# Patient Record
Sex: FEMALE | Race: BLACK OR AFRICAN AMERICAN | NOT HISPANIC OR LATINO | Employment: OTHER | URBAN - METROPOLITAN AREA
[De-identification: names, ages, dates, MRNs, and addresses within clinical notes are randomized per-mention and may not be internally consistent; named-entity substitution may affect disease eponyms.]

---

## 2017-02-14 ENCOUNTER — LAB VISIT (OUTPATIENT)
Dept: LAB | Facility: HOSPITAL | Age: 67
End: 2017-02-14
Attending: FAMILY MEDICINE
Payer: COMMERCIAL

## 2017-02-14 DIAGNOSIS — E78.5 HLD (HYPERLIPIDEMIA): ICD-10-CM

## 2017-02-14 DIAGNOSIS — D64.9 ANEMIA: ICD-10-CM

## 2017-02-14 DIAGNOSIS — M85.80 OSTEOPENIA: ICD-10-CM

## 2017-02-14 DIAGNOSIS — I10 ESSENTIAL HYPERTENSION: ICD-10-CM

## 2017-02-14 DIAGNOSIS — Z00.00 ROUTINE GENERAL MEDICAL EXAMINATION AT A HEALTH CARE FACILITY: ICD-10-CM

## 2017-02-14 LAB
25(OH)D3+25(OH)D2 SERPL-MCNC: 42 NG/ML
ALBUMIN SERPL BCP-MCNC: 3.5 G/DL
ALP SERPL-CCNC: 74 U/L
ALT SERPL W/O P-5'-P-CCNC: 13 U/L
ANION GAP SERPL CALC-SCNC: 5 MMOL/L
AST SERPL-CCNC: 21 U/L
BASOPHILS # BLD AUTO: 0.01 K/UL
BASOPHILS NFR BLD: 0.2 %
BILIRUB SERPL-MCNC: 0.5 MG/DL
BUN SERPL-MCNC: 18 MG/DL
CALCIUM SERPL-MCNC: 9.3 MG/DL
CHLORIDE SERPL-SCNC: 106 MMOL/L
CHOLEST/HDLC SERPL: 2.1 {RATIO}
CO2 SERPL-SCNC: 30 MMOL/L
CREAT SERPL-MCNC: 0.9 MG/DL
DIFFERENTIAL METHOD: ABNORMAL
EOSINOPHIL # BLD AUTO: 0.1 K/UL
EOSINOPHIL NFR BLD: 2 %
ERYTHROCYTE [DISTWIDTH] IN BLOOD BY AUTOMATED COUNT: 15 %
EST. GFR  (AFRICAN AMERICAN): >60 ML/MIN/1.73 M^2
EST. GFR  (NON AFRICAN AMERICAN): >60 ML/MIN/1.73 M^2
GLUCOSE SERPL-MCNC: 86 MG/DL
HCT VFR BLD AUTO: 35.2 %
HDL/CHOLESTEROL RATIO: 47.3 %
HDLC SERPL-MCNC: 184 MG/DL
HDLC SERPL-MCNC: 87 MG/DL
HGB BLD-MCNC: 11.3 G/DL
LDLC SERPL CALC-MCNC: 87.4 MG/DL
LYMPHOCYTES # BLD AUTO: 2.4 K/UL
LYMPHOCYTES NFR BLD: 44.5 %
MCH RBC QN AUTO: 29.8 PG
MCHC RBC AUTO-ENTMCNC: 32.1 %
MCV RBC AUTO: 93 FL
MONOCYTES # BLD AUTO: 0.4 K/UL
MONOCYTES NFR BLD: 7.2 %
NEUTROPHILS # BLD AUTO: 2.5 K/UL
NEUTROPHILS NFR BLD: 45.9 %
NONHDLC SERPL-MCNC: 97 MG/DL
PLATELET # BLD AUTO: 212 K/UL
PMV BLD AUTO: 10 FL
POTASSIUM SERPL-SCNC: 3.9 MMOL/L
PROT SERPL-MCNC: 7.6 G/DL
RBC # BLD AUTO: 3.79 M/UL
SODIUM SERPL-SCNC: 141 MMOL/L
TRIGL SERPL-MCNC: 48 MG/DL
TSH SERPL DL<=0.005 MIU/L-ACNC: 1.5 UIU/ML
WBC # BLD AUTO: 5.42 K/UL

## 2017-02-14 PROCEDURE — 84443 ASSAY THYROID STIM HORMONE: CPT

## 2017-02-14 PROCEDURE — 85025 COMPLETE CBC W/AUTO DIFF WBC: CPT

## 2017-02-14 PROCEDURE — 80061 LIPID PANEL: CPT

## 2017-02-14 PROCEDURE — 82306 VITAMIN D 25 HYDROXY: CPT

## 2017-02-14 PROCEDURE — 80053 COMPREHEN METABOLIC PANEL: CPT

## 2017-02-14 PROCEDURE — 36415 COLL VENOUS BLD VENIPUNCTURE: CPT | Mod: PO

## 2017-02-21 ENCOUNTER — OFFICE VISIT (OUTPATIENT)
Dept: INTERNAL MEDICINE | Facility: CLINIC | Age: 67
End: 2017-02-21
Payer: COMMERCIAL

## 2017-02-21 VITALS
HEART RATE: 64 BPM | DIASTOLIC BLOOD PRESSURE: 80 MMHG | TEMPERATURE: 98 F | WEIGHT: 168.88 LBS | BODY MASS INDEX: 28.83 KG/M2 | SYSTOLIC BLOOD PRESSURE: 134 MMHG | HEIGHT: 64 IN

## 2017-02-21 DIAGNOSIS — G47.9 SLEEP DISORDER: Primary | ICD-10-CM

## 2017-02-21 DIAGNOSIS — D64.9 ANEMIA, UNSPECIFIED TYPE: ICD-10-CM

## 2017-02-21 DIAGNOSIS — Z12.31 ENCOUNTER FOR SCREENING MAMMOGRAM FOR BREAST CANCER: ICD-10-CM

## 2017-02-21 DIAGNOSIS — R35.0 URINARY FREQUENCY: ICD-10-CM

## 2017-02-21 DIAGNOSIS — E78.5 HYPERLIPIDEMIA, UNSPECIFIED HYPERLIPIDEMIA TYPE: ICD-10-CM

## 2017-02-21 DIAGNOSIS — N64.4 BREAST PAIN, LEFT: ICD-10-CM

## 2017-02-21 DIAGNOSIS — E55.9 VITAMIN D DEFICIENCY DISEASE: ICD-10-CM

## 2017-02-21 DIAGNOSIS — R06.83 SNORING: ICD-10-CM

## 2017-02-21 DIAGNOSIS — N60.02 BREAST CYST, LEFT: ICD-10-CM

## 2017-02-21 DIAGNOSIS — I10 ESSENTIAL HYPERTENSION: ICD-10-CM

## 2017-02-21 LAB
CREAT UR-MCNC: 205 MG/DL
MICROALBUMIN UR DL<=1MG/L-MCNC: 18 UG/ML
MICROALBUMIN/CREATININE RATIO: 8.8 UG/MG

## 2017-02-21 PROCEDURE — 1157F ADVNC CARE PLAN IN RCRD: CPT | Mod: S$GLB,,, | Performed by: FAMILY MEDICINE

## 2017-02-21 PROCEDURE — 1126F AMNT PAIN NOTED NONE PRSNT: CPT | Mod: S$GLB,,, | Performed by: FAMILY MEDICINE

## 2017-02-21 PROCEDURE — 1160F RVW MEDS BY RX/DR IN RCRD: CPT | Mod: S$GLB,,, | Performed by: FAMILY MEDICINE

## 2017-02-21 PROCEDURE — 1159F MED LIST DOCD IN RCRD: CPT | Mod: S$GLB,,, | Performed by: FAMILY MEDICINE

## 2017-02-21 PROCEDURE — 3075F SYST BP GE 130 - 139MM HG: CPT | Mod: S$GLB,,, | Performed by: FAMILY MEDICINE

## 2017-02-21 PROCEDURE — 82570 ASSAY OF URINE CREATININE: CPT

## 2017-02-21 PROCEDURE — 3079F DIAST BP 80-89 MM HG: CPT | Mod: S$GLB,,, | Performed by: FAMILY MEDICINE

## 2017-02-21 PROCEDURE — 99214 OFFICE O/P EST MOD 30 MIN: CPT | Mod: S$GLB,,, | Performed by: FAMILY MEDICINE

## 2017-02-21 PROCEDURE — 99999 PR PBB SHADOW E&M-EST. PATIENT-LVL IV: CPT | Mod: PBBFAC,,, | Performed by: FAMILY MEDICINE

## 2017-02-21 NOTE — PROGRESS NOTES
Subjective:      Patient ID: Xin Taylor is a 66 y.o. female.    Chief Complaint: Follow-up (6m multiple issues)    HPI Comments: Patient's coming in today for follow-up of multiple issues and labs.     1. Pt snores a good bit. wants to be screened for sleep apnea. Still tired when waking up in am. Now staying with someone to tell her that she does a lot.  Also reports that she doesn't have restorative sleep.     2. She also complains of urinary frequency mostly at night.  She does drink a good bit a water.  She reports that she does not sleep well because she seems to go to the bathroom a lot at nighttime.  She doesn't seem to have as many symptoms during the daytime.      3. Hyperlipidemia- she is now back on the Lipitor.  Labs reviewed today.  She's actually doing very well.  Also on coenzyme Q 10.      4.. Anemia- currently stable at 11.4.  Normocytic.    No blood loss.  Colonoscopy done 2015 no evidence of bleeding or polyps.    5. Blood pressures controlled with amlodipine 5. No ha. No cp.    6.  She does occasionally report some left chest pain and sometimes breast tenderness.  She had a left breast cyst noted on her ultrasound when done with her last mammogram.  At this time he just is intermittent.  It is not terrible but she just wanted to mention it.  She does not want to proceed forward with doing an ultrasound now but would like to consider doing it at her July appointment.         Lab Results   Component Value Date    WBC 5.42 02/14/2017    HGB 11.3 (L) 02/14/2017    HCT 35.2 (L) 02/14/2017     02/14/2017    CHOL 184 02/14/2017    TRIG 48 02/14/2017    HDL 87 (H) 02/14/2017    ALT 13 02/14/2017    AST 21 02/14/2017     02/14/2017    K 3.9 02/14/2017     02/14/2017    CREATININE 0.9 02/14/2017    BUN 18 02/14/2017    CO2 30 (H) 02/14/2017    TSH 1.496 02/14/2017       Review of Systems   Constitutional: Negative for activity change, appetite change, fatigue and unexpected weight  change.   HENT: Negative for trouble swallowing and voice change.    Respiratory: Negative for cough and shortness of breath.    Cardiovascular: Positive for chest pain and leg swelling. Negative for palpitations.   Genitourinary: Positive for frequency. Negative for difficulty urinating.   Neurological: Negative for light-headedness and headaches.   Psychiatric/Behavioral: Positive for sleep disturbance. The patient is not nervous/anxious.      Objective:     Physical Exam   Constitutional: She appears well-developed and well-nourished.   HENT:   Head: Normocephalic and atraumatic.   Right Ear: Tympanic membrane normal.   Left Ear: Tympanic membrane normal.   Mouth/Throat: Oropharynx is clear and moist.   Eyes: Conjunctivae and EOM are normal.   Neck: Normal range of motion. Neck supple.   Cardiovascular: Normal rate and regular rhythm.    Pulmonary/Chest: Effort normal and breath sounds normal.   Psychiatric: She has a normal mood and affect. Her behavior is normal.   Nursing note and vitals reviewed.    Assessment:     1. Sleep disorder    2. Snoring    3. Essential hypertension    4. Anemia, unspecified type    5. Hyperlipidemia, unspecified hyperlipidemia type    6. Urinary frequency    7. Breast pain, left    8. Breast cyst, left    9. Encounter for screening mammogram for breast cancer    10. Vitamin D deficiency disease      Plan:   Xin was seen today for follow-up.    Diagnoses and all orders for this visit:    Sleep disorder-new problem.  Comments:  pt would like to have sleep study due to snoring and fatigue in am. nonrestorative sleep.  Will refer to sleep disorders clinic to evaluate for sleep apnea.  Orders:  -     Ambulatory consult to Sleep Disorders  -     TSH; Future  -     Lipid panel; Future  -     Comprehensive metabolic panel; Future  -     CBC auto differential; Future  -     Microalbumin/creatinine urine ratio  -     Vitamin D; Future  -     Iron and TIBC; Future    Snoring-new finding.   She's now staying with someone who can tell her that she just more extensively.  We'll refer her to sleep study clinic.  -     Ambulatory consult to Sleep Disorders  -     TSH; Future  -     Lipid panel; Future  -     Comprehensive metabolic panel; Future  -     CBC auto differential; Future  -     Microalbumin/creatinine urine ratio  -     Vitamin D; Future  -     Iron and TIBC; Future    Essential hypertension-stable with amlodipine 5 mg.  Continue with diet and exercise.  -     Ambulatory consult to Sleep Disorders  -     TSH; Future  -     Lipid panel; Future  -     Comprehensive metabolic panel; Future  -     CBC auto differential; Future  -     Microalbumin/creatinine urine ratio  -     Vitamin D; Future  -     Iron and TIBC; Future    Anemia, unspecified type-stable.  Up-to-date on colonoscopy.  Continue with B complex as well as iron tablets.  -     TSH; Future  -     Lipid panel; Future  -     Comprehensive metabolic panel; Future  -     CBC auto differential; Future  -     Microalbumin/creatinine urine ratio  -     Vitamin D; Future  -     Iron and TIBC; Future    Hyperlipidemia, unspecified hyperlipidemia type-improved with atorvastatin.  Continue with medication.  -     TSH; Future  -     Lipid panel; Future  -     Comprehensive metabolic panel; Future  -     CBC auto differential; Future  -     Microalbumin/creatinine urine ratio  -     Vitamin D; Future  -     Iron and TIBC; Future    Urinary frequency-worse only at nighttime.  Advised patient to start keeping a diary and to limit her fluid intake after 7 PM.  Still not improving can consider medication versus referral to urologist.  -     TSH; Future  -     Lipid panel; Future  -     Comprehensive metabolic panel; Future  -     CBC auto differential; Future  -     Microalbumin/creatinine urine ratio  -     Vitamin D; Future  -     Iron and TIBC; Future    Breast pain, left  Comments:  periodically, with documented left breast cyst on exam. schedule  repeats in july. wants to hold off for now.  Advised that if symptoms worsen can perform ultrasound and diagnostic mammogram sooner.  Orders:  -     US Breast Left Limited; Future  -     Mammo Digital Diagnostic Left with CAD; Future  -     TSH; Future  -     Lipid panel; Future  -     Comprehensive metabolic panel; Future  -     CBC auto differential; Future  -     Microalbumin/creatinine urine ratio  -     Vitamin D; Future  -     Iron and TIBC; Future    Breast cyst, left-noted on last breast ultrasound.  Repeating  -     US Breast Left Limited; Future  -     Mammo Digital Diagnostic Left with CAD; Future  -     TSH; Future  -     Lipid panel; Future  -     Comprehensive metabolic panel; Future  -     CBC auto differential; Future  -     Microalbumin/creatinine urine ratio  -     Vitamin D; Future  -     Iron and TIBC; Future    Encounter for screening mammogram for breast cancer  -     Mammo Digital Screening Right with Tomosynthesis_CAD; Future  -     TSH; Future  -     Lipid panel; Future  -     Comprehensive metabolic panel; Future  -     CBC auto differential; Future  -     Microalbumin/creatinine urine ratio  -     Vitamin D; Future  -     Iron and TIBC; Future    Vitamin D deficiency disease-history of, repeating labs prior to next visit.  -     TSH; Future  -     Lipid panel; Future  -     Comprehensive metabolic panel; Future  -     CBC auto differential; Future  -     Microalbumin/creatinine urine ratio  -     Vitamin D; Future  -     Iron and TIBC; Future          Return in about 6 months (around 8/21/2017) for physical.

## 2017-02-21 NOTE — MR AVS SNAPSHOT
[facility]  [address] [place] [address]  Phone: [phone]  Fax: [phone]                  Xin Taylor   2017 11:00 AM   Office Visit    Description:  Female : 1950   Provider:  Breann Carlson MD   Department[facility]  [facility]           Reason for Visit     Follow-up           Diagnoses this Visit        Comments    Sleep disorder    -  Primary pt would like to have sleep study due to snoring and fatigue in am. nonrestorative sleep.     Snoring         Essential hypertension         Anemia, unspecified type         Hyperlipidemia, unspecified hyperlipidemia type         Urinary frequency         Breast pain, left     periodically, with documented left breast cyst on exam. schedule repeats in july. wants to hold off for now.     Breast cyst, left         Encounter for screening mammogram for breast cancer         Vitamin D deficiency disease                To Do List           Future Appointments        Provider Department Dept Phone    2017 2:00 PM Vito Muse MD [facility] [phone]    8/15/2017 7:40 AM LAB[facility]ATORY, [facility] [phone]    2017 11:00 AM Breann Carlson MD [facility] [phone]      Goals (5 Years of Data)     None      Follow-Up and Disposition     Return in about 6 months (around 2017) for physical.      [facility]     [facility] Nurse Care Line -  Assistance  Registered nurses in the [facility] provide clinical advisement, health education, appointment booking, and other advisory services.  Call for this free service at [phone].             Medications           Message regarding Medications     Verify the changes and/or additions to your medication regime listed below are the same as discussed with your clinician today.  If any of these changes or additions are incorrect, please notify your  "healthcare provider.             Verify that the below list of medications is an accurate representation of the medications you are currently taking.  If none reported, the list may be blank. If incorrect, please contact your healthcare provider. Carry this list with you in case of emergency.           Current Medications     amlodipine (NORVASC) 5 MG tablet Take 1 tablet (5 mg total) by mouth nightly.    aspirin 81 mg Tab Take 1 tablet by mouth Use as needed.    atorvastatin (LIPITOR) 10 MG tablet Take 1 tablet (10 mg total) by mouth once daily.    b complex vitamins tablet Take 1 tablet by mouth once daily.    biotin 1 mg tablet Take 1,000 mcg by mouth 3 (three) times daily.    co-enzyme Q-10 30 mg capsule Take 30 mg by mouth 3 (three) times daily.    fish oil-omega-3 fatty acids 300-1,000 mg capsule Take 2 g by mouth once daily.    melatonin 1 mg Tab     multivitamin (ONE DAILY MULTIVITAMIN) per tablet Take 1 tablet by mouth once daily.    vitamin D 1000 units Tab Take 185 mg by mouth once daily.           Clinical Reference Information           Your Vitals Were     BP Pulse Temp Height Weight BMI    134/80 64 98 °F (36.7 °C) 5' 4" (1.626 m) 76.6 kg (168 lb 14 oz) 28.99 kg/m2      Blood Pressure          Most Recent Value    BP  134/80      Allergies as of 2/21/2017     No Known Drug Allergies      Immunizations Administered on Date of Encounter - 2/21/2017     None      Orders Placed During Today's Visit      Normal Orders This Visit    Ambulatory consult to Sleep Disorders     Microalbumin/creatinine urine ratio     Future Labs/Procedures Expected by Expires    CBC auto differential  2/21/2017 4/22/2018    Comprehensive metabolic panel  2/21/2017 4/22/2018    Iron and TIBC  2/21/2017 4/22/2018    Lipid panel  2/21/2017 4/22/2018    Mammo Digital Screening Right with Tomosynthesis_CAD  2/21/2017 4/21/2018    TSH  2/21/2017 4/22/2018    Vitamin D  2/21/2017 4/22/2018    Mammo Digital Diagnostic Left with CAD  " 7/28/2017 2/21/2018    US Breast Left Limited  7/28/2017 2/21/2018      [facility] Sign-Up     Activating your [facility] account is as easy as 1-2-3!     1) Visit [email or web address], select Sign Up Now, enter this activation code and your date of birth, then select Next.  [email or web address]  Expires: 4/7/2017 10:54 AM      2) Create a username and password to use when you visit [facility] in the future and select a security question in case you lose your password and select Next.    3) Enter your e-mail address and click Sign Up!    Additional Information  If you have questions, please e-mail [email or web address] or call [phone] to talk to our [facility] staff. Remember, [facility] is NOT to be used for urgent needs. For medical emergencies, dial 911.         Language Assistance Services     ATTENTION: Language assistance services are available, free of charge. Please call [phone].      ATENCIÓN: Si habla andre, tiene a corral disposición servicios gratuitos de asistencia lingüística. Llame al [phone].     CHÚ Ý: N?u b?n nói Ti?ng Vi?t, có các d?ch v? h? tr? ngôn ng? mi?n phí dành cho b?n. G?i s? [phone].         [facility] complies with applicable Federal civil rights laws and does not discriminate on the basis of race, color, national origin, age, disability, or sex.

## 2017-03-31 ENCOUNTER — TELEPHONE (OUTPATIENT)
Dept: PULMONOLOGY | Facility: CLINIC | Age: 67
End: 2017-03-31

## 2017-03-31 NOTE — TELEPHONE ENCOUNTER
----- Message from Nallely Kaye sent at 3/31/2017 12:24 PM CDT -----  Contact: pt  Pt is calling nurse staff regarding pt appt for April 6th at 2:00 pm  appt.  Pt call back to be advised [phone] thanks

## 2017-05-18 ENCOUNTER — TELEPHONE (OUTPATIENT)
Dept: SLEEP MEDICINE | Facility: CLINIC | Age: 67
End: 2017-05-18

## 2017-05-18 DIAGNOSIS — G47.30 SLEEP DISORDER BREATHING: Primary | ICD-10-CM

## 2017-05-19 ENCOUNTER — OFFICE VISIT (OUTPATIENT)
Dept: PULMONOLOGY | Facility: CLINIC | Age: 67
End: 2017-05-19
Payer: COMMERCIAL

## 2017-05-19 ENCOUNTER — HOSPITAL ENCOUNTER (OUTPATIENT)
Dept: RADIOLOGY | Facility: HOSPITAL | Age: 67
Discharge: HOME | End: 2017-05-19
Attending: INTERNAL MEDICINE
Payer: COMMERCIAL

## 2017-05-19 VITALS
HEIGHT: 64 IN | RESPIRATION RATE: 18 BRPM | WEIGHT: 171.06 LBS | BODY MASS INDEX: 29.2 KG/M2 | HEART RATE: 89 BPM | DIASTOLIC BLOOD PRESSURE: 82 MMHG | OXYGEN SATURATION: 98 % | SYSTOLIC BLOOD PRESSURE: 110 MMHG

## 2017-05-19 DIAGNOSIS — G47.33 SLEEP APNEA, OBSTRUCTIVE: Primary | Chronic | ICD-10-CM

## 2017-05-19 DIAGNOSIS — I10 ESSENTIAL HYPERTENSION: ICD-10-CM

## 2017-05-19 DIAGNOSIS — K13.79 MASS OF ORAL CAVITY: ICD-10-CM

## 2017-05-19 DIAGNOSIS — G47.30 SLEEP DISORDER BREATHING: ICD-10-CM

## 2017-05-19 PROCEDURE — 1157F ADVNC CARE PLAN IN RCRD: CPT | Mod: 8P,S$GLB,, | Performed by: INTERNAL MEDICINE

## 2017-05-19 PROCEDURE — 99205 OFFICE O/P NEW HI 60 MIN: CPT | Mod: S$GLB,,, | Performed by: INTERNAL MEDICINE

## 2017-05-19 PROCEDURE — 3074F SYST BP LT 130 MM HG: CPT | Mod: S$GLB,,, | Performed by: INTERNAL MEDICINE

## 2017-05-19 PROCEDURE — 1159F MED LIST DOCD IN RCRD: CPT | Mod: S$GLB,,, | Performed by: INTERNAL MEDICINE

## 2017-05-19 PROCEDURE — 71020 XR CHEST PA AND LATERAL: CPT | Mod: TC,PO

## 2017-05-19 PROCEDURE — 1126F AMNT PAIN NOTED NONE PRSNT: CPT | Mod: S$GLB,,, | Performed by: INTERNAL MEDICINE

## 2017-05-19 PROCEDURE — 71020 XR CHEST PA AND LATERAL: CPT | Mod: 26,,, | Performed by: RADIOLOGY

## 2017-05-19 PROCEDURE — 1160F RVW MEDS BY RX/DR IN RCRD: CPT | Mod: S$GLB,,, | Performed by: INTERNAL MEDICINE

## 2017-05-19 PROCEDURE — 99999 PR PBB SHADOW E&M-EST. PATIENT-LVL IV: CPT | Mod: PBBFAC,,, | Performed by: INTERNAL MEDICINE

## 2017-05-19 PROCEDURE — 3079F DIAST BP 80-89 MM HG: CPT | Mod: S$GLB,,, | Performed by: INTERNAL MEDICINE

## 2017-05-19 NOTE — LETTER
May 19, 2017      Breann Carlson MD  [address]  [address]  [place] [address]           [facility]  [address]  [place] [address]  Phone: [phone]  Fax: [phone]          Patient: Xin Taylor   MR Number: [id number]   YOB: 1950   Date of Visit: 5/19/2017       Dear Dr. Breann Carlson:    Thank you for referring Xin Taylor to me for evaluation. Attached you will find relevant portions of my assessment and plan of care.    If you have questions, please do not hesitate to call me. I look forward to following Xin Taylor along with you.    Sincerely,    Vito Muse MD    Enclosure  CC:  No Recipients    If you would like to receive this communication electronically, please contact [email or web address] or [phone] to request more information on [employer] access.    For providers and/or their staff who would like to refer a patient to [facility], please contact us through our one-stop-shop provider referral line, [facility], at [phone].    If you feel you have received this communication in error or would no longer like to receive these types of communications, please e-mail [email or web address]

## 2017-05-19 NOTE — PATIENT INSTRUCTIONS
Your provider has scheduled you for a sleep study.   You should be receiving a phone call from the sleep lab shortly after your study has been approved by your insurance. Please make sure you have your current phone numbers in the [facility] system. If you do not hear from anyone in the next 10 business days, please call the sleep lab at [phone] to schedule your sleep study. The sleep studies are performed at [facility] seven nights a week.  When you are scheduling your sleep study, they will also make you a follow up appointment with your provider. This follow up appointment will be 10-14 days after your sleep study to review the results. If it is noted that you do have sleep apnea on your initial sleep study, you may receive a call back for a second night study with the CPAP before you come back to the office.     Thank You    Thank you for choosing the PULMONARY MEDICINE DEPARTMENT at [facility]. At [facility], your satisfaction is our priority. You may receive a survey asking about your experience with us. We would appreciate you taking the time to complete and return the survey. Our goal is to provide you with a level 5 or Very Good experience. We thank you for allowing us to serve you and value your feedback.    Your Nurse/Medical Assistant: ___Sid Villegas___________________________    Sincerely,  Pulmonary Department  Phone: [phone]  Fax: [phone]

## 2017-05-19 NOTE — MR AVS SNAPSHOT
[facility] - Pulmonary Services  [address] [place] [address]  Phone: [phone]  Fax: [phone]                  Xin Taylor   2017 2:40 PM   Office Visit    Description:  Female : 1950   Provider:  Vito Muse MD   Department:  [facility] - Pulmonary Services           Reason for Visit     Sleep Eval           Diagnoses this Visit        Comments    Essential hypertension    -  Primary     Mass of oral cavity         Sleep apnea, obstructive                To Do List           Future Appointments        Provider Department Dept Phone    2017 9:45 AM [facility] US2 [facility] [phone]    2017 10:30 AM [facility] MAMMO2-DX [facility] [phone]    8/15/2017 7:40 AM LABORATORY, [facility] [phone]    2017 11:00 AM [facility] [phone]      Goals (5 Years of Data)     None      Follow-Up and Disposition     Return for Follow up Sleep Clinic after test.      [facility]     [facility] Care Line -  Assistance  Unless otherwise directed by your provider, please contact [facility], our nurse care line that is available for  assistance.     Registered nurses in the [facility] provide: appointment scheduling, clinical advisement, health education, and other advisory services.  Call: [phone] (toll free)               Medications           Message regarding Medications     Verify the changes and/or additions to your medication regime listed below are the same as discussed with your clinician today.  If any of these changes or additions are incorrect, please notify your healthcare provider.        STOP taking these medications     biotin 1 mg tablet Take 1,000 mcg by mouth 3 (three) times daily.    melatonin 1 mg Tab            Verify that the below list of medications is an accurate representation of the medications you are  "currently taking.  If none reported, the list may be blank. If incorrect, please contact your healthcare provider. Carry this list with you in case of emergency.           Current Medications     amlodipine (NORVASC) 5 MG tablet Take 1 tablet (5 mg total) by mouth nightly.    aspirin 81 mg Tab Take 1 tablet by mouth Use as needed.    atorvastatin (LIPITOR) 10 MG tablet Take 1 tablet (10 mg total) by mouth once daily.    b complex vitamins tablet Take 1 tablet by mouth once daily.    co-enzyme Q-10 30 mg capsule Take 30 mg by mouth 3 (three) times daily.    fish oil-omega-3 fatty acids 300-1,000 mg capsule Take 2 g by mouth once daily.    multivitamin (ONE DAILY MULTIVITAMIN) per tablet Take 1 tablet by mouth once daily.    KEGEP-UXUIC-EOV TEA-EGCG-DIG#3 ORAL Take by mouth.    vitamin D 1000 units Tab Take 185 mg by mouth once daily.           Clinical Reference Information           Your Vitals Were     BP Pulse Resp Height Weight SpO2    110/82 (BP Location: Left arm, Patient Position: Sitting, BP Method: Manual) 89 18 5' 4" (1.626 m) 77.6 kg (171 lb 1.2 oz) 98%    BMI                29.37 kg/m2          Blood Pressure          Most Recent Value    BP  110/82      Allergies as of 5/19/2017     No Known Drug Allergies      Immunizations Administered on Date of Encounter - 5/19/2017     None      Orders Placed During Today's Visit     Future Labs/Procedures Expected by Expires    Polysomnogram (CPAP will be added if patient meets diagnostic criteria.)  As directed 5/19/2018      [facility] Sign-Up     Activating your [facility] account is as easy as 1-2-3!     1) Visit [email or web address], select Sign Up Now, enter this activation code and your date of birth, then select Next.  [email or web address]  Expires: 7/3/2017  3:41 PM      2) Create a username and password to use when you visit [facility] in the future and select a security question in case you lose your password and select Next.    3) Enter your e-mail address and " click Sign Up!    Additional Information  If you have questions, please e-mail [email or web address] or call [phone] to talk to our [facility] staff. Remember, [facility] is NOT to be used for urgent needs. For medical emergencies, dial 911.         Instructions    Your provider has scheduled you for a sleep study.   You should be receiving a phone call from the sleep lab shortly after your study has been approved by your insurance. Please make sure you have your current phone numbers in the [facility] system. If you do not hear from anyone in the next 10 business days, please call the sleep lab at [phone] to schedule your sleep study. The sleep studies are performed at [facility] seven nights a week.  When you are scheduling your sleep study, they will also make you a follow up appointment with your provider. This follow up appointment will be 10-14 days after your sleep study to review the results. If it is noted that you do have sleep apnea on your initial sleep study, you may receive a call back for a second night study with the CPAP before you come back to the office.     Thank You    Thank you for choosing the PULMONARY MEDICINE DEPARTMENT at [facility]. At [facility], your satisfaction is our priority. You may receive a survey asking about your experience with us. We would appreciate you taking the time to complete and return the survey. Our goal is to provide you with a level 5 or Very Good experience. We thank you for allowing us to serve you and value your feedback.    Your Nurse/Medical Assistant: ___Sid Villegas___________________________    Sincerely,  Pulmonary Department  Phone: [phone]  Fax: [phone]              Language Assistance Services     ATTENTION: Language assistance services are available, free of charge. Please call [phone].      ATENCIÓN: Si habla español, tiene a corral disposición servicios gratuitos de asistencia  lingüística. Brayden al [phone].     KAVON Ý: N?u b?n nói Ti?ng Vi?t, có các d?ch v? h? tr? ngôn ng? mi?n phí dành cho b?n. G?i s? [phone].         [facility] - [facility] Services complies with applicable Federal civil rights laws and does not discriminate on the basis of race, color, national origin, age, disability, or sex.
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
## 2017-05-30 DIAGNOSIS — G47.33 SLEEP APNEA, OBSTRUCTIVE: Primary | Chronic | ICD-10-CM

## 2017-06-10 RX ORDER — ATORVASTATIN CALCIUM 10 MG/1
10 TABLET, FILM COATED ORAL DAILY
Qty: 90 TABLET | Refills: 3 | Status: SHIPPED | OUTPATIENT
Start: 2017-06-10 | End: 2018-02-22 | Stop reason: SDUPTHER

## 2017-06-12 ENCOUNTER — OFFICE VISIT (OUTPATIENT)
Dept: SLEEP MEDICINE | Facility: CLINIC | Age: 67
End: 2017-06-12
Payer: COMMERCIAL

## 2017-06-12 DIAGNOSIS — G47.33 SLEEP APNEA, OBSTRUCTIVE: Chronic | ICD-10-CM

## 2017-06-12 PROCEDURE — 95806 SLEEP STUDY UNATT&RESP EFFT: CPT | Mod: S$GLB,,, | Performed by: INTERNAL MEDICINE

## 2017-06-12 PROCEDURE — 99499 UNLISTED E&M SERVICE: CPT | Mod: S$GLB,,, | Performed by: INTERNAL MEDICINE

## 2017-06-12 PROCEDURE — 99999 PR PBB SHADOW E&M-EST. PATIENT-LVL II: CPT | Mod: PBBFAC,,,

## 2017-06-12 NOTE — Clinical Note
Please inform patient's sleep study shows she has obstructive sleep apnea and I will be ordering a CPAP device for her.

## 2017-06-13 ENCOUNTER — TELEPHONE (OUTPATIENT)
Dept: PULMONOLOGY | Facility: CLINIC | Age: 67
End: 2017-06-13

## 2017-06-13 NOTE — TELEPHONE ENCOUNTER
----- Message from Vito Muse MD sent at 6/12/2017  9:50 PM CDT -----  Please inform patient's sleep study shows she has obstructive sleep apnea and I will be ordering a CPAP device for her.

## 2017-06-13 NOTE — PROGRESS NOTES
Assessment and Recommendations  Device duration 7 hours 55 minutes. Excluded respiratory signal 2 minutes excluded O2 signal 2 minutes.  Lowest oxygen saturation was 80%. Snoring recorded about 50 dB 99% of the time. Apnea-hypopnea  index/respiratory event index 13.4 events per hour. Total events 106.  Obstructive sleep apnea-hypopnea syndrome is detected.  Treatment with CPAP is indicated. Auto PAP 4-20 cm water pressure.  If patient is intolerant to CPAP then other modalities may be considered including oral appliance therapy,  mandibular advancement device, nasal Provent where appropriate. ENT appropriate procedures in the correct  candidate.  Follow-up with ordering clinician

## 2017-07-28 ENCOUNTER — TELEPHONE (OUTPATIENT)
Dept: INTERNAL MEDICINE | Facility: CLINIC | Age: 67
End: 2017-07-28

## 2017-07-28 DIAGNOSIS — Z12.31 ENCOUNTER FOR SCREENING MAMMOGRAM FOR BREAST CANCER: Primary | ICD-10-CM

## 2017-08-15 ENCOUNTER — LAB VISIT (OUTPATIENT)
Dept: LAB | Facility: HOSPITAL | Age: 67
End: 2017-08-15
Attending: FAMILY MEDICINE
Payer: COMMERCIAL

## 2017-08-15 DIAGNOSIS — R06.83 SNORING: ICD-10-CM

## 2017-08-15 DIAGNOSIS — R35.0 URINARY FREQUENCY: ICD-10-CM

## 2017-08-15 DIAGNOSIS — G47.9 SLEEP DISORDER: ICD-10-CM

## 2017-08-15 DIAGNOSIS — Z12.31 ENCOUNTER FOR SCREENING MAMMOGRAM FOR BREAST CANCER: ICD-10-CM

## 2017-08-15 DIAGNOSIS — I10 ESSENTIAL HYPERTENSION: ICD-10-CM

## 2017-08-15 DIAGNOSIS — D64.9 ANEMIA, UNSPECIFIED TYPE: ICD-10-CM

## 2017-08-15 DIAGNOSIS — E78.5 HYPERLIPIDEMIA, UNSPECIFIED HYPERLIPIDEMIA TYPE: ICD-10-CM

## 2017-08-15 DIAGNOSIS — N64.4 BREAST PAIN, LEFT: ICD-10-CM

## 2017-08-15 DIAGNOSIS — N60.02 BREAST CYST, LEFT: ICD-10-CM

## 2017-08-15 DIAGNOSIS — E55.9 VITAMIN D DEFICIENCY DISEASE: ICD-10-CM

## 2017-08-15 LAB
25(OH)D3+25(OH)D2 SERPL-MCNC: 43 NG/ML
ALBUMIN SERPL BCP-MCNC: 3.5 G/DL
ALP SERPL-CCNC: 76 U/L
ALT SERPL W/O P-5'-P-CCNC: 19 U/L
ANION GAP SERPL CALC-SCNC: 6 MMOL/L
AST SERPL-CCNC: 22 U/L
BASOPHILS # BLD AUTO: 0.01 K/UL
BASOPHILS NFR BLD: 0.2 %
BILIRUB SERPL-MCNC: 0.5 MG/DL
BUN SERPL-MCNC: 13 MG/DL
CALCIUM SERPL-MCNC: 9.4 MG/DL
CHLORIDE SERPL-SCNC: 109 MMOL/L
CHOLEST/HDLC SERPL: 2 {RATIO}
CO2 SERPL-SCNC: 28 MMOL/L
CREAT SERPL-MCNC: 0.8 MG/DL
DIFFERENTIAL METHOD: ABNORMAL
EOSINOPHIL # BLD AUTO: 0.1 K/UL
EOSINOPHIL NFR BLD: 1.9 %
ERYTHROCYTE [DISTWIDTH] IN BLOOD BY AUTOMATED COUNT: 15.4 %
EST. GFR  (AFRICAN AMERICAN): >60 ML/MIN/1.73 M^2
EST. GFR  (NON AFRICAN AMERICAN): >60 ML/MIN/1.73 M^2
GLUCOSE SERPL-MCNC: 83 MG/DL
HCT VFR BLD AUTO: 33.2 %
HDL/CHOLESTEROL RATIO: 49.7 %
HDLC SERPL-MCNC: 169 MG/DL
HDLC SERPL-MCNC: 84 MG/DL
HGB BLD-MCNC: 11.1 G/DL
IRON SERPL-MCNC: 66 UG/DL
LDLC SERPL CALC-MCNC: 76.4 MG/DL
LYMPHOCYTES # BLD AUTO: 2.2 K/UL
LYMPHOCYTES NFR BLD: 47.8 %
MCH RBC QN AUTO: 30 PG
MCHC RBC AUTO-ENTMCNC: 33.4 G/DL
MCV RBC AUTO: 90 FL
MONOCYTES # BLD AUTO: 0.4 K/UL
MONOCYTES NFR BLD: 8.2 %
NEUTROPHILS # BLD AUTO: 1.9 K/UL
NEUTROPHILS NFR BLD: 41.7 %
NONHDLC SERPL-MCNC: 85 MG/DL
PLATELET # BLD AUTO: 240 K/UL
PMV BLD AUTO: 9.9 FL
POTASSIUM SERPL-SCNC: 3.7 MMOL/L
PROT SERPL-MCNC: 7.4 G/DL
RBC # BLD AUTO: 3.7 M/UL
SATURATED IRON: 15 %
SODIUM SERPL-SCNC: 143 MMOL/L
TOTAL IRON BINDING CAPACITY: 443 UG/DL
TRANSFERRIN SERPL-MCNC: 299 MG/DL
TRIGL SERPL-MCNC: 43 MG/DL
TSH SERPL DL<=0.005 MIU/L-ACNC: 1.45 UIU/ML
WBC # BLD AUTO: 4.62 K/UL

## 2017-08-15 PROCEDURE — 36415 COLL VENOUS BLD VENIPUNCTURE: CPT | Mod: PO

## 2017-08-15 PROCEDURE — 80053 COMPREHEN METABOLIC PANEL: CPT

## 2017-08-15 PROCEDURE — 80061 LIPID PANEL: CPT

## 2017-08-15 PROCEDURE — 83540 ASSAY OF IRON: CPT

## 2017-08-15 PROCEDURE — 84443 ASSAY THYROID STIM HORMONE: CPT

## 2017-08-15 PROCEDURE — 82306 VITAMIN D 25 HYDROXY: CPT

## 2017-08-15 PROCEDURE — 85025 COMPLETE CBC W/AUTO DIFF WBC: CPT

## 2017-08-22 ENCOUNTER — OFFICE VISIT (OUTPATIENT)
Dept: INTERNAL MEDICINE | Facility: CLINIC | Age: 67
End: 2017-08-22
Payer: COMMERCIAL

## 2017-08-22 VITALS
HEART RATE: 82 BPM | WEIGHT: 178.56 LBS | SYSTOLIC BLOOD PRESSURE: 122 MMHG | DIASTOLIC BLOOD PRESSURE: 80 MMHG | HEIGHT: 64 IN | BODY MASS INDEX: 30.48 KG/M2 | TEMPERATURE: 98 F

## 2017-08-22 DIAGNOSIS — D50.8 IRON DEFICIENCY ANEMIA SECONDARY TO INADEQUATE DIETARY IRON INTAKE: ICD-10-CM

## 2017-08-22 DIAGNOSIS — E78.5 HYPERLIPIDEMIA, UNSPECIFIED HYPERLIPIDEMIA TYPE: ICD-10-CM

## 2017-08-22 DIAGNOSIS — G47.33 SLEEP APNEA, OBSTRUCTIVE: Chronic | ICD-10-CM

## 2017-08-22 DIAGNOSIS — M85.80 OSTEOPENIA, UNSPECIFIED LOCATION: ICD-10-CM

## 2017-08-22 DIAGNOSIS — Z00.00 ROUTINE GENERAL MEDICAL EXAMINATION AT A HEALTH CARE FACILITY: Primary | ICD-10-CM

## 2017-08-22 DIAGNOSIS — I10 ESSENTIAL HYPERTENSION: ICD-10-CM

## 2017-08-22 DIAGNOSIS — M17.0 PRIMARY OSTEOARTHRITIS OF BOTH KNEES: ICD-10-CM

## 2017-08-22 PROCEDURE — 99999 PR PBB SHADOW E&M-EST. PATIENT-LVL III: CPT | Mod: PBBFAC,,, | Performed by: FAMILY MEDICINE

## 2017-08-22 PROCEDURE — 99397 PER PM REEVAL EST PAT 65+ YR: CPT | Mod: S$GLB,,, | Performed by: FAMILY MEDICINE

## 2017-08-22 RX ORDER — FERROUS SULFATE 325(65) MG
325 TABLET ORAL DAILY
Qty: 100 TABLET | Refills: 3 | Status: SHIPPED | OUTPATIENT
Start: 2017-08-22 | End: 2018-08-21

## 2017-08-22 RX ORDER — GUAIFENESIN AND PHENYLEPHRINE HCL 400; 10 MG/1; MG/1
500 TABLET ORAL 2 TIMES DAILY
Qty: 100 CAPSULE | Refills: 4
Start: 2017-08-22

## 2017-08-22 NOTE — PROGRESS NOTES
Subjective:      Patient ID: iXn Taylor is a 66 y.o. female.    Chief Complaint: Annual Exam    Patient's coming in today for follow-up of multiple issues and labs and prevention exam..     1. Pt is now on a CPAP machine.  She actually finds in the last month she feels much more restorative.  Her blood pressure is much improved.  Her energy level is much improved.      2. She does occasionally have some knee pain and discomfort especially more in the right knee than the left.  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  She has difficulty getting up from the bathtub and difficulty getting up from the laying down position.  Mainly this is because she can't fully flex her knees.  She was interested in possibly using turmeric.     3. Hyperlipidemia- she is now back on the Lipitor.  Labs reviewed today.  She's actually doing very well.  Also on coenzyme Q 10.      4.. Anemia- currently stable at 11.4.  Normocytic.    No blood loss.  Colonoscopy done 2015 no evidence of bleeding or polyps.  Is willing to increase her iron because once again saturations are low.    5. Blood pressures controlled with amlodipine 5. No ha. No cp.  Improved with the use of CPAP as well.    6.  Reviewed her bone density noted to be osteopenia.  Discussed need for 1200 mg of calcium through diet and vitamin D supplementation through supplements.  Currently vitamin D levels look good.        Lab Results   Component Value Date    WBC 4.62 08/15/2017    HGB 11.1 (L) 08/15/2017    HCT 33.2 (L) 08/15/2017     08/15/2017    CHOL 169 08/15/2017    TRIG 43 08/15/2017    HDL 84 (H) 08/15/2017    ALT 19 08/15/2017    AST 22 08/15/2017     08/15/2017    K 3.7 08/15/2017     08/15/2017    CREATININE 0.8 08/15/2017    BUN 13 08/15/2017    CO2 28 08/15/2017    TSH 1.446 08/15/2017       Review of Systems   Constitutional: Negative for chills, fatigue and fever.   HENT: Negative for ear pain and trouble swallowing.     Eyes: Negative for pain and visual disturbance.   Respiratory: Negative for cough and shortness of breath.    Cardiovascular: Negative for chest pain and leg swelling.   Gastrointestinal: Negative for abdominal pain, blood in stool, nausea and vomiting.   Endocrine: Negative for cold intolerance and heat intolerance.   Genitourinary: Negative for dysuria and frequency.   Musculoskeletal: Positive for arthralgias. Negative for joint swelling, myalgias and neck pain.   Skin: Negative for color change and rash.   Neurological: Negative for dizziness and headaches.   Psychiatric/Behavioral: Negative for behavioral problems, decreased concentration, dysphoric mood and sleep disturbance.     Objective:     Physical Exam   Constitutional: She is oriented to person, place, and time. She appears well-developed and well-nourished.   HENT:   Head: Normocephalic and atraumatic.   Right Ear: External ear normal.   Left Ear: External ear normal.   Mouth/Throat: Oropharynx is clear and moist.   Eyes: EOM are normal.   Neck: Normal range of motion. Neck supple. No thyromegaly present.   Cardiovascular: Normal rate and regular rhythm.  Exam reveals no gallop and no friction rub.    No murmur heard.  Pulmonary/Chest: Effort normal. No respiratory distress. She has no wheezes. She has no rales.   Abdominal: Soft. Bowel sounds are normal. She exhibits no distension. There is no tenderness. There is no rebound.   Musculoskeletal: Normal range of motion. She exhibits no edema.        Right knee: She exhibits normal range of motion and no swelling. Tenderness found. Medial joint line tenderness noted.        Left knee: She exhibits normal range of motion and no swelling. Tenderness found. Medial joint line tenderness noted.   Lymphadenopathy:     She has no cervical adenopathy.   Neurological: She is alert and oriented to person, place, and time.   Skin: Skin is warm and dry. No rash noted.   Psychiatric: She has a normal mood and  affect. Her behavior is normal. Judgment and thought content normal.   Vitals reviewed.    Assessment:     1. Routine general medical examination at a health care facility    2. Hyperlipidemia, unspecified hyperlipidemia type    3. Essential hypertension    4. Iron deficiency anemia secondary to inadequate dietary iron intake    5. Sleep apnea, obstructive    6. Osteopenia, unspecified location    7. Primary osteoarthritis of both knees      Plan:   Xin was seen today for annual exam.    Diagnoses and all orders for this visit:    Routine general medical examination at a health care facility - labs reviewed. Discussed Health Maintenance issues.       Hyperlipidemia, unspecified hyperlipidemia type - stable, Continue with current medications and interventions. Labs reviewed.     -     Lipid panel; Future  -     Comprehensive metabolic panel; Future  -     CBC auto differential; Future  -     TSH; Future  -     Iron and TIBC; Future    Essential hypertension- stable, Continue with current medications and interventions. Labs reviewed.  -     Lipid panel; Future  -     Comprehensive metabolic panel; Future  -     CBC auto differential; Future  -     TSH; Future  -     Iron and TIBC; Future    Iron deficiency anemia secondary to inadequate dietary iron intake-add an additional iron supplement mid day.  Anemia stable at 11-  -     Lipid panel; Future  -     Comprehensive metabolic panel; Future  -     CBC auto differential; Future  -     TSH; Future  -     Iron and TIBC; Future    Sleep apnea, obstructive-Improved with CPAP using now nightly.  Finds benefit from device.  -     Lipid panel; Future  -     Comprehensive metabolic panel; Future  -     CBC auto differential; Future  -     TSH; Future  -     Iron and TIBC; Future    Osteopenia, unspecified location-Review DEXA scan obtained 1200 mg calcium through diet vitamin D3 supplement at goal levels.    Primary osteoarthritis of both knees-Okay to start daily  supplement of turmeric root extract 500 mg Cap    Other orders  -     ferrous sulfate 325 mg (65 mg iron) Tab tablet; Take 1 tablet (325 mg total) by mouth once daily.  -     turmeric root extract 500 mg Cap; Take 500 mg by mouth 2 (two) times daily.    She has a mammogram scheduled for tomorrow.            Return in about 6 months (around 2/22/2018) for chol, bp, anemia, iron.

## 2017-08-23 ENCOUNTER — HOSPITAL ENCOUNTER (OUTPATIENT)
Dept: RADIOLOGY | Facility: HOSPITAL | Age: 67
Discharge: HOME | End: 2017-08-23
Attending: FAMILY MEDICINE
Payer: COMMERCIAL

## 2017-08-23 DIAGNOSIS — Z12.31 ENCOUNTER FOR SCREENING MAMMOGRAM FOR BREAST CANCER: ICD-10-CM

## 2017-08-23 PROCEDURE — 77063 BREAST TOMOSYNTHESIS BI: CPT | Mod: 26,,, | Performed by: RADIOLOGY

## 2017-08-23 PROCEDURE — 77067 SCR MAMMO BI INCL CAD: CPT | Mod: 26,,, | Performed by: RADIOLOGY

## 2017-08-23 PROCEDURE — 77067 SCR MAMMO BI INCL CAD: CPT | Mod: TC

## 2017-09-14 ENCOUNTER — OFFICE VISIT (OUTPATIENT)
Dept: SLEEP MEDICINE | Facility: CLINIC | Age: 67
End: 2017-09-14
Payer: COMMERCIAL

## 2017-09-14 VITALS
HEIGHT: 64 IN | OXYGEN SATURATION: 98 % | SYSTOLIC BLOOD PRESSURE: 110 MMHG | HEART RATE: 97 BPM | BODY MASS INDEX: 30.14 KG/M2 | RESPIRATION RATE: 18 BRPM | WEIGHT: 176.56 LBS | DIASTOLIC BLOOD PRESSURE: 62 MMHG

## 2017-09-14 DIAGNOSIS — G47.33 OSA ON CPAP: Primary | ICD-10-CM

## 2017-09-14 PROCEDURE — 3008F BODY MASS INDEX DOCD: CPT | Mod: S$GLB,,, | Performed by: INTERNAL MEDICINE

## 2017-09-14 PROCEDURE — 3078F DIAST BP <80 MM HG: CPT | Mod: S$GLB,,, | Performed by: INTERNAL MEDICINE

## 2017-09-14 PROCEDURE — 3074F SYST BP LT 130 MM HG: CPT | Mod: S$GLB,,, | Performed by: INTERNAL MEDICINE

## 2017-09-14 PROCEDURE — 99999 PR PBB SHADOW E&M-EST. PATIENT-LVL III: CPT | Mod: PBBFAC,,, | Performed by: INTERNAL MEDICINE

## 2017-09-14 PROCEDURE — 99214 OFFICE O/P EST MOD 30 MIN: CPT | Mod: S$GLB,,, | Performed by: INTERNAL MEDICINE

## 2017-09-14 PROCEDURE — 1159F MED LIST DOCD IN RCRD: CPT | Mod: S$GLB,,, | Performed by: INTERNAL MEDICINE

## 2017-09-14 NOTE — PROGRESS NOTES
"Subjective:       Patient ID: Xin Taylor is a 66 y.o. female.    Chief Complaint: kathryn on cpap    Ms. Denise Taylor comes to see me as a follow-up  She had a sleep study.  AHI was 13.4 events per hour.  I prescribed AutoPap 4-20 cm water pressure   she is using the device and benefits from it  Mean pressure was 10.2.  Residual AHI is 7.0.    She is having some mask leak about an hour  Her usage greater than 4 hours was 93%.  I like her to have change of mask to see whether this will help of mask leak  She'll follow-up annually.  She'll let me know if there are any issues with mask  She still some issues with her maxillary teeth on the upper left which she'll address with a dentist        Review of Systems   Constitutional: Negative.    HENT: Negative.    Eyes: Negative.    Respiratory: Negative.    Genitourinary: Negative.    Endocrine: endocrine negative   Musculoskeletal: Negative.    Skin: Negative.    Gastrointestinal: Negative.    Neurological: Negative.    Psychiatric/Behavioral: Negative.        Objective:       Vitals:    09/14/17 0940   BP: 110/62   Pulse: 97   Resp: 18   SpO2: 98%   Weight: 80.1 kg (176 lb 9.4 oz)   Height: 5' 4" (1.626 m)     Physical Exam   Constitutional: She is oriented to person, place, and time. She appears well-developed and well-nourished.   HENT:   Head: Normocephalic.   Nose: Nose normal.   Neck: Normal range of motion.   Musculoskeletal: Normal range of motion.   Neurological: She is alert and oriented to person, place, and time.   Skin: Skin is warm and dry.   Psychiatric: She has a normal mood and affect.   Nursing note and vitals reviewed.    Personal Diagnostic Review  DOWNLOAD    8/14/2017 - 9/12/2017  YOB: 1950  Mask:  Compliance Summary  8/14/2017 - 9/12/2017 (30 days)  Days with Device Usage 30 days  Days without Device Usage 0 days  Percent Days with Device Usage 100.0%  Cumulative Usage 7 days 21 hrs. 4 mins. 3 secs.  Maximum Usage (1 Day) 9 hrs. 14 " mins. 45 secs.  Average Usage (All Days) 6 hrs. 18 mins. 8 secs.  Average Usage (Days Used) 6 hrs. 18 mins. 8 secs.  Minimum Usage (1 Day) 3 hrs. 55 secs.  Percent of Days with Usage >= 4 Hours 93.3%  Percent of Days with Usage < 4 Hours 6.7%  Date Range  Total Blower Time 7 days 21 hrs. 4 mins. 13 secs.  Average AHI 7.0  Auto CPAP Summary  Auto CPAP Mean Pressure 10.2 cmH2O  Auto CPAP Peak Average Pressure 14.1 cmH2O  Average Device Pressure <= 90% of Time 13.2 cmH2O  Average Time in Large Leak Per Day 1 hrs. 6 mins. 18 secs.  No flowsheet data found.     PSG  Assessment and Recommendations  Device duration 7 hours 55 minutes. Excluded respiratory signal 2 minutes excluded O2 signal 2 minutes.  Lowest oxygen saturation was 80%. Snoring recorded about 50 dB 99% of the time. Apnea-hypopnea  index/respiratory event index 13.4 events per hour. Total events 106.  Obstructive sleep apnea-hypopnea syndrome is detected.  Treatment with CPAP is indicated. Auto PAP 4-20 cm water pressure.  If patient is intolerant to CPAP then other modalities may be considered including oral appliance therapy,  mandibular advancement device, nasal Provent where appropriate. ENT appropriate procedures in the correct  candidate.  Follow-up with ordering clinician      Assessment:       Problem List Items Addressed This Visit     ASIF on CPAP - Primary    Relevant Orders    CPAP/BIPAP SUPPLIES      Other Visit Diagnoses    None.       Plan:       Patient using device and benefits     Follow with dental pratitioner    Return in about 1 year (around 9/14/2018) for Download, CPAP supplies.    This note was prepared using voice recognition system and is likely to have sound alike errors that may have been overlooked even after proof reading.  Please call me with any questions    TIME SPENT WITH PATIENT:     Time spent: 20 minutes in face to face  discussion concerning diagnosis, prognosis, review of lab and test results, benefits of treatment as  well as management of disease, counseling of patient and coordination of care between various health  care providers . Greater than half the time spent was used for coordination of care and counseling of patient.     Vito Muse    Pulmonary/Critical care/Sleepmedicine

## 2017-11-29 ENCOUNTER — IMMUNIZATION (OUTPATIENT)
Dept: INTERNAL MEDICINE | Facility: CLINIC | Age: 67
End: 2017-11-29
Payer: COMMERCIAL

## 2017-11-29 PROCEDURE — 90662 IIV NO PRSV INCREASED AG IM: CPT | Mod: S$GLB,,, | Performed by: PEDIATRICS

## 2017-11-29 PROCEDURE — 90471 IMMUNIZATION ADMIN: CPT | Mod: S$GLB,,, | Performed by: PEDIATRICS

## 2017-11-30 ENCOUNTER — TELEPHONE (OUTPATIENT)
Dept: INTERNAL MEDICINE | Facility: CLINIC | Age: 67
End: 2017-11-30

## 2017-11-30 NOTE — TELEPHONE ENCOUNTER
----- Message from Reji Streeter sent at 11/30/2017 12:34 PM CST -----  Contact: Pt  States she's calling to get an order for the pneumonia shot placed in system and wants to have it done at the [facility] location and can be reached at [phone]

## 2017-12-01 RX ORDER — AMLODIPINE BESYLATE 5 MG/1
5 TABLET ORAL NIGHTLY
Qty: 90 TABLET | Refills: 3 | Status: SHIPPED | OUTPATIENT
Start: 2017-12-01 | End: 2018-02-22 | Stop reason: SDUPTHER

## 2017-12-01 NOTE — TELEPHONE ENCOUNTER
----- Message from Isha Shields sent at 12/1/2017 11:25 AM CST -----  Contact: mick alegre  Pt needs a refill on amlodipine ,,, [facility] [address],,, please call pt when done at [phone]

## 2018-02-19 ENCOUNTER — LAB VISIT (OUTPATIENT)
Dept: LAB | Facility: HOSPITAL | Age: 68
End: 2018-02-19
Attending: FAMILY MEDICINE
Payer: COMMERCIAL

## 2018-02-19 DIAGNOSIS — E78.5 HYPERLIPIDEMIA, UNSPECIFIED HYPERLIPIDEMIA TYPE: ICD-10-CM

## 2018-02-19 DIAGNOSIS — D50.8 IRON DEFICIENCY ANEMIA SECONDARY TO INADEQUATE DIETARY IRON INTAKE: ICD-10-CM

## 2018-02-19 DIAGNOSIS — G47.33 SLEEP APNEA, OBSTRUCTIVE: Chronic | ICD-10-CM

## 2018-02-19 DIAGNOSIS — I10 ESSENTIAL HYPERTENSION: ICD-10-CM

## 2018-02-19 LAB
ALBUMIN SERPL BCP-MCNC: 3.3 G/DL
ALP SERPL-CCNC: 83 U/L
ALT SERPL W/O P-5'-P-CCNC: 14 U/L
ANION GAP SERPL CALC-SCNC: 10 MMOL/L
AST SERPL-CCNC: 20 U/L
BASOPHILS # BLD AUTO: 0.03 K/UL
BASOPHILS NFR BLD: 0.6 %
BILIRUB SERPL-MCNC: 0.5 MG/DL
BUN SERPL-MCNC: 13 MG/DL
CALCIUM SERPL-MCNC: 9.3 MG/DL
CHLORIDE SERPL-SCNC: 106 MMOL/L
CHOLEST SERPL-MCNC: 174 MG/DL
CHOLEST/HDLC SERPL: 2.1 {RATIO}
CO2 SERPL-SCNC: 27 MMOL/L
CREAT SERPL-MCNC: 0.9 MG/DL
DIFFERENTIAL METHOD: ABNORMAL
EOSINOPHIL # BLD AUTO: 0.2 K/UL
EOSINOPHIL NFR BLD: 3.2 %
ERYTHROCYTE [DISTWIDTH] IN BLOOD BY AUTOMATED COUNT: 15.1 %
EST. GFR  (AFRICAN AMERICAN): >60 ML/MIN/1.73 M^2
EST. GFR  (NON AFRICAN AMERICAN): >60 ML/MIN/1.73 M^2
GLUCOSE SERPL-MCNC: 90 MG/DL
HCT VFR BLD AUTO: 33.6 %
HDLC SERPL-MCNC: 83 MG/DL
HDLC SERPL: 47.7 %
HGB BLD-MCNC: 11.1 G/DL
IMM GRANULOCYTES # BLD AUTO: 0.01 K/UL
IMM GRANULOCYTES NFR BLD AUTO: 0.2 %
IRON SERPL-MCNC: 67 UG/DL
LDLC SERPL CALC-MCNC: 78 MG/DL
LYMPHOCYTES # BLD AUTO: 2.1 K/UL
LYMPHOCYTES NFR BLD: 38.2 %
MCH RBC QN AUTO: 30.1 PG
MCHC RBC AUTO-ENTMCNC: 33 G/DL
MCV RBC AUTO: 91 FL
MONOCYTES # BLD AUTO: 0.5 K/UL
MONOCYTES NFR BLD: 9.1 %
NEUTROPHILS # BLD AUTO: 2.6 K/UL
NEUTROPHILS NFR BLD: 48.7 %
NONHDLC SERPL-MCNC: 91 MG/DL
NRBC BLD-RTO: 0 /100 WBC
PLATELET # BLD AUTO: 215 K/UL
PMV BLD AUTO: 9.8 FL
POTASSIUM SERPL-SCNC: 3.9 MMOL/L
PROT SERPL-MCNC: 7.6 G/DL
RBC # BLD AUTO: 3.69 M/UL
SATURATED IRON: 16 %
SODIUM SERPL-SCNC: 143 MMOL/L
TOTAL IRON BINDING CAPACITY: 419 UG/DL
TRANSFERRIN SERPL-MCNC: 283 MG/DL
TRIGL SERPL-MCNC: 65 MG/DL
TSH SERPL DL<=0.005 MIU/L-ACNC: 1.39 UIU/ML
WBC # BLD AUTO: 5.39 K/UL

## 2018-02-19 PROCEDURE — 84443 ASSAY THYROID STIM HORMONE: CPT

## 2018-02-19 PROCEDURE — 80053 COMPREHEN METABOLIC PANEL: CPT

## 2018-02-19 PROCEDURE — 80061 LIPID PANEL: CPT

## 2018-02-19 PROCEDURE — 36415 COLL VENOUS BLD VENIPUNCTURE: CPT | Mod: PO

## 2018-02-19 PROCEDURE — 83540 ASSAY OF IRON: CPT

## 2018-02-19 PROCEDURE — 85025 COMPLETE CBC W/AUTO DIFF WBC: CPT

## 2018-02-22 ENCOUNTER — TELEPHONE (OUTPATIENT)
Dept: INTERNAL MEDICINE | Facility: CLINIC | Age: 68
End: 2018-02-22

## 2018-02-22 ENCOUNTER — OFFICE VISIT (OUTPATIENT)
Dept: INTERNAL MEDICINE | Facility: CLINIC | Age: 68
End: 2018-02-22
Payer: COMMERCIAL

## 2018-02-22 VITALS
BODY MASS INDEX: 29.89 KG/M2 | TEMPERATURE: 99 F | HEIGHT: 64 IN | SYSTOLIC BLOOD PRESSURE: 144 MMHG | WEIGHT: 175.06 LBS | DIASTOLIC BLOOD PRESSURE: 84 MMHG

## 2018-02-22 DIAGNOSIS — G89.29 CHRONIC PAIN OF RIGHT KNEE: ICD-10-CM

## 2018-02-22 DIAGNOSIS — G89.29 CHRONIC RIGHT SHOULDER PAIN: ICD-10-CM

## 2018-02-22 DIAGNOSIS — M79.671 RIGHT FOOT PAIN: ICD-10-CM

## 2018-02-22 DIAGNOSIS — D50.8 IRON DEFICIENCY ANEMIA SECONDARY TO INADEQUATE DIETARY IRON INTAKE: ICD-10-CM

## 2018-02-22 DIAGNOSIS — E78.5 HYPERLIPIDEMIA, UNSPECIFIED HYPERLIPIDEMIA TYPE: Primary | ICD-10-CM

## 2018-02-22 DIAGNOSIS — M25.561 CHRONIC PAIN OF RIGHT KNEE: ICD-10-CM

## 2018-02-22 DIAGNOSIS — M25.511 CHRONIC RIGHT SHOULDER PAIN: ICD-10-CM

## 2018-02-22 DIAGNOSIS — M79.89 LEG SWELLING: ICD-10-CM

## 2018-02-22 PROCEDURE — 1126F AMNT PAIN NOTED NONE PRSNT: CPT | Mod: S$GLB,,, | Performed by: FAMILY MEDICINE

## 2018-02-22 PROCEDURE — 99999 PR PBB SHADOW E&M-EST. PATIENT-LVL III: CPT | Mod: PBBFAC,,, | Performed by: FAMILY MEDICINE

## 2018-02-22 PROCEDURE — 1159F MED LIST DOCD IN RCRD: CPT | Mod: S$GLB,,, | Performed by: FAMILY MEDICINE

## 2018-02-22 PROCEDURE — 99214 OFFICE O/P EST MOD 30 MIN: CPT | Mod: S$GLB,,, | Performed by: FAMILY MEDICINE

## 2018-02-22 PROCEDURE — 3008F BODY MASS INDEX DOCD: CPT | Mod: S$GLB,,, | Performed by: FAMILY MEDICINE

## 2018-02-22 RX ORDER — ATORVASTATIN CALCIUM 10 MG/1
5 TABLET, FILM COATED ORAL DAILY
Qty: 90 TABLET | Refills: 3 | Status: SHIPPED | OUTPATIENT
Start: 2018-02-22 | End: 2019-04-05 | Stop reason: SDUPTHER

## 2018-02-22 RX ORDER — TRIAMTERENE/HYDROCHLOROTHIAZID 37.5-25 MG
1 TABLET ORAL DAILY
Qty: 90 TABLET | Refills: 3 | Status: SHIPPED | OUTPATIENT
Start: 2018-02-22 | End: 2019-03-11 | Stop reason: SDUPTHER

## 2018-02-22 RX ORDER — MELOXICAM 15 MG/1
15 TABLET ORAL DAILY
Qty: 20 TABLET | Refills: 0 | Status: SHIPPED | OUTPATIENT
Start: 2018-02-22 | End: 2018-03-22 | Stop reason: SDUPTHER

## 2018-02-22 RX ORDER — AMLODIPINE BESYLATE 2.5 MG/1
2.5 TABLET ORAL NIGHTLY
Qty: 90 TABLET | Refills: 3 | Status: SHIPPED | OUTPATIENT
Start: 2018-02-22 | End: 2019-03-04 | Stop reason: SDUPTHER

## 2018-02-22 NOTE — TELEPHONE ENCOUNTER
----- Message from Tio Palma sent at 2/22/2018  9:46 AM CST -----  Contact: [phone]   09:50 - Pt running 15 mins late- called no [id number] (pt's cell number is [phone]).

## 2018-02-22 NOTE — PROGRESS NOTES
Subjective:      Patient ID: Xin Taylor is a 67 y.o. female.    Chief Complaint: Follow-up (6 months labs, meds, joint pains, leg swelling)    Disclaimer:  This note is prepared using voice recognition software and as such is likely to have errors and has not been proof read. Please contact me for questions.     Patient's coming in today for follow-up of multiple issues and labs.     1. Pt is doing well other than the fact that she's been having more joint pains especially the right side.  Mainly on the right shoulder right knee and right foot.  She did buy several supplements wanted make sure she could take them.  She is currently on tumor ache.  She also believes some of this contributed to taking the Lipitor 10.  She finds that when she wears a knee sleeve it seems to help the right knee.  Her foot is also been bothersome.      Blood pressures actually slightly elevated today.  On amlodipine 5 mg by mouth B did discuss that this can call some additional leg swelling.  She feels like she could benefit from a diuretic.  She's willing to check adjust medicines at this time.    Cholesterol levels are much improved with Lipitor 10 but she's concerned about possible joint pains.  I did discuss with her that we could consider decreasing it down to 5 mg.  She's willing to try this as well.    She doesn't find that that he works very helpful for her arthritis.  She bought some glucosamine/chondroitin.  I believe should benefit from some meloxicam only short-term.  This may raise her blood pressure some but she be willing to try it.  Kidney functions normal    Still has anemia with low iron levels but no change.  Was taking her iron with her meal.  Willing to  out.            Lab Results   Component Value Date    WBC 5.39 02/19/2018    HGB 11.1 (L) 02/19/2018    HCT 33.6 (L) 02/19/2018     02/19/2018    CHOL 174 02/19/2018    TRIG 65 02/19/2018    HDL 83 (H) 02/19/2018    ALT 14 02/19/2018    AST 20  "02/19/2018     02/19/2018    K 3.9 02/19/2018     02/19/2018    CREATININE 0.9 02/19/2018    BUN 13 02/19/2018    CO2 27 02/19/2018    TSH 1.386 02/19/2018       Review of Systems   Constitutional: Positive for activity change. Negative for appetite change, fatigue and unexpected weight change.   Respiratory: Negative for cough and shortness of breath.    Cardiovascular: Positive for leg swelling. Negative for chest pain and palpitations.   Musculoskeletal: Positive for arthralgias, gait problem, joint swelling and myalgias.   Psychiatric/Behavioral: Negative for sleep disturbance. The patient is not nervous/anxious.      Objective:     Vitals:    02/22/18 1021   BP: (!) 144/84   Temp: 99.4 °F (37.4 °C)   TempSrc: Tympanic   Weight: 79.4 kg (175 lb 0.7 oz)   Height: 5' 4" (1.626 m)     Physical Exam   Constitutional: She appears well-developed and well-nourished.   HENT:   Head: Normocephalic and atraumatic.   Right Ear: Tympanic membrane normal.   Left Ear: Tympanic membrane normal.   Mouth/Throat: Oropharynx is clear and moist.   Eyes: Conjunctivae and EOM are normal.   Neck: Normal range of motion. Neck supple.   Cardiovascular: Normal rate and regular rhythm.    Pulmonary/Chest: Effort normal and breath sounds normal.   Musculoskeletal:        Right shoulder: She exhibits decreased range of motion, tenderness, pain and spasm.        Right knee: She exhibits decreased range of motion and swelling. Tenderness found. Medial joint line tenderness noted.        Right foot: There is tenderness and swelling.   Psychiatric: She has a normal mood and affect. Her behavior is normal.   Nursing note and vitals reviewed.    Assessment:     1. Hyperlipidemia, unspecified hyperlipidemia type    2. Iron deficiency anemia secondary to inadequate dietary iron intake    3. Chronic right shoulder pain    4. Right foot pain    5. Chronic pain of right knee    6. Leg swelling      Plan:   Xin was seen today for " follow-up.    Diagnoses and all orders for this visit:    Hyperlipidemia, unspecified hyperlipidemia type  Comments:  well controlled, but with joint aches, cut to 5mg. on the Lipitor labs reviewed    Iron deficiency anemia secondary to inadequate dietary iron intake  Comments:  move to taking iron tablets separate from diary in diet.  Stable but still not improved with the iron levels    Chronic right shoulder pain-new Will give a trial of NSAIDs and changing medicines  Comments:  trial of mobic x 1 week, and decreasing lipitor. if not improving, then xray and ortho consideration.     Right foot pain-new Will give a trial of NSAIDs and changing medicines  Comments:  trial of mobic x 1 week, and decreasing lipitor. if not improving, then xray and ortho consideration. dec amlodpine for less swelling    Chronic pain of right knee-new Will give a trial of NSAIDs and changing medicines  Comments:  trial of mobic x 1 week, and decreasing lipitor. if not improving, then xray and ortho consideration.     Leg swelling  Comments:  trial of mobic x 1 week, and decreasing lipitor. if not improving, then xray and ortho consideration. dec amlodpine for less swelling    Other orders  -     atorvastatin (LIPITOR) 10 MG tablet; Take 0.5 tablets (5 mg total) by mouth once daily.  -     triamterene-hydrochlorothiazide 37.5-25 mg (MAXZIDE-25) 37.5-25 mg per tablet; Take 1 tablet by mouth once daily. For BP and diuretic  -     amLODIPine (NORVASC) 2.5 MG tablet; Take 1 tablet (2.5 mg total) by mouth nightly.  -     meloxicam (MOBIC) 15 MG tablet; Take 1 tablet (15 mg total) by mouth once daily. Arthritis/anti-inflammatory            Follow-up in about 4 weeks (around 3/22/2018) for bp, arthritis followup.

## 2018-03-22 ENCOUNTER — TELEPHONE (OUTPATIENT)
Dept: INTERNAL MEDICINE | Facility: CLINIC | Age: 68
End: 2018-03-22

## 2018-03-22 ENCOUNTER — OFFICE VISIT (OUTPATIENT)
Dept: INTERNAL MEDICINE | Facility: CLINIC | Age: 68
End: 2018-03-22
Payer: COMMERCIAL

## 2018-03-22 VITALS
WEIGHT: 177.25 LBS | TEMPERATURE: 98 F | BODY MASS INDEX: 30.26 KG/M2 | OXYGEN SATURATION: 99 % | SYSTOLIC BLOOD PRESSURE: 138 MMHG | HEIGHT: 64 IN | HEART RATE: 70 BPM | DIASTOLIC BLOOD PRESSURE: 82 MMHG

## 2018-03-22 DIAGNOSIS — M25.50 ARTHRALGIA, UNSPECIFIED JOINT: Primary | ICD-10-CM

## 2018-03-22 DIAGNOSIS — I10 ESSENTIAL HYPERTENSION: ICD-10-CM

## 2018-03-22 DIAGNOSIS — E61.1 IRON DEFICIENCY: ICD-10-CM

## 2018-03-22 DIAGNOSIS — E78.5 HYPERLIPIDEMIA, UNSPECIFIED HYPERLIPIDEMIA TYPE: ICD-10-CM

## 2018-03-22 PROCEDURE — 3079F DIAST BP 80-89 MM HG: CPT | Mod: CPTII,S$GLB,, | Performed by: FAMILY MEDICINE

## 2018-03-22 PROCEDURE — 99214 OFFICE O/P EST MOD 30 MIN: CPT | Mod: S$GLB,,, | Performed by: FAMILY MEDICINE

## 2018-03-22 PROCEDURE — 99999 PR PBB SHADOW E&M-EST. PATIENT-LVL III: CPT | Mod: PBBFAC,,, | Performed by: FAMILY MEDICINE

## 2018-03-22 PROCEDURE — 3075F SYST BP GE 130 - 139MM HG: CPT | Mod: CPTII,S$GLB,, | Performed by: FAMILY MEDICINE

## 2018-03-22 RX ORDER — MELOXICAM 15 MG/1
15 TABLET ORAL DAILY PRN
Qty: 30 TABLET | Refills: 1 | Status: SHIPPED | OUTPATIENT
Start: 2018-03-22 | End: 2018-08-21

## 2018-03-22 NOTE — TELEPHONE ENCOUNTER
----- Message from Charley Coulter sent at 3/22/2018 12:06 PM CDT -----  Contact: pt  Pt will be a few minutes late due to traffic. 5-10 minutes.

## 2018-03-22 NOTE — PROGRESS NOTES
Subjective:      Patient ID: Xin Taylor is a 67 y.o. female.    Chief Complaint: Follow-up (4w)    Disclaimer:  This note is prepared using voice recognition software and as such is likely to have errors and has not been proof read. Please contact me for questions.     Patient's coming in today for four-week follow-up of arthralgias.  Since I last saw her we did do meloxicam for about 20 days.  She reports that all of her joint pains dramatically improved.  Especially her knee her foot her neck and her hands.  She was impressed with how much improvement she had.  She however has not been on it for the last week.  She starting to notice some the symptoms are coming back.  She was slightly concerned about seeing the possible risks associated with medicine.  Weeks since we went over the increased risk for cardiovascular events slightly in prolonged use of anti-inflammatories such as meloxicam.  She is using tumor ache but doesn't find that it's very beneficial at this time but she has another when she like to try.  We did discuss potentially that the cardiac vascular risk would be lessened if she does use meloxicam intermittently.  She like to consider doing this as well.    We also reduce the Lipitor from 10-5 mg.  She's uncertain if this is helped either but she's doing well with this dosing today.    Blood pressures are controlled today.  Still on the amlodipine.  Even in the setting of using the anti-inflammatories is doing well.    She's trying to obtain more iron on only to a supplement but also in her diet as well.            Lab Results   Component Value Date    WBC 5.39 02/19/2018    HGB 11.1 (L) 02/19/2018    HCT 33.6 (L) 02/19/2018     02/19/2018    CHOL 174 02/19/2018    TRIG 65 02/19/2018    HDL 83 (H) 02/19/2018    ALT 14 02/19/2018    AST 20 02/19/2018     02/19/2018    K 3.9 02/19/2018     02/19/2018    CREATININE 0.9 02/19/2018    BUN 13 02/19/2018    CO2 27 02/19/2018    TSH  "1.386 02/19/2018       Review of Systems   Constitutional: Positive for activity change. Negative for appetite change, fatigue and unexpected weight change.   Respiratory: Negative for cough and shortness of breath.    Cardiovascular: Negative for chest pain and palpitations.   Musculoskeletal: Negative for arthralgias, back pain, gait problem, joint swelling, myalgias, neck pain and neck stiffness.   Neurological: Negative for light-headedness and headaches.   Psychiatric/Behavioral: Negative for decreased concentration, dysphoric mood and sleep disturbance.     Objective:     Vitals:    03/22/18 1224   BP: 138/82   Pulse: 70   Temp: 98.1 °F (36.7 °C)   SpO2: 99%   Weight: 80.4 kg (177 lb 4 oz)   Height: 5' 4" (1.626 m)     Physical Exam   Constitutional: She appears well-developed and well-nourished.   Cardiovascular: Normal rate, regular rhythm and normal heart sounds.    Pulmonary/Chest: Effort normal and breath sounds normal.   Musculoskeletal:        Right knee: She exhibits normal range of motion. No tenderness found. No medial joint line and no lateral joint line tenderness noted.        Left knee: She exhibits normal range of motion. No tenderness found. No medial joint line tenderness noted.        Cervical back: She exhibits normal range of motion, no tenderness, no bony tenderness and no pain.   Psychiatric: She has a normal mood and affect. Her speech is normal.   Vitals reviewed.    Assessment:     1. Arthralgia, unspecified joint    2. Essential hypertension    3. Hyperlipidemia, unspecified hyperlipidemia type    4. Iron deficiency      Plan:   Xin was seen today for follow-up.    Diagnoses and all orders for this visit:    Arthralgia, unspecified joint  Comments:  much improved with short burst of mobic. given prn rx to use intermittently. discussed risks and benefits with cv events, and ok to use intermittently.   Orders:  -     meloxicam (MOBIC) 15 MG tablet; Take 1 tablet (15 mg total) by " mouth daily as needed for Pain. Arthritis/anti-inflammatory    Essential hypertension  Comments:  bp controlled, check bp when using the meloxicam to monitor for elevations or tinnitus.   Orders:  -     Lipid panel; Future  -     Comprehensive metabolic panel; Future  -     TSH; Future  -     T4, free; Future    Hyperlipidemia, unspecified hyperlipidemia type  Comments:  cont with lipitor at 5mg. Labs prior to next visit.   Orders:  -     Lipid panel; Future  -     Comprehensive metabolic panel; Future  -     TSH; Future  -     T4, free; Future    Iron deficiency- continue with supplementation on only good diet but also to vitamins.  -     CBC auto differential; Future  -     Iron and TIBC; Future            Follow-up in about 5 months (around 8/22/2018) for F/u WT, Labs, Meds Dr Carlson.

## 2018-03-23 ENCOUNTER — TELEPHONE (OUTPATIENT)
Dept: INTERNAL MEDICINE | Facility: CLINIC | Age: 68
End: 2018-03-23

## 2018-03-23 NOTE — TELEPHONE ENCOUNTER
Her bp was good at last visit. If still high this afternoon > 150/90 then take additional dose of the amlodipine 2.5mg tablet.

## 2018-08-14 ENCOUNTER — LAB VISIT (OUTPATIENT)
Dept: LAB | Facility: HOSPITAL | Age: 68
End: 2018-08-14
Attending: FAMILY MEDICINE
Payer: COMMERCIAL

## 2018-08-14 DIAGNOSIS — E61.1 IRON DEFICIENCY: ICD-10-CM

## 2018-08-14 DIAGNOSIS — I10 ESSENTIAL HYPERTENSION: ICD-10-CM

## 2018-08-14 DIAGNOSIS — E78.5 HYPERLIPIDEMIA, UNSPECIFIED HYPERLIPIDEMIA TYPE: ICD-10-CM

## 2018-08-14 LAB
ALBUMIN SERPL BCP-MCNC: 3.7 G/DL
ALP SERPL-CCNC: 75 U/L
ALT SERPL W/O P-5'-P-CCNC: 13 U/L
ANION GAP SERPL CALC-SCNC: 9 MMOL/L
AST SERPL-CCNC: 20 U/L
BASOPHILS # BLD AUTO: 0.02 K/UL
BASOPHILS NFR BLD: 0.4 %
BILIRUB SERPL-MCNC: 0.5 MG/DL
BUN SERPL-MCNC: 21 MG/DL
CALCIUM SERPL-MCNC: 9.7 MG/DL
CHLORIDE SERPL-SCNC: 106 MMOL/L
CHOLEST SERPL-MCNC: 176 MG/DL
CHOLEST/HDLC SERPL: 2.3 {RATIO}
CO2 SERPL-SCNC: 28 MMOL/L
CREAT SERPL-MCNC: 1.1 MG/DL
DIFFERENTIAL METHOD: ABNORMAL
EOSINOPHIL # BLD AUTO: 0.1 K/UL
EOSINOPHIL NFR BLD: 2 %
ERYTHROCYTE [DISTWIDTH] IN BLOOD BY AUTOMATED COUNT: 14.6 %
EST. GFR  (AFRICAN AMERICAN): >60 ML/MIN/1.73 M^2
EST. GFR  (NON AFRICAN AMERICAN): 52.1 ML/MIN/1.73 M^2
GLUCOSE SERPL-MCNC: 94 MG/DL
HCT VFR BLD AUTO: 35.5 %
HDLC SERPL-MCNC: 76 MG/DL
HDLC SERPL: 43.2 %
HGB BLD-MCNC: 11.6 G/DL
IMM GRANULOCYTES # BLD AUTO: 0.01 K/UL
IMM GRANULOCYTES NFR BLD AUTO: 0.2 %
IRON SERPL-MCNC: 63 UG/DL
LDLC SERPL CALC-MCNC: 88.2 MG/DL
LYMPHOCYTES # BLD AUTO: 2.2 K/UL
LYMPHOCYTES NFR BLD: 40.8 %
MCH RBC QN AUTO: 30.9 PG
MCHC RBC AUTO-ENTMCNC: 32.7 G/DL
MCV RBC AUTO: 94 FL
MONOCYTES # BLD AUTO: 0.5 K/UL
MONOCYTES NFR BLD: 9.1 %
NEUTROPHILS # BLD AUTO: 2.6 K/UL
NEUTROPHILS NFR BLD: 47.5 %
NONHDLC SERPL-MCNC: 100 MG/DL
NRBC BLD-RTO: 0 /100 WBC
PLATELET # BLD AUTO: 248 K/UL
PMV BLD AUTO: 10 FL
POTASSIUM SERPL-SCNC: 3.8 MMOL/L
PROT SERPL-MCNC: 7.6 G/DL
RBC # BLD AUTO: 3.76 M/UL
SATURATED IRON: 16 %
SODIUM SERPL-SCNC: 143 MMOL/L
T4 FREE SERPL-MCNC: 1.02 NG/DL
TOTAL IRON BINDING CAPACITY: 400 UG/DL
TRANSFERRIN SERPL-MCNC: 270 MG/DL
TRIGL SERPL-MCNC: 59 MG/DL
TSH SERPL DL<=0.005 MIU/L-ACNC: 1.28 UIU/ML
WBC # BLD AUTO: 5.37 K/UL

## 2018-08-14 PROCEDURE — 83540 ASSAY OF IRON: CPT

## 2018-08-14 PROCEDURE — 84439 ASSAY OF FREE THYROXINE: CPT

## 2018-08-14 PROCEDURE — 36415 COLL VENOUS BLD VENIPUNCTURE: CPT | Mod: PO

## 2018-08-14 PROCEDURE — 80061 LIPID PANEL: CPT

## 2018-08-14 PROCEDURE — 80053 COMPREHEN METABOLIC PANEL: CPT

## 2018-08-14 PROCEDURE — 85025 COMPLETE CBC W/AUTO DIFF WBC: CPT

## 2018-08-14 PROCEDURE — 84443 ASSAY THYROID STIM HORMONE: CPT

## 2018-08-21 ENCOUNTER — OFFICE VISIT (OUTPATIENT)
Dept: INTERNAL MEDICINE | Facility: CLINIC | Age: 68
End: 2018-08-21
Payer: COMMERCIAL

## 2018-08-21 VITALS
BODY MASS INDEX: 29.31 KG/M2 | HEIGHT: 65 IN | HEART RATE: 74 BPM | SYSTOLIC BLOOD PRESSURE: 130 MMHG | DIASTOLIC BLOOD PRESSURE: 78 MMHG | TEMPERATURE: 98 F | WEIGHT: 175.94 LBS

## 2018-08-21 DIAGNOSIS — L84 PRE-ULCERATIVE CORN OR CALLOUS: ICD-10-CM

## 2018-08-21 DIAGNOSIS — Z12.31 ENCOUNTER FOR SCREENING MAMMOGRAM FOR BREAST CANCER: ICD-10-CM

## 2018-08-21 DIAGNOSIS — G62.9 NEUROPATHY: ICD-10-CM

## 2018-08-21 DIAGNOSIS — Z00.00 ROUTINE GENERAL MEDICAL EXAMINATION AT A HEALTH CARE FACILITY: ICD-10-CM

## 2018-08-21 DIAGNOSIS — E78.5 HYPERLIPIDEMIA, UNSPECIFIED HYPERLIPIDEMIA TYPE: Primary | ICD-10-CM

## 2018-08-21 DIAGNOSIS — I10 ESSENTIAL HYPERTENSION: ICD-10-CM

## 2018-08-21 DIAGNOSIS — D50.8 IRON DEFICIENCY ANEMIA SECONDARY TO INADEQUATE DIETARY IRON INTAKE: ICD-10-CM

## 2018-08-21 PROCEDURE — 3078F DIAST BP <80 MM HG: CPT | Mod: CPTII,S$GLB,, | Performed by: FAMILY MEDICINE

## 2018-08-21 PROCEDURE — 3075F SYST BP GE 130 - 139MM HG: CPT | Mod: CPTII,S$GLB,, | Performed by: FAMILY MEDICINE

## 2018-08-21 PROCEDURE — 99999 PR PBB SHADOW E&M-EST. PATIENT-LVL III: CPT | Mod: PBBFAC,,, | Performed by: FAMILY MEDICINE

## 2018-08-21 PROCEDURE — 99397 PER PM REEVAL EST PAT 65+ YR: CPT | Mod: 25,S$GLB,, | Performed by: FAMILY MEDICINE

## 2018-08-21 PROCEDURE — 99214 OFFICE O/P EST MOD 30 MIN: CPT | Mod: 25,S$GLB,, | Performed by: FAMILY MEDICINE

## 2018-08-21 RX ORDER — FERROUS SULFATE 325(65) MG
325 TABLET ORAL DAILY
Qty: 100 TABLET | Refills: 3 | Status: SHIPPED | OUTPATIENT
Start: 2018-08-21

## 2018-08-21 NOTE — PROGRESS NOTES
Subjective:      Patient ID: Xin Taylor is a 67 y.o. female.    Chief Complaint: Follow-up (multiple issues. )    Disclaimer:  This note is prepared using voice recognition software and as such is likely to have errors and has not been proof read. Please contact me for questions.     Xin Taylor is a 67 y.o. female who presents today for multiple issues and 4 month follow-up.  Since I last saw her she has lost about 2 lb.  Blood pressure is well controlled.  She is not currently taking her aspirin.  She still uses meloxicam only every 4-5 days to help with arthritis symptoms but she does report that when she takes the aspirin it helped a lot with her arthritis as well.  She just noticed that when she took it daily she had bruised a lot more.  Does she is willing to go back on it but potentially even doing every other day dosing and giving at the [facility].    She also reports that Left big toe going numb since last visit about 5mo. Hoping not to go to the whole foot. Tingling noted. decreased sensation.  We did review the path way of the lumbar nerve that is consistent with a S1 pattern.  It has decreased discrimination.  She does not have diabetes.  She has been walking differently on her foot because of a callus as well as some breakdown with her arches.  She has been trying to apply some various topical creams, acids, and even soaking and scrubbing that it has not been coming off.  Willing to have this looked at further.    Needing mammo also.     Wt down 2 lbs. 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  Does  more so does a lot of squatting    Last visit she took the atorvastatin 10 mg and Cut the statin medication in half and numbers not affected much.  Willing to keep it at this dose.  Did really help much with joint aches or pains though.  Is needing to take the nsaid every 7-10 days to help with arthritis symptoms, but doesn't think the decreased dose changed the arthritis symptoms, but numbers look good.  "          Lab Results   Component Value Date    WBC 5.37 08/14/2018    HGB 11.6 (L) 08/14/2018    HCT 35.5 (L) 08/14/2018     08/14/2018    CHOL 176 08/14/2018    TRIG 59 08/14/2018    HDL 76 (H) 08/14/2018    ALT 13 08/14/2018    AST 20 08/14/2018     08/14/2018    K 3.8 08/14/2018     08/14/2018    CREATININE 1.1 08/14/2018    BUN 21 08/14/2018    CO2 28 08/14/2018    TSH 1.282 08/14/2018       Review of Systems   Constitutional: Positive for activity change. Negative for appetite change, fatigue and unexpected weight change.   Respiratory: Negative for cough and shortness of breath.    Cardiovascular: Negative for chest pain and palpitations.   Gastrointestinal: Negative for abdominal distention and abdominal pain.   Musculoskeletal: Positive for arthralgias, gait problem, joint swelling and myalgias. Negative for neck pain and neck stiffness.   Neurological: Positive for numbness. Negative for weakness and headaches.   Psychiatric/Behavioral: Negative for decreased concentration, dysphoric mood and sleep disturbance.     Objective:     Vitals:    08/21/18 1017   BP: 130/78   Pulse: 74   Temp: 98.2 °F (36.8 °C)   TempSrc: Tympanic   Weight: 79.8 kg (175 lb 14.8 oz)   Height: 5' 5" (1.651 m)     Physical Exam   Constitutional: She is oriented to person, place, and time. She appears well-developed and well-nourished.   HENT:   Right Ear: Tympanic membrane normal.   Left Ear: Tympanic membrane normal.   Cardiovascular:   Pulses:       Dorsalis pedis pulses are 2+ on the left side.   Musculoskeletal:        Left ankle: She exhibits decreased range of motion and swelling.        Left foot: There is decreased range of motion, tenderness, bony tenderness and deformity.   Feet:   Left Foot:   Protective Sensation: 4 sites sensed.   Skin Integrity: Positive for callus and dry skin.   Neurological: She is alert and oriented to person, place, and time. She has normal strength. A sensory deficit is " present.   Decreased sensation to pinpoint and monofilament over the S1 distribution of the left great toe   Skin:        Psychiatric: She has a normal mood and affect. Her speech is normal and behavior is normal.   Nursing note and vitals reviewed.    Assessment:     1. Hyperlipidemia, unspecified hyperlipidemia type    2. Iron deficiency anemia secondary to inadequate dietary iron intake    3. Pre-ulcerative corn or callous    4. Neuropathy    5. Essential hypertension    6. Encounter for screening mammogram for breast cancer      Plan:   Xin was seen today for follow-up.    Diagnoses and all orders for this visit:    Hyperlipidemia, unspecified hyperlipidemia type  Comments:  stable with 5mg dosing, labs reviewed.     Iron deficiency anemia secondary to inadequate dietary iron intake  Comments:  increase with citrus to improved iron absorption.   Orders:  -     CBC auto differential; Future  -     Iron and TIBC; Future    Pre-ulcerative corn or callous  Comments:  removed today with #10 blade in office with shave procedure.     Neuropathy  Comments:  of left great toe in S1 pattern. trial of changing gait / shoe cushion to see if helps if does not , could consider lumbar xrays vs xray of foot.     Essential hypertension-stable continue with medications  -     Comprehensive metabolic panel; Future      Iron deficiency anemia-advised patient to start taking it with citrus to help anemia levels are improving but iron stores still could improve.  -     ferrous sulfate 325 mg (65 mg iron) Tab tablet; Take 1 tablet (325 mg total) by mouth once daily. Take with citrus            Follow-up in about 6 months (around 2/21/2019) for chronic issues Dr Carlson.

## 2018-08-21 NOTE — PROCEDURES
"Foot Care  Date/Time: 8/21/2018 10:45 AM  Performed by: Breann Carlson MD  Authorized by: Breann Carlson MD     Time out: Immediately prior to procedure a "time out" was called to verify the correct patient, procedure, equipment, support staff and site/side marked as required.    Consent Done?:  Yes (Verbal)  Hyperkeratotic Skin Lesions?: Yes    Location(s):  Left 5th Metatarsal Head    Nail Care Type:  Debride (callous)(Left 5th Toe)  Patient tolerance:  Patient tolerated the procedure well with no immediate complications        "

## 2018-08-21 NOTE — PROGRESS NOTES
"Subjective:      Patient ID: Xin Taylor is a 67 y.o. female.    Chief Complaint: prevention exam    Disclaimer:  This note is prepared using voice recognition software and as such is likely to have errors and has not been proof read. Please contact me for questions.     Xin Taylor is a 67 y.o. female who presents today for a wellness exam.  She is needing to have her mammogram.  She is also wanting to review her lab work.  She does not like to take a lot of medications.  She does like to take supplements.  Is working on risk reduction.  Currently on lower dose of the statin.            Lab Results   Component Value Date    WBC 5.37 08/14/2018    HGB 11.6 (L) 08/14/2018    HCT 35.5 (L) 08/14/2018     08/14/2018    CHOL 176 08/14/2018    TRIG 59 08/14/2018    HDL 76 (H) 08/14/2018    ALT 13 08/14/2018    AST 20 08/14/2018     08/14/2018    K 3.8 08/14/2018     08/14/2018    CREATININE 1.1 08/14/2018    BUN 21 08/14/2018    CO2 28 08/14/2018    TSH 1.282 08/14/2018       Review of Systems  Objective:     Vitals:    08/21/18 1017   BP: 130/78   Pulse: 74   Temp: 98.2 °F (36.8 °C)   TempSrc: Tympanic   Weight: 79.8 kg (175 lb 14.8 oz)   Height: 5' 5" (1.651 m)     Physical Exam   Constitutional: She appears well-developed and well-nourished.   HENT:   Head: Normocephalic and atraumatic.   Right Ear: Tympanic membrane normal.   Left Ear: Tympanic membrane normal.   Mouth/Throat: Oropharynx is clear and moist.   Eyes: Conjunctivae and EOM are normal.   Neck: Normal range of motion. Neck supple.   Cardiovascular: Normal rate and regular rhythm.   Pulmonary/Chest: Effort normal and breath sounds normal.   Psychiatric: She has a normal mood and affect. Her behavior is normal.   Nursing note and vitals reviewed.    Assessment:                         6. Routine general medical examination at a health care facility    7. Encounter for screening mammogram for breast cancer      Plan:   Xin was seen " today for prevention exam  Diagnoses and all orders for this visit:    [facility] [place] general medical examination at a health care facility-reviewed labs today discussed health maintenance will set up her mammogram she is up-to-date on immunizations at this time.  Obtain shingles vaccine once back order has been resolved.  Obtain flu shot in October.    Encounter for screening mammogram for breast cancer  -     Mammo Digital Screening Bilat with CAD; Future    Other orders  -     ferrous sulfate 325 mg (65 mg iron) Tab tablet; Take 1 tablet (325 mg total) by mouth once daily. Take with citrus            Follow-up in about 6 months (around 2/21/2019) for chronic issues Dr Carlson.

## 2018-08-30 ENCOUNTER — TELEPHONE (OUTPATIENT)
Dept: PULMONOLOGY | Facility: CLINIC | Age: 68
End: 2018-08-30

## 2018-09-11 ENCOUNTER — HOSPITAL ENCOUNTER (OUTPATIENT)
Dept: RADIOLOGY | Facility: HOSPITAL | Age: 68
Discharge: HOME | End: 2018-09-11
Attending: FAMILY MEDICINE
Payer: COMMERCIAL

## 2018-09-11 VITALS — WEIGHT: 175 LBS | HEIGHT: 65 IN | BODY MASS INDEX: 29.16 KG/M2

## 2018-09-11 DIAGNOSIS — Z12.31 ENCOUNTER FOR SCREENING MAMMOGRAM FOR BREAST CANCER: ICD-10-CM

## 2018-09-11 PROCEDURE — 77067 SCR MAMMO BI INCL CAD: CPT | Mod: 26,,, | Performed by: RADIOLOGY

## 2018-09-11 PROCEDURE — 77063 BREAST TOMOSYNTHESIS BI: CPT | Mod: TC,PO

## 2018-09-11 PROCEDURE — 77063 BREAST TOMOSYNTHESIS BI: CPT | Mod: 26,,, | Performed by: RADIOLOGY

## 2018-09-19 NOTE — PROGRESS NOTES
"Subjective:       Patient ID: Xin Taylor is a 67 y.o. female.    Chief Complaint: Sleep Apnea    Ms. Denise Taylor comes to see me as a follow-up  She has ASIF on APAP 4-20  Residual AHI 5.4, some mask leak  Bed time: 11-12MN  Wake time : 9 am  [place] score 5  She is having some mask leak about an hour  Her usage greater than 4 hours was 100%.  She'll follow-up annually.  She'll let me know if there are any issues with mask  She still some issues with her maxillary teeth on the upper left which she'll address with a dentist        Review of Systems   Constitutional: Negative.    HENT: Negative.    Eyes: Negative.    Respiratory: Negative.    Genitourinary: Negative.    Endocrine: endocrine negative   Musculoskeletal: Negative.    Skin: Negative.    Gastrointestinal: Negative.    Neurological: Negative.    Psychiatric/Behavioral: Negative.        Objective:       Vitals:    09/20/18 0907   BP: 134/80   Pulse: 83   Resp: 15   SpO2: 98%   Weight: 80.1 kg (176 lb 9.4 oz)   Height: 5' 5" (1.651 m)     Physical Exam   Constitutional: She is oriented to person, place, and time. She appears well-developed and well-nourished.   HENT:   Head: Normocephalic.   Nose: Nose normal.   Neck: Normal range of motion.   Cardiovascular: Normal rate and regular rhythm.   Pulmonary/Chest: Normal expansion, symmetric chest wall expansion, effort normal and breath sounds normal.   Musculoskeletal: Normal range of motion.   Neurological: She is alert and oriented to person, place, and time.   Skin: Skin is warm and dry.   Psychiatric: She has a normal mood and affect.   Nursing note and vitals reviewed.    Personal Diagnostic Review  DOWNLOAD    8/18/2018 - 9/16/2018  YOB: 1950  Mask:  Compliance Summary  8/18/2018 - 9/16/2018 (30 days)  Days with Device Usage 30 days  Days without Device Usage 0 days  Percent Days with Device Usage 100.0%  Cumulative Usage 11 days 2 hrs. 9 mins. 23 secs.  Maximum Usage (1 Day) 10 hrs. " 43 mins. 44 secs.  Average Usage (All Days) 8 hrs. 52 mins. 18 secs.  Average Usage (Days Used) 8 hrs. 52 mins. 18 secs.  Minimum Usage (1 Day) 7 hrs. 27 mins. 36 secs.  Percent of Days with Usage >= 4 Hours 100.0%  Percent of Days with Usage < 4 Hours 0.0%  Date Range  Total Blower Time 11 days 2 hrs. 15 mins. 36 secs.  Average AHI 5.4  Auto-CPAP Summary  Auto-CPAP Mean Pressure 8.4 cmH2O  Auto-CPAP Peak Average Pressure 11.0 cmH2O  Average Device Pressure <= 90% of Time 11.4 cmH2O  Average Time in Large Leak Per Day 1 hrs. 9 mins. 26 secs.        Assessment:       Problem List Items Addressed This Visit     Mass of oral cavity     Has neglected to see dentist, will make appt         ASIF on CPAP - Primary    Relevant Orders    HME - OTHER    MyChart Patient Entered CPAP Usage        Plan:       Patient using device and benefits   APAP 6-14  Follow with dental pratitioner    Follow-up in about 1 year (around 9/20/2019), or APAP 6-14, for Download CPAP/ APAP/ TRIOLOG/ BIPA, CPAP supplies.    This note was prepared using voice recognition system and is likely to have sound alike errors that may have been overlooked even after proof reading.  Please call me with any questions    TIME SPENT WITH PATIENT:     Time spent: 20 minutes in face to face  discussion concerning diagnosis, prognosis, review of lab and test results, benefits of treatment as well as management of disease, counseling of patient and coordination of care between various health  care providers . Greater than half the time spent was used for coordination of care and counseling of patient.     Vito Muse    Pulmonary/Critical care/Sleepmedicine

## 2018-09-20 ENCOUNTER — OFFICE VISIT (OUTPATIENT)
Dept: SLEEP MEDICINE | Facility: CLINIC | Age: 68
End: 2018-09-20
Payer: COMMERCIAL

## 2018-09-20 VITALS
HEIGHT: 65 IN | RESPIRATION RATE: 15 BRPM | WEIGHT: 176.56 LBS | DIASTOLIC BLOOD PRESSURE: 80 MMHG | HEART RATE: 83 BPM | BODY MASS INDEX: 29.42 KG/M2 | OXYGEN SATURATION: 98 % | SYSTOLIC BLOOD PRESSURE: 134 MMHG

## 2018-09-20 DIAGNOSIS — G47.33 OSA ON CPAP: Primary | ICD-10-CM

## 2018-09-20 DIAGNOSIS — K13.79 MASS OF ORAL CAVITY: ICD-10-CM

## 2018-09-20 PROCEDURE — 1101F PT FALLS ASSESS-DOCD LE1/YR: CPT | Mod: CPTII,S$GLB,, | Performed by: INTERNAL MEDICINE

## 2018-09-20 PROCEDURE — 99999 PR PBB SHADOW E&M-EST. PATIENT-LVL III: CPT | Mod: PBBFAC,,, | Performed by: INTERNAL MEDICINE

## 2018-09-20 PROCEDURE — 3075F SYST BP GE 130 - 139MM HG: CPT | Mod: CPTII,S$GLB,, | Performed by: INTERNAL MEDICINE

## 2018-09-20 PROCEDURE — 3079F DIAST BP 80-89 MM HG: CPT | Mod: CPTII,S$GLB,, | Performed by: INTERNAL MEDICINE

## 2018-09-20 PROCEDURE — 99214 OFFICE O/P EST MOD 30 MIN: CPT | Mod: S$GLB,,, | Performed by: INTERNAL MEDICINE

## 2018-10-02 ENCOUNTER — NURSE TRIAGE (OUTPATIENT)
Dept: ADMINISTRATIVE | Facility: CLINIC | Age: 68
End: 2018-10-02

## 2018-10-02 ENCOUNTER — TELEPHONE (OUTPATIENT)
Dept: INTERNAL MEDICINE | Facility: CLINIC | Age: 68
End: 2018-10-02

## 2018-10-02 NOTE — TELEPHONE ENCOUNTER
Reason for Disposition   Colds with no complications    Protocols used: ST COMMON COLD-A-[place]

## 2018-10-02 NOTE — TELEPHONE ENCOUNTER
Off work since Thursday afternoon due to illness. She states that she had diarrhea and then had heavy congestion and cough. Saw [facility] urgent care. Given abx and instructions to follow.      She was calling today stating that her stomach issue has gotten better but she has not gotten better as far as the cold, congestion and cough goes. She called the on call service and they gave her some instructions with trying some different things to see if it would help. (see telephone call). She will try those things and if not better by Thursday call back and schedule to see Nancy Aldana NP.

## 2018-10-02 NOTE — TELEPHONE ENCOUNTER
----- Message from Regine Landers sent at 10/2/2018 11:46 AM CDT -----  Pt at [phone]//states she went to a [facility] urgent care over the weekend//she had diarrhea///// upper respiratory congestion and low grade fever//she still has a cough/nasal congestion//but she is feeling a lot better//would like to discuss with your office//please call//desmond/chaparrita

## 2018-10-11 ENCOUNTER — TELEPHONE (OUTPATIENT)
Dept: INTERNAL MEDICINE | Facility: CLINIC | Age: 68
End: 2018-10-11

## 2018-10-11 NOTE — TELEPHONE ENCOUNTER
Patient states that she is going to see urgent care at [facility] today.emma

## 2018-10-11 NOTE — TELEPHONE ENCOUNTER
----- Message from Shira Barillas sent at 10/11/2018 11:45 AM CDT -----  Contact: pt  She is calling in regards to still having symptoms of congestion and a fever after her 4 week visit, please advise [phone] (home)

## 2018-10-12 ENCOUNTER — OFFICE VISIT (OUTPATIENT)
Dept: INTERNAL MEDICINE | Facility: CLINIC | Age: 68
End: 2018-10-12
Payer: COMMERCIAL

## 2018-10-12 VITALS
BODY MASS INDEX: 28.17 KG/M2 | WEIGHT: 169.06 LBS | OXYGEN SATURATION: 97 % | HEART RATE: 84 BPM | HEIGHT: 65 IN | SYSTOLIC BLOOD PRESSURE: 120 MMHG | TEMPERATURE: 99 F | DIASTOLIC BLOOD PRESSURE: 80 MMHG

## 2018-10-12 DIAGNOSIS — J32.9 SINUSITIS, UNSPECIFIED CHRONICITY, UNSPECIFIED LOCATION: Primary | ICD-10-CM

## 2018-10-12 PROCEDURE — 3079F DIAST BP 80-89 MM HG: CPT | Mod: CPTII,S$GLB,, | Performed by: INTERNAL MEDICINE

## 2018-10-12 PROCEDURE — 99999 PR PBB SHADOW E&M-EST. PATIENT-LVL III: CPT | Mod: PBBFAC,,, | Performed by: INTERNAL MEDICINE

## 2018-10-12 PROCEDURE — 99214 OFFICE O/P EST MOD 30 MIN: CPT | Mod: S$GLB,,, | Performed by: INTERNAL MEDICINE

## 2018-10-12 PROCEDURE — 1101F PT FALLS ASSESS-DOCD LE1/YR: CPT | Mod: CPTII,S$GLB,, | Performed by: INTERNAL MEDICINE

## 2018-10-12 PROCEDURE — 3074F SYST BP LT 130 MM HG: CPT | Mod: CPTII,S$GLB,, | Performed by: INTERNAL MEDICINE

## 2018-10-12 RX ORDER — FLUTICASONE PROPIONATE 50 MCG
2 SPRAY, SUSPENSION (ML) NASAL DAILY
Qty: 16 G | Refills: 1 | Status: SHIPPED | OUTPATIENT
Start: 2018-10-12 | End: 2018-11-11

## 2018-10-12 RX ORDER — AMOXICILLIN AND CLAVULANATE POTASSIUM 875; 125 MG/1; MG/1
1 TABLET, FILM COATED ORAL DAILY
Qty: 5 TABLET | Refills: 0 | Status: SHIPPED | OUTPATIENT
Start: 2018-10-12 | End: 2018-10-17

## 2018-10-12 RX ORDER — PREDNISONE 20 MG/1
20 TABLET ORAL DAILY
Qty: 10 TABLET | Refills: 0 | Status: SHIPPED | OUTPATIENT
Start: 2018-10-12 | End: 2018-10-22

## 2018-10-12 NOTE — PATIENT INSTRUCTIONS
flonase nasal spray 2 spays each nostril for nasal congestion, post nasal drip, runny nose    Delsym as needed for cough    Allegra or zyrtec for allergies, post nasal drip, runny nose, watery eyes.    cepacol throat lozenges or chloraseptic spray for sore throat    mucinex for chest congestion.     Tylenol or ibuprofen for pain or fever.

## 2018-10-12 NOTE — PROGRESS NOTES
"Subjective:      Patient ID: Xin Taylor is a 68 y.o. female.    Chief Complaint: Fever; Nasal Congestion; and Cough    67 yo with Patient Active Problem List:     HTN (hypertension)     HLD (hyperlipidemia)     Anemia     Personal history of colonic polyps     Osteopenia     Mass of oral cavity     ASIF on CPAP    Past Medical History:  No date: Anemia  No date: Colon polyp  No date: Edema  No date: HLD (hyperlipidemia)  No date: HTN (hypertension)  No date: Palpitation  No date: Tinnitus    Here today c/o sinus problem        Sinus Problem   This is a new problem. The current episode started 1 to 4 weeks ago. The problem has been gradually worsening since onset. The maximum temperature recorded prior to her arrival was 101 - 101.9 F. Associated symptoms include chills, congestion, coughing, sinus pressure and a sore throat. Pertinent negatives include no ear pain, headaches, hoarse voice, neck pain, shortness of breath or sneezing. Treatments tried: mucinex. The treatment provided mild relief.     Review of Systems   Constitutional: Positive for chills.   HENT: Positive for congestion, sinus pressure and sore throat. Negative for ear pain, hoarse voice and sneezing.    Respiratory: Positive for cough. Negative for shortness of breath.    Musculoskeletal: Negative for neck pain.   Neurological: Negative for headaches.     Objective:   /80 (BP Location: Right arm, Patient Position: Sitting)   Pulse 84   Temp 98.6 °F (37 °C) (Tympanic)   Ht 5' 5" (1.651 m)   Wt 76.7 kg (169 lb 1.5 oz)   SpO2 97%   BMI 28.14 kg/m²     Physical Exam   Constitutional: She appears well-developed and well-nourished. No distress.   HENT:   Right Ear: Tympanic membrane normal.   Left Ear: Tympanic membrane normal.   Nose: Mucosal edema and rhinorrhea present. Right sinus exhibits maxillary sinus tenderness. Right sinus exhibits no frontal sinus tenderness. Left sinus exhibits no maxillary sinus tenderness and no frontal sinus " tenderness.   Mouth/Throat: Posterior oropharyngeal erythema present. No oropharyngeal exudate or posterior oropharyngeal edema.   Cardiovascular: Normal rate and regular rhythm.   Pulmonary/Chest: Effort normal.   Neurological: She is alert.   Skin: Skin is warm and dry.   Psychiatric: She has a normal mood and affect. Her behavior is normal.       Assessment:     1. Sinusitis, unspecified chronicity, unspecified location      Plan:   Sinusitis, unspecified chronicity, unspecified location  -     amoxicillin-clavulanate 875-125mg (AUGMENTIN) 875-125 mg per tablet; Take 1 tablet by mouth once daily. for 5 days  Dispense: 5 tablet; Refill: 0  -     fluticasone (FLONASE) 50 mcg/actuation nasal spray; 2 sprays (100 mcg total) by Each Nare route once daily. For nasal congestion or runny nose  Dispense: 16 g; Refill: 1  -     predniSONE (DELTASONE) 20 MG tablet; Take 1 tablet (20 mg total) by mouth once daily. for 10 days  Dispense: 10 tablet; Refill: 0    flonase nasal spray 2 spays each nostril for nasal congestion, post nasal drip, runny nose    Delsym as needed for cough    Allegra or zyrtec for allergies, post nasal drip, runny nose, watery eyes.    cepacol throat lozenges or chloraseptic spray for sore throat    mucinex for chest congestion.     Tylenol or ibuprofen for pain or fever.       Lab Frequency Next Occurrence   CBC auto differential Once 11/21/2018   Iron and TIBC Once 11/21/2018   Comprehensive metabolic panel Once 02/21/2019       Problem List Items Addressed This Visit     None      Visit Diagnoses     Sinusitis, unspecified chronicity, unspecified location    -  Primary    Relevant Medications    amoxicillin-clavulanate 875-125mg (AUGMENTIN) 875-125 mg per tablet    fluticasone (FLONASE) 50 mcg/actuation nasal spray    predniSONE (DELTASONE) 20 MG tablet          Follow-up if symptoms worsen or fail to improve.

## 2018-11-20 ENCOUNTER — TELEPHONE (OUTPATIENT)
Dept: INTERNAL MEDICINE | Facility: CLINIC | Age: 68
End: 2018-11-20

## 2018-11-20 NOTE — TELEPHONE ENCOUNTER
----- Message from Kia Garcia sent at 11/20/2018  3:40 PM CST -----  Contact: Pt   Pt called and stated she needs to know if when she should schedule her pneumonia, shingles, and tetanus vaccinations. She can be reached at [phone].    Thanks,  TF

## 2018-11-21 ENCOUNTER — IMMUNIZATION (OUTPATIENT)
Dept: INTERNAL MEDICINE | Facility: CLINIC | Age: 68
End: 2018-11-21
Payer: COMMERCIAL

## 2018-11-21 PROCEDURE — 90471 IMMUNIZATION ADMIN: CPT | Mod: S$GLB,,, | Performed by: FAMILY MEDICINE

## 2018-11-21 PROCEDURE — 90662 IIV NO PRSV INCREASED AG IM: CPT | Mod: S$GLB,,, | Performed by: FAMILY MEDICINE

## 2018-12-18 ENCOUNTER — TELEPHONE (OUTPATIENT)
Dept: INTERNAL MEDICINE | Facility: CLINIC | Age: 68
End: 2018-12-18

## 2018-12-18 NOTE — TELEPHONE ENCOUNTER
----- Message from Hawa Shields sent at 12/18/2018  2:35 PM CST -----  Contact: [phone]  Returning call. Please call back at [phone]. Please call before 11:00. Thanks [email or web address]

## 2019-01-21 ENCOUNTER — NURSE TRIAGE (OUTPATIENT)
Dept: ADMINISTRATIVE | Facility: CLINIC | Age: 69
End: 2019-01-21

## 2019-01-21 NOTE — TELEPHONE ENCOUNTER
Reason for Disposition   Red eye and no complications   Colds with no complications    Protocols used: ST EYE - RED WITHOUT PUS-A-OH, ST COLDS-A-OH    Ms. Taylor is experiencing redness to one eye with white crust to the eye Patient noted redness about an hour ago after waking up. She states she has been experiencing nasal congestion for about a week. Patient is concerned that she has pinkeye. She would like to know if Dr. Carlson recommends antibiotic eye drops. 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

## 2019-01-21 NOTE — TELEPHONE ENCOUNTER
"Called and explained to pt that she would need to be seen for this. That this is not something that we would treat over the phone. That we would want to see her and evaluate her for this. Offered her to come in today for 5:00 with Nancy Song NP. She declined stating that this would be too far. She asked for something at "[facility]" explained to her that it is now [place] and gave her directions. Gave her information on going in to the Urgent Care she verbalized understanding.   "

## 2019-01-22 ENCOUNTER — OFFICE VISIT (OUTPATIENT)
Dept: URGENT CARE | Facility: CLINIC | Age: 69
End: 2019-01-22
Payer: COMMERCIAL

## 2019-01-22 VITALS
BODY MASS INDEX: 28.02 KG/M2 | HEART RATE: 91 BPM | HEIGHT: 65 IN | WEIGHT: 168.19 LBS | TEMPERATURE: 99 F | SYSTOLIC BLOOD PRESSURE: 124 MMHG | DIASTOLIC BLOOD PRESSURE: 82 MMHG

## 2019-01-22 DIAGNOSIS — H10.9 BACTERIAL CONJUNCTIVITIS OF LEFT EYE: ICD-10-CM

## 2019-01-22 DIAGNOSIS — J01.00 ACUTE NON-RECURRENT MAXILLARY SINUSITIS: Primary | ICD-10-CM

## 2019-01-22 PROCEDURE — 3079F PR MOST RECENT DIASTOLIC BLOOD PRESSURE 80-89 MM HG: ICD-10-PCS | Mod: CPTII,S$GLB,, | Performed by: NURSE PRACTITIONER

## 2019-01-22 PROCEDURE — 1101F PT FALLS ASSESS-DOCD LE1/YR: CPT | Mod: CPTII,S$GLB,, | Performed by: NURSE PRACTITIONER

## 2019-01-22 PROCEDURE — 99999 PR PBB SHADOW E&M-EST. PATIENT-LVL IV: CPT | Mod: PBBFAC,,, | Performed by: NURSE PRACTITIONER

## 2019-01-22 PROCEDURE — 3079F DIAST BP 80-89 MM HG: CPT | Mod: CPTII,S$GLB,, | Performed by: NURSE PRACTITIONER

## 2019-01-22 PROCEDURE — 3074F PR MOST RECENT SYSTOLIC BLOOD PRESSURE < 130 MM HG: ICD-10-PCS | Mod: CPTII,S$GLB,, | Performed by: NURSE PRACTITIONER

## 2019-01-22 PROCEDURE — 3074F SYST BP LT 130 MM HG: CPT | Mod: CPTII,S$GLB,, | Performed by: NURSE PRACTITIONER

## 2019-01-22 PROCEDURE — 99214 OFFICE O/P EST MOD 30 MIN: CPT | Mod: S$GLB,,, | Performed by: NURSE PRACTITIONER

## 2019-01-22 PROCEDURE — 1101F PR PT FALLS ASSESS DOC 0-1 FALLS W/OUT INJ PAST YR: ICD-10-PCS | Mod: CPTII,S$GLB,, | Performed by: NURSE PRACTITIONER

## 2019-01-22 PROCEDURE — 99214 PR OFFICE/OUTPT VISIT, EST, LEVL IV, 30-39 MIN: ICD-10-PCS | Mod: S$GLB,,, | Performed by: NURSE PRACTITIONER

## 2019-01-22 PROCEDURE — 99999 PR PBB SHADOW E&M-EST. PATIENT-LVL IV: ICD-10-PCS | Mod: PBBFAC,,, | Performed by: NURSE PRACTITIONER

## 2019-01-22 RX ORDER — POLYMYXIN B SULFATE AND TRIMETHOPRIM 1; 10000 MG/ML; [USP'U]/ML
1 SOLUTION OPHTHALMIC EVERY 4 HOURS
Qty: 10 ML | Refills: 0 | Status: SHIPPED | OUTPATIENT
Start: 2019-01-22 | End: 2019-02-01

## 2019-01-22 RX ORDER — AMOXICILLIN 875 MG/1
875 TABLET, FILM COATED ORAL 2 TIMES DAILY
Qty: 20 TABLET | Refills: 0 | Status: SHIPPED | OUTPATIENT
Start: 2019-01-22 | End: 2019-02-01

## 2019-01-22 NOTE — PROGRESS NOTES
Subjective:       Patient ID: Xin Taylor is a 68 y.o. female.    Chief Complaint: Conjunctivitis (drainage, crust ) and URI (sinus congestion)    68 year old presents to Urgent Care with reports of sinus pressure and tenderness that has been present for about 2 weeks with no improvement with OTC medications.  Patient also reports left eye redness and drainage (closed shut) that has been present for about 3 days. Denies any other problems or concerns at this time.      Sinusitis   This is a new problem. The problem has been gradually worsening since onset. There has been no fever. Her pain is at a severity of 3/10. The pain is mild. Associated symptoms include congestion, sinus pressure and a sore throat. Pertinent negatives include no chills, ear pain or shortness of breath. (Eye drainage) Past treatments include nothing.     Review of Systems   Constitutional: Negative for appetite change, chills and fever.   HENT: Positive for congestion, sinus pressure, sinus pain and sore throat. Negative for ear pain and trouble swallowing.    Eyes: Positive for discharge. Negative for visual disturbance.   Respiratory: Negative for shortness of breath.    Cardiovascular: Negative for chest pain.   Gastrointestinal: Negative for abdominal pain, diarrhea, nausea and vomiting.   Endocrine: Negative for cold intolerance, polyphagia and polyuria.   Genitourinary: Negative for decreased urine volume and dysuria.   Musculoskeletal: Negative for back pain.   Skin: Negative for rash.   Allergic/Immunologic: Negative for environmental allergies and food allergies.   Neurological: Negative for dizziness, tremors, weakness and numbness.   Hematological: Does not bruise/bleed easily.   Psychiatric/Behavioral: Negative for confusion and hallucinations. The patient is not nervous/anxious and is not hyperactive.    All other systems reviewed and are negative.      Objective:     Physical Exam   Constitutional: She is oriented to person,  place, and time. She appears well-developed and well-nourished.   HENT:   Head: Normocephalic and atraumatic.   Right Ear: External ear normal.   Left Ear: External ear normal.   Nose: Mucosal edema present. Right sinus exhibits maxillary sinus tenderness. Left sinus exhibits maxillary sinus tenderness.   Mouth/Throat: Uvula is midline. Posterior oropharyngeal erythema present.   Eyes: EOM are normal. Pupils are equal, round, and reactive to light. Left eye exhibits discharge. Left conjunctiva is injected.   Neck: Normal range of motion. Neck supple.   Cardiovascular: Normal rate, regular rhythm, normal heart sounds and intact distal pulses.   No murmur heard.  Pulmonary/Chest: Effort normal and breath sounds normal. She has no wheezes.   Abdominal: Soft. Bowel sounds are normal. There is no tenderness.   Musculoskeletal: Normal range of motion.   Neurological: She is alert and oriented to person, place, and time. She has normal reflexes.   Skin: Skin is warm and dry. No rash noted.   Psychiatric: She has a normal mood and affect. Her behavior is normal. Judgment and thought content normal.   Nursing note and vitals reviewed.    Assessment:     1. Acute non-recurrent maxillary sinusitis    2. Bacterial conjunctivitis of left eye      Plan:   Xin was seen today for conjunctivitis and uri.    Diagnoses and all orders for this visit:    Acute non-recurrent maxillary sinusitis    Bacterial conjunctivitis of left eye  -     polymyxin B sulf-trimethoprim (POLYMYXIN B SUL-TRIMETHOPRIM) 10,000 unit- 1 mg/mL Drop; Place 1 drop into both eyes every 4 (four) hours. for 10 days    Other orders  -     amoxicillin (AMOXIL) 875 MG tablet; Take 1 tablet (875 mg total) by mouth 2 (two) times daily. for 10 days

## 2019-01-22 NOTE — PATIENT INSTRUCTIONS
Sinusitis (Antibiotic Treatment)    The sinuses are air-filled spaces within the bones of the face. They connect to the inside of the nose. Sinusitis is an inflammation of the tissue lining the sinus cavity. Sinus inflammation can occur during a cold. It can also be due to allergies to pollens and other particles in the air. Sinusitis can cause symptoms of sinus congestion and fullness. A sinus infection causes fever, headache and facial pain. There is often green or yellow drainage from the nose or into the back of the throat (post-nasal drip). You have been given antibiotics to treat this condition.  Home care:  · Take the full course of antibiotics as instructed. Do not stop taking them, even if you feel better.  · Drink plenty of water, hot tea, and other liquids. This may help thin mucus. It also may promote sinus drainage.  · Heat may help soothe painful areas of the face. Use a towel soaked in hot water. Or,  the shower and direct the hot spray onto your face. Using a vaporizer along with a menthol rub at night may also help.   · An expectorant containing guaifenesin may help thin the mucus and promote drainage from the sinuses.  · Over-the-counter decongestants may be used unless a similar medicine was prescribed. Nasal sprays work the fastest. Use one that contains phenylephrine or oxymetazoline. First blow the nose gently. Then use the spray. Do not use these medicines more often than directed on the label or symptoms may get worse. You may also use tablets containing pseudoephedrine. Avoid products that combine ingredients, because side effects may be increased. Read labels. You can also ask the pharmacist for help. (NOTE: Persons with high blood pressure should not use decongestants. They can raise blood pressure.)  · Over-the-counter antihistamines may help if allergies contributed to your sinusitis.    · Do not use nasal rinses or irrigation during an acute sinus infection, unless told to by  your health care provider. Rinsing may spread the infection to other sinuses.  · Use acetaminophen or ibuprofen to control pain, unless another pain medicine was prescribed. (If you have chronic liver or kidney disease or ever had a stomach ulcer, talk with your doctor before using these medicines. Aspirin should never be used in anyone under 18 years of age who is ill with a fever. It may cause severe liver damage.)  · Don't smoke. This can worsen symptoms.  Follow-up care  Follow up with your healthcare provider or our staff if you are not improving within the next week.  When to seek medical advice  Call your healthcare provider if any of these occur:  · Facial pain or headache becoming more severe  · Stiff neck  · Unusual drowsiness or confusion  · Swelling of the forehead or eyelids  · Vision problems, including blurred or double vision  · Fever of 100.4ºF (38ºC) or higher, or as directed by your healthcare provider  · Seizure  · Breathing problems  · Symptoms not resolving within 10 days  Date Last Reviewed: 4/13/2015  © 9874-3932 [employer]. [address] [place] [address], [place] [address]. All rights reserved. This information is not intended as a substitute for professional medical care. Always follow your healthcare professional's instructions.

## 2019-01-22 NOTE — LETTER
January 22, 2019      [facility]  [address]  [place] [address]  Phone: [phone]  Fax: [phone]       Patient: Xin Taylor   YOB: 1950  Date of Visit: 01/22/2019    To Whom It May Concern:    Xavi Taylor  was at [facility] on 01/22/2019. She may return to work/school on 01/23/2019 with no restrictions. If you have any questions or concerns, or if I can be of further assistance, please do not hesitate to contact me.    Sincerely,    LOTTIE Baxter

## 2019-02-12 ENCOUNTER — LAB VISIT (OUTPATIENT)
Dept: LAB | Facility: HOSPITAL | Age: 69
End: 2019-02-12
Payer: COMMERCIAL

## 2019-02-12 DIAGNOSIS — D50.8 IRON DEFICIENCY ANEMIA SECONDARY TO INADEQUATE DIETARY IRON INTAKE: ICD-10-CM

## 2019-02-12 DIAGNOSIS — I10 ESSENTIAL HYPERTENSION: ICD-10-CM

## 2019-02-12 LAB
ALBUMIN SERPL BCP-MCNC: 3.6 G/DL
ALP SERPL-CCNC: 80 U/L
ALT SERPL W/O P-5'-P-CCNC: 15 U/L
ANION GAP SERPL CALC-SCNC: 10 MMOL/L
AST SERPL-CCNC: 22 U/L
BASOPHILS # BLD AUTO: 0.03 K/UL
BASOPHILS NFR BLD: 0.5 %
BILIRUB SERPL-MCNC: 0.4 MG/DL
BUN SERPL-MCNC: 19 MG/DL
CALCIUM SERPL-MCNC: 9.5 MG/DL
CHLORIDE SERPL-SCNC: 103 MMOL/L
CO2 SERPL-SCNC: 28 MMOL/L
CREAT SERPL-MCNC: 0.9 MG/DL
DIFFERENTIAL METHOD: ABNORMAL
EOSINOPHIL # BLD AUTO: 0.2 K/UL
EOSINOPHIL NFR BLD: 3.6 %
ERYTHROCYTE [DISTWIDTH] IN BLOOD BY AUTOMATED COUNT: 14.5 %
EST. GFR  (AFRICAN AMERICAN): >60 ML/MIN/1.73 M^2
EST. GFR  (NON AFRICAN AMERICAN): >60 ML/MIN/1.73 M^2
GLUCOSE SERPL-MCNC: 89 MG/DL
HCT VFR BLD AUTO: 35.8 %
HGB BLD-MCNC: 11.5 G/DL
IMM GRANULOCYTES # BLD AUTO: 0.01 K/UL
IMM GRANULOCYTES NFR BLD AUTO: 0.2 %
IRON SERPL-MCNC: 49 UG/DL
LYMPHOCYTES # BLD AUTO: 2.2 K/UL
LYMPHOCYTES NFR BLD: 39.1 %
MCH RBC QN AUTO: 29.8 PG
MCHC RBC AUTO-ENTMCNC: 32.1 G/DL
MCV RBC AUTO: 93 FL
MONOCYTES # BLD AUTO: 0.5 K/UL
MONOCYTES NFR BLD: 9.5 %
NEUTROPHILS # BLD AUTO: 2.6 K/UL
NEUTROPHILS NFR BLD: 47.1 %
NRBC BLD-RTO: 0 /100 WBC
PLATELET # BLD AUTO: 215 K/UL
PMV BLD AUTO: 9.8 FL
POTASSIUM SERPL-SCNC: 3.5 MMOL/L
PROT SERPL-MCNC: 7.7 G/DL
RBC # BLD AUTO: 3.86 M/UL
SATURATED IRON: 12 %
SODIUM SERPL-SCNC: 141 MMOL/L
TOTAL IRON BINDING CAPACITY: 403 UG/DL
TRANSFERRIN SERPL-MCNC: 272 MG/DL
WBC # BLD AUTO: 5.5 K/UL

## 2019-02-12 PROCEDURE — 83540 ASSAY OF IRON: CPT

## 2019-02-12 PROCEDURE — 36415 COLL VENOUS BLD VENIPUNCTURE: CPT

## 2019-02-12 PROCEDURE — 80053 COMPREHEN METABOLIC PANEL: CPT

## 2019-02-12 PROCEDURE — 85025 COMPLETE CBC W/AUTO DIFF WBC: CPT

## 2019-02-27 ENCOUNTER — OFFICE VISIT (OUTPATIENT)
Dept: INTERNAL MEDICINE | Facility: CLINIC | Age: 69
End: 2019-02-27
Payer: COMMERCIAL

## 2019-02-27 ENCOUNTER — HOSPITAL ENCOUNTER (OUTPATIENT)
Dept: RADIOLOGY | Facility: HOSPITAL | Age: 69
Discharge: HOME | End: 2019-02-27
Attending: FAMILY MEDICINE
Payer: COMMERCIAL

## 2019-02-27 VITALS
HEIGHT: 65 IN | WEIGHT: 170.44 LBS | TEMPERATURE: 97 F | SYSTOLIC BLOOD PRESSURE: 120 MMHG | BODY MASS INDEX: 28.4 KG/M2 | DIASTOLIC BLOOD PRESSURE: 80 MMHG | HEART RATE: 68 BPM

## 2019-02-27 DIAGNOSIS — G47.33 OSA ON CPAP: ICD-10-CM

## 2019-02-27 DIAGNOSIS — D50.8 IRON DEFICIENCY ANEMIA SECONDARY TO INADEQUATE DIETARY IRON INTAKE: ICD-10-CM

## 2019-02-27 DIAGNOSIS — I10 ESSENTIAL HYPERTENSION: Primary | ICD-10-CM

## 2019-02-27 DIAGNOSIS — M17.11 PRIMARY OSTEOARTHRITIS OF RIGHT KNEE: ICD-10-CM

## 2019-02-27 DIAGNOSIS — E78.5 HYPERLIPIDEMIA, UNSPECIFIED HYPERLIPIDEMIA TYPE: ICD-10-CM

## 2019-02-27 DIAGNOSIS — E55.9 VITAMIN D DEFICIENCY: ICD-10-CM

## 2019-02-27 PROCEDURE — 73560 XR KNEE ORTHO RIGHT: ICD-10-PCS | Mod: 26,59,RT, | Performed by: RADIOLOGY

## 2019-02-27 PROCEDURE — 99999 PR PBB SHADOW E&M-EST. PATIENT-LVL III: CPT | Mod: PBBFAC,,, | Performed by: FAMILY MEDICINE

## 2019-02-27 PROCEDURE — 3079F PR MOST RECENT DIASTOLIC BLOOD PRESSURE 80-89 MM HG: ICD-10-PCS | Mod: CPTII,S$GLB,, | Performed by: FAMILY MEDICINE

## 2019-02-27 PROCEDURE — 73560 X-RAY EXAM OF KNEE 1 OR 2: CPT | Mod: 26,59,RT, | Performed by: RADIOLOGY

## 2019-02-27 PROCEDURE — 73562 XR KNEE ORTHO RIGHT: ICD-10-PCS | Mod: 26,RT,, | Performed by: RADIOLOGY

## 2019-02-27 PROCEDURE — 3288F PR FALLS RISK ASSESSMENT DOCUMENTED: ICD-10-PCS | Mod: CPTII,S$GLB,, | Performed by: FAMILY MEDICINE

## 2019-02-27 PROCEDURE — 73562 X-RAY EXAM OF KNEE 3: CPT | Mod: 26,RT,, | Performed by: RADIOLOGY

## 2019-02-27 PROCEDURE — 1100F PR PT FALLS ASSESS DOC 2+ FALLS/FALL W/INJURY/YR: ICD-10-PCS | Mod: CPTII,S$GLB,, | Performed by: FAMILY MEDICINE

## 2019-02-27 PROCEDURE — 3074F SYST BP LT 130 MM HG: CPT | Mod: CPTII,S$GLB,, | Performed by: FAMILY MEDICINE

## 2019-02-27 PROCEDURE — 99214 OFFICE O/P EST MOD 30 MIN: CPT | Mod: S$GLB,,, | Performed by: FAMILY MEDICINE

## 2019-02-27 PROCEDURE — 99214 PR OFFICE/OUTPT VISIT, EST, LEVL IV, 30-39 MIN: ICD-10-PCS | Mod: S$GLB,,, | Performed by: FAMILY MEDICINE

## 2019-02-27 PROCEDURE — 73560 X-RAY EXAM OF KNEE 1 OR 2: CPT | Mod: TC,FY,PO,RT

## 2019-02-27 PROCEDURE — 3288F FALL RISK ASSESSMENT DOCD: CPT | Mod: CPTII,S$GLB,, | Performed by: FAMILY MEDICINE

## 2019-02-27 PROCEDURE — 3074F PR MOST RECENT SYSTOLIC BLOOD PRESSURE < 130 MM HG: ICD-10-PCS | Mod: CPTII,S$GLB,, | Performed by: FAMILY MEDICINE

## 2019-02-27 PROCEDURE — 99999 PR PBB SHADOW E&M-EST. PATIENT-LVL III: ICD-10-PCS | Mod: PBBFAC,,, | Performed by: FAMILY MEDICINE

## 2019-02-27 PROCEDURE — 73562 X-RAY EXAM OF KNEE 3: CPT | Mod: TC,FY,PO,RT

## 2019-02-27 PROCEDURE — 3079F DIAST BP 80-89 MM HG: CPT | Mod: CPTII,S$GLB,, | Performed by: FAMILY MEDICINE

## 2019-02-27 PROCEDURE — 1100F PTFALLS ASSESS-DOCD GE2>/YR: CPT | Mod: CPTII,S$GLB,, | Performed by: FAMILY MEDICINE

## 2019-02-27 RX ORDER — MELOXICAM 15 MG/1
TABLET ORAL
Refills: 1 | COMMUNITY
Start: 2019-01-21 | End: 2019-11-05 | Stop reason: SDUPTHER

## 2019-02-27 NOTE — PROGRESS NOTES
Please call to inform that the x-rays did show extensive arthritis and wear and tear in the knee that is affecting the middle of the knee as well as with some bone spurs.  Continue to use the meloxicam and do the physical therapy as we discussed.  Breann Carlson MD

## 2019-02-27 NOTE — PROGRESS NOTES
Subjective:      Patient ID: Xin Taylor is a 68 y.o. female.    Chief Complaint: Follow-up (bp, knee, labs)    Disclaimer:  This note is prepared using voice recognition software and as such is likely to have errors and has not been proof read. Please contact me for questions.     Xin Taylor is a 67 y.o. female who presents today for multiple issues and 6 month follow-up.   Did have 2 issues with sinusitis since last visit. Doing her cpap.  Does note that on the initial visit with the doctor she only got 5 days worth of antibiotics.   Since I last saw her she has lost about 2 lb.  Blood pressure is well controlled.  She is not currently taking her aspirin.  She still uses meloxicam only every 4-5 days to help with arthritis symptoms but she does report that when she takes the aspirin it helped a lot with her arthritis as well.  She just noticed that when she took it daily she had bruised a lot more.  Does she is willing to go back on it but potentially even doing every other day dosing and giving at the [facility].    Interested in doing x-rays of her knees.  Mobic seems to help some but has never done x-rays before.  Wants to look into the causes further before gets too for long as she would like to continue to stay active.    Blood pressure is very well controlled.  120/80 tolerating her medications    On statin therapy as well Lipitor 10.  Tolerating well.  No muscle cramps noted.    Is on several vitamins still at this time.  Reviewed iron studies blood counts.        Lab Results   Component Value Date    WBC 5.50 02/12/2019    HGB 11.5 (L) 02/12/2019    HCT 35.8 (L) 02/12/2019     02/12/2019    CHOL 176 08/14/2018    TRIG 59 08/14/2018    HDL 76 (H) 08/14/2018    ALT 15 02/12/2019    AST 22 02/12/2019     02/12/2019    K 3.5 02/12/2019     02/12/2019    CREATININE 0.9 02/12/2019    BUN 19 02/12/2019    CO2 28 02/12/2019    TSH 1.282 08/14/2018       X-ray Knee Ortho Right  Narrative:  "EXAMINATION:  XR KNEE ORTHO RIGHT    CLINICAL HISTORY:  right knee pain, lateral, failed nsaids; Unilateral primary osteoarthritis, right knee    TECHNIQUE:  AP standing of both knees, Merchant views of both knees as well as a lateral view of the right knee were performed.    COMPARISON:  None    FINDINGS:  Bilateral tricompartmental degenerative changes are seen with medial compartment joint space narrowing and marginal spurring.  No fracture or dislocation.  No patellar tilt.  No joint effusion.  Bones appear demineralized.  Impression: As above    Electronically signed by: Barrett Nagy MD  Date:    02/27/2019  Time:    11:34        Review of Systems   Constitutional: Negative for chills, fatigue and fever.   HENT: Negative for ear pain and trouble swallowing.    Eyes: Negative for pain and visual disturbance.   Respiratory: Negative for cough and shortness of breath.    Cardiovascular: Negative for chest pain and leg swelling.   Gastrointestinal: Negative for abdominal pain, blood in stool, nausea and vomiting.   Endocrine: Negative for cold intolerance and heat intolerance.   Genitourinary: Negative for dysuria and frequency.   Musculoskeletal: Positive for arthralgias, gait problem and joint swelling. Negative for myalgias and neck pain.   Skin: Negative for color change and rash.   Neurological: Negative for dizziness and headaches.   Psychiatric/Behavioral: Negative for behavioral problems and sleep disturbance.     Objective:     Vitals:    02/27/19 1015   BP: 120/80   Pulse: 68   Temp: 97 °F (36.1 °C)   Weight: 77.3 kg (170 lb 6.7 oz)   Height: 5' 5" (1.651 m)     Physical Exam   Constitutional: She is oriented to person, place, and time. She appears well-developed and well-nourished.   HENT:   Head: Normocephalic and atraumatic.   Right Ear: External ear normal.   Left Ear: External ear normal.   Mouth/Throat: Oropharynx is clear and moist.   Eyes: EOM are normal.   Neck: Normal range of motion. Neck " supple. No thyromegaly present.   Cardiovascular: Normal rate and regular rhythm. Exam reveals no gallop and no friction rub.   No murmur heard.  Pulmonary/Chest: Effort normal. No respiratory distress. She has no wheezes. She has no rales.   Abdominal: Soft. Bowel sounds are normal. She exhibits no distension. There is no tenderness. There is no rebound.   Musculoskeletal: She exhibits no edema.        Right knee: She exhibits decreased range of motion and swelling. Tenderness found. Medial joint line and lateral joint line tenderness noted.        Left knee: She exhibits decreased range of motion and swelling. Tenderness found. Medial joint line and lateral joint line tenderness noted.   Lymphadenopathy:     She has no cervical adenopathy.   Neurological: She is alert and oriented to person, place, and time.   Skin: Skin is warm and dry. No rash noted.   Psychiatric: She has a normal mood and affect. Her behavior is normal. Judgment and thought content normal.   Vitals reviewed.    Assessment:     1. Essential hypertension    2. Hyperlipidemia, unspecified hyperlipidemia type    3. Iron deficiency anemia secondary to inadequate dietary iron intake    4. ASIF on CPAP    5. Primary osteoarthritis of right knee    6. Vitamin D deficiency      Plan:   Xin was seen today for follow-up.    Diagnoses and all orders for this visit:    Essential hypertension - stable, Continue with current medications and interventions. Labs reviewed.     -     TSH; Future  -     T4, free; Future  -     Lipid panel; Future  -     Comprehensive metabolic panel; Future  -     CBC auto differential; Future  -     Vitamin D; Future  -     Iron and TIBC; Future  -     Ferritin; Future    Hyperlipidemia, unspecified hyperlipidemia type - stable, Continue with current medications and interventions. Labs reviewed.     -     TSH; Future  -     T4, free; Future  -     Lipid panel; Future  -     Comprehensive metabolic panel; Future  -     CBC auto  differential; Future  -     Vitamin D; Future  -     Iron and TIBC; Future  -     Ferritin; Future    Iron deficiency anemia secondary to inadequate dietary iron intake- stable, Continue with current medications and interventions. Labs reviewed.   -     TSH; Future  -     T4, free; Future  -     Lipid panel; Future  -     Comprehensive metabolic panel; Future  -     CBC auto differential; Future  -     Vitamin D; Future  -     Iron and TIBC; Future  -     Ferritin; Future    ASIF on CPAP - stable, Continue with current medications and interventions. Labs reviewed.   -     TSH; Future  -     T4, free; Future  -     Lipid panel; Future  -     Comprehensive metabolic panel; Future  -     CBC auto differential; Future  -     Vitamin D; Future  -     Iron and TIBC; Future  -     Ferritin; Future    Primary osteoarthritis of right knee  Comments:  xray today, setup with PT.  Given home exercises as well.  Okay to use meloxicam intermittently but not more than 2 weeks consistently.  Could consider referral to Orthopedics if desires injections  Orders:  -     Cancel: X-Ray Knee Complete 4 Or More Views Right; Future  -     Ambulatory Referral to Physical/Occupational Therapy  -     TSH; Future  -     T4, free; Future  -     Lipid panel; Future  -     Comprehensive metabolic panel; Future  -     CBC auto differential; Future  -     Vitamin D; Future  -     Iron and TIBC; Future  -     Ferritin; Future    Vitamin D deficiency - stable, Continue with current medications and interventions. Labs reviewed.   -     TSH; Future  -     T4, free; Future  -     Lipid panel; Future  -     Comprehensive metabolic panel; Future  -     CBC auto differential; Future  -     Vitamin D; Future  -     Iron and TIBC; Future  -     Ferritin; Future            Follow-up in about 6 months (around 8/27/2019) for physical with Dr TORRES.    There are no Patient Instructions on file for this visit.

## 2019-03-04 RX ORDER — AMLODIPINE BESYLATE 2.5 MG/1
2.5 TABLET ORAL NIGHTLY
Qty: 90 TABLET | Refills: 0 | Status: SHIPPED | OUTPATIENT
Start: 2019-03-04 | End: 2019-06-13 | Stop reason: SDUPTHER

## 2019-03-11 RX ORDER — TRIAMTERENE/HYDROCHLOROTHIAZID 37.5-25 MG
1 TABLET ORAL DAILY
Qty: 90 TABLET | Refills: 3 | Status: SHIPPED | OUTPATIENT
Start: 2019-03-11 | End: 2020-04-29 | Stop reason: SDUPTHER

## 2019-03-30 ENCOUNTER — NURSE TRIAGE (OUTPATIENT)
Dept: ADMINISTRATIVE | Facility: CLINIC | Age: 69
End: 2019-03-30

## 2019-04-05 RX ORDER — ATORVASTATIN CALCIUM 10 MG/1
5 TABLET, FILM COATED ORAL DAILY
Qty: 90 TABLET | Refills: 3 | Status: SHIPPED | OUTPATIENT
Start: 2019-04-05 | End: 2020-04-27 | Stop reason: SDUPTHER

## 2019-04-05 NOTE — TELEPHONE ENCOUNTER
----- Message from Craolyn Beasley sent at 4/5/2019 11:53 AM CDT -----  Contact: Patient  Patient called and stated she has been trying to return a call since last week and she hasn't been able to connect with the nurse yet. She hasn't been on one of her medications (Atorvastatin) because it wasn't renewed; she is concerned about that. She can be contacted at [phone].    Thanks,  Carolyn

## 2019-04-05 NOTE — TELEPHONE ENCOUNTER
----- Message from Geri Ny sent at 4/5/2019  1:52 PM CDT -----  Contact: Pt  Type:  Patient Returning Call    Who Called: Xin  Who Left Message for Patient: Unsure  Does the patient know what this is regarding?: Prescription Denial   Would the patient rather a call back or a response via [facility]? Callback  Best Call Back Number: [phone]  Additional Information: No

## 2019-04-21 ENCOUNTER — HOSPITAL ENCOUNTER (EMERGENCY)
Facility: HOSPITAL | Age: 69
Discharge: HOME | End: 2019-04-22
Attending: EMERGENCY MEDICINE
Payer: COMMERCIAL

## 2019-04-21 VITALS
HEART RATE: 74 BPM | DIASTOLIC BLOOD PRESSURE: 67 MMHG | TEMPERATURE: 98 F | SYSTOLIC BLOOD PRESSURE: 160 MMHG | RESPIRATION RATE: 20 BRPM | HEIGHT: 65 IN | BODY MASS INDEX: 29.24 KG/M2 | OXYGEN SATURATION: 99 % | WEIGHT: 175.5 LBS
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
## 2019-04-21 DIAGNOSIS — S00.83XA TRAUMATIC HEMATOMA OF FOREHEAD, INITIAL ENCOUNTER: ICD-10-CM

## 2019-04-21 DIAGNOSIS — T07.XXXA MULTIPLE ABRASIONS: ICD-10-CM

## 2019-04-21 DIAGNOSIS — M79.18 MUSCULOSKELETAL PAIN: ICD-10-CM

## 2019-04-21 PROCEDURE — 99284 EMERGENCY DEPT VISIT MOD MDM: CPT

## 2019-04-21 RX ORDER — METHOCARBAMOL 750 MG/1
750 TABLET, FILM COATED ORAL 4 TIMES DAILY
Qty: 28 TABLET | Refills: 0 | Status: SHIPPED | OUTPATIENT
Start: 2019-04-21 | End: 2019-04-28

## 2019-04-21 RX ORDER — TRAMADOL HYDROCHLORIDE 50 MG/1
50 TABLET ORAL EVERY 6 HOURS PRN
Qty: 15 TABLET | Refills: 0 | Status: SHIPPED | OUTPATIENT
Start: 2019-04-21 | End: 2019-05-01
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
## 2019-04-22 NOTE — DISCHARGE INSTRUCTIONS
Return to the ED immediately for any worsening of symptoms, fever above 100.5, headache, change in vision, slurred speech, facial droop, numbness or tingling to extremities, confusion, dizziness, weakness or any concerns.

## 2019-04-22 NOTE — ED NOTES
Patient identifiers verified and correct for Xin Taylor.    LOC: The patient is awake, alert and aware of environment with an appropriate affect, the patient is oriented x 3 and speaking appropriately.  APPEARANCE: Patient resting comfortably and in no acute distress, patient is clean and well groomed, patient's clothing is properly fastened.  SKIN: The skin is warm and dry, color consistent with ethnicity, patient has normal skin turgor and moist mucus membranes, abrasions to head and L hand  MUSCULOSKELETAL: Patient moving all extremities spontaneously, no obvious swelling or deformities noted.  RESPIRATORY: Airway is open and patent, respirations are spontaneous, patient has a normal effort and rate, no accessory muscle use noted, bilateral breath sounds clear.  CARDIAC: Patient has a normal rate and regular rhythm, no periphreal edema noted, capillary refill < 3 seconds.  ABDOMEN: Soft and non tender to palpation, no distention noted, normoactive bowel sounds present in all four quadrants.  NEUROLOGIC: PERRL, **mm bilaterally, eyes open spontaneously, behavior appropriate to situation, follows commands, facial expression symmetrical, bilateral hand grasp equal and even, purposeful motor response noted, normal sensation in all extremities when touched with a finger.

## 2019-05-31 ENCOUNTER — NURSE TRIAGE (OUTPATIENT)
Dept: ADMINISTRATIVE | Facility: CLINIC | Age: 69
End: 2019-05-31

## 2019-05-31 ENCOUNTER — OFFICE VISIT (OUTPATIENT)
Dept: INTERNAL MEDICINE | Facility: CLINIC | Age: 69
End: 2019-05-31
Payer: COMMERCIAL

## 2019-05-31 VITALS
HEART RATE: 87 BPM | HEIGHT: 65 IN | DIASTOLIC BLOOD PRESSURE: 78 MMHG | BODY MASS INDEX: 28.04 KG/M2 | WEIGHT: 168.31 LBS | OXYGEN SATURATION: 98 % | TEMPERATURE: 98 F | SYSTOLIC BLOOD PRESSURE: 135 MMHG

## 2019-05-31 DIAGNOSIS — I10 ESSENTIAL HYPERTENSION: Primary | ICD-10-CM

## 2019-05-31 DIAGNOSIS — M75.42 IMPINGEMENT SYNDROME OF LEFT SHOULDER: ICD-10-CM

## 2019-05-31 DIAGNOSIS — R60.0 EDEMA, PERIPHERAL: ICD-10-CM

## 2019-05-31 DIAGNOSIS — R07.89 DISCOMFORT IN CHEST: ICD-10-CM

## 2019-05-31 PROCEDURE — 3078F DIAST BP <80 MM HG: CPT | Mod: CPTII,S$GLB,, | Performed by: FAMILY MEDICINE

## 2019-05-31 PROCEDURE — 3288F FALL RISK ASSESSMENT DOCD: CPT | Mod: CPTII,S$GLB,, | Performed by: FAMILY MEDICINE

## 2019-05-31 PROCEDURE — 3075F PR MOST RECENT SYSTOLIC BLOOD PRESS GE 130-139MM HG: ICD-10-PCS | Mod: CPTII,S$GLB,, | Performed by: FAMILY MEDICINE

## 2019-05-31 PROCEDURE — 1100F PTFALLS ASSESS-DOCD GE2>/YR: CPT | Mod: CPTII,S$GLB,, | Performed by: FAMILY MEDICINE

## 2019-05-31 PROCEDURE — 99213 OFFICE O/P EST LOW 20 MIN: CPT | Mod: S$GLB,,, | Performed by: FAMILY MEDICINE

## 2019-05-31 PROCEDURE — 3288F PR FALLS RISK ASSESSMENT DOCUMENTED: ICD-10-PCS | Mod: CPTII,S$GLB,, | Performed by: FAMILY MEDICINE

## 2019-05-31 PROCEDURE — 99213 PR OFFICE/OUTPT VISIT, EST, LEVL III, 20-29 MIN: ICD-10-PCS | Mod: S$GLB,,, | Performed by: FAMILY MEDICINE

## 2019-05-31 PROCEDURE — 1100F PR PT FALLS ASSESS DOC 2+ FALLS/FALL W/INJURY/YR: ICD-10-PCS | Mod: CPTII,S$GLB,, | Performed by: FAMILY MEDICINE

## 2019-05-31 PROCEDURE — 99999 PR PBB SHADOW E&M-EST. PATIENT-LVL III: ICD-10-PCS | Mod: PBBFAC,,, | Performed by: FAMILY MEDICINE

## 2019-05-31 PROCEDURE — 3078F PR MOST RECENT DIASTOLIC BLOOD PRESSURE < 80 MM HG: ICD-10-PCS | Mod: CPTII,S$GLB,, | Performed by: FAMILY MEDICINE

## 2019-05-31 PROCEDURE — 99999 PR PBB SHADOW E&M-EST. PATIENT-LVL III: CPT | Mod: PBBFAC,,, | Performed by: FAMILY MEDICINE

## 2019-05-31 PROCEDURE — 3075F SYST BP GE 130 - 139MM HG: CPT | Mod: CPTII,S$GLB,, | Performed by: FAMILY MEDICINE

## 2019-05-31 NOTE — ASSESSMENT & PLAN NOTE
Initial encounter.  Reports compliance to meds.  No adverse effects to meds.  Continue meds and monitor.  Advised amlodipine can contribute to peripheral edema but also taking diuretic that helps peripheral edema

## 2019-05-31 NOTE — TELEPHONE ENCOUNTER
Reason for Disposition   Pain also present in shoulder(s) or arm(s) or jaw    Protocols used: CHEST PAIN-A-OH    Pt reports a funny feeling on the left side of her chest pain that has been radiating into her arm and shoulder. Symptoms have been pressure for last couple of days. Each episode last a few seconds and then goes away. Advised ER per protocol but patient refused. Would like to speak with pCP. pls contact patient to further advise

## 2019-05-31 NOTE — PROGRESS NOTES
"Subjective:       Patient ID: Xin Taylor is a 68 y.o. female.    Chief Complaint: Flank Pain (leftside)    HPI  Sx above  Onset Tuesday  Recently retired and started new job helping with  but now out for summer  Reports feeling sensation of electricity  Non-exertional, occurs at rest  Radiating to abdomen  Reports similar sensation of left forearm     Brings bp log with elevated reading on left arm of 150/70s  States meds were reduced in past d/t adverse effects  Reports noncompliance to med when blood pressure reading occurred    States gaseous with belching    Swelling of ankles better in am  Reports worse as day progresses  Started before taking amlodipine  Not elevating legs   Not wearing compression stockings    Review of Systems   Cardiovascular: Positive for leg swelling.   Genitourinary: Positive for flank pain.   Musculoskeletal: Positive for arthralgias and myalgias.        Objective:   /78 (BP Location: Left arm, Patient Position: Sitting, BP Method: Large (Automatic))   Pulse 87   Temp 98.2 °F (36.8 °C) (Tympanic)   Ht 5' 5" (1.651 m)   Wt 76.4 kg (168 lb 5.1 oz)   SpO2 98%   BMI 28.01 kg/m²     Physical Exam   Constitutional: She is oriented to person, place, and time. She appears well-nourished. No distress.   HENT:   Head: Normocephalic and atraumatic.   Mouth/Throat: Oropharynx is clear and moist.   Eyes: Conjunctivae and EOM are normal. No scleral icterus.   Neck: Normal range of motion. Neck supple.   Cardiovascular: Normal rate, regular rhythm and normal heart sounds.   Pulmonary/Chest: Effort normal and breath sounds normal. She has no wheezes.   Abdominal: Soft. Bowel sounds are normal. There is no tenderness.   Musculoskeletal: She exhibits edema (nonpitting). She exhibits no deformity.   Inspection wnl  Palpation non TTP  Apley scratch (ROM) reduced on left shoulder  Neers (impingement) pos on left shoulder    Empty can (supraspinatus) neg  Speed test (bicep " tendonitis)  neg  Cross arm (AC joint d/o) neg  Lift off (subscapularis) pos on left arm       Neurological: She is alert and oriented to person, place, and time.   Skin: Skin is warm and dry.   Psychiatric: She has a normal mood and affect. Her behavior is normal.   Vitals reviewed.    Assessment:     1. Essential hypertension    2. Discomfort in chest    3. Impingement syndrome of left shoulder    4. Edema, peripheral      Plan:     Problem List Items Addressed This Visit        Cardiac/Vascular    HTN (hypertension) - Primary    Current Assessment & Plan     Initial encounter.  Reports compliance to meds.  No adverse effects to meds.  Continue meds and monitor.  Advised amlodipine can contribute to peripheral edema but also taking diuretic that helps peripheral edema           Other Visit Diagnoses     Discomfort in chest        Impingement syndrome of left shoulder        Edema, peripheral          H&P reassuring. Provided reassurance. Advised sx may be multifactorial  ED precautions provided if chest pain continues    Advised compression stockings and elevating legs, exercise to help with peripheral edema    Follow up if symptoms worsen or fail to improve.

## 2019-05-31 NOTE — PATIENT INSTRUCTIONS
Amlodipine tablets  What is this medicine?  AMLODIPINE (am GUILLERMO oliver anderson) is a calcium-channel blocker. It affects the amount of calcium found in your heart and muscle cells. This relaxes your blood vessels, which can reduce the amount of work the heart has to do. This medicine is used to lower high blood pressure. It is also used to prevent chest pain.  How should I use this medicine?  Take this medicine by mouth with a glass of water. Follow the directions on the prescription label. Take your medicine at regular intervals. Do not take more medicine than directed.  Talk to your pediatrician regarding the use of this medicine in children. Special care may be needed. This medicine has been used in children as young as 6.  Persons over 65 years old may have a stronger reaction to this medicine and need smaller doses.  What side effects may I notice from receiving this medicine?  Side effects that you should report to your doctor or health care professional as soon as possible:  · allergic reactions like skin rash, itching or hives, swelling of the face, lips, or tongue  · breathing problems  · changes in vision or hearing  · chest pain  · fast, irregular heartbeat  · swelling of legs or ankles  Side effects that usually do not require medical attention (report to your doctor or health care professional if they continue or are bothersome):  · dry mouth  · facial flushing  · nausea, vomiting  · stomach gas, pain  · tired, weak  · trouble sleeping  What may interact with this medicine?  · herbal or dietary supplements  · local or general anesthetics  · medicines for high blood pressure  · medicines for prostate problems  · rifampin  What if I miss a dose?  If you miss a dose, take it as soon as you can. If it is almost time for your next dose, take only that dose. Do not take double or extra doses.  Where should I keep my medicine?  Keep out of the reach of children.  Store at room temperature between 59 and 86 degrees F  (15 and 30 degrees C). Protect from light. Keep container tightly closed. Throw away any unused medicine after the expiration date.  What should I tell my health care provider before I take this medicine?  They need to know if you have any of these conditions:  · heart problems like heart failure or aortic stenosis  · liver disease  · an unusual or allergic reaction to amlodipine, other medicines, foods, dyes, or preservatives  · pregnant or trying to get pregnant  · breast-feeding  What should I watch for while using this medicine?  Visit your doctor or health care professional for regular check ups. Check your blood pressure and pulse rate regularly. Ask your health care professional what your blood pressure and pulse rate should be, and when you should contact him or her.  This medicine may make you feel confused, dizzy or lightheaded. Do not drive, use machinery, or do anything that needs mental alertness until you know how this medicine affects you. To reduce the risk of dizzy or fainting spells, do not sit or stand up quickly, especially if you are an older patient. Avoid alcoholic drinks; they can make you more dizzy.  Do not suddenly stop taking amlodipine. Ask your doctor or health care professional how you can gradually reduce the dose.  NOTE:This sheet is a summary. It may not cover all possible information. If you have questions about this medicine, talk to your doctor, pharmacist, or health care provider. Copyright© 2017 [employer]

## 2019-05-31 NOTE — TELEPHONE ENCOUNTER
Called patient to f/u and she verbalized that she has an mario at [facility] today.emma

## 2019-06-13 RX ORDER — AMLODIPINE BESYLATE 2.5 MG/1
2.5 TABLET ORAL NIGHTLY
Qty: 90 TABLET | Refills: 3 | Status: SHIPPED | OUTPATIENT
Start: 2019-06-13 | End: 2020-05-25

## 2019-06-13 RX ORDER — AMLODIPINE BESYLATE 2.5 MG/1
TABLET ORAL
Qty: 90 TABLET | Refills: 0 | OUTPATIENT
Start: 2019-06-13

## 2019-06-13 NOTE — TELEPHONE ENCOUNTER
----- Message from Brigitte Horne sent at 6/13/2019 10:02 AM CDT -----  Contact: Patient  Type:  RX Refill Request    Who Called: Patient  Refill or New Rx:refill  RX Name and Strength:Amlodipine, 2.5mg  How is the patient currently taking it? (ex. 1XDay):once daily  Is this a 30 day or 90 day RX:90  Preferred Pharm[facility] with phone number:[facility]  Local or Mail Order:Local  Ordering Provider:Dr Carlson  Would the patient rather a call back or a response via [facility]? call  Best Call Back Number:[phone]  Additional Information:

## 2019-08-21 ENCOUNTER — LAB VISIT (OUTPATIENT)
Dept: LAB | Facility: HOSPITAL | Age: 69
End: 2019-08-21
Attending: FAMILY MEDICINE
Payer: COMMERCIAL

## 2019-08-21 DIAGNOSIS — G47.33 OSA ON CPAP: ICD-10-CM

## 2019-08-21 DIAGNOSIS — E78.5 HYPERLIPIDEMIA, UNSPECIFIED HYPERLIPIDEMIA TYPE: ICD-10-CM

## 2019-08-21 DIAGNOSIS — I10 ESSENTIAL HYPERTENSION: ICD-10-CM

## 2019-08-21 DIAGNOSIS — E55.9 VITAMIN D DEFICIENCY: ICD-10-CM

## 2019-08-21 DIAGNOSIS — D50.8 IRON DEFICIENCY ANEMIA SECONDARY TO INADEQUATE DIETARY IRON INTAKE: ICD-10-CM

## 2019-08-21 DIAGNOSIS — M17.11 PRIMARY OSTEOARTHRITIS OF RIGHT KNEE: ICD-10-CM

## 2019-08-21 LAB
25(OH)D3+25(OH)D2 SERPL-MCNC: 54 NG/ML (ref 30–96)
ALBUMIN SERPL BCP-MCNC: 3.5 G/DL (ref 3.5–5.2)
ALP SERPL-CCNC: 74 U/L (ref 55–135)
ALT SERPL W/O P-5'-P-CCNC: 12 U/L (ref 10–44)
ANION GAP SERPL CALC-SCNC: 8 MMOL/L (ref 8–16)
AST SERPL-CCNC: 20 U/L (ref 10–40)
BASOPHILS # BLD AUTO: 0.02 K/UL (ref 0–0.2)
BASOPHILS NFR BLD: 0.4 % (ref 0–1.9)
BILIRUB SERPL-MCNC: 0.5 MG/DL (ref 0.1–1)
BUN SERPL-MCNC: 21 MG/DL (ref 8–23)
CALCIUM SERPL-MCNC: 9.4 MG/DL (ref 8.7–10.5)
CHLORIDE SERPL-SCNC: 105 MMOL/L (ref 95–110)
CHOLEST SERPL-MCNC: 185 MG/DL (ref 120–199)
CHOLEST/HDLC SERPL: 2.2 {RATIO} (ref 2–5)
CO2 SERPL-SCNC: 28 MMOL/L (ref 23–29)
CREAT SERPL-MCNC: 1.1 MG/DL (ref 0.5–1.4)
DIFFERENTIAL METHOD: ABNORMAL
EOSINOPHIL # BLD AUTO: 0.2 K/UL (ref 0–0.5)
EOSINOPHIL NFR BLD: 2.8 % (ref 0–8)
ERYTHROCYTE [DISTWIDTH] IN BLOOD BY AUTOMATED COUNT: 14.6 % (ref 11.5–14.5)
EST. GFR  (AFRICAN AMERICAN): 59.6 ML/MIN/1.73 M^2
EST. GFR  (NON AFRICAN AMERICAN): 51.7 ML/MIN/1.73 M^2
FERRITIN SERPL-MCNC: 83 NG/ML (ref 20–300)
GLUCOSE SERPL-MCNC: 89 MG/DL (ref 70–110)
HCT VFR BLD AUTO: 36.4 % (ref 37–48.5)
HDLC SERPL-MCNC: 86 MG/DL (ref 40–75)
HDLC SERPL: 46.5 % (ref 20–50)
HGB BLD-MCNC: 11.3 G/DL (ref 12–16)
IMM GRANULOCYTES # BLD AUTO: 0.01 K/UL (ref 0–0.04)
IMM GRANULOCYTES NFR BLD AUTO: 0.2 % (ref 0–0.5)
IRON SERPL-MCNC: 72 UG/DL (ref 30–160)
LDLC SERPL CALC-MCNC: 88.4 MG/DL (ref 63–159)
LYMPHOCYTES # BLD AUTO: 2.2 K/UL (ref 1–4.8)
LYMPHOCYTES NFR BLD: 41.8 % (ref 18–48)
MCH RBC QN AUTO: 30.2 PG (ref 27–31)
MCHC RBC AUTO-ENTMCNC: 31 G/DL (ref 32–36)
MCV RBC AUTO: 97 FL (ref 82–98)
MONOCYTES # BLD AUTO: 0.5 K/UL (ref 0.3–1)
MONOCYTES NFR BLD: 9.3 % (ref 4–15)
NEUTROPHILS # BLD AUTO: 2.4 K/UL (ref 1.8–7.7)
NEUTROPHILS NFR BLD: 45.5 % (ref 38–73)
NONHDLC SERPL-MCNC: 99 MG/DL
NRBC BLD-RTO: 0 /100 WBC
PLATELET # BLD AUTO: 215 K/UL (ref 150–350)
PMV BLD AUTO: 10 FL (ref 9.2–12.9)
POTASSIUM SERPL-SCNC: 3.9 MMOL/L (ref 3.5–5.1)
PROT SERPL-MCNC: 7.4 G/DL (ref 6–8.4)
RBC # BLD AUTO: 3.74 M/UL (ref 4–5.4)
SATURATED IRON: 18 % (ref 20–50)
SODIUM SERPL-SCNC: 141 MMOL/L (ref 136–145)
T4 FREE SERPL-MCNC: 1.07 NG/DL (ref 0.71–1.51)
TOTAL IRON BINDING CAPACITY: 401 UG/DL (ref 250–450)
TRANSFERRIN SERPL-MCNC: 271 MG/DL (ref 200–375)
TRIGL SERPL-MCNC: 53 MG/DL (ref 30–150)
TSH SERPL DL<=0.005 MIU/L-ACNC: 1.38 UIU/ML (ref 0.4–4)
WBC # BLD AUTO: 5.29 K/UL (ref 3.9–12.7)

## 2019-08-21 PROCEDURE — 36415 COLL VENOUS BLD VENIPUNCTURE: CPT

## 2019-08-21 PROCEDURE — 83540 ASSAY OF IRON: CPT

## 2019-08-21 PROCEDURE — 85025 COMPLETE CBC W/AUTO DIFF WBC: CPT

## 2019-08-21 PROCEDURE — 80053 COMPREHEN METABOLIC PANEL: CPT

## 2019-08-21 PROCEDURE — 80061 LIPID PANEL: CPT

## 2019-08-21 PROCEDURE — 82306 VITAMIN D 25 HYDROXY: CPT

## 2019-08-21 PROCEDURE — 84439 ASSAY OF FREE THYROXINE: CPT

## 2019-08-21 PROCEDURE — 84443 ASSAY THYROID STIM HORMONE: CPT

## 2019-08-21 PROCEDURE — 82728 ASSAY OF FERRITIN: CPT

## 2019-08-28 ENCOUNTER — OFFICE VISIT (OUTPATIENT)
Dept: INTERNAL MEDICINE | Facility: CLINIC | Age: 69
End: 2019-08-28
Payer: COMMERCIAL

## 2019-08-28 VITALS
SYSTOLIC BLOOD PRESSURE: 138 MMHG | BODY MASS INDEX: 27.17 KG/M2 | WEIGHT: 169.06 LBS | TEMPERATURE: 97 F | HEART RATE: 78 BPM | DIASTOLIC BLOOD PRESSURE: 78 MMHG | HEIGHT: 66 IN

## 2019-08-28 DIAGNOSIS — R20.0 NUMBNESS OF TOES: ICD-10-CM

## 2019-08-28 DIAGNOSIS — L60.9 NAIL DISORDER: ICD-10-CM

## 2019-08-28 DIAGNOSIS — E78.5 HYPERLIPIDEMIA, UNSPECIFIED HYPERLIPIDEMIA TYPE: ICD-10-CM

## 2019-08-28 DIAGNOSIS — Z00.00 ROUTINE GENERAL MEDICAL EXAMINATION AT A HEALTH CARE FACILITY: Primary | ICD-10-CM

## 2019-08-28 DIAGNOSIS — D50.8 IRON DEFICIENCY ANEMIA SECONDARY TO INADEQUATE DIETARY IRON INTAKE: ICD-10-CM

## 2019-08-28 DIAGNOSIS — Z12.31 ENCOUNTER FOR SCREENING MAMMOGRAM FOR BREAST CANCER: ICD-10-CM

## 2019-08-28 DIAGNOSIS — I10 ESSENTIAL HYPERTENSION: ICD-10-CM

## 2019-08-28 PROCEDURE — 99999 PR PBB SHADOW E&M-EST. PATIENT-LVL III: CPT | Mod: PBBFAC,,, | Performed by: FAMILY MEDICINE

## 2019-08-28 PROCEDURE — 3078F DIAST BP <80 MM HG: CPT | Mod: CPTII,S$GLB,, | Performed by: FAMILY MEDICINE

## 2019-08-28 PROCEDURE — 3075F SYST BP GE 130 - 139MM HG: CPT | Mod: CPTII,S$GLB,, | Performed by: FAMILY MEDICINE

## 2019-08-28 PROCEDURE — 99397 PER PM REEVAL EST PAT 65+ YR: CPT | Mod: S$GLB,,, | Performed by: FAMILY MEDICINE

## 2019-08-28 PROCEDURE — 3075F PR MOST RECENT SYSTOLIC BLOOD PRESS GE 130-139MM HG: ICD-10-PCS | Mod: CPTII,S$GLB,, | Performed by: FAMILY MEDICINE

## 2019-08-28 PROCEDURE — 99397 PR PREVENTIVE VISIT,EST,65 & OVER: ICD-10-PCS | Mod: S$GLB,,, | Performed by: FAMILY MEDICINE

## 2019-08-28 PROCEDURE — 99999 PR PBB SHADOW E&M-EST. PATIENT-LVL III: ICD-10-PCS | Mod: PBBFAC,,, | Performed by: FAMILY MEDICINE

## 2019-08-28 PROCEDURE — 3078F PR MOST RECENT DIASTOLIC BLOOD PRESSURE < 80 MM HG: ICD-10-PCS | Mod: CPTII,S$GLB,, | Performed by: FAMILY MEDICINE

## 2019-08-28 NOTE — PROGRESS NOTES
Subjective:      Patient ID: Xin Taylor is a 68 y.o. female.    Chief Complaint: Annual Exam    Disclaimer:  This note is prepared using voice recognition software and as such is likely to have errors and has not been proof read. Please contact me for questions.     Xin Taylor is a 68 y.o. female who presents today for multiple issues and 6 month follow-up.     Saw Dr Lucas back in May for some atypical chest pains. Thought it might be angina. Was occurring with rest.  But really felt like it went away on its own. Only went on for 1-2 weeks.     Repeat bp today was 150/76 in office. Didn't take her hctz yesterday. Thinks this may play a role today in her BP.     Last labs were non-fasting. Wants to discuss today.     Reports numbness in big toe on the left foot. Hasn't spread to other toes. Uncertain if circulation or nerve issue.     Nails seem to have ridges.  Didn't look it up.     Still doing vitamins.  Only one she has been out of is vitamin d. Out for 1 week.     Collagen? Uncertain if it will help or not. Doesn't think that the chondroitin is helping. Not taking the full amt.     Didn't know if she needed a baseline cardiologist workup.  Has not ever had an echo.  Willing to do so.  Also reports that she took a meloxicam yesterday after working at the [employer] in for got to take her HCTZ.  Willing to start monitoring blood pressures as well.    Does still have numbness in 1 of her toes but has not spread.  Only in the left toe.  Does not have a corn or callus or a bunion.  Discussed that most likely this is probably related more to a back issue but since not bothering her walking or her gait and she takes plenty of supplements at this time and has not progressed that we can just continue to monitor this.    Wanting to do some additional nutritional labs due to changes in her nails as well as her mom having a thyroid disorder.    She still uses meloxicam only every 4-5 days to help with arthritis  symptoms but she does report that when she takes the aspirin it helped a lot with her arthritis as well.  She just noticed that when she took it daily she had bruised a lot more.  Does she is willing to go back on it but potentially even doing every other day dosing and giving at the [facility].    On statin therapy as well Lipitor 10.  Tolerating well.  No muscle cramps noted.    Is on several vitamins still at this time.  Reviewed iron studies blood counts.      Lab Results   Component Value Date    WBC 5.29 08/21/2019    HGB 11.3 (L) 08/21/2019    HCT 36.4 (L) 08/21/2019     08/21/2019    CHOL 185 08/21/2019    TRIG 53 08/21/2019    HDL 86 (H) 08/21/2019    ALT 12 08/21/2019    AST 20 08/21/2019     08/21/2019    K 3.9 08/21/2019     08/21/2019    CREATININE 1.1 08/21/2019    BUN 21 08/21/2019    CO2 28 08/21/2019    TSH 1.377 08/21/2019       CT Maxillofacial Without Contrast  Narrative: EXAMINATION:  CT MAXILLOFACIAL WITHOUT CONTRAST    CLINICAL HISTORY:  Maxface trauma blunt;    TECHNIQUE:  Axial CT images through the facial bones were obtained without contrast.    COMPARISON:  None    FINDINGS:  Small scalp soft tissue hematoma seen within the forehead.    Osseus structures demonstrate no evidence of fracture or destructive lesion.    Orbits are intact.    Mild sinus mucosal thickening.    Surrounding soft tissues are unremarkable.  Impression: Negative for fracture.    All CT scans at this facility use dose modulation, iterative reconstruction and/or weight based dosing when appropriate to reduce radiation dose to as low as reasonably achievable.    Electronically signed by: Michael Lutz MD  Date:    04/21/2019  Time:    22:53  CT Head Without Contrast  Narrative: EXAMINATION:  CT HEAD WITHOUT CONTRAST    CLINICAL HISTORY:  maxface trauma, no LOC, on ASA;    TECHNIQUE:  Low dose axial CT images obtained throughout the head without intravenous contrast. Sagittal and coronal reconstructions were  performed.  All CT scans at this facility use dose modulation, iterative reconstruction and/or weight based dosing when appropriate to reduce radiation dose to as low as reasonably achievable.    COMPARISON:  None.    FINDINGS:  Intracranial compartment:    The brain parenchyma demonstrates areas of decreased attenuation with mild to moderate periventricular white matter consistent with chronic microvascular ischemic changes..  No parenchymal mass, hemorrhage, edema or major vascular distribution infarct.  Vascular calcifications are noted.    Mild prominence of the sulci and ventricles are consistent with age-related involutional changes.    No extra-axial blood or fluid collections.    Skull/extracranial contents (limited evaluation): Small scalp soft tissue hematoma seen within the forehead.  No fracture. Mastoid air cells and paranasal sinuses are essentially clear.  Impression: Chronic microvascular ischemic changes.    Electronically signed by: Michael Lutz MD  Date:    04/21/2019  Time:    22:52        Review of Systems   Constitutional: Negative for activity change, appetite change, chills, fatigue and fever.   HENT: Negative for ear pain and trouble swallowing.    Eyes: Negative for pain and visual disturbance.   Respiratory: Negative for cough and shortness of breath.    Cardiovascular: Negative for chest pain and leg swelling.   Gastrointestinal: Negative for abdominal pain, blood in stool, nausea and vomiting.   Endocrine: Negative for cold intolerance and heat intolerance.   Genitourinary: Negative for dysuria and frequency.   Musculoskeletal: Positive for arthralgias. Negative for joint swelling, myalgias and neck pain.        Right knee   Skin: Negative for color change and rash.        Nail ridges   Neurological: Positive for numbness. Negative for dizziness and headaches.        Left toe numbness   Psychiatric/Behavioral: Negative for behavioral problems and sleep disturbance.     Objective:  "    Vitals:    08/28/19 1010   BP: 138/78   Pulse: 78   Temp: 97.4 °F (36.3 °C)   TempSrc: Tympanic   Weight: 76.7 kg (169 lb 1.5 oz)   Height: 5' 6" (1.676 m)     Physical Exam   Constitutional: She is oriented to person, place, and time. She appears well-developed and well-nourished.   HENT:   Head: Normocephalic and atraumatic.   Right Ear: External ear normal.   Left Ear: External ear normal.   Mouth/Throat: Oropharynx is clear and moist.   Eyes: EOM are normal.   Neck: Normal range of motion. Neck supple. No thyromegaly present.   Cardiovascular: Normal rate and regular rhythm. Exam reveals no gallop and no friction rub.   No murmur heard.  Pulmonary/Chest: Effort normal. No respiratory distress. She has no wheezes. She has no rales.   Abdominal: Soft. Bowel sounds are normal. She exhibits no distension. There is no tenderness. There is no rebound.   Musculoskeletal: She exhibits no edema.        Right knee: She exhibits decreased range of motion. She exhibits no swelling, no effusion, no ecchymosis and no deformity. Tenderness found. Medial joint line tenderness noted.   Lymphadenopathy:     She has no cervical adenopathy.   Neurological: She is alert and oriented to person, place, and time.   Skin: Skin is warm and dry. No rash noted.        Psychiatric: She has a normal mood and affect. Her speech is normal and behavior is normal. Judgment and thought content normal.   Vitals reviewed.    Assessment:     1. Routine general medical examination at a health care facility    2. Encounter for screening mammogram for breast cancer    3. Iron deficiency anemia secondary to inadequate dietary iron intake    4. Hyperlipidemia, unspecified hyperlipidemia type    5. Essential hypertension    6. Nail disorder    7. Numbness of toes      Plan:   Xin was seen today for annual exam.    Diagnoses and all orders for this visit:    Routine general medical examination at a health care facility-labs reviewed today discussed " health maintenance discussed diet exercise discussed monitoring of blood pressure schedule mammogram lab work prior to next visit  -     Comprehensive metabolic panel; Future  -     Hemoglobin A1c; Future  -     CBC auto differential; Future  -     TSH; Future  -     T4, free; Future  -     Lipid panel; Future  -     T3, free; Future  -     Vitamin B12; Future  -     Folate; Future    Encounter for screening mammogram for breast cancer  -     Mammo Digital Screening Bilat; Future  -     Comprehensive metabolic panel; Future  -     Hemoglobin A1c; Future  -     CBC auto differential; Future  -     TSH; Future  -     T4, free; Future  -     Lipid panel; Future  -     T3, free; Future  -     Vitamin B12; Future  -     Folate; Future    Iron deficiency anemia secondary to inadequate dietary iron intake-stable continue with supplementation baseline hemoglobin at 11  -     Comprehensive metabolic panel; Future  -     Hemoglobin A1c; Future  -     CBC auto differential; Future  -     TSH; Future  -     T4, free; Future  -     Lipid panel; Future  -     T3, free; Future  -     Vitamin B12; Future  -     Folate; Future    Hyperlipidemia, unspecified hyperlipidemia type stable with Lipitor 10 labs reviewed obtain cardiac echo  -     2D Echo w/ Color Flow Doppler; Future  -     Comprehensive metabolic panel; Future  -     Hemoglobin A1c; Future  -     CBC auto differential; Future  -     TSH; Future  -     T4, free; Future  -     Lipid panel; Future  -     T3, free; Future  -     Vitamin B12; Future  -     Folate; Future    Essential hypertension-not controlled today however controlled at home  Comments:  took mobic last night and missed hctz. check echo due to chronic leg swelling. Also with some fluctuations in blood pressure  Orders:  -     2D Echo w/ Color Flow Doppler; Future  -     Comprehensive metabolic panel; Future  -     Hemoglobin A1c; Future  -     CBC auto differential; Future  -     TSH; Future  -     T4, free;  Future  -     Lipid panel; Future  -     T3, free; Future  -     Vitamin B12; Future  -     Folate; Future    Nail disorder-desires to screen for [place] B12 deficiencies as well as some additional thyroid testing free T4 and TSH were normal.  Checking with next set of lab work    Numbness of toes still continue with conservative monitoring at this time no worsening at this point.            Follow up in about 6 months (around 2/28/2020) for physical with Dr TORRES.    There are no Patient Instructions on file for this visit.

## 2019-09-19 ENCOUNTER — CLINICAL SUPPORT (OUTPATIENT)
Dept: CARDIOLOGY | Facility: CLINIC | Age: 69
End: 2019-09-19
Attending: FAMILY MEDICINE
Payer: COMMERCIAL

## 2019-09-19 ENCOUNTER — HOSPITAL ENCOUNTER (OUTPATIENT)
Dept: RADIOLOGY | Facility: HOSPITAL | Age: 69
Discharge: HOME | End: 2019-09-19
Attending: FAMILY MEDICINE
Payer: COMMERCIAL

## 2019-09-19 VITALS — WEIGHT: 169 LBS | BODY MASS INDEX: 27.16 KG/M2 | HEIGHT: 66 IN

## 2019-09-19 DIAGNOSIS — I10 ESSENTIAL HYPERTENSION: ICD-10-CM

## 2019-09-19 DIAGNOSIS — E78.5 HYPERLIPIDEMIA, UNSPECIFIED HYPERLIPIDEMIA TYPE: ICD-10-CM

## 2019-09-19 DIAGNOSIS — Z12.31 ENCOUNTER FOR SCREENING MAMMOGRAM FOR BREAST CANCER: ICD-10-CM

## 2019-09-19 PROCEDURE — 77067 MAMMO DIGITAL SCREENING BILAT WITH TOMOSYNTHESIS_CAD: ICD-10-PCS | Mod: 26,,, | Performed by: RADIOLOGY

## 2019-09-19 PROCEDURE — 77067 SCR MAMMO BI INCL CAD: CPT | Mod: 26,,, | Performed by: RADIOLOGY

## 2019-09-19 PROCEDURE — 93306 TTE W/DOPPLER COMPLETE: CPT | Mod: S$GLB,,, | Performed by: INTERNAL MEDICINE

## 2019-09-19 PROCEDURE — 77067 SCR MAMMO BI INCL CAD: CPT | Mod: TC

## 2019-09-19 PROCEDURE — 93306 2D ECHO WITH COLOR FLOW DOPPLER: ICD-10-PCS | Mod: S$GLB,,, | Performed by: INTERNAL MEDICINE

## 2019-09-19 PROCEDURE — 77063 MAMMO DIGITAL SCREENING BILAT WITH TOMOSYNTHESIS_CAD: ICD-10-PCS | Mod: 26,,, | Performed by: RADIOLOGY

## 2019-09-19 PROCEDURE — 77063 BREAST TOMOSYNTHESIS BI: CPT | Mod: 26,,, | Performed by: RADIOLOGY

## 2019-09-20 LAB
DIASTOLIC DYSFUNCTION: NO
ESTIMATED PA SYSTOLIC PRESSURE: 39.36
MITRAL VALVE REGURGITATION: NORMAL
RETIRED EF AND QEF - SEE NOTES: 55 (ref 55–65)
TRICUSPID VALVE REGURGITATION: NORMAL

## 2019-09-20 NOTE — PROGRESS NOTES
There are some shape changes to the heart related to blood pressure however the valves are within expected ranges. Her heart relaxes and pumps as it should continue to work on blood pressure control, weight loss, and low salt diet. Please let me know if you have further questions.    Sincerely,   Breann Carlson MD

## 2019-09-26 ENCOUNTER — OFFICE VISIT (OUTPATIENT)
Dept: SLEEP MEDICINE | Facility: CLINIC | Age: 69
End: 2019-09-26
Payer: COMMERCIAL

## 2019-09-26 VITALS
BODY MASS INDEX: 28.25 KG/M2 | HEART RATE: 77 BPM | WEIGHT: 169.56 LBS | OXYGEN SATURATION: 98 % | HEIGHT: 65 IN | SYSTOLIC BLOOD PRESSURE: 130 MMHG | RESPIRATION RATE: 18 BRPM | DIASTOLIC BLOOD PRESSURE: 76 MMHG

## 2019-09-26 DIAGNOSIS — I10 ESSENTIAL HYPERTENSION: ICD-10-CM

## 2019-09-26 DIAGNOSIS — E78.00 PURE HYPERCHOLESTEROLEMIA: ICD-10-CM

## 2019-09-26 DIAGNOSIS — G47.33 OSA ON CPAP: Primary | ICD-10-CM

## 2019-09-26 PROCEDURE — 99214 OFFICE O/P EST MOD 30 MIN: CPT | Mod: S$GLB,,, | Performed by: INTERNAL MEDICINE

## 2019-09-26 PROCEDURE — 1101F PR PT FALLS ASSESS DOC 0-1 FALLS W/OUT INJ PAST YR: ICD-10-PCS | Mod: CPTII,S$GLB,, | Performed by: INTERNAL MEDICINE

## 2019-09-26 PROCEDURE — 1101F PT FALLS ASSESS-DOCD LE1/YR: CPT | Mod: CPTII,S$GLB,, | Performed by: INTERNAL MEDICINE

## 2019-09-26 PROCEDURE — 3075F PR MOST RECENT SYSTOLIC BLOOD PRESS GE 130-139MM HG: ICD-10-PCS | Mod: CPTII,S$GLB,, | Performed by: INTERNAL MEDICINE

## 2019-09-26 PROCEDURE — 3075F SYST BP GE 130 - 139MM HG: CPT | Mod: CPTII,S$GLB,, | Performed by: INTERNAL MEDICINE

## 2019-09-26 PROCEDURE — 3078F DIAST BP <80 MM HG: CPT | Mod: CPTII,S$GLB,, | Performed by: INTERNAL MEDICINE

## 2019-09-26 PROCEDURE — 3078F PR MOST RECENT DIASTOLIC BLOOD PRESSURE < 80 MM HG: ICD-10-PCS | Mod: CPTII,S$GLB,, | Performed by: INTERNAL MEDICINE

## 2019-09-26 PROCEDURE — 99999 PR PBB SHADOW E&M-EST. PATIENT-LVL III: ICD-10-PCS | Mod: PBBFAC,,, | Performed by: INTERNAL MEDICINE

## 2019-09-26 PROCEDURE — 99214 PR OFFICE/OUTPT VISIT, EST, LEVL IV, 30-39 MIN: ICD-10-PCS | Mod: S$GLB,,, | Performed by: INTERNAL MEDICINE

## 2019-09-26 PROCEDURE — 99999 PR PBB SHADOW E&M-EST. PATIENT-LVL III: CPT | Mod: PBBFAC,,, | Performed by: INTERNAL MEDICINE

## 2019-09-26 NOTE — PROGRESS NOTES
Subjective:       Patient ID: Xin Taylor is a 68 y.o. female.  Patient Active Problem List   Diagnosis    HTN (hypertension)    HLD (hyperlipidemia)    Anemia    Personal history of colonic polyps    Osteopenia    Mass of oral cavity    ASIF on CPAP     Immunization History   Administered Date(s) Administered    Hepatitis A, Adult 11/13/2012, 05/13/2013    Hepatitis A, Pediatric/Adolescent, 2 Dose 05/13/2013    Influenza - High Dose - PF (65 years and older) 10/27/2015, 11/10/2016, 11/29/2017, 11/21/2018    Influenza - Intradermal - Quadrivalent - PF 11/13/2012    Influenza - Intradermal - Trivalent - PF 11/13/2012    Influenza - Trivalent (ADULT) 10/06/2008, 10/06/2009, 10/25/2010, 10/30/2013    Influenza Split 10/06/2008, 10/06/2009, 10/25/2010, 11/13/2012, 10/30/2013    Meningococcal Conjugate (MCV4P) 11/13/2012    Pneumococcal Conjugate - 13 Valent 01/27/2016    Pneumococcal Polysaccharide - 23 Valent 11/10/2016    Tdap 11/13/2012    Typhoid - ViCPs 11/09/2012    Yellow Fever 11/09/2012    Zoster 08/08/2012     Social History     Tobacco Use   Smoking Status Never Smoker   Smokeless Tobacco Never Used     EPWORTH SLEEPINESS SCALE 9/26/2019   Sitting and reading 1   Watching TV 1   Sitting, inactive in a public place (e.g. a theatre or a meeting) 0   As a passenger in a car for an hour without a break 1   Lying down to rest in the afternoon when circumstances permit 3   Sitting and talking to someone 0   Sitting quietly after a lunch without alcohol 0   In a car, while stopped for a few minutes in traffic 0   Total score 6       Chief Complaint: Sleep Apnea    Ms. Denise Taylor comes to see me as a follow-up  Last visit was 09/20/2018  She has ASIF on APAP 4-20  Residual AHI 5.4, some mask leak  Bed time: 11-12MN  Wake time : 9 am  [place] score 6  She is having some mask leak about an hour  Her usage greater than 4 hours was 95.6 %.  She'll follow-up annually.        Review of Systems  "  Constitutional: Negative.    HENT: Negative.    Eyes: Negative.    Respiratory: Negative.    Genitourinary: Negative.    Endocrine: endocrine negative   Musculoskeletal: Negative.    Skin: Negative.    Gastrointestinal: Negative.    Neurological: Negative.    Psychiatric/Behavioral: Negative.        Objective:       Vitals:    09/26/19 1011   BP: 130/76   Pulse: 77   Resp: 18   SpO2: 98%   Weight: 76.9 kg (169 lb 8.5 oz)   Height: 5' 5" (1.651 m)     Physical Exam   Constitutional: She is oriented to person, place, and time. She appears well-developed and well-nourished.   HENT:   Head: Normocephalic.   Nose: Nose normal.   Neck: Normal range of motion.   Cardiovascular: Normal rate and regular rhythm.   Pulmonary/Chest: Normal expansion, symmetric chest wall expansion, effort normal and breath sounds normal.   Musculoskeletal: Normal range of motion.   Neurological: She is alert and oriented to person, place, and time.   Skin: Skin is warm and dry.   Psychiatric: She has a normal mood and affect.   Nursing note and vitals reviewed.    Personal Diagnostic Review  DOWNLOAD    6/26/2019 - 9/23/2019  YOB: 1950  Mask:  Compliance Summary  6/26/2019 - 9/23/2019 (90 days)  Days with Device Usage 90 days  Days without Device Usage 0 days  Percent Days with Device Usage 100.0%  Cumulative Usage 30 days 12 hrs. 29 mins. 50 secs.  Maximum Usage (1 Day) 12 hrs. 16 mins. 46 secs.  Average Usage (All Days) 8 hrs. 8 mins. 19 secs.  Average Usage (Days Used) 8 hrs. 8 mins. 19 secs.  Minimum Usage (1 Day) 1 hrs. 57 mins. 18 secs.  Percent of Days with Usage >= 4 Hours 95.6%  Percent of Days with Usage < 4 Hours 4.4%  Date Range  Total Blower Time 30 days 12 hrs. 30 mins. 33 secs.  Average AHI 2.5  Auto-CPAP Summary  Auto-CPAP Mean Pressure 9.0 cmH2O  Auto-CPAP Peak Average Pressure 14.0 cmH2O  Average Device Pressure <= 90% of Time 11.4 cmH2O  Average Time in Large Leak Per Day 5 mins. 29 secs.  Assessment:     "   Problem List Items Addressed This Visit     HTN (hypertension)     STABLE on NORVASC         HLD (hyperlipidemia)     STABLE on LIPITOR         ASIF on CPAP - Primary     [place] score 6  Bed time: 11 pm  Wake time: 9 am  Usage > 4 hours was 95.6%             Plan:       Patient using device and benefits   APAP 6-14       Follow up in about 1 year (around 9/26/2020), or Download, Weight loss.    This note was prepared using voice recognition system and is likely to have sound alike errors that may have been overlooked even after proof reading.  Please call me with any questions    TIME SPENT WITH PATIENT:     Time spent: 20 minutes in face to face  discussion concerning diagnosis, prognosis, review of lab and test results, benefits of treatment as well as management of disease, counseling of patient and coordination of care between various health  care providers . Greater than half the time spent was used for coordination of care and counseling of patient.     Vito Muse    Pulmonary/Critical care/Sleepmedicine

## 2019-10-10 ENCOUNTER — IMMUNIZATION (OUTPATIENT)
Dept: PHARMACY | Facility: CLINIC | Age: 69
End: 2019-10-10
Payer: COMMERCIAL

## 2019-11-05 NOTE — TELEPHONE ENCOUNTER
----- Message from Sheila Lara sent at 11/5/2019  2:31 PM CST -----  Contact: self  Type:  RX Refill Request    Who Called: pt  Refill or New Rx:refill  RX Name and Strength:meloxicam  How is the patient currently taking it? (ex. 1XDay):as needed  Is this a 30 day or 90 day RX:30  Preferred Pharmacy with phone number:  [id number]  [place] - [address] AT [facility]  [address] [address]  Phone: [phone] Fax: [phone]  Local or Mail Order:local  Ordering Provider:love  Would the patient rather a call back or a response via [facility]? Call back  Best Call Back Number:[phone]  Additional Information: pt has one dosage left    Thanks,  Sheila Lara

## 2019-11-06 RX ORDER — MELOXICAM 15 MG/1
15 TABLET ORAL DAILY
Qty: 90 TABLET | Refills: 1 | Status: SHIPPED | OUTPATIENT
Start: 2019-11-06 | End: 2021-12-20 | Stop reason: SDUPTHER

## 2020-03-12 ENCOUNTER — LAB VISIT (OUTPATIENT)
Dept: LAB | Facility: HOSPITAL | Age: 70
End: 2020-03-12
Attending: FAMILY MEDICINE
Payer: COMMERCIAL

## 2020-03-12 DIAGNOSIS — Z12.31 ENCOUNTER FOR SCREENING MAMMOGRAM FOR BREAST CANCER: ICD-10-CM

## 2020-03-12 DIAGNOSIS — E78.5 HYPERLIPIDEMIA, UNSPECIFIED HYPERLIPIDEMIA TYPE: ICD-10-CM

## 2020-03-12 DIAGNOSIS — I10 ESSENTIAL HYPERTENSION: ICD-10-CM

## 2020-03-12 DIAGNOSIS — Z00.00 ROUTINE GENERAL MEDICAL EXAMINATION AT A HEALTH CARE FACILITY: ICD-10-CM

## 2020-03-12 DIAGNOSIS — D50.8 IRON DEFICIENCY ANEMIA SECONDARY TO INADEQUATE DIETARY IRON INTAKE: ICD-10-CM

## 2020-03-12 LAB
ALBUMIN SERPL BCP-MCNC: 3.6 G/DL (ref 3.5–5.2)
ALP SERPL-CCNC: 72 U/L (ref 55–135)
ALT SERPL W/O P-5'-P-CCNC: 13 U/L (ref 10–44)
ANION GAP SERPL CALC-SCNC: 11 MMOL/L (ref 8–16)
AST SERPL-CCNC: 21 U/L (ref 10–40)
BASOPHILS # BLD AUTO: 0.03 K/UL (ref 0–0.2)
BASOPHILS NFR BLD: 0.7 % (ref 0–1.9)
BILIRUB SERPL-MCNC: 0.4 MG/DL (ref 0.1–1)
BUN SERPL-MCNC: 19 MG/DL (ref 8–23)
CALCIUM SERPL-MCNC: 9.7 MG/DL (ref 8.7–10.5)
CHLORIDE SERPL-SCNC: 102 MMOL/L (ref 95–110)
CHOLEST SERPL-MCNC: 192 MG/DL (ref 120–199)
CHOLEST/HDLC SERPL: 2.1 {RATIO} (ref 2–5)
CO2 SERPL-SCNC: 27 MMOL/L (ref 23–29)
CREAT SERPL-MCNC: 1 MG/DL (ref 0.5–1.4)
DIFFERENTIAL METHOD: ABNORMAL
EOSINOPHIL # BLD AUTO: 0.2 K/UL (ref 0–0.5)
EOSINOPHIL NFR BLD: 3.8 % (ref 0–8)
ERYTHROCYTE [DISTWIDTH] IN BLOOD BY AUTOMATED COUNT: 14.6 % (ref 11.5–14.5)
EST. GFR  (AFRICAN AMERICAN): >60 ML/MIN/1.73 M^2
EST. GFR  (NON AFRICAN AMERICAN): 57.6 ML/MIN/1.73 M^2
FOLATE SERPL-MCNC: 16.4 NG/ML (ref 4–24)
GLUCOSE SERPL-MCNC: 86 MG/DL (ref 70–110)
HCT VFR BLD AUTO: 36.4 % (ref 37–48.5)
HDLC SERPL-MCNC: 93 MG/DL (ref 40–75)
HDLC SERPL: 48.4 % (ref 20–50)
HGB BLD-MCNC: 11.1 G/DL (ref 12–16)
IMM GRANULOCYTES # BLD AUTO: 0.01 K/UL (ref 0–0.04)
IMM GRANULOCYTES NFR BLD AUTO: 0.2 % (ref 0–0.5)
LDLC SERPL CALC-MCNC: 88.8 MG/DL (ref 63–159)
LYMPHOCYTES # BLD AUTO: 1.9 K/UL (ref 1–4.8)
LYMPHOCYTES NFR BLD: 42.2 % (ref 18–48)
MCH RBC QN AUTO: 30 PG (ref 27–31)
MCHC RBC AUTO-ENTMCNC: 30.5 G/DL (ref 32–36)
MCV RBC AUTO: 98 FL (ref 82–98)
MONOCYTES # BLD AUTO: 0.4 K/UL (ref 0.3–1)
MONOCYTES NFR BLD: 9.9 % (ref 4–15)
NEUTROPHILS # BLD AUTO: 1.9 K/UL (ref 1.8–7.7)
NEUTROPHILS NFR BLD: 43.2 % (ref 38–73)
NONHDLC SERPL-MCNC: 99 MG/DL
NRBC BLD-RTO: 0 /100 WBC
PLATELET # BLD AUTO: 224 K/UL (ref 150–350)
PMV BLD AUTO: 10.3 FL (ref 9.2–12.9)
POTASSIUM SERPL-SCNC: 3.6 MMOL/L (ref 3.5–5.1)
PROT SERPL-MCNC: 7.7 G/DL (ref 6–8.4)
RBC # BLD AUTO: 3.7 M/UL (ref 4–5.4)
SODIUM SERPL-SCNC: 140 MMOL/L (ref 136–145)
T4 FREE SERPL-MCNC: 1.07 NG/DL (ref 0.71–1.51)
TRIGL SERPL-MCNC: 51 MG/DL (ref 30–150)
TSH SERPL DL<=0.005 MIU/L-ACNC: 1.5 UIU/ML (ref 0.4–4)
VIT B12 SERPL-MCNC: 746 PG/ML (ref 210–950)
WBC # BLD AUTO: 4.43 K/UL (ref 3.9–12.7)

## 2020-03-12 PROCEDURE — 82746 ASSAY OF FOLIC ACID SERUM: CPT

## 2020-03-12 PROCEDURE — 36415 COLL VENOUS BLD VENIPUNCTURE: CPT

## 2020-03-12 PROCEDURE — 84481 FREE ASSAY (FT-3): CPT

## 2020-03-12 PROCEDURE — 82607 VITAMIN B-12: CPT

## 2020-03-12 PROCEDURE — 83036 HEMOGLOBIN GLYCOSYLATED A1C: CPT

## 2020-03-12 PROCEDURE — 84443 ASSAY THYROID STIM HORMONE: CPT

## 2020-03-12 PROCEDURE — 85025 COMPLETE CBC W/AUTO DIFF WBC: CPT

## 2020-03-12 PROCEDURE — 80061 LIPID PANEL: CPT

## 2020-03-12 PROCEDURE — 84439 ASSAY OF FREE THYROXINE: CPT

## 2020-03-12 PROCEDURE — 80053 COMPREHEN METABOLIC PANEL: CPT

## 2020-03-13 LAB
ESTIMATED AVG GLUCOSE: 123 MG/DL (ref 68–131)
HBA1C MFR BLD HPLC: 5.9 % (ref 4–5.6)
T3FREE SERPL-MCNC: 3.1 PG/ML (ref 2.3–4.2)

## 2020-03-15 NOTE — PROGRESS NOTES
Xin Taylor,     Attached are your lab results. We will go over them in more detail at your upcoming appointment and answer any questions at that time. Because of the COVID-19 risks, we are also reaching out to patients to either change their visit to a telemedicine visit with me or pushing it out a few months unless the visit is absolutely necessary. If you are interested in either option please respond back with a message so my staff can assist you with re-scheduling your visit and give you more information.     Sincerely,    Breann Carlson MD

## 2020-04-10 ENCOUNTER — NURSE TRIAGE (OUTPATIENT)
Dept: ADMINISTRATIVE | Facility: CLINIC | Age: 70
End: 2020-04-10

## 2020-04-11 NOTE — TELEPHONE ENCOUNTER
Pt states around 7pm she started feeling like her heart was beating fast and hard. denies CP and SOB. +dizziness and weakness. Pt states she feels it may have slowed down slightly, but she still feels weak. Per triage protocol, pt to go to ED to be seen now. Pt verbalized understanding.     Reason for Disposition   Dizziness, lightheadedness, or weakness    Additional Information   Negative: Unable to walk, or can only walk with assistance (e.g., requires support)   Negative: Passed out (i.e., lost consciousness, collapsed and was not responding)   Negative: Shock suspected (e.g., cold/pale/clammy skin, too weak to stand, low BP, rapid pulse)   Negative: Difficult to awaken or acting confused (e.g., disoriented, slurred speech)   Negative: Visible sweat on face or sweat dripping down face   Negative: [1] Received SHOCK from implantable cardiac defibrillator AND [2] persisting symptoms (i.e., palpitations, lightheadedness)   Negative: Sounds like a life-threatening emergency to the triager   Negative: Chest pain   Negative: Difficulty breathing    Protocols used: HEART RATE AND HEARTBEAT [id number]

## 2020-04-13 NOTE — TELEPHONE ENCOUNTER
Called and spoke with pt. Pt did not go to ED. She stated that she felt as if she was just dehydrated and started drinking fluids. She felt better. She stated that she spoke with someone with EMS and they gave her some info on what to do. She stated that also, EMS came out to her house and did EKG and such and all checked out.

## 2020-04-27 RX ORDER — ATORVASTATIN CALCIUM 10 MG/1
5 TABLET, FILM COATED ORAL DAILY
Qty: 90 TABLET | Refills: 3 | Status: SHIPPED | OUTPATIENT
Start: 2020-04-27 | End: 2021-04-29 | Stop reason: SDUPTHER

## 2020-04-29 ENCOUNTER — TELEPHONE (OUTPATIENT)
Dept: INTERNAL MEDICINE | Facility: CLINIC | Age: 70
End: 2020-04-29

## 2020-04-29 ENCOUNTER — PATIENT MESSAGE (OUTPATIENT)
Dept: INTERNAL MEDICINE | Facility: CLINIC | Age: 70
End: 2020-04-29

## 2020-04-29 ENCOUNTER — OFFICE VISIT (OUTPATIENT)
Dept: INTERNAL MEDICINE | Facility: CLINIC | Age: 70
End: 2020-04-29
Payer: COMMERCIAL

## 2020-04-29 VITALS
SYSTOLIC BLOOD PRESSURE: 139 MMHG | HEIGHT: 65 IN | BODY MASS INDEX: 28.16 KG/M2 | WEIGHT: 169 LBS | DIASTOLIC BLOOD PRESSURE: 74 MMHG | HEART RATE: 64 BPM

## 2020-04-29 DIAGNOSIS — R73.09 ABNORMAL GLUCOSE: ICD-10-CM

## 2020-04-29 DIAGNOSIS — G47.33 OSA ON CPAP: ICD-10-CM

## 2020-04-29 DIAGNOSIS — E78.00 PURE HYPERCHOLESTEROLEMIA: ICD-10-CM

## 2020-04-29 DIAGNOSIS — R73.03 PREDIABETES: ICD-10-CM

## 2020-04-29 DIAGNOSIS — D50.8 IRON DEFICIENCY ANEMIA SECONDARY TO INADEQUATE DIETARY IRON INTAKE: ICD-10-CM

## 2020-04-29 DIAGNOSIS — I10 ESSENTIAL HYPERTENSION: Primary | ICD-10-CM

## 2020-04-29 PROCEDURE — 99214 OFFICE O/P EST MOD 30 MIN: CPT | Mod: 95,,, | Performed by: FAMILY MEDICINE

## 2020-04-29 PROCEDURE — 99214 PR OFFICE/OUTPT VISIT, EST, LEVL IV, 30-39 MIN: ICD-10-PCS | Mod: 95,,, | Performed by: FAMILY MEDICINE

## 2020-04-29 PROCEDURE — 3078F PR MOST RECENT DIASTOLIC BLOOD PRESSURE < 80 MM HG: ICD-10-PCS | Mod: CPTII,,, | Performed by: FAMILY MEDICINE

## 2020-04-29 PROCEDURE — 1101F PT FALLS ASSESS-DOCD LE1/YR: CPT | Mod: CPTII,,, | Performed by: FAMILY MEDICINE

## 2020-04-29 PROCEDURE — 3075F PR MOST RECENT SYSTOLIC BLOOD PRESS GE 130-139MM HG: ICD-10-PCS | Mod: CPTII,,, | Performed by: FAMILY MEDICINE

## 2020-04-29 PROCEDURE — 3075F SYST BP GE 130 - 139MM HG: CPT | Mod: CPTII,,, | Performed by: FAMILY MEDICINE

## 2020-04-29 PROCEDURE — 1159F MED LIST DOCD IN RCRD: CPT | Mod: ,,, | Performed by: FAMILY MEDICINE

## 2020-04-29 PROCEDURE — 1159F PR MEDICATION LIST DOCUMENTED IN MEDICAL RECORD: ICD-10-PCS | Mod: ,,, | Performed by: FAMILY MEDICINE

## 2020-04-29 PROCEDURE — 3078F DIAST BP <80 MM HG: CPT | Mod: CPTII,,, | Performed by: FAMILY MEDICINE

## 2020-04-29 PROCEDURE — 1101F PR PT FALLS ASSESS DOC 0-1 FALLS W/OUT INJ PAST YR: ICD-10-PCS | Mod: CPTII,,, | Performed by: FAMILY MEDICINE

## 2020-04-29 RX ORDER — TRIAMTERENE/HYDROCHLOROTHIAZID 37.5-25 MG
1 TABLET ORAL DAILY
Qty: 90 TABLET | Refills: 3 | Status: SHIPPED | OUTPATIENT
Start: 2020-04-29 | End: 2021-04-29

## 2020-04-29 NOTE — TELEPHONE ENCOUNTER
----- Message from Hawa Shields sent at 4/29/2020  9:41 AM CDT -----  Contact: [phone]  Would like to consult  With the nurse, patient has some question concerning her Appt, patient would like to speak with the nurse concerning this, please call back at [phone], thanks sj

## 2020-04-29 NOTE — PATIENT INSTRUCTIONS
"Radha Aguilera- nutritionist with [employer] has Podcasts of "Fueled".   Free!   Several on intermittent fasting, ketogenic diets, supplements, sugar replacements, apple cidar vinegar as well.   Also cookbooks and blogs on nutrition. Eat Fit BR and DOMINIC with mario.   [email or web address]    [email or web address]    [email or web address]    Get in Touch  Email: [email or web address]  Twitter: [email or web address]  Facebook: [email or web address]  Instagram: [email or web address]  Pinterest: Radha Aguilera RD    "

## 2020-04-29 NOTE — PROGRESS NOTES
Subjective:      Patient ID: Xin Taylor is a 69 y.o. female.    Chief Complaint: Other    Disclaimer:  This note is prepared using voice recognition software and as such is likely to have errors and has not been proof read. Please contact me for questions.     The patient location is:home  The chief complaint leading to consultation is: followup / my chart request/ chronic issues.   Visit type: Virtual visit with synchronous audio and video  Total time spent with patient:257 pm 322pm  Each patient to whom he or she provides medical services by telemedicine is:  (1) informed of the relationship between the physician and patient and the respective role of any other health care provider with respect to management of the patient; and (2) notified that he or she may decline to receive medical services by telemedicine and may withdraw from such care at any time.    Notes:     Xin Taylor is a 68 y.o. female who presents today for multiple issues and 6 month follow-up.     On 4/10/20 called RN nurse and did call ems due to dizziness and felt dehydrated.  , started to feel better after ems was there. Had an ekg that day, did finger stick and all came back ok. Didn't have to good ED.    Doing ok, but forgets that she hasn't been drinking enough fluids at times.  Because her heart will start to have abnormal beats. And will start to drink more fluids and will be fine at that time.      Does like to hear about prediabetes as her diagnosis now.  Would do a sandwish for lunch while working. And really her only time to eat bread.  No family  Hx of diabetes.  Willing to work on diet.     Would be willing to try apple cider vinegar to help with blood sugars and predm.       Blood pressure is doing well now, but up today without sleeping well and was anxious about getting setup with the telemedince.      Has joint pain irena the right knee.      And having some bleeding of her gums.  The days she takes the asa, she can tell  that she going to have more bleeding and really every other day.  And occasionally has some bruising at times as well.     Is taking the mobic prn.   Goes as long as she can where if she is sitting more than she is moving, then has to take it.  Baca that it isn't as food for her at times.  alliavates pain some. Interested in other supplements if possible.    on lipitor 10mg. Doing well.     Taking asa every other day.       Lab Results   Component Value Date    WBC 4.43 03/12/2020    HGB 11.1 (L) 03/12/2020    HCT 36.4 (L) 03/12/2020     03/12/2020    CHOL 192 03/12/2020    TRIG 51 03/12/2020    HDL 93 (H) 03/12/2020    ALT 13 03/12/2020    AST 21 03/12/2020     03/12/2020    K 3.6 03/12/2020     03/12/2020    CREATININE 1.0 03/12/2020    BUN 19 03/12/2020    CO2 27 03/12/2020    TSH 1.495 03/12/2020    HGBA1C 5.9 (H) 03/12/2020       2D Echo w/ Color Flow Doppler  Date of Procedure: 09/19/2019    TEST DESCRIPTION   Technical Quality: This is a technically adequate study.     Aorta: The aortic root is normal in size, measuring 2.6 cm at sinotubular junction and 2.4 cm at Sinuses of Valsalva. The proximal ascending aorta is normal in size, measuring 3.0 cm across.     Left Atrium: The left atrial volume index is normal, measuring 23.65 cc/m2.     Left Ventricle: The left ventricle is normal in size, with an end-diastolic diameter of 4.2 cm, and an end-systolic diameter of 2.7 cm. LV wall thickness is normal, with the septum measuring 1.2 cm and the posterior wall measuring 1.3 cm across. Relative   wall thickness was increased at 0.62, and the LV mass index was increased at 119.4 g/m2 consistent with concentric left ventricular hypertrophy. There are no regional wall motion abnormalities. Left ventricular systolic function appears normal. Visually   estimated ejection fraction is 55-60%. The LV Doppler derived stroke volume equals 94.0 ccs.     Diastolic indices: E wave velocity 0.7 m/s, E/A ratio  0.8,  msec., E/e' ratio(avg) 6. Diastolic function is normal.     Right Atrium: The right atrium is normal in size, measuring 4.7 cm in length and 3.4 cm in width in the apical view.     Right Ventricle: The right ventricle is normal in size measuring 2.9 cm at the base in the apical right ventricle-focused view. Global right ventricular systolic function appears normal. Tricuspid annular plane systolic excursion (TAPSE) is 3.2 cm. The   estimated PA systolic pressure is 39 mmHg.     Aortic Valve:  The aortic valve is normal in structure. The aortic valve is tri-leaflet in structure.     Mitral Valve:  The mitral valve is mildly sclerotic. There is trivial mitral regurgitation.     Tricuspid Valve:  The tricuspid valve is normal in structure. There is trivial tricuspid regurgitation.     Pulmonary Valve:  The pulmonic valve is normal in structure. There is trivial pulmonic regurgitation.     IVC: IVC is enlarged but collapses > 50% with a sniff, suggesting intermediate right atrial pressure of 8 mmHg.     Intracavitary: There is no evidence of pericardial effusion, intracavity mass, thrombi, or vegetation.     CONCLUSIONS     1 - Concentric hypertrophy.     2 - No wall motion abnormalities.     3 - Normal left ventricular systolic function (EF 55-60%).     4 - Normal left ventricular diastolic function.     5 - Normal right ventricular systolic function .     6 - The estimated PA systolic pressure is 39 mmHg.     7 - Trivial mitral regurgitation.     8 - Trivial tricuspid regurgitation.     9 - Intermediate central venous pressure.     This document has been electronically    SIGNED BY: Juan C Espino MD On: 09/20/2019 13:37  Mammo Digital Screening Bilat w/ Jl  Narrative: Result:  Mammo Digital Screening Bilat w/ Jl    History:  Patient is 68 y.o. and is seen for a screening mammogram.    Films Compared:  Compared to: 09/11/2018 Mammo Digital Screening Bilat with   Tomosynthesis_CAD and 08/23/2017 Mammo  "Digital Screening Bilat with   Tomosynthesis_CAD    Findings:  Computer-aided detection was utilized in the interpretation of this   examination. This procedure was performed using tomosynthesis.       The breasts have scattered areas of fibroglandular density. There is no   evidence of suspicious masses, microcalcifications or architectural   distortion.  Impression: No mammographic evidence of malignancy.    BI-RADS Category 1: Negative    Recommendation:  Routine screening mammogram in 1 year is recommended.    The patient's estimated lifetime risk of breast cancer (to age 85) based   on Tyrer-Cuzick - 7 risk assessment model is: Tyrer-Cuzick: 12.81 %.   According to the American Cancer Society,  patients with a lifetime breast   cancer risk of 20% or higher might benefit from supplemental screening   tests.        Review of Systems   Constitutional: Negative for activity change, appetite change, fatigue and fever.   Respiratory: Negative for cough and shortness of breath.    Cardiovascular: Negative for chest pain and palpitations.   Gastrointestinal: Negative for abdominal distention and abdominal pain.   Hematological: Bruises/bleeds easily.     Objective:     Vitals:    04/29/20 1508   BP: 112/78   Pulse: 61   Weight: 76.7 kg (169 lb)   Height: 5' 5" (1.651 m)     Physical Exam   Constitutional: She appears well-developed and well-nourished. She is active and cooperative. She does not have a sickly appearance. She does not appear ill. No distress.   HENT:   Head: Normocephalic and atraumatic.   Right Ear: External ear normal.   Left Ear: External ear normal.   Pulmonary/Chest: Effort normal.   Neurological: She is alert.   Psychiatric: She has a normal mood and affect. Her behavior is normal. Judgment and thought content normal. Her mood appears not anxious. Her affect is not angry, not blunt, not labile and not inappropriate. Her speech is not rapid and/or pressured, not delayed, not tangential and not " slurred. She is not agitated, not aggressive, not hyperactive, not slowed, not withdrawn, not actively hallucinating and not combative. Thought content is not paranoid and not delusional. Cognition and memory are normal. Cognition and memory are not impaired. She does not express impulsivity or inappropriate judgment. She does not exhibit a depressed mood. She expresses no homicidal and no suicidal ideation. She expresses no suicidal plans and no homicidal plans. She is communicative. She exhibits normal recent memory and normal remote memory. She is attentive.   Vitals reviewed.    Assessment:     1. Essential hypertension    2. Pure hypercholesterolemia    3. ASIF on CPAP    4. Iron deficiency anemia secondary to inadequate dietary iron intake    5. Prediabetes    6. Abnormal glucose      Plan:   Xin was seen today for other.    Diagnoses and all orders for this visit:    Essential hypertension - - stable, Continue with current medications and interventions. Labs reviewed.     -     Hemoglobin A1c; Future  -     Comprehensive metabolic panel; Future  -     CBC auto differential; Future  -     Insulin, random; Future    Pure hypercholesterolemia - stable, Continue with current medications and interventions. Labs reviewed.     -     Hemoglobin A1c; Future  -     Comprehensive metabolic panel; Future  -     CBC auto differential; Future  -     Insulin, random; Future    ASIF on CPAP - stable, Continue with current medications and interventions. Labs reviewed.     -     Hemoglobin A1c; Future  -     Comprehensive metabolic panel; Future  -     CBC auto differential; Future  -     Insulin, random; Future    Iron deficiency anemia secondary to inadequate dietary iron intake - stable, Continue with current medications and interventions. Labs reviewed.     -     Hemoglobin A1c; Future  -     Comprehensive metabolic panel; Future  -     CBC auto differential; Future  -     Insulin, random; Future    Prediabetes- new.  "Discussed diet, supplements, weight changes, and repeating labs in 3 months.   -     Hemoglobin A1c; Future  -     Comprehensive metabolic panel; Future  -     CBC auto differential; Future  -     Insulin, random; Future    Abnormal glucose- new. Discussed diet, supplements, weight changes, and repeating labs in 3 months.   -     Hemoglobin A1c; Future  -     Comprehensive metabolic panel; Future  -     CBC auto differential; Future  -     Insulin, random; Future    Other orders  -     triamterene-hydrochlorothiazide 37.5-25 mg (MAXZIDE-25) 37.5-25 mg per tablet; Take 1 tablet by mouth once daily. For BP and diuretic            Follow up in about 3 months (around 7/29/2020), or if symptoms worsen or fail to improve, for f/u telemed Dr Carlson/ prediab, bp, labs.    Patient Instructions   Radha Aguilera- nutritionist with [facility] has Podcasts of "Fueled".   Free!   Several on intermittent fasting, ketogenic diets, supplements, sugar replacements, apple cidar vinegar as well.   Also cookbooks and blogs on nutrition. Eat Fit BR and DOMINIC with mario.   [email or web address]    [email or web address]    [email or web address]    Get in Touch  Email: [email or web address]  Twitter: [email or web address]  Facebook: [email or web address]  Instagram: [email or web address]  Pinterest: Radha Aguilera RD                      "

## 2020-05-25 RX ORDER — AMLODIPINE BESYLATE 2.5 MG/1
TABLET ORAL
Qty: 90 TABLET | Refills: 3 | Status: SHIPPED | OUTPATIENT
Start: 2020-05-25 | End: 2021-04-29 | Stop reason: SDUPTHER

## 2020-07-22 ENCOUNTER — LAB VISIT (OUTPATIENT)
Dept: LAB | Facility: HOSPITAL | Age: 70
End: 2020-07-22
Attending: FAMILY MEDICINE
Payer: COMMERCIAL

## 2020-07-22 ENCOUNTER — TELEPHONE (OUTPATIENT)
Dept: OTOLARYNGOLOGY | Facility: CLINIC | Age: 70
End: 2020-07-22

## 2020-07-22 DIAGNOSIS — R73.03 PREDIABETES: ICD-10-CM

## 2020-07-22 DIAGNOSIS — R73.09 ABNORMAL GLUCOSE: ICD-10-CM

## 2020-07-22 DIAGNOSIS — I10 ESSENTIAL HYPERTENSION: ICD-10-CM

## 2020-07-22 DIAGNOSIS — D50.8 IRON DEFICIENCY ANEMIA SECONDARY TO INADEQUATE DIETARY IRON INTAKE: ICD-10-CM

## 2020-07-22 DIAGNOSIS — G47.33 OSA ON CPAP: ICD-10-CM

## 2020-07-22 DIAGNOSIS — E78.00 PURE HYPERCHOLESTEROLEMIA: ICD-10-CM

## 2020-07-22 PROCEDURE — 36415 COLL VENOUS BLD VENIPUNCTURE: CPT

## 2020-07-22 PROCEDURE — 83036 HEMOGLOBIN GLYCOSYLATED A1C: CPT

## 2020-07-22 PROCEDURE — 85025 COMPLETE CBC W/AUTO DIFF WBC: CPT

## 2020-07-22 PROCEDURE — 80053 COMPREHEN METABOLIC PANEL: CPT

## 2020-07-22 PROCEDURE — 83525 ASSAY OF INSULIN: CPT

## 2020-07-22 NOTE — TELEPHONE ENCOUNTER
----- Message from Shanae Lott sent at 7/22/2020  4:18 PM CDT -----  Regarding: audiology appt  Pt request call back to schedule audiology appt for an assestment  tomorrow at [facility] 7/23  ... call back: [phone] (home)

## 2020-07-22 NOTE — TELEPHONE ENCOUNTER
Returned call to patient, scheduled audiogram for Tinnitus and follow up with Delicia Parsons following for review on 07/29/20 beginning at 9:30 am.

## 2020-07-23 ENCOUNTER — OFFICE VISIT (OUTPATIENT)
Dept: OPHTHALMOLOGY | Facility: CLINIC | Age: 70
End: 2020-07-23
Payer: COMMERCIAL

## 2020-07-23 DIAGNOSIS — H18.413 ARCUS SENILIS OF BOTH CORNEAS: ICD-10-CM

## 2020-07-23 DIAGNOSIS — H25.13 CATARACT, NUCLEAR SCLEROTIC SENILE, BILATERAL: Primary | ICD-10-CM

## 2020-07-23 DIAGNOSIS — H52.4 BILATERAL PRESBYOPIA: ICD-10-CM

## 2020-07-23 LAB
ALBUMIN SERPL BCP-MCNC: 3.8 G/DL (ref 3.5–5.2)
ALP SERPL-CCNC: 76 U/L (ref 55–135)
ALT SERPL W/O P-5'-P-CCNC: 10 U/L (ref 10–44)
ANION GAP SERPL CALC-SCNC: 7 MMOL/L (ref 8–16)
AST SERPL-CCNC: 19 U/L (ref 10–40)
BASOPHILS # BLD AUTO: 0.02 K/UL (ref 0–0.2)
BASOPHILS NFR BLD: 0.3 % (ref 0–1.9)
BILIRUB SERPL-MCNC: 0.3 MG/DL (ref 0.1–1)
BUN SERPL-MCNC: 17 MG/DL (ref 8–23)
CALCIUM SERPL-MCNC: 9.5 MG/DL (ref 8.7–10.5)
CHLORIDE SERPL-SCNC: 102 MMOL/L (ref 95–110)
CO2 SERPL-SCNC: 29 MMOL/L (ref 23–29)
CREAT SERPL-MCNC: 1.2 MG/DL (ref 0.5–1.4)
DIFFERENTIAL METHOD: ABNORMAL
EOSINOPHIL # BLD AUTO: 0.1 K/UL (ref 0–0.5)
EOSINOPHIL NFR BLD: 1.7 % (ref 0–8)
ERYTHROCYTE [DISTWIDTH] IN BLOOD BY AUTOMATED COUNT: 14.7 % (ref 11.5–14.5)
EST. GFR  (AFRICAN AMERICAN): 53.3 ML/MIN/1.73 M^2
EST. GFR  (NON AFRICAN AMERICAN): 46.2 ML/MIN/1.73 M^2
ESTIMATED AVG GLUCOSE: 120 MG/DL (ref 68–131)
GLUCOSE SERPL-MCNC: 84 MG/DL (ref 70–110)
HBA1C MFR BLD HPLC: 5.8 % (ref 4–5.6)
HCT VFR BLD AUTO: 35.1 % (ref 37–48.5)
HGB BLD-MCNC: 10.6 G/DL (ref 12–16)
IMM GRANULOCYTES # BLD AUTO: 0.01 K/UL (ref 0–0.04)
IMM GRANULOCYTES NFR BLD AUTO: 0.2 % (ref 0–0.5)
INSULIN COLLECTION INTERVAL: NORMAL
INSULIN SERPL-ACNC: 5.2 UU/ML
LYMPHOCYTES # BLD AUTO: 2.5 K/UL (ref 1–4.8)
LYMPHOCYTES NFR BLD: 41.4 % (ref 18–48)
MCH RBC QN AUTO: 29.9 PG (ref 27–31)
MCHC RBC AUTO-ENTMCNC: 30.2 G/DL (ref 32–36)
MCV RBC AUTO: 99 FL (ref 82–98)
MONOCYTES # BLD AUTO: 0.6 K/UL (ref 0.3–1)
MONOCYTES NFR BLD: 9.8 % (ref 4–15)
NEUTROPHILS # BLD AUTO: 2.8 K/UL (ref 1.8–7.7)
NEUTROPHILS NFR BLD: 46.6 % (ref 38–73)
NRBC BLD-RTO: 0 /100 WBC
PLATELET # BLD AUTO: 232 K/UL (ref 150–350)
PMV BLD AUTO: 10.4 FL (ref 9.2–12.9)
POTASSIUM SERPL-SCNC: 3.6 MMOL/L (ref 3.5–5.1)
PROT SERPL-MCNC: 7.8 G/DL (ref 6–8.4)
RBC # BLD AUTO: 3.54 M/UL (ref 4–5.4)
SODIUM SERPL-SCNC: 138 MMOL/L (ref 136–145)
WBC # BLD AUTO: 5.92 K/UL (ref 3.9–12.7)

## 2020-07-23 PROCEDURE — 99999 PR PBB SHADOW E&M-EST. PATIENT-LVL III: ICD-10-PCS | Mod: PBBFAC,,, | Performed by: OPTOMETRIST

## 2020-07-23 PROCEDURE — 99999 PR PBB SHADOW E&M-EST. PATIENT-LVL III: CPT | Mod: PBBFAC,,, | Performed by: OPTOMETRIST

## 2020-07-23 PROCEDURE — 92004 PR EYE EXAM, NEW PATIENT,COMPREHESV: ICD-10-PCS | Mod: S$GLB,,, | Performed by: OPTOMETRIST

## 2020-07-23 PROCEDURE — 92015 PR REFRACTION: ICD-10-PCS | Mod: S$GLB,,, | Performed by: OPTOMETRIST

## 2020-07-23 PROCEDURE — 92004 COMPRE OPH EXAM NEW PT 1/>: CPT | Mod: S$GLB,,, | Performed by: OPTOMETRIST

## 2020-07-23 PROCEDURE — 92015 DETERMINE REFRACTIVE STATE: CPT | Mod: S$GLB,,, | Performed by: OPTOMETRIST

## 2020-07-23 NOTE — PROGRESS NOTES
HPI     No visual complaints.   Last eye exam 04/28/2015 TRF.  Update glasses RX.    Last edited by Kristin Parsons on 7/23/2020  2:29 PM. (History)            Assessment /Plan     For exam results, see Encounter Report.    Cataract, nuclear sclerotic senile, bilateral    Arcus senilis of both corneas    Bilateral presbyopia      Mild cataracts OU, not surgical.    Moderate arcus OD>OS    Dispense Final Rx for glasses.  RTC 1 year  Discussed above and answered questions.

## 2020-07-29 ENCOUNTER — OFFICE VISIT (OUTPATIENT)
Dept: PRIMARY CARE CLINIC | Facility: CLINIC | Age: 70
End: 2020-07-29
Payer: COMMERCIAL

## 2020-07-29 ENCOUNTER — CLINICAL SUPPORT (OUTPATIENT)
Dept: AUDIOLOGY | Facility: CLINIC | Age: 70
End: 2020-07-29
Payer: COMMERCIAL

## 2020-07-29 ENCOUNTER — TELEPHONE (OUTPATIENT)
Dept: PRIMARY CARE CLINIC | Facility: CLINIC | Age: 70
End: 2020-07-29

## 2020-07-29 DIAGNOSIS — N18.30 CHRONIC KIDNEY DISEASE, STAGE 3: ICD-10-CM

## 2020-07-29 DIAGNOSIS — D50.8 IRON DEFICIENCY ANEMIA SECONDARY TO INADEQUATE DIETARY IRON INTAKE: ICD-10-CM

## 2020-07-29 DIAGNOSIS — K06.8 BLEEDING GUMS: ICD-10-CM

## 2020-07-29 DIAGNOSIS — I10 ESSENTIAL HYPERTENSION: ICD-10-CM

## 2020-07-29 DIAGNOSIS — H93.13 TINNITUS, BILATERAL: ICD-10-CM

## 2020-07-29 DIAGNOSIS — R07.89 CHEST DISCOMFORT: ICD-10-CM

## 2020-07-29 DIAGNOSIS — R73.03 PREDIABETES: Primary | ICD-10-CM

## 2020-07-29 DIAGNOSIS — R73.09 ABNORMAL GLUCOSE: ICD-10-CM

## 2020-07-29 DIAGNOSIS — H90.3 SENSORINEURAL HEARING LOSS, BILATERAL: Primary | ICD-10-CM

## 2020-07-29 DIAGNOSIS — E78.00 PURE HYPERCHOLESTEROLEMIA: ICD-10-CM

## 2020-07-29 DIAGNOSIS — Z12.11 COLON CANCER SCREENING: ICD-10-CM

## 2020-07-29 PROCEDURE — 1159F PR MEDICATION LIST DOCUMENTED IN MEDICAL RECORD: ICD-10-PCS | Mod: ,,, | Performed by: FAMILY MEDICINE

## 2020-07-29 PROCEDURE — 99215 PR OFFICE/OUTPT VISIT, EST, LEVL V, 40-54 MIN: ICD-10-PCS | Mod: 95,,, | Performed by: FAMILY MEDICINE

## 2020-07-29 PROCEDURE — 99215 OFFICE O/P EST HI 40 MIN: CPT | Mod: 95,,, | Performed by: FAMILY MEDICINE

## 2020-07-29 PROCEDURE — 99999 PR PBB SHADOW E&M-EST. PATIENT-LVL I: ICD-10-PCS | Mod: PBBFAC,,, | Performed by: AUDIOLOGIST-HEARING AID FITTER

## 2020-07-29 PROCEDURE — 1159F MED LIST DOCD IN RCRD: CPT | Mod: ,,, | Performed by: FAMILY MEDICINE

## 2020-07-29 PROCEDURE — 92567 TYMPANOMETRY: CPT | Mod: S$GLB,,, | Performed by: AUDIOLOGIST-HEARING AID FITTER

## 2020-07-29 PROCEDURE — 1101F PR PT FALLS ASSESS DOC 0-1 FALLS W/OUT INJ PAST YR: ICD-10-PCS | Mod: CPTII,,, | Performed by: FAMILY MEDICINE

## 2020-07-29 PROCEDURE — 92557 PR COMPREHENSIVE HEARING TEST: ICD-10-PCS | Mod: S$GLB,,, | Performed by: AUDIOLOGIST-HEARING AID FITTER

## 2020-07-29 PROCEDURE — 92557 COMPREHENSIVE HEARING TEST: CPT | Mod: S$GLB,,, | Performed by: AUDIOLOGIST-HEARING AID FITTER

## 2020-07-29 PROCEDURE — 92567 PR TYMPA2METRY: ICD-10-PCS | Mod: S$GLB,,, | Performed by: AUDIOLOGIST-HEARING AID FITTER

## 2020-07-29 PROCEDURE — 1101F PT FALLS ASSESS-DOCD LE1/YR: CPT | Mod: CPTII,,, | Performed by: FAMILY MEDICINE

## 2020-07-29 PROCEDURE — 99999 PR PBB SHADOW E&M-EST. PATIENT-LVL I: CPT | Mod: PBBFAC,,, | Performed by: AUDIOLOGIST-HEARING AID FITTER

## 2020-07-29 NOTE — TELEPHONE ENCOUNTER
----- Message from Nikia Streeter sent at 7/29/2020 11:09 AM CDT -----  Contact: Xin  Would like to consult with nurse about her appt. Please call back [phone]

## 2020-07-29 NOTE — PROGRESS NOTES
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  Patient complains of tinnitus that has become more noticeable recently. Tinnitus is bilateral, high frequency, and non-pulsatile.She denies c/o hearing loss, otalgia, aural fullness, and vertigo.    Results reveal a mild-to-severe sensorineural hearing loss 250-8000 Hz for the right ear, and a mild to moderately severe sensorineural hearing loss 250-8000 Hz for the left ear.   Speech Reception Thresholds were  15 dBHL for the right ear and 20 dBHL for the left ear.   Word recognition scores were excellent for the right ear and excellent for the left ear.   Tympanograms were Type A, normal for the right ear and Type A, normal for the left ear.    Patient was counseled on the above findings.    We discussed that tinnitus is most often caused by a hearing loss, and that as the hair cells are damaged, either genetic or as a result of loud noise exposure, they then cause tinnitus. Some patients find that restricting the salt or caffeine in their diet helps, and there is also an OTC supplement, lipflavinoids, that some people find to be effective though their benefit is not fully proven. Tinnitus tends to be louder in times of stress and fatigue, and may decrease with time. Sound machines may also be an effective masking technique if needed at night.     Recommendations:  1. Sound generator for night time use.  2. Annual Audiograms.  3. Hearing protection in noise.

## 2020-07-29 NOTE — PROGRESS NOTES
Subjective:      Patient ID: Xin Taylor is a 69 y.o. female.    Chief Complaint: Follow-up (chronic issues)    Disclaimer:  This note is prepared using voice recognition software and as such is likely to have errors and has not been proof read. Please contact me for questions.     The patient location is:home  The chief complaint leading to consultation is: followup / my chart request/ chronic issues.   Visit type: Virtual visit with synchronous audio and video  Total time spent with patient:41 min  Each patient to whom he or she provides medical services by telemedicine is:  (1) informed of the relationship between the physician and patient and the respective role of any other health care provider with respect to management of the patient; and (2) notified that he or she may decline to receive medical services by telemedicine and may withdraw from such care at any time.    Notes:     Xin Taylor is a 68 y.o. female who presents today for multiple issues and 2 month follow-up.     Had an episode in July thought she had a cold and chest discomfort and flipping and flopping situation in her chest.  Had a radiating sensation from under the left breast and around that side and up to the top. Not a pain, all happened at same time as back and shoulder blade.  Wasn't certain if she needed to have cardiac assessment or not. Didn't get anything checked out but in last 2 weeks but feels more of her normal. Doing recumbent bike most days. No trouble doing it. No problems with the exercise.     Not certain what was the cause. Not certain of a trigger.    Is taking iron currently. Hb/Hct is down a little bit again. This time < 10.  Taking it usually with OJ or something with vitamin C. No blood in stool but stool is dark.  Eats a lot of greens.  Eats a lot of beets as well.       Is noted to be anemic has been in the past.  Hemoglobins normally around 11.  This time though was in the 10s.  Uncertain what may be the cause  has been taking iron.  Previously with check B12 and folate levels have been normal.  Does report that she did not drink water for 10 hr prior to the testing.  This can affect also her kidney function.    Kidney function shows some lowering of the GFR however suspect is more related to dehydration because BUN is normal.  She is willing to try to push fluids she reports only take the meloxicam every 6 days and actually her legs and knees have done better with her doing regular exercise through her bike.    She also wanted to go through additional lab work extensively through her CBC on reasons why the eosinophilic count might be higher at certain times of the year we discussed this can be related to allergies seasonally.    Are prediabetes standpoint though her insulin levels are normal her sugar numbers actually improving now to 5.8.  She did not start medicine she has just been working on diet.    Is due for colonoscopy at this time has not done an EGD would be willing to do so but would like to have a female provider.  In the setting of her anemia as well need to rule out any type of gastritis or GI ulcer or other type etiology for her anemia.  She is willing to do so.    Is still on cholesterol medicine and tolerating it well.  Blood pressure is doing well now, but up today without sleeping well and was anxious about getting setup with the telemedince.      And having some bleeding of her gums.  The days she takes the asa, she can tell that she going to have more bleeding and really every other day.  And occasionally has some bruising at times as well.           Lab Results   Component Value Date    WBC 5.92 07/22/2020    HGB 10.6 (L) 07/22/2020    HCT 35.1 (L) 07/22/2020     07/22/2020    CHOL 192 03/12/2020    TRIG 51 03/12/2020    HDL 93 (H) 03/12/2020    ALT 10 07/22/2020    AST 19 07/22/2020     07/22/2020    K 3.6 07/22/2020     07/22/2020    CREATININE 1.2 07/22/2020    BUN 17 07/22/2020     CO2 29 07/22/2020    TSH 1.495 03/12/2020    HGBA1C 5.8 (H) 07/22/2020       2D Echo w/ Color Flow Doppler  Date of Procedure: 09/19/2019    TEST DESCRIPTION   Technical Quality: This is a technically adequate study.     Aorta: The aortic root is normal in size, measuring 2.6 cm at sinotubular junction and 2.4 cm at Sinuses of Valsalva. The proximal ascending aorta is normal in size, measuring 3.0 cm across.     Left Atrium: The left atrial volume index is normal, measuring 23.65 cc/m2.     Left Ventricle: The left ventricle is normal in size, with an end-diastolic diameter of 4.2 cm, and an end-systolic diameter of 2.7 cm. LV wall thickness is normal, with the septum measuring 1.2 cm and the posterior wall measuring 1.3 cm across. Relative   wall thickness was increased at 0.62, and the LV mass index was increased at 119.4 g/m2 consistent with concentric left ventricular hypertrophy. There are no regional wall motion abnormalities. Left ventricular systolic function appears normal. Visually   estimated ejection fraction is 55-60%. The LV Doppler derived stroke volume equals 94.0 ccs.     Diastolic indices: E wave velocity 0.7 m/s, E/A ratio 0.8,  msec., E/e' ratio(avg) 6. Diastolic function is normal.     Right Atrium: The right atrium is normal in size, measuring 4.7 cm in length and 3.4 cm in width in the apical view.     Right Ventricle: The right ventricle is normal in size measuring 2.9 cm at the base in the apical right ventricle-focused view. Global right ventricular systolic function appears normal. Tricuspid annular plane systolic excursion (TAPSE) is 3.2 cm. The   estimated PA systolic pressure is 39 mmHg.     Aortic Valve:  The aortic valve is normal in structure. The aortic valve is tri-leaflet in structure.     Mitral Valve:  The mitral valve is mildly sclerotic. There is trivial mitral regurgitation.     Tricuspid Valve:  The tricuspid valve is normal in structure. There is trivial tricuspid  regurgitation.     Pulmonary Valve:  The pulmonic valve is normal in structure. There is trivial pulmonic regurgitation.     IVC: IVC is enlarged but collapses > 50% with a sniff, suggesting intermediate right atrial pressure of 8 mmHg.     Intracavitary: There is no evidence of pericardial effusion, intracavity mass, thrombi, or vegetation.     CONCLUSIONS     1 - Concentric hypertrophy.     2 - No wall motion abnormalities.     3 - Normal left ventricular systolic function (EF 55-60%).     4 - Normal left ventricular diastolic function.     5 - Normal right ventricular systolic function .     6 - The estimated PA systolic pressure is 39 mmHg.     7 - Trivial mitral regurgitation.     8 - Trivial tricuspid regurgitation.     9 - Intermediate central venous pressure.     This document has been electronically    SIGNED BY: Juan C Espino MD On: 09/20/2019 13:37  Mammo Digital Screening Bilat w/ Lj  Narrative: Result:  Mammo Digital Screening Bilat w/ Jl    History:  Patient is 68 y.o. and is seen for a screening mammogram.    Films Compared:  Compared to: 09/11/2018 Mammo Digital Screening Bilat with   Tomosynthesis_CAD and 08/23/2017 Mammo Digital Screening Bilat with   Tomosynthesis_CAD    Findings:  Computer-aided detection was utilized in the interpretation of this   examination. This procedure was performed using tomosynthesis.       The breasts have scattered areas of fibroglandular density. There is no   evidence of suspicious masses, microcalcifications or architectural   distortion.  Impression: No mammographic evidence of malignancy.    BI-RADS Category 1: Negative    Recommendation:  Routine screening mammogram in 1 year is recommended.    The patient's estimated lifetime risk of breast cancer (to age 85) based   on Tyrer-Cuzick - 7 risk assessment model is: Tyrer-Cuzick: 12.81 %.   According to the American Cancer Society,  patients with a lifetime breast   cancer risk of 20% or higher might benefit from  supplemental screening   tests.        Review of Systems   Constitutional: Positive for activity change. Negative for appetite change and unexpected weight change.   HENT: Negative for hearing loss, rhinorrhea and trouble swallowing.         Bleeding gums   Eyes: Negative for discharge and visual disturbance.   Respiratory: Negative for chest tightness and wheezing.    Cardiovascular: Positive for palpitations. Negative for chest pain.   Gastrointestinal: Negative for blood in stool, constipation, diarrhea and vomiting.   Endocrine: Negative for polydipsia and polyuria.   Genitourinary: Negative for difficulty urinating, dysuria, hematuria and menstrual problem.   Musculoskeletal: Positive for arthralgias. Negative for joint swelling and neck pain.   Neurological: Negative for weakness and headaches.   Psychiatric/Behavioral: Negative for confusion and dysphoric mood. The patient is not nervous/anxious.      Objective:   There were no vitals filed for this visit.  Physical Exam  Vitals signs reviewed.   Constitutional:       General: She is not in acute distress.     Appearance: Normal appearance. She is well-developed and normal weight. She is not ill-appearing.   HENT:      Head: Normocephalic and atraumatic.      Right Ear: External ear normal.      Left Ear: External ear normal.   Eyes:      Conjunctiva/sclera: Conjunctivae normal.   Pulmonary:      Effort: Pulmonary effort is normal.   Neurological:      Mental Status: She is alert.   Psychiatric:         Attention and Perception: She is attentive.         Mood and Affect: Mood normal. Mood is not anxious or depressed. Affect is not labile, blunt, angry or inappropriate.         Speech: She is communicative. Speech is not rapid and pressured, delayed, slurred or tangential.         Behavior: Behavior normal. Behavior is not agitated, slowed, aggressive, withdrawn, hyperactive or combative. Behavior is cooperative.         Thought Content: Thought content  normal. Thought content is not paranoid or delusional. Thought content does not include homicidal or suicidal ideation. Thought content does not include homicidal or suicidal plan.         Cognition and Memory: Memory is not impaired. She does not exhibit impaired recent memory or impaired remote memory.         Judgment: Judgment normal. Judgment is not impulsive or inappropriate.       Assessment:     1. Prediabetes    2. Abnormal glucose    3. Iron deficiency anemia secondary to inadequate dietary iron intake    4. Essential hypertension    5. Pure hypercholesterolemia    6. Bleeding gums    7. Colon cancer screening    8. Chest discomfort    9. Chronic kidney disease, stage 3      Plan:   Xin was seen today for follow-up.    Diagnoses and all orders for this visit:    Prediabetes  Comments:  improving with diet alone without meds.   Orders:  -     Hemoglobin A1C; Future  -     CBC auto differential; Future  -     Comprehensive metabolic panel; Future  -     Iron and TIBC; Future  -     Lipid Panel; Future  -     TSH; Future  -     T4, free; Future  -     Vitamin B12; Future    Abnormal glucose  Comments:  Improving insulin levels normal.  Continue work on diet exercise  Orders:  -     Hemoglobin A1C; Future  -     CBC auto differential; Future  -     Comprehensive metabolic panel; Future  -     Iron and TIBC; Future  -     Lipid Panel; Future  -     TSH; Future  -     T4, free; Future  -     Vitamin B12; Future    Iron deficiency anemia secondary to inadequate dietary iron intake  Comments:  on iron supplementation, needing egd and colonoscopy. will refer to GI.   Orders:  -     Ambulatory referral/consult to Gastroenterology; Future  -     Hemoglobin A1C; Future  -     CBC auto differential; Future  -     Comprehensive metabolic panel; Future  -     Iron and TIBC; Future  -     Lipid Panel; Future  -     TSH; Future  -     T4, free; Future  -     Vitamin B12; Future    Essential  hypertension  Comments:  Controlled at this time continue with hydrochlorothiazide and low-dose amlodipine continue with exercise  Orders:  -     Hemoglobin A1C; Future  -     CBC auto differential; Future  -     Comprehensive metabolic panel; Future  -     Iron and TIBC; Future  -     Lipid Panel; Future  -     TSH; Future  -     T4, free; Future  -     Vitamin B12; Future    Pure hypercholesterolemia  Comments:  Advised to continue with statin therapy.  Labs reviewed  Orders:  -     Hemoglobin A1C; Future  -     CBC auto differential; Future  -     Comprehensive metabolic panel; Future  -     Iron and TIBC; Future  -     Lipid Panel; Future  -     TSH; Future  -     T4, free; Future  -     Vitamin B12; Future    Bleeding gums  Comments:  does have issues with bleeding gums, needs to see dentist. only with brushing.   Orders:  -     Hemoglobin A1C; Future  -     CBC auto differential; Future  -     Comprehensive metabolic panel; Future  -     Iron and TIBC; Future  -     Lipid Panel; Future  -     TSH; Future  -     T4, free; Future  -     Vitamin B12; Future    Colon cancer screening  -     Ambulatory referral/consult to Gastroenterology; Future    Chest discomfort  Comments:  Suspect at this time since resolved on its own may actually been more related to GI issues also in the setting anemia needs to do colonoscopy and poss  EGD  Orders:  -     Ambulatory referral/consult to Gastroenterology; Future  -     Hemoglobin A1C; Future  -     CBC auto differential; Future  -     Comprehensive metabolic panel; Future  -     Iron and TIBC; Future  -     Lipid Panel; Future  -     TSH; Future  -     T4, free; Future  -     Vitamin B12; Future    Chronic kidney disease, stage 3  Comments:  Noted with higher creatinine and lower GFR suspect related to dehydration with lab work encourage hydration with water limit NSAID use repeat again in 3 months            Follow up in about 3 months (around 10/29/2020) for chronic issues  Dr Anahi borges need 40 min appt slot .    There are no Patient Instructions on file for this visit.      Time spent: 40 minutes in face to face discussion concerning diagnosis, prognosis, review of lab and test results, benefits of treatment as well as management of disease, counseling of patient and coordination of care between various health care providers . Greater than half the time spent was used for coordination of care and counseling of patient.

## 2020-07-29 NOTE — TELEPHONE ENCOUNTER
Called and spoke with pt. She will get checked in for her virtual visit as soon as she can. She is headed home.

## 2020-09-30 ENCOUNTER — TELEPHONE (OUTPATIENT)
Dept: PULMONOLOGY | Facility: CLINIC | Age: 70
End: 2020-09-30

## 2020-09-30 DIAGNOSIS — R06.02 SOB (SHORTNESS OF BREATH): Primary | ICD-10-CM

## 2020-10-01 ENCOUNTER — OFFICE VISIT (OUTPATIENT)
Dept: SLEEP MEDICINE | Facility: CLINIC | Age: 70
End: 2020-10-01
Payer: COMMERCIAL

## 2020-10-01 ENCOUNTER — TELEPHONE (OUTPATIENT)
Dept: PRIMARY CARE CLINIC | Facility: CLINIC | Age: 70
End: 2020-10-01

## 2020-10-01 ENCOUNTER — HOSPITAL ENCOUNTER (OUTPATIENT)
Dept: RADIOLOGY | Facility: HOSPITAL | Age: 70
Discharge: HOME | End: 2020-10-01
Attending: INTERNAL MEDICINE
Payer: COMMERCIAL

## 2020-10-01 ENCOUNTER — PATIENT OUTREACH (OUTPATIENT)
Dept: ADMINISTRATIVE | Facility: OTHER | Age: 70
End: 2020-10-01

## 2020-10-01 VITALS
WEIGHT: 170.63 LBS | HEIGHT: 65 IN | BODY MASS INDEX: 28.43 KG/M2 | DIASTOLIC BLOOD PRESSURE: 80 MMHG | RESPIRATION RATE: 17 BRPM | OXYGEN SATURATION: 99 % | SYSTOLIC BLOOD PRESSURE: 120 MMHG | HEART RATE: 91 BPM

## 2020-10-01 DIAGNOSIS — Z12.31 ENCOUNTER FOR SCREENING MAMMOGRAM FOR MALIGNANT NEOPLASM OF BREAST: Primary | ICD-10-CM

## 2020-10-01 DIAGNOSIS — R73.03 PREDIABETES: ICD-10-CM

## 2020-10-01 DIAGNOSIS — G47.33 OSA ON CPAP: ICD-10-CM

## 2020-10-01 DIAGNOSIS — R06.02 SOB (SHORTNESS OF BREATH): ICD-10-CM

## 2020-10-01 DIAGNOSIS — Z12.11 COLON CANCER SCREENING: Primary | ICD-10-CM

## 2020-10-01 DIAGNOSIS — Z86.010 PERSONAL HISTORY OF COLONIC POLYPS: ICD-10-CM

## 2020-10-01 DIAGNOSIS — E78.2 MIXED HYPERLIPIDEMIA: ICD-10-CM

## 2020-10-01 DIAGNOSIS — I10 ESSENTIAL HYPERTENSION: Primary | ICD-10-CM

## 2020-10-01 PROCEDURE — 99999 PR PBB SHADOW E&M-EST. PATIENT-LVL IV: CPT | Mod: PBBFAC,,, | Performed by: INTERNAL MEDICINE

## 2020-10-01 PROCEDURE — 99214 PR OFFICE/OUTPT VISIT, EST, LEVL IV, 30-39 MIN: ICD-10-PCS | Mod: S$GLB,,, | Performed by: INTERNAL MEDICINE

## 2020-10-01 PROCEDURE — 3008F PR BODY MASS INDEX (BMI) DOCUMENTED: ICD-10-PCS | Mod: CPTII,S$GLB,, | Performed by: INTERNAL MEDICINE

## 2020-10-01 PROCEDURE — 3074F SYST BP LT 130 MM HG: CPT | Mod: CPTII,S$GLB,, | Performed by: INTERNAL MEDICINE

## 2020-10-01 PROCEDURE — 71046 X-RAY EXAM CHEST 2 VIEWS: CPT | Mod: TC

## 2020-10-01 PROCEDURE — 71046 XR CHEST PA AND LATERAL: ICD-10-PCS | Mod: 26,,, | Performed by: RADIOLOGY

## 2020-10-01 PROCEDURE — 99214 OFFICE O/P EST MOD 30 MIN: CPT | Mod: S$GLB,,, | Performed by: INTERNAL MEDICINE

## 2020-10-01 PROCEDURE — 3079F DIAST BP 80-89 MM HG: CPT | Mod: CPTII,S$GLB,, | Performed by: INTERNAL MEDICINE

## 2020-10-01 PROCEDURE — 3079F PR MOST RECENT DIASTOLIC BLOOD PRESSURE 80-89 MM HG: ICD-10-PCS | Mod: CPTII,S$GLB,, | Performed by: INTERNAL MEDICINE

## 2020-10-01 PROCEDURE — 1101F PT FALLS ASSESS-DOCD LE1/YR: CPT | Mod: CPTII,S$GLB,, | Performed by: INTERNAL MEDICINE

## 2020-10-01 PROCEDURE — 71046 X-RAY EXAM CHEST 2 VIEWS: CPT | Mod: 26,,, | Performed by: RADIOLOGY

## 2020-10-01 PROCEDURE — 99999 PR PBB SHADOW E&M-EST. PATIENT-LVL IV: ICD-10-PCS | Mod: PBBFAC,,, | Performed by: INTERNAL MEDICINE

## 2020-10-01 PROCEDURE — 3074F PR MOST RECENT SYSTOLIC BLOOD PRESSURE < 130 MM HG: ICD-10-PCS | Mod: CPTII,S$GLB,, | Performed by: INTERNAL MEDICINE

## 2020-10-01 PROCEDURE — 1159F MED LIST DOCD IN RCRD: CPT | Mod: S$GLB,,, | Performed by: INTERNAL MEDICINE

## 2020-10-01 PROCEDURE — 3008F BODY MASS INDEX DOCD: CPT | Mod: CPTII,S$GLB,, | Performed by: INTERNAL MEDICINE

## 2020-10-01 PROCEDURE — 1101F PR PT FALLS ASSESS DOC 0-1 FALLS W/OUT INJ PAST YR: ICD-10-PCS | Mod: CPTII,S$GLB,, | Performed by: INTERNAL MEDICINE

## 2020-10-01 PROCEDURE — 1159F PR MEDICATION LIST DOCUMENTED IN MEDICAL RECORD: ICD-10-PCS | Mod: S$GLB,,, | Performed by: INTERNAL MEDICINE

## 2020-10-01 NOTE — PROGRESS NOTES
Health Maintenance Due   Topic Date Due    Shingles Vaccine (2 of 3) 10/03/2012    Influenza Vaccine (1) 08/01/2020    Colorectal Cancer Screening  08/11/2020    Mammogram  09/19/2020     Updates were requested from care everywhere.  Chart was reviewed for overdue Proactive [facility] Encounters (GAGE) topics (CRS, Breast Cancer Screening, Eye exam)  Health Maintenance has been updated.  LINKS immunization registry triggered.  Immunizations were reconciled.  Order placed for mammogram.

## 2020-10-01 NOTE — PROGRESS NOTES
Subjective:       Patient ID: Xin Taylor is a 69 y.o. female.  Patient Active Problem List   Diagnosis    HTN (hypertension)    HLD (hyperlipidemia)    Anemia    Personal history of colonic polyps    Osteopenia    Mass of oral cavity    ASIF on CPAP    Prediabetes    Abnormal glucose     Immunization History   Administered Date(s) Administered    Hepatitis A, Adult 11/13/2012, 05/13/2013    Hepatitis A, Pediatric/Adolescent, 2 Dose 05/13/2013    Influenza - High Dose - PF (65 years and older) 10/27/2015, 11/10/2016, 11/29/2017, 11/21/2018, 10/10/2019    Influenza - Intradermal - Quadrivalent - PF 11/13/2012    Influenza - Intradermal - Trivalent - PF 11/13/2012    Influenza - Trivalent (ADULT) 10/06/2008, 10/06/2009, 10/25/2010, 10/30/2013    Influenza Split 10/06/2008, 10/06/2009, 10/25/2010, 11/13/2012, 10/30/2013    Meningococcal Conjugate (MCV4P) 11/13/2012    Pneumococcal Conjugate - 13 Valent 01/27/2016    Pneumococcal Polysaccharide - 23 Valent 11/10/2016    Tdap 11/13/2012    Typhoid - ViCPs 11/09/2012    Yellow Fever 11/09/2012    Zoster 08/08/2012     Social History     Tobacco Use   Smoking Status Never Smoker   Smokeless Tobacco Never Used     EPWORTH SLEEPINESS SCALE 10/1/2020   Sitting and reading 2   Watching TV 2   Sitting, inactive in a public place (e.g. a theatre or a meeting) 0   As a passenger in a car for an hour without a break 0   Lying down to rest in the afternoon when circumstances permit 0   Sitting and talking to someone 0   Sitting quietly after a lunch without alcohol 0   In a car, while stopped for a few minutes in traffic 0   Total score 4        Chief Complaint: Sleep Apnea    Ms. Denise Taylor comes to see me as a follow-up  Last visit was 09/26/2019  She has ASIF on APAP 4-20  Residual AHI 2.3, some mask leak 1 hr 1 min  Bed time: 11-12MN  Wake time : 9 am  [place] score 4  Her usage greater than 4 hours was 100.0 %.  She'll follow-up annually.  Seen  "Audiology for tinnitus  Information on equipment change given  Goals of CPAP discussed      Review of Systems   Constitutional: Negative.    HENT: Negative.    Eyes: Negative.    Respiratory: Negative.    Genitourinary: Negative.    Endocrine: endocrine negative   Musculoskeletal: Negative.    Skin: Negative.    Gastrointestinal: Negative.    Neurological: Negative.    Psychiatric/Behavioral: Negative.    All other systems reviewed and are negative.      Objective:       Vitals:    10/01/20 1003   BP: 120/80   Pulse: 91   Resp: 17   SpO2: 99%   Weight: 77.4 kg (170 lb 10.2 oz)   Height: 5' 5" (1.651 m)     Physical Exam   Constitutional: She is oriented to person, place, and time. She appears well-developed and well-nourished.   HENT:   Head: Normocephalic.   Nose: Nose normal.   Neck: Normal range of motion.   Cardiovascular: Normal rate and regular rhythm.   Pulmonary/Chest: Normal expansion, symmetric chest wall expansion, effort normal and breath sounds normal.   Musculoskeletal: Normal range of motion.   Neurological: She is alert and oriented to person, place, and time.   Skin: Skin is warm and dry.   Psychiatric: She has a normal mood and affect.   Nursing note and vitals reviewed.    Personal Diagnostic Review  DOWNLOAD    7/3/2020 - 9/30/2020  YOB: 1950  Mask:  Compliance Summary  7/3/2020 - 9/30/2020 (90 days)  Days with Device Usage 90 days  Days without Device Usage 0 days  Percent Days with Device Usage 100.0%  Cumulative Usage 31 days 14 hrs. 8 secs.  Maximum Usage (1 Day) 11 hrs. 59 mins. 40 secs.  Average Usage (All Days) 8 hrs. 25 mins. 20 secs.  Average Usage (Days Used) 8 hrs. 25 mins. 20 secs.  Minimum Usage (1 Day) 4 hrs. 10 mins. 15 secs.  Percent of Days with Usage >= 4 Hours 100.0%  Percent of Days with Usage < 4 Hours 0.0%  Date Range  Total Blower Time 31 days 14 hrs. 50 secs.  Average AHI 2.3  Auto-CPAP Summary  Auto-CPAP Mean Pressure 7.6 cmH2O  Auto-CPAP Peak Average " Pressure 11.5 cmH2O  Average Device Pressure <= 90% of Time 10.1 cmH2O  Average Time in Large Leak Per Day 1 hrs. 1 mins. 41 secs.  Printed By: [employer] - version: 2.44.7.0 Page 1 of 2  This report      CXR today         EXAMINATION:  XR CHEST PA AND LATERAL     CLINICAL HISTORY:  Shortness of breath     TECHNIQUE:  PA and lateral views of the chest were performed.     COMPARISON:  05/19/2017     FINDINGS:  Cardiac silhouette and mediastinal contours are normal.  Lungs are clear.  Osseous structures are intact.     Impression:     No acute cardiopulmonary process.  Assessment:       Problem List Items Addressed This Visit     HTN (hypertension) - Primary     Stable on Norvasc         HLD (hyperlipidemia)     Stable on Lipitor         ASIF on CPAP     Vanderbilt score 4  Bed time: 11 pm  Wake time: 9 am  Usage > 4 hours was 100 %  Leak 1 hr  AverageAHI 2.3  APAP6-14    Discussed therapeutic goals for positive airway pressure therapy(CPAP or BiPAP):   Ideal is usage 100% of nights for 6 - 8 hours per night.   Minimum usage is 70% of night for at least 4 hours per night used.  Mask choices discussed.  Patient expressed understanding. All Questions answered.           Prediabetes     Stable diet             Plan:       Patient using device and benefits   APAP 6-14       No follow-ups on file.    This note was prepared using voice recognition system and is likely to have sound alike errors that may have been overlooked even after proof reading.  Please call me with any questions    TIME SPENT WITH PATIENT:     Time spent: 20 minutes in face to face  discussion concerning diagnosis, prognosis, review of lab and test results, benefits of treatment as well as management of disease, counseling of patient and coordination of care between various health  care providers . Greater than half the time spent was used for coordination of care and counseling of patient.     Vito Muse    Pulmonary/Critical  care/Sleepmedicine

## 2020-10-01 NOTE — TELEPHONE ENCOUNTER
----- Message from Lexii Isidro sent at 10/1/2020 11:00 AM CDT -----  Contact: Xin Lauren called requesting orders for mammo and colonoscopy.  Please call her back at [phone].    Thanks    TF

## 2020-10-01 NOTE — ASSESSMENT & PLAN NOTE
Weldon score 4  Bed time: 11 pm  Wake time: 9 am  Usage > 4 hours was 100 %  Leak 1 hr  AverageAHI 2.3  APAP6-14    Discussed therapeutic goals for positive airway pressure therapy(CPAP or BiPAP):   Ideal is usage 100% of nights for 6 - 8 hours per night.   Minimum usage is 70% of night for at least 4 hours per night used.  Mask choices discussed.  Patient expressed understanding. All Questions answered.

## 2020-10-06 ENCOUNTER — HOSPITAL ENCOUNTER (OUTPATIENT)
Dept: RADIOLOGY | Facility: HOSPITAL | Age: 70
Discharge: HOME | End: 2020-10-06
Attending: FAMILY MEDICINE
Payer: COMMERCIAL

## 2020-10-06 VITALS — WEIGHT: 170.63 LBS | BODY MASS INDEX: 28.43 KG/M2 | HEIGHT: 65 IN

## 2020-10-06 DIAGNOSIS — Z12.31 ENCOUNTER FOR SCREENING MAMMOGRAM FOR MALIGNANT NEOPLASM OF BREAST: ICD-10-CM

## 2020-10-06 PROCEDURE — 77067 MAMMO DIGITAL SCREENING BILAT WITH TOMO: ICD-10-PCS | Mod: 26,,, | Performed by: RADIOLOGY

## 2020-10-06 PROCEDURE — 77067 SCR MAMMO BI INCL CAD: CPT | Mod: TC

## 2020-10-06 PROCEDURE — 77063 MAMMO DIGITAL SCREENING BILAT WITH TOMO: ICD-10-PCS | Mod: 26,,, | Performed by: RADIOLOGY

## 2020-10-06 PROCEDURE — 77067 SCR MAMMO BI INCL CAD: CPT | Mod: 26,,, | Performed by: RADIOLOGY

## 2020-10-06 PROCEDURE — 77063 BREAST TOMOSYNTHESIS BI: CPT | Mod: 26,,, | Performed by: RADIOLOGY

## 2020-10-22 ENCOUNTER — LAB VISIT (OUTPATIENT)
Dept: LAB | Facility: HOSPITAL | Age: 70
End: 2020-10-22
Attending: FAMILY MEDICINE
Payer: COMMERCIAL

## 2020-10-22 DIAGNOSIS — R73.09 ABNORMAL GLUCOSE: ICD-10-CM

## 2020-10-22 DIAGNOSIS — E78.00 PURE HYPERCHOLESTEROLEMIA: ICD-10-CM

## 2020-10-22 DIAGNOSIS — R07.89 CHEST DISCOMFORT: ICD-10-CM

## 2020-10-22 DIAGNOSIS — I10 ESSENTIAL HYPERTENSION: ICD-10-CM

## 2020-10-22 DIAGNOSIS — D50.8 IRON DEFICIENCY ANEMIA SECONDARY TO INADEQUATE DIETARY IRON INTAKE: ICD-10-CM

## 2020-10-22 DIAGNOSIS — K06.8 BLEEDING GUMS: ICD-10-CM

## 2020-10-22 DIAGNOSIS — R73.03 PREDIABETES: ICD-10-CM

## 2020-10-22 LAB
BASOPHILS # BLD AUTO: 0.02 K/UL (ref 0–0.2)
BASOPHILS NFR BLD: 0.4 % (ref 0–1.9)
DIFFERENTIAL METHOD: ABNORMAL
EOSINOPHIL # BLD AUTO: 0.1 K/UL (ref 0–0.5)
EOSINOPHIL NFR BLD: 2.4 % (ref 0–8)
ERYTHROCYTE [DISTWIDTH] IN BLOOD BY AUTOMATED COUNT: 14.3 % (ref 11.5–14.5)
HCT VFR BLD AUTO: 34.3 % (ref 37–48.5)
HGB BLD-MCNC: 10.9 G/DL (ref 12–16)
IMM GRANULOCYTES # BLD AUTO: 0.01 K/UL (ref 0–0.04)
IMM GRANULOCYTES NFR BLD AUTO: 0.2 % (ref 0–0.5)
LYMPHOCYTES # BLD AUTO: 2.2 K/UL (ref 1–4.8)
LYMPHOCYTES NFR BLD: 43.7 % (ref 18–48)
MCH RBC QN AUTO: 30.3 PG (ref 27–31)
MCHC RBC AUTO-ENTMCNC: 31.8 G/DL (ref 32–36)
MCV RBC AUTO: 95 FL (ref 82–98)
MONOCYTES # BLD AUTO: 0.5 K/UL (ref 0.3–1)
MONOCYTES NFR BLD: 8.9 % (ref 4–15)
NEUTROPHILS # BLD AUTO: 2.2 K/UL (ref 1.8–7.7)
NEUTROPHILS NFR BLD: 44.4 % (ref 38–73)
NRBC BLD-RTO: 0 /100 WBC
PLATELET # BLD AUTO: 215 K/UL (ref 150–350)
PMV BLD AUTO: 10.4 FL (ref 9.2–12.9)
RBC # BLD AUTO: 3.6 M/UL (ref 4–5.4)
WBC # BLD AUTO: 5.04 K/UL (ref 3.9–12.7)

## 2020-10-22 PROCEDURE — 80053 COMPREHEN METABOLIC PANEL: CPT

## 2020-10-22 PROCEDURE — 84439 ASSAY OF FREE THYROXINE: CPT

## 2020-10-22 PROCEDURE — 84443 ASSAY THYROID STIM HORMONE: CPT

## 2020-10-22 PROCEDURE — 85025 COMPLETE CBC W/AUTO DIFF WBC: CPT

## 2020-10-22 PROCEDURE — 36415 COLL VENOUS BLD VENIPUNCTURE: CPT

## 2020-10-22 PROCEDURE — 83036 HEMOGLOBIN GLYCOSYLATED A1C: CPT

## 2020-10-22 PROCEDURE — 83540 ASSAY OF IRON: CPT

## 2020-10-22 PROCEDURE — 80061 LIPID PANEL: CPT

## 2020-10-22 PROCEDURE — 82607 VITAMIN B-12: CPT

## 2020-10-23 LAB
ALBUMIN SERPL BCP-MCNC: 3.8 G/DL (ref 3.5–5.2)
ALP SERPL-CCNC: 72 U/L (ref 55–135)
ALT SERPL W/O P-5'-P-CCNC: 11 U/L (ref 10–44)
ANION GAP SERPL CALC-SCNC: 9 MMOL/L (ref 8–16)
AST SERPL-CCNC: 18 U/L (ref 10–40)
BILIRUB SERPL-MCNC: 0.3 MG/DL (ref 0.1–1)
BUN SERPL-MCNC: 22 MG/DL (ref 8–23)
CALCIUM SERPL-MCNC: 9.2 MG/DL (ref 8.7–10.5)
CHLORIDE SERPL-SCNC: 102 MMOL/L (ref 95–110)
CHOLEST SERPL-MCNC: 192 MG/DL (ref 120–199)
CHOLEST/HDLC SERPL: 2.2 {RATIO} (ref 2–5)
CO2 SERPL-SCNC: 27 MMOL/L (ref 23–29)
CREAT SERPL-MCNC: 1 MG/DL (ref 0.5–1.4)
EST. GFR  (AFRICAN AMERICAN): >60 ML/MIN/1.73 M^2
EST. GFR  (NON AFRICAN AMERICAN): 57.2 ML/MIN/1.73 M^2
ESTIMATED AVG GLUCOSE: 120 MG/DL (ref 68–131)
GLUCOSE SERPL-MCNC: 87 MG/DL (ref 70–110)
HBA1C MFR BLD HPLC: 5.8 % (ref 4–5.6)
HDLC SERPL-MCNC: 86 MG/DL (ref 40–75)
HDLC SERPL: 44.8 % (ref 20–50)
IRON SERPL-MCNC: 49 UG/DL (ref 30–160)
LDLC SERPL CALC-MCNC: 96.8 MG/DL (ref 63–159)
NONHDLC SERPL-MCNC: 106 MG/DL
POTASSIUM SERPL-SCNC: 3.5 MMOL/L (ref 3.5–5.1)
PROT SERPL-MCNC: 7.8 G/DL (ref 6–8.4)
SATURATED IRON: 11 % (ref 20–50)
SODIUM SERPL-SCNC: 138 MMOL/L (ref 136–145)
T4 FREE SERPL-MCNC: 0.97 NG/DL (ref 0.71–1.51)
TOTAL IRON BINDING CAPACITY: 432 UG/DL (ref 250–450)
TRANSFERRIN SERPL-MCNC: 292 MG/DL (ref 200–375)
TRIGL SERPL-MCNC: 46 MG/DL (ref 30–150)
TSH SERPL DL<=0.005 MIU/L-ACNC: 1.46 UIU/ML (ref 0.4–4)
VIT B12 SERPL-MCNC: 1190 PG/ML (ref 210–950)

## 2020-10-27 ENCOUNTER — TELEPHONE (OUTPATIENT)
Dept: ENDOSCOPY | Facility: HOSPITAL | Age: 70
End: 2020-10-27

## 2020-10-27 ENCOUNTER — TELEPHONE (OUTPATIENT)
Dept: PRIMARY CARE CLINIC | Facility: CLINIC | Age: 70
End: 2020-10-27

## 2020-10-27 NOTE — TELEPHONE ENCOUNTER
----- Message from Dana Sullivan sent at 10/27/2020 10:21 AM CDT -----  Contact: Davis  Type:  Sooner Apoointment Request    Caller is requesting a sooner appointment.  Caller declined first available appointment listed below.  Caller will not accept being placed on the waitlist and is requesting a message be sent to doctor.  Name of Caller: Davis   When is the first available appointment? 10/29  Symptoms: follow up   Would the patient rather a call back or a response via [employer]? Call back   Best Call Back Number: [phone]  Additional Information: pt called in regarding coming in earlier on 10/29    Thanks,  Zhen

## 2020-10-27 NOTE — TELEPHONE ENCOUNTER
Spoke with patient in regards to scheduling colonoscopy procedure. Patient was on her way in to work and will call back to scheduled when she gets a break, number provided.

## 2020-10-29 ENCOUNTER — OFFICE VISIT (OUTPATIENT)
Dept: PRIMARY CARE CLINIC | Facility: CLINIC | Age: 70
End: 2020-10-29
Payer: COMMERCIAL

## 2020-10-29 VITALS
TEMPERATURE: 98 F | HEART RATE: 89 BPM | DIASTOLIC BLOOD PRESSURE: 80 MMHG | BODY MASS INDEX: 28.41 KG/M2 | SYSTOLIC BLOOD PRESSURE: 140 MMHG | WEIGHT: 170.75 LBS | OXYGEN SATURATION: 96 %

## 2020-10-29 DIAGNOSIS — D50.8 IRON DEFICIENCY ANEMIA SECONDARY TO INADEQUATE DIETARY IRON INTAKE: ICD-10-CM

## 2020-10-29 DIAGNOSIS — R73.09 ABNORMAL GLUCOSE: ICD-10-CM

## 2020-10-29 DIAGNOSIS — N18.31 STAGE 3A CHRONIC KIDNEY DISEASE: ICD-10-CM

## 2020-10-29 DIAGNOSIS — E78.2 MIXED HYPERLIPIDEMIA: ICD-10-CM

## 2020-10-29 DIAGNOSIS — I10 ESSENTIAL HYPERTENSION: Primary | ICD-10-CM

## 2020-10-29 DIAGNOSIS — R73.03 PREDIABETES: ICD-10-CM

## 2020-10-29 PROCEDURE — 3079F PR MOST RECENT DIASTOLIC BLOOD PRESSURE 80-89 MM HG: ICD-10-PCS | Mod: CPTII,S$GLB,, | Performed by: FAMILY MEDICINE

## 2020-10-29 PROCEDURE — 3008F BODY MASS INDEX DOCD: CPT | Mod: CPTII,S$GLB,, | Performed by: FAMILY MEDICINE

## 2020-10-29 PROCEDURE — 1159F MED LIST DOCD IN RCRD: CPT | Mod: S$GLB,,, | Performed by: FAMILY MEDICINE

## 2020-10-29 PROCEDURE — 3077F PR MOST RECENT SYSTOLIC BLOOD PRESSURE >= 140 MM HG: ICD-10-PCS | Mod: CPTII,S$GLB,, | Performed by: FAMILY MEDICINE

## 2020-10-29 PROCEDURE — 3079F DIAST BP 80-89 MM HG: CPT | Mod: CPTII,S$GLB,, | Performed by: FAMILY MEDICINE

## 2020-10-29 PROCEDURE — 1101F PR PT FALLS ASSESS DOC 0-1 FALLS W/OUT INJ PAST YR: ICD-10-PCS | Mod: CPTII,S$GLB,, | Performed by: FAMILY MEDICINE

## 2020-10-29 PROCEDURE — 3008F PR BODY MASS INDEX (BMI) DOCUMENTED: ICD-10-PCS | Mod: CPTII,S$GLB,, | Performed by: FAMILY MEDICINE

## 2020-10-29 PROCEDURE — 1126F PR PAIN SEVERITY QUANTIFIED, NO PAIN PRESENT: ICD-10-PCS | Mod: S$GLB,,, | Performed by: FAMILY MEDICINE

## 2020-10-29 PROCEDURE — 99999 PR PBB SHADOW E&M-EST. PATIENT-LVL III: ICD-10-PCS | Mod: PBBFAC,,, | Performed by: FAMILY MEDICINE

## 2020-10-29 PROCEDURE — 1126F AMNT PAIN NOTED NONE PRSNT: CPT | Mod: S$GLB,,, | Performed by: FAMILY MEDICINE

## 2020-10-29 PROCEDURE — 99215 OFFICE O/P EST HI 40 MIN: CPT | Mod: S$GLB,,, | Performed by: FAMILY MEDICINE

## 2020-10-29 PROCEDURE — 99215 PR OFFICE/OUTPT VISIT, EST, LEVL V, 40-54 MIN: ICD-10-PCS | Mod: S$GLB,,, | Performed by: FAMILY MEDICINE

## 2020-10-29 PROCEDURE — 1101F PT FALLS ASSESS-DOCD LE1/YR: CPT | Mod: CPTII,S$GLB,, | Performed by: FAMILY MEDICINE

## 2020-10-29 PROCEDURE — 99999 PR PBB SHADOW E&M-EST. PATIENT-LVL III: CPT | Mod: PBBFAC,,, | Performed by: FAMILY MEDICINE

## 2020-10-29 PROCEDURE — 1159F PR MEDICATION LIST DOCUMENTED IN MEDICAL RECORD: ICD-10-PCS | Mod: S$GLB,,, | Performed by: FAMILY MEDICINE

## 2020-10-29 PROCEDURE — 3077F SYST BP >= 140 MM HG: CPT | Mod: CPTII,S$GLB,, | Performed by: FAMILY MEDICINE

## 2020-10-29 NOTE — PROGRESS NOTES
Subjective:      Patient ID: Xin Taylor is a 70 y.o. female.    Chief Complaint: Follow-up (3 mth )    Disclaimer:  This note is prepared using voice recognition software and as such is likely to have errors and has not been proof read. Please contact me for questions.       Xin Taylor is a 68 y.o. female who presents today for multiple issues and 3 month follow-up.     Did have some tinnitus and worse at night. Saw Estephania Mosquera in Audiology on 7/29/2020.  Reviewed report and recommendations.  Discussed sound machine is at night may be beneficial.  She was just uncertain why her visit with the provider for an MAT was cancelled.     Tumeric hasn't started as a pill. Is cooking with it.       Did get iron plus vit C.   Did review labs.  Still noted to be anemic.  Still needing to do colonoscopy.  Overall iron studies are normal.  Kidney function is improved and at baseline.    Did just start mini omega-3 540mg.  Cholesterol levels look very good.  Well within goal ranges.    Blood pressure is actually well controlled as well.    She has noticed a little bit more fullness in her right jaw area.  Just mainly when looking in the mirror.no pain.     Is taking iron currently. Hb/Hct is slightly improved.  B12 levels look good.    GFR resolved with hydration.    Are prediabetes standpoint though her insulin levels are normal her sugar numbers actually improving now to 5.8.  She did not start medicine she has just been working on diet.    Is due for colonoscopy at this time has not done an EGD would be willing to do so but would like to have a female provider.  In the setting of her anemia as well need to rule out any type of gastritis or GI ulcer or other type etiology for her anemia.  She is willing to do so.    Is still on cholesterol medicine and tolerating it well.    Lab Results       Component                Value               Date                       HGBA1C                   5.8 (H)             10/22/2020                  HGBA1C                   5.8 (H)             07/22/2020                 HGBA1C                   5.9 (H)             03/12/2020             Lab Results       Component                Value               Date                       CHOL                     192                 10/22/2020                 CHOL                     192                 03/12/2020                 CHOL                     185                 08/21/2019            Lab Results       Component                Value               Date                       LDLCALC                  96.8                10/22/2020                 LDLCALC                  88.8                03/12/2020                 LDLCALC                  88.4                08/21/2019              Wt Readings from Last 10 Encounters:  10/29/20 : 77.4 kg (170 lb 11.9 oz)  10/06/20 : 77.4 kg (170 lb 10.2 oz)  10/01/20 : 77.4 kg (170 lb 10.2 oz)  04/29/20 : 76.7 kg (169 lb)  09/26/19 : 76.9 kg (169 lb 8.5 oz)  09/19/19 : 76.7 kg (169 lb)  08/28/19 : 76.7 kg (169 lb 1.5 oz)  05/31/19 : 76.4 kg (168 lb 5.1 oz)  04/21/19 : 79.6 kg (175 lb 7.8 oz)  02/27/19 : 77.3 kg (170 lb 6.7 oz)      The 10-year ASCVD risk score (Rolando JOSE Jr., et al., 2013) is: 14.1%    Values used to calculate the score:      Age: 70 years      Sex: Female      Is Non- : Yes      Diabetic: No      Tobacco smoker: No      Systolic Blood Pressure: 140 mmHg      Is BP treated: Yes      HDL Cholesterol: 86 mg/dL      Total Cholesterol: 192 mg/dL      Wt Readings :  10/29/20 : 77.4 kg (170 lb 11.9 oz)  02/06/12 : 74.8 kg (164 lb 14.5 oz)              Lab Results   Component Value Date    WBC 5.04 10/22/2020    HGB 10.9 (L) 10/22/2020    HCT 34.3 (L) 10/22/2020     10/22/2020    CHOL 192 10/22/2020    TRIG 46 10/22/2020    HDL 86 (H) 10/22/2020    ALT 11 10/22/2020    AST 18 10/22/2020     10/22/2020    K 3.5 10/22/2020     10/22/2020    CREATININE 1.0 10/22/2020     BUN 22 10/22/2020    CO2 27 10/22/2020    TSH 1.458 10/22/2020    HGBA1C 5.8 (H) 10/22/2020       Mammo Digital Screening Bilat w/ Jl  Narrative: Result:  Mammo Digital Screening Bilat w/ Jl    History:  Patient is 69 y.o. and is seen for a screening mammogram.    Films Compared:  Compared to: 09/19/2019 Mammo Digital Screening Bilat w/ Jl, 09/11/2018   Mammo Digital Screening Bilat with Tomosynthesis_CAD, 08/23/2017 Mammo   Digital Screening Bilat with Tomosynthesis_CAD, 08/03/2016 Mammo Digital   Diagnostic Left with Tomosynthesis_CAD, and 07/26/2016 Mammo Digital   Screening Bilat with Tomosynthesis_CAD    Findings:  This procedure was performed using tomosynthesis. Computer-aided detection   was utilized in the interpretation of this examination.     The breasts have scattered areas of fibroglandular density. There is no   evidence of suspicious masses, microcalcifications or architectural   distortion.  Impression: No mammographic evidence of malignancy.    BI-RADS Category 1: Negative    Recommendation:  Routine screening mammogram in 1 year is recommended.    Your estimated lifetime risk of breast cancer (to age 85) based on   Tyrer-Cuzick risk assessment model is Tyrer-Cuzick: 9.73 %. According to   the American Cancer Society, patients with a lifetime breast cancer risk   of 20% or higher might benefit from supplemental screening tests.        Review of Systems   Constitutional: Negative for activity change, chills, fatigue, fever and unexpected weight change.   HENT: Negative for ear pain, hearing loss, rhinorrhea and trouble swallowing.    Eyes: Negative for pain, discharge and visual disturbance.   Respiratory: Negative for cough, chest tightness, shortness of breath and wheezing.    Cardiovascular: Negative for chest pain, palpitations and leg swelling.   Gastrointestinal: Negative for abdominal pain, blood in stool, constipation, diarrhea, nausea and vomiting.   Endocrine: Negative for cold  intolerance, heat intolerance, polydipsia and polyuria.   Genitourinary: Negative for difficulty urinating, dysuria, frequency, hematuria and menstrual problem.   Musculoskeletal: Positive for arthralgias. Negative for joint swelling, myalgias and neck pain.   Skin: Negative for color change and rash.   Neurological: Negative for dizziness, weakness and headaches.   Psychiatric/Behavioral: Negative for behavioral problems, confusion, dysphoric mood and sleep disturbance.     Objective:     Vitals:    10/29/20 1338   BP: (!) 140/80   Pulse: 89   Temp: 97.5 °F (36.4 °C)   SpO2: 96%   Weight: 77.4 kg (170 lb 11.9 oz)     Physical Exam  Vitals signs reviewed.   Constitutional:       Appearance: Normal appearance. She is well-developed, well-groomed and overweight.   HENT:      Head: Normocephalic and atraumatic.      Right Ear: Tympanic membrane and external ear normal.      Left Ear: Tympanic membrane and external ear normal.      Nose: Nose normal.      Mouth/Throat:      Mouth: Mucous membranes are moist.      Pharynx: Oropharynx is clear.   Eyes:      Conjunctiva/sclera: Conjunctivae normal.      Pupils: Pupils are equal, round, and reactive to light.   Neck:      Musculoskeletal: Normal range of motion and neck supple.      Thyroid: No thyromegaly.   Cardiovascular:      Rate and Rhythm: Normal rate and regular rhythm.      Heart sounds: No murmur. No friction rub. No gallop.    Pulmonary:      Effort: Pulmonary effort is normal. No respiratory distress.      Breath sounds: No wheezing or rales.   Abdominal:      General: Bowel sounds are normal. There is no distension.      Palpations: Abdomen is soft.      Tenderness: There is no abdominal tenderness. There is no rebound.   Musculoskeletal: Normal range of motion.   Lymphadenopathy:      Cervical: No cervical adenopathy.   Skin:     General: Skin is warm and dry.      Findings: No rash.   Neurological:      Mental Status: She is alert and oriented to person,  place, and time.   Psychiatric:         Attention and Perception: Attention and perception normal.         Mood and Affect: Mood and affect normal.         Speech: Speech normal.         Behavior: Behavior normal. Behavior is cooperative.         Thought Content: Thought content normal.         Cognition and Memory: Cognition and memory normal.         Judgment: Judgment normal.       Assessment:     1. Essential hypertension    2. Mixed hyperlipidemia    3. Iron deficiency anemia secondary to inadequate dietary iron intake    4. Prediabetes    5. Abnormal glucose    6. Stage 3a chronic kidney disease      Plan:   Xin was seen today for follow-up.    Diagnoses and all orders for this visit:    Essential hypertension  Comments:  Stable this time reviewed labs discussed health maintenance discussed supplements  Orders:  -     Comprehensive Metabolic Panel; Future  -     Hemoglobin A1C; Future  -     CBC Auto Differential; Future  -     TSH; Future  -     T4, Free; Future  -     Lipid Panel; Future  -     Iron and TIBC; Future  -     Ferritin; Future    Mixed hyperlipidemia  Comments:  Stable this time continue with medication labs reviewed  Orders:  -     Comprehensive Metabolic Panel; Future  -     Hemoglobin A1C; Future  -     CBC Auto Differential; Future  -     TSH; Future  -     T4, Free; Future  -     Lipid Panel; Future  -     Iron and TIBC; Future  -     Ferritin; Future    Iron deficiency anemia secondary to inadequate dietary iron intake  Comments:  Continue with supplementation of iron schedule colonoscopy orders in system  Orders:  -     Comprehensive Metabolic Panel; Future  -     Hemoglobin A1C; Future  -     CBC Auto Differential; Future  -     TSH; Future  -     T4, Free; Future  -     Lipid Panel; Future  -     Iron and TIBC; Future  -     Ferritin; Future    Prediabetes  Comments:  Stable this time continue with current diet exercise low glycemic index stressed  Orders:  -     Comprehensive  Metabolic Panel; Future  -     Hemoglobin A1C; Future  -     CBC Auto Differential; Future  -     TSH; Future  -     T4, Free; Future  -     Lipid Panel; Future  -     Iron and TIBC; Future  -     Ferritin; Future    Abnormal glucose  -     Comprehensive Metabolic Panel; Future  -     Hemoglobin A1C; Future  -     CBC Auto Differential; Future  -     TSH; Future  -     T4, Free; Future  -     Lipid Panel; Future  -     Iron and TIBC; Future  -     Ferritin; Future    Stage 3a chronic kidney disease  Comments:  Resolved      Patient is going to start Omega XL supplementation.  Will continue with iron supplementation.  Stressed importance of completing through with colonoscopy.  We discussed diet exercise weight loss as well.      Follow up in about 6 months (around 4/29/2021) for f/u office visit Dr. Carlson.    There are no Patient Instructions on file for this visit.            Time spent: 40 minutes in face to face discussion concerning diagnosis, prognosis, review of lab and test results, benefits of treatment as well as management of disease, counseling of patient and coordination of care between various health care providers . Greater than half the time spent was used for coordination of care and counseling of patient.

## 2020-11-13 ENCOUNTER — PATIENT MESSAGE (OUTPATIENT)
Dept: PRIMARY CARE CLINIC | Facility: CLINIC | Age: 70
End: 2020-11-13

## 2020-11-17 ENCOUNTER — PATIENT OUTREACH (OUTPATIENT)
Dept: ADMINISTRATIVE | Facility: HOSPITAL | Age: 70
End: 2020-11-17

## 2020-11-17 NOTE — PROGRESS NOTES
Working HTN Report       Called pt for home BP Reading, No answer, Unable to leave        Miranda SANABRIA LPN Care Coordinator  Care Coordination Department  [facility]  [phone]

## 2020-11-19 ENCOUNTER — PATIENT OUTREACH (OUTPATIENT)
Dept: ADMINISTRATIVE | Facility: HOSPITAL | Age: 70
End: 2020-11-19

## 2020-11-19 ENCOUNTER — TELEPHONE (OUTPATIENT)
Dept: INTERNAL MEDICINE | Facility: CLINIC | Age: 70
End: 2020-11-19

## 2020-11-19 ENCOUNTER — TELEPHONE (OUTPATIENT)
Dept: PRIMARY CARE CLINIC | Facility: CLINIC | Age: 70
End: 2020-11-19

## 2020-11-19 NOTE — TELEPHONE ENCOUNTER
Remote BP Entered   133/71        Miranda SANABRIA LPN Care Coordinator  Care Coordination Department  [facility]  [phone]

## 2020-11-19 NOTE — TELEPHONE ENCOUNTER
Spoke with pt regarding blood pressure readings. Asked that she take blood pressure and report to us. She sent in through the [facility].

## 2020-11-19 NOTE — PROGRESS NOTES
Working HTN Report     Remote BP Entered 133/71     Miranda SANABRIA LPN Care Coordinator  Care Coordination Department  [facility]  [phone]

## 2021-03-05 ENCOUNTER — IMMUNIZATION (OUTPATIENT)
Dept: INTERNAL MEDICINE | Facility: CLINIC | Age: 71
End: 2021-03-05
Payer: COMMERCIAL

## 2021-03-05 DIAGNOSIS — Z23 NEED FOR VACCINATION: Primary | ICD-10-CM

## 2021-03-05 PROCEDURE — 91300 COVID-19, MRNA, LNP-S, PF, 30 MCG/0.3 ML DOSE VACCINE: CPT | Mod: PBBFAC | Performed by: FAMILY MEDICINE

## 2021-03-26 ENCOUNTER — IMMUNIZATION (OUTPATIENT)
Dept: INTERNAL MEDICINE | Facility: CLINIC | Age: 71
End: 2021-03-26
Payer: COMMERCIAL

## 2021-03-26 DIAGNOSIS — Z23 NEED FOR VACCINATION: Primary | ICD-10-CM

## 2021-03-26 PROCEDURE — 0002A COVID-19, MRNA, LNP-S, PF, 30 MCG/0.3 ML DOSE VACCINE: CPT | Mod: PBBFAC | Performed by: FAMILY MEDICINE

## 2021-03-26 PROCEDURE — 91300 COVID-19, MRNA, LNP-S, PF, 30 MCG/0.3 ML DOSE VACCINE: CPT | Mod: PBBFAC | Performed by: FAMILY MEDICINE

## 2021-04-22 ENCOUNTER — LAB VISIT (OUTPATIENT)
Dept: LAB | Facility: HOSPITAL | Age: 71
End: 2021-04-22
Attending: FAMILY MEDICINE
Payer: COMMERCIAL

## 2021-04-22 DIAGNOSIS — R73.03 PREDIABETES: ICD-10-CM

## 2021-04-22 DIAGNOSIS — I10 ESSENTIAL HYPERTENSION: ICD-10-CM

## 2021-04-22 DIAGNOSIS — R73.09 ABNORMAL GLUCOSE: ICD-10-CM

## 2021-04-22 DIAGNOSIS — E78.2 MIXED HYPERLIPIDEMIA: ICD-10-CM

## 2021-04-22 DIAGNOSIS — D50.8 IRON DEFICIENCY ANEMIA SECONDARY TO INADEQUATE DIETARY IRON INTAKE: ICD-10-CM

## 2021-04-22 LAB
ALBUMIN SERPL BCP-MCNC: 3.7 G/DL (ref 3.5–5.2)
ALP SERPL-CCNC: 71 U/L (ref 55–135)
ALT SERPL W/O P-5'-P-CCNC: 11 U/L (ref 10–44)
ANION GAP SERPL CALC-SCNC: 8 MMOL/L (ref 8–16)
AST SERPL-CCNC: 20 U/L (ref 10–40)
BASOPHILS # BLD AUTO: 0.01 K/UL (ref 0–0.2)
BASOPHILS NFR BLD: 0.2 % (ref 0–1.9)
BILIRUB SERPL-MCNC: 0.4 MG/DL (ref 0.1–1)
BUN SERPL-MCNC: 19 MG/DL (ref 8–23)
CALCIUM SERPL-MCNC: 9.3 MG/DL (ref 8.7–10.5)
CHLORIDE SERPL-SCNC: 103 MMOL/L (ref 95–110)
CHOLEST SERPL-MCNC: 193 MG/DL (ref 120–199)
CHOLEST/HDLC SERPL: 2.2 {RATIO} (ref 2–5)
CO2 SERPL-SCNC: 29 MMOL/L (ref 23–29)
CREAT SERPL-MCNC: 0.9 MG/DL (ref 0.5–1.4)
DIFFERENTIAL METHOD: ABNORMAL
EOSINOPHIL # BLD AUTO: 0.1 K/UL (ref 0–0.5)
EOSINOPHIL NFR BLD: 2.4 % (ref 0–8)
ERYTHROCYTE [DISTWIDTH] IN BLOOD BY AUTOMATED COUNT: 14.5 % (ref 11.5–14.5)
EST. GFR  (AFRICAN AMERICAN): >60 ML/MIN/1.73 M^2
EST. GFR  (NON AFRICAN AMERICAN): >60 ML/MIN/1.73 M^2
ESTIMATED AVG GLUCOSE: 126 MG/DL (ref 68–131)
GLUCOSE SERPL-MCNC: 94 MG/DL (ref 70–110)
HBA1C MFR BLD: 6 % (ref 4–5.6)
HCT VFR BLD AUTO: 33 % (ref 37–48.5)
HDLC SERPL-MCNC: 87 MG/DL (ref 40–75)
HDLC SERPL: 45.1 % (ref 20–50)
HGB BLD-MCNC: 10.7 G/DL (ref 12–16)
IMM GRANULOCYTES # BLD AUTO: 0.01 K/UL (ref 0–0.04)
IMM GRANULOCYTES NFR BLD AUTO: 0.2 % (ref 0–0.5)
IRON SERPL-MCNC: 64 UG/DL (ref 30–160)
LDLC SERPL CALC-MCNC: 91 MG/DL (ref 63–159)
LYMPHOCYTES # BLD AUTO: 2.3 K/UL (ref 1–4.8)
LYMPHOCYTES NFR BLD: 43.8 % (ref 18–48)
MCH RBC QN AUTO: 29.8 PG (ref 27–31)
MCHC RBC AUTO-ENTMCNC: 32.4 G/DL (ref 32–36)
MCV RBC AUTO: 92 FL (ref 82–98)
MONOCYTES # BLD AUTO: 0.5 K/UL (ref 0.3–1)
MONOCYTES NFR BLD: 9 % (ref 4–15)
NEUTROPHILS # BLD AUTO: 2.4 K/UL (ref 1.8–7.7)
NEUTROPHILS NFR BLD: 44.4 % (ref 38–73)
NONHDLC SERPL-MCNC: 106 MG/DL
NRBC BLD-RTO: 0 /100 WBC
PLATELET # BLD AUTO: 238 K/UL (ref 150–450)
PMV BLD AUTO: 10.1 FL (ref 9.2–12.9)
POTASSIUM SERPL-SCNC: 3.8 MMOL/L (ref 3.5–5.1)
PROT SERPL-MCNC: 7.9 G/DL (ref 6–8.4)
RBC # BLD AUTO: 3.59 M/UL (ref 4–5.4)
SATURATED IRON: 15 % (ref 20–50)
SODIUM SERPL-SCNC: 140 MMOL/L (ref 136–145)
TOTAL IRON BINDING CAPACITY: 432 UG/DL (ref 250–450)
TRANSFERRIN SERPL-MCNC: 292 MG/DL (ref 200–375)
TRIGL SERPL-MCNC: 75 MG/DL (ref 30–150)
WBC # BLD AUTO: 5.32 K/UL (ref 3.9–12.7)

## 2021-04-22 PROCEDURE — 83540 ASSAY OF IRON: CPT | Performed by: FAMILY MEDICINE

## 2021-04-22 PROCEDURE — 36415 COLL VENOUS BLD VENIPUNCTURE: CPT | Performed by: FAMILY MEDICINE

## 2021-04-22 PROCEDURE — 84443 ASSAY THYROID STIM HORMONE: CPT | Performed by: FAMILY MEDICINE

## 2021-04-22 PROCEDURE — 84439 ASSAY OF FREE THYROXINE: CPT | Performed by: FAMILY MEDICINE

## 2021-04-22 PROCEDURE — 82728 ASSAY OF FERRITIN: CPT | Performed by: FAMILY MEDICINE

## 2021-04-22 PROCEDURE — 80053 COMPREHEN METABOLIC PANEL: CPT | Performed by: FAMILY MEDICINE

## 2021-04-22 PROCEDURE — 83036 HEMOGLOBIN GLYCOSYLATED A1C: CPT | Performed by: FAMILY MEDICINE

## 2021-04-22 PROCEDURE — 80061 LIPID PANEL: CPT | Performed by: FAMILY MEDICINE

## 2021-04-22 PROCEDURE — 85025 COMPLETE CBC W/AUTO DIFF WBC: CPT | Performed by: FAMILY MEDICINE

## 2021-04-23 LAB
FERRITIN SERPL-MCNC: 100 NG/ML (ref 20–300)
T4 FREE SERPL-MCNC: 1.02 NG/DL (ref 0.71–1.51)
TSH SERPL DL<=0.005 MIU/L-ACNC: 1.4 UIU/ML (ref 0.4–4)

## 2021-04-29 ENCOUNTER — OFFICE VISIT (OUTPATIENT)
Dept: PRIMARY CARE CLINIC | Facility: CLINIC | Age: 71
End: 2021-04-29
Payer: COMMERCIAL

## 2021-04-29 VITALS
HEART RATE: 83 BPM | WEIGHT: 169.75 LBS | TEMPERATURE: 98 F | SYSTOLIC BLOOD PRESSURE: 156 MMHG | OXYGEN SATURATION: 99 % | DIASTOLIC BLOOD PRESSURE: 80 MMHG | BODY MASS INDEX: 28.25 KG/M2

## 2021-04-29 DIAGNOSIS — Z12.11 COLON CANCER SCREENING: ICD-10-CM

## 2021-04-29 DIAGNOSIS — E55.9 VITAMIN D DEFICIENCY: ICD-10-CM

## 2021-04-29 DIAGNOSIS — I10 ESSENTIAL HYPERTENSION: ICD-10-CM

## 2021-04-29 DIAGNOSIS — D64.9 ANEMIA, UNSPECIFIED TYPE: ICD-10-CM

## 2021-04-29 DIAGNOSIS — N95.9 UNSPECIFIED MENOPAUSAL AND PERIMENOPAUSAL DISORDER: ICD-10-CM

## 2021-04-29 DIAGNOSIS — R73.03 PREDIABETES: ICD-10-CM

## 2021-04-29 DIAGNOSIS — E78.2 MIXED HYPERLIPIDEMIA: Primary | ICD-10-CM

## 2021-04-29 DIAGNOSIS — D50.8 IRON DEFICIENCY ANEMIA SECONDARY TO INADEQUATE DIETARY IRON INTAKE: ICD-10-CM

## 2021-04-29 DIAGNOSIS — M17.10 ARTHRITIS OF KNEE: ICD-10-CM

## 2021-04-29 DIAGNOSIS — Z23 NEED FOR SHINGLES VACCINE: ICD-10-CM

## 2021-04-29 PROCEDURE — 99215 OFFICE O/P EST HI 40 MIN: CPT | Mod: 25,S$GLB,, | Performed by: FAMILY MEDICINE

## 2021-04-29 PROCEDURE — 1101F PR PT FALLS ASSESS DOC 0-1 FALLS W/OUT INJ PAST YR: ICD-10-PCS | Mod: CPTII,S$GLB,, | Performed by: FAMILY MEDICINE

## 2021-04-29 PROCEDURE — 3008F BODY MASS INDEX DOCD: CPT | Mod: CPTII,S$GLB,, | Performed by: FAMILY MEDICINE

## 2021-04-29 PROCEDURE — 99215 PR OFFICE/OUTPT VISIT, EST, LEVL V, 40-54 MIN: ICD-10-PCS | Mod: 25,S$GLB,, | Performed by: FAMILY MEDICINE

## 2021-04-29 PROCEDURE — 3008F PR BODY MASS INDEX (BMI) DOCUMENTED: ICD-10-PCS | Mod: CPTII,S$GLB,, | Performed by: FAMILY MEDICINE

## 2021-04-29 PROCEDURE — 1159F MED LIST DOCD IN RCRD: CPT | Mod: S$GLB,,, | Performed by: FAMILY MEDICINE

## 2021-04-29 PROCEDURE — 3288F PR FALLS RISK ASSESSMENT DOCUMENTED: ICD-10-PCS | Mod: CPTII,S$GLB,, | Performed by: FAMILY MEDICINE

## 2021-04-29 PROCEDURE — 1126F AMNT PAIN NOTED NONE PRSNT: CPT | Mod: S$GLB,,, | Performed by: FAMILY MEDICINE

## 2021-04-29 PROCEDURE — 3288F FALL RISK ASSESSMENT DOCD: CPT | Mod: CPTII,S$GLB,, | Performed by: FAMILY MEDICINE

## 2021-04-29 PROCEDURE — 90750 ZOSTER RECOMBINANT VACCINE: ICD-10-PCS | Mod: S$GLB,,, | Performed by: FAMILY MEDICINE

## 2021-04-29 PROCEDURE — 1126F PR PAIN SEVERITY QUANTIFIED, NO PAIN PRESENT: ICD-10-PCS | Mod: S$GLB,,, | Performed by: FAMILY MEDICINE

## 2021-04-29 PROCEDURE — 1101F PT FALLS ASSESS-DOCD LE1/YR: CPT | Mod: CPTII,S$GLB,, | Performed by: FAMILY MEDICINE

## 2021-04-29 PROCEDURE — 1159F PR MEDICATION LIST DOCUMENTED IN MEDICAL RECORD: ICD-10-PCS | Mod: S$GLB,,, | Performed by: FAMILY MEDICINE

## 2021-04-29 PROCEDURE — 90471 IMMUNIZATION ADMIN: CPT | Mod: S$GLB,,, | Performed by: FAMILY MEDICINE

## 2021-04-29 PROCEDURE — 99999 PR PBB SHADOW E&M-EST. PATIENT-LVL IV: ICD-10-PCS | Mod: PBBFAC,,, | Performed by: FAMILY MEDICINE

## 2021-04-29 PROCEDURE — 90750 HZV VACC RECOMBINANT IM: CPT | Mod: S$GLB,,, | Performed by: FAMILY MEDICINE

## 2021-04-29 PROCEDURE — 99999 PR PBB SHADOW E&M-EST. PATIENT-LVL IV: CPT | Mod: PBBFAC,,, | Performed by: FAMILY MEDICINE

## 2021-04-29 PROCEDURE — 90471 ZOSTER RECOMBINANT VACCINE: ICD-10-PCS | Mod: S$GLB,,, | Performed by: FAMILY MEDICINE

## 2021-04-29 RX ORDER — VALSARTAN AND HYDROCHLOROTHIAZIDE 160; 25 MG/1; MG/1
1 TABLET ORAL DAILY
Qty: 90 TABLET | Refills: 3 | Status: SHIPPED | OUTPATIENT
Start: 2021-04-29 | End: 2022-04-26

## 2021-04-29 RX ORDER — AMLODIPINE BESYLATE 2.5 MG/1
2.5 TABLET ORAL DAILY
Qty: 90 TABLET | Refills: 3 | Status: SHIPPED | OUTPATIENT
Start: 2021-04-29 | End: 2021-06-01 | Stop reason: SDUPTHER

## 2021-04-29 RX ORDER — ATORVASTATIN CALCIUM 10 MG/1
5 TABLET, FILM COATED ORAL DAILY
Qty: 90 TABLET | Refills: 3 | Status: SHIPPED | OUTPATIENT
Start: 2021-04-29 | End: 2022-04-10

## 2021-05-09 ENCOUNTER — PATIENT MESSAGE (OUTPATIENT)
Dept: PRIMARY CARE CLINIC | Facility: CLINIC | Age: 71
End: 2021-05-09

## 2021-05-11 ENCOUNTER — PATIENT OUTREACH (OUTPATIENT)
Dept: ADMINISTRATIVE | Facility: HOSPITAL | Age: 71
End: 2021-05-11

## 2021-05-28 ENCOUNTER — CLINICAL SUPPORT (OUTPATIENT)
Dept: PRIMARY CARE CLINIC | Facility: CLINIC | Age: 71
End: 2021-05-28
Payer: COMMERCIAL

## 2021-05-28 VITALS — DIASTOLIC BLOOD PRESSURE: 86 MMHG | SYSTOLIC BLOOD PRESSURE: 138 MMHG

## 2021-05-31 ENCOUNTER — APPOINTMENT (OUTPATIENT)
Dept: RADIOLOGY | Facility: HOSPITAL | Age: 71
End: 2021-05-31
Attending: FAMILY MEDICINE
Payer: COMMERCIAL

## 2021-05-31 DIAGNOSIS — N95.9 UNSPECIFIED MENOPAUSAL AND PERIMENOPAUSAL DISORDER: ICD-10-CM

## 2021-05-31 PROCEDURE — 77080 DXA BONE DENSITY AXIAL: CPT | Mod: TC

## 2021-05-31 PROCEDURE — 77080 DEXA BONE DENSITY SPINE HIP: ICD-10-PCS | Mod: 26,,, | Performed by: RADIOLOGY

## 2021-05-31 PROCEDURE — 77080 DXA BONE DENSITY AXIAL: CPT | Mod: 26,,, | Performed by: RADIOLOGY

## 2021-06-01 RX ORDER — AMLODIPINE BESYLATE 2.5 MG/1
2.5 TABLET ORAL DAILY
Qty: 90 TABLET | Refills: 3 | Status: SHIPPED | OUTPATIENT
Start: 2021-06-01 | End: 2022-05-09

## 2021-06-02 LAB — NONINV COLON CA DNA+OCC BLD SCRN STL QL: NEGATIVE

## 2021-06-24 ENCOUNTER — TELEPHONE (OUTPATIENT)
Dept: PRIMARY CARE CLINIC | Facility: CLINIC | Age: 71
End: 2021-06-24

## 2021-06-29 ENCOUNTER — CLINICAL SUPPORT (OUTPATIENT)
Dept: PRIMARY CARE CLINIC | Facility: CLINIC | Age: 71
End: 2021-06-29
Payer: COMMERCIAL

## 2021-06-29 PROCEDURE — 90471 ZOSTER RECOMBINANT VACCINE: ICD-10-PCS | Mod: S$GLB,,, | Performed by: FAMILY MEDICINE

## 2021-06-29 PROCEDURE — 90750 ZOSTER RECOMBINANT VACCINE: ICD-10-PCS | Mod: S$GLB,,, | Performed by: FAMILY MEDICINE

## 2021-06-29 PROCEDURE — 99999 PR PBB SHADOW E&M-EST. PATIENT-LVL II: CPT | Mod: PBBFAC,,,

## 2021-06-29 PROCEDURE — 99999 PR PBB SHADOW E&M-EST. PATIENT-LVL II: ICD-10-PCS | Mod: PBBFAC,,,

## 2021-06-29 PROCEDURE — 90750 HZV VACC RECOMBINANT IM: CPT | Mod: S$GLB,,, | Performed by: FAMILY MEDICINE

## 2021-06-29 PROCEDURE — 90471 IMMUNIZATION ADMIN: CPT | Mod: S$GLB,,, | Performed by: FAMILY MEDICINE

## 2021-07-15 ENCOUNTER — TELEPHONE (OUTPATIENT)
Dept: OPHTHALMOLOGY | Facility: CLINIC | Age: 71
End: 2021-07-15

## 2021-07-15 ENCOUNTER — TELEPHONE (OUTPATIENT)
Dept: PULMONOLOGY | Facility: CLINIC | Age: 71
End: 2021-07-15

## 2021-07-27 ENCOUNTER — PATIENT OUTREACH (OUTPATIENT)
Dept: ADMINISTRATIVE | Facility: OTHER | Age: 71
End: 2021-07-27

## 2021-07-28 ENCOUNTER — OFFICE VISIT (OUTPATIENT)
Dept: OPHTHALMOLOGY | Facility: CLINIC | Age: 71
End: 2021-07-28
Payer: COMMERCIAL

## 2021-07-28 DIAGNOSIS — R73.03 PREDIABETES: Primary | ICD-10-CM

## 2021-07-28 DIAGNOSIS — H18.413 ARCUS SENILIS OF BOTH CORNEAS: ICD-10-CM

## 2021-07-28 DIAGNOSIS — H52.4 BILATERAL PRESBYOPIA: ICD-10-CM

## 2021-07-28 DIAGNOSIS — H25.13 CATARACT, NUCLEAR SCLEROTIC SENILE, BILATERAL: ICD-10-CM

## 2021-07-28 PROCEDURE — 92015 DETERMINE REFRACTIVE STATE: CPT | Mod: S$GLB,,, | Performed by: OPTOMETRIST

## 2021-07-28 PROCEDURE — 92014 COMPRE OPH EXAM EST PT 1/>: CPT | Mod: S$GLB,,, | Performed by: OPTOMETRIST

## 2021-07-28 PROCEDURE — 1159F MED LIST DOCD IN RCRD: CPT | Mod: CPTII,S$GLB,, | Performed by: OPTOMETRIST

## 2021-07-28 PROCEDURE — 99999 PR PBB SHADOW E&M-EST. PATIENT-LVL II: ICD-10-PCS | Mod: PBBFAC,,, | Performed by: OPTOMETRIST

## 2021-07-28 PROCEDURE — 99999 PR PBB SHADOW E&M-EST. PATIENT-LVL II: CPT | Mod: PBBFAC,,, | Performed by: OPTOMETRIST

## 2021-07-28 PROCEDURE — 1159F PR MEDICATION LIST DOCUMENTED IN MEDICAL RECORD: ICD-10-PCS | Mod: CPTII,S$GLB,, | Performed by: OPTOMETRIST

## 2021-07-28 PROCEDURE — 92014 PR EYE EXAM, EST PATIENT,COMPREHESV: ICD-10-PCS | Mod: S$GLB,,, | Performed by: OPTOMETRIST

## 2021-07-28 PROCEDURE — 92015 PR REFRACTION: ICD-10-PCS | Mod: S$GLB,,, | Performed by: OPTOMETRIST

## 2021-09-21 ENCOUNTER — TELEPHONE (OUTPATIENT)
Dept: PULMONOLOGY | Facility: CLINIC | Age: 71
End: 2021-09-21

## 2021-09-24 ENCOUNTER — OFFICE VISIT (OUTPATIENT)
Dept: PULMONOLOGY | Facility: CLINIC | Age: 71
End: 2021-09-24
Payer: COMMERCIAL

## 2021-09-24 VITALS
WEIGHT: 168.19 LBS | DIASTOLIC BLOOD PRESSURE: 78 MMHG | HEIGHT: 65 IN | RESPIRATION RATE: 16 BRPM | OXYGEN SATURATION: 99 % | HEART RATE: 100 BPM | BODY MASS INDEX: 28.02 KG/M2 | SYSTOLIC BLOOD PRESSURE: 136 MMHG

## 2021-09-24 DIAGNOSIS — G47.33 OSA ON CPAP: Primary | ICD-10-CM

## 2021-09-24 DIAGNOSIS — R73.03 PREDIABETES: ICD-10-CM

## 2021-09-24 DIAGNOSIS — I10 ESSENTIAL HYPERTENSION: ICD-10-CM

## 2021-09-24 DIAGNOSIS — E78.2 MIXED HYPERLIPIDEMIA: ICD-10-CM

## 2021-09-24 PROCEDURE — 3075F SYST BP GE 130 - 139MM HG: CPT | Mod: CPTII,S$GLB,, | Performed by: INTERNAL MEDICINE

## 2021-09-24 PROCEDURE — 1101F PT FALLS ASSESS-DOCD LE1/YR: CPT | Mod: CPTII,S$GLB,, | Performed by: INTERNAL MEDICINE

## 2021-09-24 PROCEDURE — 1159F PR MEDICATION LIST DOCUMENTED IN MEDICAL RECORD: ICD-10-PCS | Mod: CPTII,S$GLB,, | Performed by: INTERNAL MEDICINE

## 2021-09-24 PROCEDURE — 3044F PR MOST RECENT HEMOGLOBIN A1C LEVEL <7.0%: ICD-10-PCS | Mod: CPTII,S$GLB,, | Performed by: INTERNAL MEDICINE

## 2021-09-24 PROCEDURE — 3288F PR FALLS RISK ASSESSMENT DOCUMENTED: ICD-10-PCS | Mod: CPTII,S$GLB,, | Performed by: INTERNAL MEDICINE

## 2021-09-24 PROCEDURE — 99214 OFFICE O/P EST MOD 30 MIN: CPT | Mod: S$GLB,,, | Performed by: INTERNAL MEDICINE

## 2021-09-24 PROCEDURE — 3008F PR BODY MASS INDEX (BMI) DOCUMENTED: ICD-10-PCS | Mod: CPTII,S$GLB,, | Performed by: INTERNAL MEDICINE

## 2021-09-24 PROCEDURE — 3008F BODY MASS INDEX DOCD: CPT | Mod: CPTII,S$GLB,, | Performed by: INTERNAL MEDICINE

## 2021-09-24 PROCEDURE — 99999 PR PBB SHADOW E&M-EST. PATIENT-LVL IV: ICD-10-PCS | Mod: PBBFAC,,, | Performed by: INTERNAL MEDICINE

## 2021-09-24 PROCEDURE — 1159F MED LIST DOCD IN RCRD: CPT | Mod: CPTII,S$GLB,, | Performed by: INTERNAL MEDICINE

## 2021-09-24 PROCEDURE — 3075F PR MOST RECENT SYSTOLIC BLOOD PRESS GE 130-139MM HG: ICD-10-PCS | Mod: CPTII,S$GLB,, | Performed by: INTERNAL MEDICINE

## 2021-09-24 PROCEDURE — 1101F PR PT FALLS ASSESS DOC 0-1 FALLS W/OUT INJ PAST YR: ICD-10-PCS | Mod: CPTII,S$GLB,, | Performed by: INTERNAL MEDICINE

## 2021-09-24 PROCEDURE — 3044F HG A1C LEVEL LT 7.0%: CPT | Mod: CPTII,S$GLB,, | Performed by: INTERNAL MEDICINE

## 2021-09-24 PROCEDURE — 1160F RVW MEDS BY RX/DR IN RCRD: CPT | Mod: CPTII,S$GLB,, | Performed by: INTERNAL MEDICINE

## 2021-09-24 PROCEDURE — 3078F PR MOST RECENT DIASTOLIC BLOOD PRESSURE < 80 MM HG: ICD-10-PCS | Mod: CPTII,S$GLB,, | Performed by: INTERNAL MEDICINE

## 2021-09-24 PROCEDURE — 3078F DIAST BP <80 MM HG: CPT | Mod: CPTII,S$GLB,, | Performed by: INTERNAL MEDICINE

## 2021-09-24 PROCEDURE — 99999 PR PBB SHADOW E&M-EST. PATIENT-LVL IV: CPT | Mod: PBBFAC,,, | Performed by: INTERNAL MEDICINE

## 2021-09-24 PROCEDURE — 99214 PR OFFICE/OUTPT VISIT, EST, LEVL IV, 30-39 MIN: ICD-10-PCS | Mod: S$GLB,,, | Performed by: INTERNAL MEDICINE

## 2021-09-24 PROCEDURE — 3288F FALL RISK ASSESSMENT DOCD: CPT | Mod: CPTII,S$GLB,, | Performed by: INTERNAL MEDICINE

## 2021-09-24 PROCEDURE — 1160F PR REVIEW ALL MEDS BY PRESCRIBER/CLIN PHARMACIST DOCUMENTED: ICD-10-PCS | Mod: CPTII,S$GLB,, | Performed by: INTERNAL MEDICINE

## 2021-10-13 DIAGNOSIS — Z12.31 OTHER SCREENING MAMMOGRAM: ICD-10-CM

## 2021-10-18 ENCOUNTER — PATIENT MESSAGE (OUTPATIENT)
Dept: PRIMARY CARE CLINIC | Facility: CLINIC | Age: 71
End: 2021-10-18
Payer: COMMERCIAL

## 2021-10-18 ENCOUNTER — LAB VISIT (OUTPATIENT)
Dept: LAB | Facility: HOSPITAL | Age: 71
End: 2021-10-18
Attending: FAMILY MEDICINE
Payer: COMMERCIAL

## 2021-10-18 DIAGNOSIS — E55.9 VITAMIN D DEFICIENCY: ICD-10-CM

## 2021-10-18 DIAGNOSIS — Z12.11 COLON CANCER SCREENING: ICD-10-CM

## 2021-10-18 DIAGNOSIS — D64.9 ANEMIA, UNSPECIFIED TYPE: ICD-10-CM

## 2021-10-18 DIAGNOSIS — R73.03 PREDIABETES: ICD-10-CM

## 2021-10-18 DIAGNOSIS — I10 ESSENTIAL HYPERTENSION: ICD-10-CM

## 2021-10-18 DIAGNOSIS — E78.2 MIXED HYPERLIPIDEMIA: ICD-10-CM

## 2021-10-18 DIAGNOSIS — D50.8 IRON DEFICIENCY ANEMIA SECONDARY TO INADEQUATE DIETARY IRON INTAKE: ICD-10-CM

## 2021-10-18 DIAGNOSIS — M17.10 ARTHRITIS OF KNEE: ICD-10-CM

## 2021-10-18 DIAGNOSIS — N95.9 UNSPECIFIED MENOPAUSAL AND PERIMENOPAUSAL DISORDER: ICD-10-CM

## 2021-10-18 LAB
25(OH)D3+25(OH)D2 SERPL-MCNC: 60 NG/ML (ref 30–96)
ALBUMIN SERPL BCP-MCNC: 3.7 G/DL (ref 3.5–5.2)
ALP SERPL-CCNC: 77 U/L (ref 55–135)
ALT SERPL W/O P-5'-P-CCNC: 15 U/L (ref 10–44)
ANION GAP SERPL CALC-SCNC: 8 MMOL/L (ref 8–16)
AST SERPL-CCNC: 21 U/L (ref 10–40)
BASOPHILS # BLD AUTO: 0.02 K/UL (ref 0–0.2)
BASOPHILS NFR BLD: 0.4 % (ref 0–1.9)
BILIRUB SERPL-MCNC: 0.7 MG/DL (ref 0.1–1)
BUN SERPL-MCNC: 15 MG/DL (ref 8–23)
CALCIUM SERPL-MCNC: 9.7 MG/DL (ref 8.7–10.5)
CHLORIDE SERPL-SCNC: 100 MMOL/L (ref 95–110)
CHOLEST SERPL-MCNC: 184 MG/DL (ref 120–199)
CHOLEST/HDLC SERPL: 2.3 {RATIO} (ref 2–5)
CO2 SERPL-SCNC: 28 MMOL/L (ref 23–29)
CREAT SERPL-MCNC: 0.9 MG/DL (ref 0.5–1.4)
DIFFERENTIAL METHOD: ABNORMAL
EOSINOPHIL # BLD AUTO: 0.2 K/UL (ref 0–0.5)
EOSINOPHIL NFR BLD: 2.7 % (ref 0–8)
ERYTHROCYTE [DISTWIDTH] IN BLOOD BY AUTOMATED COUNT: 15.1 % (ref 11.5–14.5)
EST. GFR  (AFRICAN AMERICAN): >60 ML/MIN/1.73 M^2
EST. GFR  (NON AFRICAN AMERICAN): >60 ML/MIN/1.73 M^2
ESTIMATED AVG GLUCOSE: 117 MG/DL (ref 68–131)
GLUCOSE SERPL-MCNC: 83 MG/DL (ref 70–110)
HBA1C MFR BLD: 5.7 % (ref 4–5.6)
HCT VFR BLD AUTO: 36.3 % (ref 37–48.5)
HDLC SERPL-MCNC: 81 MG/DL (ref 40–75)
HDLC SERPL: 44 % (ref 20–50)
HGB BLD-MCNC: 11.5 G/DL (ref 12–16)
IMM GRANULOCYTES # BLD AUTO: 0.01 K/UL (ref 0–0.04)
IMM GRANULOCYTES NFR BLD AUTO: 0.2 % (ref 0–0.5)
IRON SERPL-MCNC: 79 UG/DL (ref 30–160)
LDLC SERPL CALC-MCNC: 89.6 MG/DL (ref 63–159)
LYMPHOCYTES # BLD AUTO: 1.5 K/UL (ref 1–4.8)
LYMPHOCYTES NFR BLD: 26.6 % (ref 18–48)
MCH RBC QN AUTO: 30.2 PG (ref 27–31)
MCHC RBC AUTO-ENTMCNC: 31.7 G/DL (ref 32–36)
MCV RBC AUTO: 95 FL (ref 82–98)
MONOCYTES # BLD AUTO: 0.5 K/UL (ref 0.3–1)
MONOCYTES NFR BLD: 9.5 % (ref 4–15)
NEUTROPHILS # BLD AUTO: 3.4 K/UL (ref 1.8–7.7)
NEUTROPHILS NFR BLD: 60.6 % (ref 38–73)
NONHDLC SERPL-MCNC: 103 MG/DL
NRBC BLD-RTO: 0 /100 WBC
PLATELET # BLD AUTO: 253 K/UL (ref 150–450)
PMV BLD AUTO: 10.2 FL (ref 9.2–12.9)
POTASSIUM SERPL-SCNC: 3.3 MMOL/L (ref 3.5–5.1)
PROT SERPL-MCNC: 7.6 G/DL (ref 6–8.4)
RBC # BLD AUTO: 3.81 M/UL (ref 4–5.4)
SATURATED IRON: 19 % (ref 20–50)
SODIUM SERPL-SCNC: 136 MMOL/L (ref 136–145)
T4 FREE SERPL-MCNC: 1.04 NG/DL (ref 0.71–1.51)
TOTAL IRON BINDING CAPACITY: 422 UG/DL (ref 250–450)
TRANSFERRIN SERPL-MCNC: 285 MG/DL (ref 200–375)
TRIGL SERPL-MCNC: 67 MG/DL (ref 30–150)
TSH SERPL DL<=0.005 MIU/L-ACNC: 1.68 UIU/ML (ref 0.4–4)
WBC # BLD AUTO: 5.56 K/UL (ref 3.9–12.7)

## 2021-10-18 PROCEDURE — 85025 COMPLETE CBC W/AUTO DIFF WBC: CPT | Performed by: FAMILY MEDICINE

## 2021-10-18 PROCEDURE — 84439 ASSAY OF FREE THYROXINE: CPT | Performed by: FAMILY MEDICINE

## 2021-10-18 PROCEDURE — 84443 ASSAY THYROID STIM HORMONE: CPT | Performed by: FAMILY MEDICINE

## 2021-10-18 PROCEDURE — 82306 VITAMIN D 25 HYDROXY: CPT | Performed by: FAMILY MEDICINE

## 2021-10-18 PROCEDURE — 36415 COLL VENOUS BLD VENIPUNCTURE: CPT | Performed by: FAMILY MEDICINE

## 2021-10-18 PROCEDURE — 80053 COMPREHEN METABOLIC PANEL: CPT | Performed by: FAMILY MEDICINE

## 2021-10-18 PROCEDURE — 84466 ASSAY OF TRANSFERRIN: CPT | Performed by: FAMILY MEDICINE

## 2021-10-18 PROCEDURE — 80061 LIPID PANEL: CPT | Performed by: FAMILY MEDICINE

## 2021-10-18 PROCEDURE — 83036 HEMOGLOBIN GLYCOSYLATED A1C: CPT | Performed by: FAMILY MEDICINE

## 2021-10-29 ENCOUNTER — OFFICE VISIT (OUTPATIENT)
Dept: PRIMARY CARE CLINIC | Facility: CLINIC | Age: 71
End: 2021-10-29
Payer: COMMERCIAL

## 2021-10-29 VITALS
BODY MASS INDEX: 27.9 KG/M2 | WEIGHT: 167.69 LBS | DIASTOLIC BLOOD PRESSURE: 86 MMHG | HEART RATE: 77 BPM | TEMPERATURE: 98 F | OXYGEN SATURATION: 99 % | SYSTOLIC BLOOD PRESSURE: 130 MMHG

## 2021-10-29 DIAGNOSIS — I10 PRIMARY HYPERTENSION: Primary | ICD-10-CM

## 2021-10-29 DIAGNOSIS — M85.80 OSTEOPENIA, UNSPECIFIED LOCATION: ICD-10-CM

## 2021-10-29 DIAGNOSIS — R73.03 PREDIABETES: ICD-10-CM

## 2021-10-29 DIAGNOSIS — E78.2 MIXED HYPERLIPIDEMIA: ICD-10-CM

## 2021-10-29 DIAGNOSIS — E87.6 HYPOKALEMIA: ICD-10-CM

## 2021-10-29 PROCEDURE — 90471 IMMUNIZATION ADMIN: CPT | Mod: S$GLB,,, | Performed by: PHYSICIAN ASSISTANT

## 2021-10-29 PROCEDURE — 1126F PR PAIN SEVERITY QUANTIFIED, NO PAIN PRESENT: ICD-10-PCS | Mod: CPTII,S$GLB,, | Performed by: PHYSICIAN ASSISTANT

## 2021-10-29 PROCEDURE — 1159F MED LIST DOCD IN RCRD: CPT | Mod: CPTII,S$GLB,, | Performed by: PHYSICIAN ASSISTANT

## 2021-10-29 PROCEDURE — 1101F PR PT FALLS ASSESS DOC 0-1 FALLS W/OUT INJ PAST YR: ICD-10-PCS | Mod: CPTII,S$GLB,, | Performed by: PHYSICIAN ASSISTANT

## 2021-10-29 PROCEDURE — 3075F SYST BP GE 130 - 139MM HG: CPT | Mod: CPTII,S$GLB,, | Performed by: PHYSICIAN ASSISTANT

## 2021-10-29 PROCEDURE — 3288F PR FALLS RISK ASSESSMENT DOCUMENTED: ICD-10-PCS | Mod: CPTII,S$GLB,, | Performed by: PHYSICIAN ASSISTANT

## 2021-10-29 PROCEDURE — 90694 VACC AIIV4 NO PRSRV 0.5ML IM: CPT | Mod: S$GLB,,, | Performed by: PHYSICIAN ASSISTANT

## 2021-10-29 PROCEDURE — 3008F PR BODY MASS INDEX (BMI) DOCUMENTED: ICD-10-PCS | Mod: CPTII,S$GLB,, | Performed by: PHYSICIAN ASSISTANT

## 2021-10-29 PROCEDURE — 1160F PR REVIEW ALL MEDS BY PRESCRIBER/CLIN PHARMACIST DOCUMENTED: ICD-10-PCS | Mod: CPTII,S$GLB,, | Performed by: PHYSICIAN ASSISTANT

## 2021-10-29 PROCEDURE — 99999 PR PBB SHADOW E&M-EST. PATIENT-LVL IV: ICD-10-PCS | Mod: PBBFAC,,, | Performed by: PHYSICIAN ASSISTANT

## 2021-10-29 PROCEDURE — 3079F DIAST BP 80-89 MM HG: CPT | Mod: CPTII,S$GLB,, | Performed by: PHYSICIAN ASSISTANT

## 2021-10-29 PROCEDURE — 3008F BODY MASS INDEX DOCD: CPT | Mod: CPTII,S$GLB,, | Performed by: PHYSICIAN ASSISTANT

## 2021-10-29 PROCEDURE — 1160F RVW MEDS BY RX/DR IN RCRD: CPT | Mod: CPTII,S$GLB,, | Performed by: PHYSICIAN ASSISTANT

## 2021-10-29 PROCEDURE — 99214 OFFICE O/P EST MOD 30 MIN: CPT | Mod: 25,S$GLB,, | Performed by: PHYSICIAN ASSISTANT

## 2021-10-29 PROCEDURE — 3079F PR MOST RECENT DIASTOLIC BLOOD PRESSURE 80-89 MM HG: ICD-10-PCS | Mod: CPTII,S$GLB,, | Performed by: PHYSICIAN ASSISTANT

## 2021-10-29 PROCEDURE — 1101F PT FALLS ASSESS-DOCD LE1/YR: CPT | Mod: CPTII,S$GLB,, | Performed by: PHYSICIAN ASSISTANT

## 2021-10-29 PROCEDURE — 90471 FLU VACCINE - QUADRIVALENT - ADJUVANTED: ICD-10-PCS | Mod: S$GLB,,, | Performed by: PHYSICIAN ASSISTANT

## 2021-10-29 PROCEDURE — 3044F HG A1C LEVEL LT 7.0%: CPT | Mod: CPTII,S$GLB,, | Performed by: PHYSICIAN ASSISTANT

## 2021-10-29 PROCEDURE — 1126F AMNT PAIN NOTED NONE PRSNT: CPT | Mod: CPTII,S$GLB,, | Performed by: PHYSICIAN ASSISTANT

## 2021-10-29 PROCEDURE — 3075F PR MOST RECENT SYSTOLIC BLOOD PRESS GE 130-139MM HG: ICD-10-PCS | Mod: CPTII,S$GLB,, | Performed by: PHYSICIAN ASSISTANT

## 2021-10-29 PROCEDURE — 99999 PR PBB SHADOW E&M-EST. PATIENT-LVL IV: CPT | Mod: PBBFAC,,, | Performed by: PHYSICIAN ASSISTANT

## 2021-10-29 PROCEDURE — 3288F FALL RISK ASSESSMENT DOCD: CPT | Mod: CPTII,S$GLB,, | Performed by: PHYSICIAN ASSISTANT

## 2021-10-29 PROCEDURE — 90694 FLU VACCINE - QUADRIVALENT - ADJUVANTED: ICD-10-PCS | Mod: S$GLB,,, | Performed by: PHYSICIAN ASSISTANT

## 2021-10-29 PROCEDURE — 1159F PR MEDICATION LIST DOCUMENTED IN MEDICAL RECORD: ICD-10-PCS | Mod: CPTII,S$GLB,, | Performed by: PHYSICIAN ASSISTANT

## 2021-10-29 PROCEDURE — 3044F PR MOST RECENT HEMOGLOBIN A1C LEVEL <7.0%: ICD-10-PCS | Mod: CPTII,S$GLB,, | Performed by: PHYSICIAN ASSISTANT

## 2021-10-29 PROCEDURE — 99214 PR OFFICE/OUTPT VISIT, EST, LEVL IV, 30-39 MIN: ICD-10-PCS | Mod: 25,S$GLB,, | Performed by: PHYSICIAN ASSISTANT

## 2021-11-02 ENCOUNTER — IMMUNIZATION (OUTPATIENT)
Dept: PRIMARY CARE CLINIC | Facility: CLINIC | Age: 71
End: 2021-11-02
Payer: COMMERCIAL

## 2021-11-02 DIAGNOSIS — Z23 NEED FOR VACCINATION: Primary | ICD-10-CM

## 2021-11-02 PROCEDURE — 0004A COVID-19, MRNA, LNP-S, PF, 30 MCG/0.3 ML DOSE VACCINE: CPT | Mod: CV19,S$GLB,, | Performed by: FAMILY MEDICINE

## 2021-11-02 PROCEDURE — 0004A COVID-19, MRNA, LNP-S, PF, 30 MCG/0.3 ML DOSE VACCINE: ICD-10-PCS | Mod: CV19,S$GLB,, | Performed by: FAMILY MEDICINE

## 2021-11-02 PROCEDURE — 91300 COVID-19, MRNA, LNP-S, PF, 30 MCG/0.3 ML DOSE VACCINE: CPT | Mod: PBBFAC | Performed by: FAMILY MEDICINE

## 2021-11-16 ENCOUNTER — HOSPITAL ENCOUNTER (OUTPATIENT)
Dept: RADIOLOGY | Facility: HOSPITAL | Age: 71
Discharge: HOME | End: 2021-11-16
Attending: FAMILY MEDICINE
Payer: COMMERCIAL

## 2021-11-16 VITALS — HEIGHT: 65 IN | WEIGHT: 167 LBS | BODY MASS INDEX: 27.82 KG/M2

## 2021-11-16 DIAGNOSIS — Z12.31 OTHER SCREENING MAMMOGRAM: ICD-10-CM

## 2021-11-16 PROCEDURE — 77067 SCR MAMMO BI INCL CAD: CPT | Mod: 26,,, | Performed by: RADIOLOGY

## 2021-11-16 PROCEDURE — 77067 SCR MAMMO BI INCL CAD: CPT | Mod: TC

## 2021-11-16 PROCEDURE — 77067 MAMMO DIGITAL SCREENING BILAT WITH TOMO: ICD-10-PCS | Mod: 26,,, | Performed by: RADIOLOGY

## 2021-11-16 PROCEDURE — 77063 BREAST TOMOSYNTHESIS BI: CPT | Mod: 26,,, | Performed by: RADIOLOGY

## 2021-11-16 PROCEDURE — 77063 MAMMO DIGITAL SCREENING BILAT WITH TOMO: ICD-10-PCS | Mod: 26,,, | Performed by: RADIOLOGY

## 2021-12-21 RX ORDER — MELOXICAM 15 MG/1
15 TABLET ORAL DAILY
Qty: 90 TABLET | Refills: 1 | Status: SHIPPED | OUTPATIENT
Start: 2021-12-21 | End: 2023-12-11 | Stop reason: SDUPTHER

## 2022-01-19 ENCOUNTER — OFFICE VISIT (OUTPATIENT)
Dept: URGENT CARE | Facility: CLINIC | Age: 72
End: 2022-01-19
Payer: COMMERCIAL

## 2022-01-19 VITALS
WEIGHT: 167.13 LBS | SYSTOLIC BLOOD PRESSURE: 136 MMHG | HEIGHT: 65 IN | DIASTOLIC BLOOD PRESSURE: 64 MMHG | HEART RATE: 60 BPM | BODY MASS INDEX: 27.85 KG/M2 | RESPIRATION RATE: 18 BRPM | OXYGEN SATURATION: 100 % | TEMPERATURE: 98 F

## 2022-01-19 DIAGNOSIS — S00.83XA CONTUSION OF FACE, INITIAL ENCOUNTER: Primary | ICD-10-CM

## 2022-01-19 DIAGNOSIS — S00.81XA ABRASION, FACE W/O INFECTION: ICD-10-CM

## 2022-01-19 PROCEDURE — 99213 OFFICE O/P EST LOW 20 MIN: CPT | Mod: S$GLB,,, | Performed by: EMERGENCY MEDICINE

## 2022-01-19 PROCEDURE — 99213 PR OFFICE/OUTPT VISIT, EST, LEVL III, 20-29 MIN: ICD-10-PCS | Mod: S$GLB,,, | Performed by: EMERGENCY MEDICINE

## 2022-01-19 PROCEDURE — 3008F PR BODY MASS INDEX (BMI) DOCUMENTED: ICD-10-PCS | Mod: CPTII,S$GLB,, | Performed by: EMERGENCY MEDICINE

## 2022-01-19 PROCEDURE — 1160F PR REVIEW ALL MEDS BY PRESCRIBER/CLIN PHARMACIST DOCUMENTED: ICD-10-PCS | Mod: CPTII,S$GLB,, | Performed by: EMERGENCY MEDICINE

## 2022-01-19 PROCEDURE — 3078F PR MOST RECENT DIASTOLIC BLOOD PRESSURE < 80 MM HG: ICD-10-PCS | Mod: CPTII,S$GLB,, | Performed by: EMERGENCY MEDICINE

## 2022-01-19 PROCEDURE — 3078F DIAST BP <80 MM HG: CPT | Mod: CPTII,S$GLB,, | Performed by: EMERGENCY MEDICINE

## 2022-01-19 PROCEDURE — 1159F MED LIST DOCD IN RCRD: CPT | Mod: CPTII,S$GLB,, | Performed by: EMERGENCY MEDICINE

## 2022-01-19 PROCEDURE — 1160F RVW MEDS BY RX/DR IN RCRD: CPT | Mod: CPTII,S$GLB,, | Performed by: EMERGENCY MEDICINE

## 2022-01-19 PROCEDURE — 3008F BODY MASS INDEX DOCD: CPT | Mod: CPTII,S$GLB,, | Performed by: EMERGENCY MEDICINE

## 2022-01-19 PROCEDURE — 3075F PR MOST RECENT SYSTOLIC BLOOD PRESS GE 130-139MM HG: ICD-10-PCS | Mod: CPTII,S$GLB,, | Performed by: EMERGENCY MEDICINE

## 2022-01-19 PROCEDURE — 1159F PR MEDICATION LIST DOCUMENTED IN MEDICAL RECORD: ICD-10-PCS | Mod: CPTII,S$GLB,, | Performed by: EMERGENCY MEDICINE

## 2022-01-19 PROCEDURE — 3075F SYST BP GE 130 - 139MM HG: CPT | Mod: CPTII,S$GLB,, | Performed by: EMERGENCY MEDICINE

## 2022-01-19 NOTE — PROGRESS NOTES
" Subjective:       Patient ID: Xin Taylor is a 71 y.o. female.    Vitals:  height is 5' 5" (1.651 m) and weight is 75.8 kg (167 lb 1.7 oz). Her tympanic temperature is 97.7 °F (36.5 °C). Her blood pressure is 136/64 and her pulse is 60. Her respiration is 18 and oxygen saturation is 100%.     Chief Complaint: Fall    71-year-old female presents to urgent care for evaluation of injury related to fall.  Patient states she was in a local business Monday evening when she tripped going into the business.  Patient states that she fell directly onto her face from a standing position.  She believes that she may have somewhat caught herself with her hands.  She immediately appreciated that she had a busted lip on the right upper lip and some swelling associated with that.  No loss of consciousness or headache at this time.  No nausea or vomiting.  Patient states the next day she noticed some bruising of the lower lip on the right as well.  No other complaints    Fall  She fell from a height of 1 to 2 ft. She landed on hard floor. The point of impact was the face. The pain is present in the face. The pain is at a severity of 0/10. The patient is experiencing no pain. Pertinent negatives include no abdominal pain, bowel incontinence, fever, headaches, hearing loss, hematuria, loss of consciousness, nausea, numbness, tingling, visual change or vomiting. She has tried nothing for the symptoms. The treatment provided no relief.       Constitution: Negative for fever.   Gastrointestinal: Negative for abdominal pain, nausea, vomiting and bowel incontinence.   Genitourinary: Negative for hematuria.   Neurological: Negative for headaches, loss of consciousness and numbness.       Objective:      Physical Exam   Constitutional: She is oriented to person, place, and time. She does not appear ill. No distress.   HENT:   Head: Normocephalic.      Comments: No parker sign, hemotympanum, raccoon eyes  Ears:   Right Ear: Tympanic " membrane and external ear normal.   Left Ear: Tympanic membrane and external ear normal.   Nose: No congestion.      Comments: No epistaxis,septal deviation or septal hematoma.  Small abrasion noticed at the opening of the right nostril  Mouth/Throat: Mucous membranes are moist.      Comments: Right upper lip with moderate swelling and bruising noted.  There is a healing laceration to the oral mucosa on the inside of the right upper lip.  No signs of infection with this.  The area is moderately tender to palpation.  Left lower lip is also mildly bruised and tender without laceration.  Teeth are intact without being loose.  No tenderness to palpation of the bones of the mid face, maxilla, mandible, orbit bilaterally  Eyes: Pupils are equal, round, and reactive to light.      extraocular movement intact   Cardiovascular: Normal pulses.   Pulmonary/Chest: Effort normal.   Abdominal: Normal appearance.   Neurological: She is alert and oriented to person, place, and time.   Skin: Skin is warm and dry.   Psychiatric: Her behavior is normal.   Nursing note and vitals reviewed.        Assessment:       1. Contusion of face, initial encounter    2. Abrasion, face w/o infection          Plan:         Contusion of face, initial encounter    Abrasion, face w/o infection

## 2022-01-19 NOTE — PATIENT INSTRUCTIONS
Patient Education       Abrasions ED   General Information   You came to the Emergency Department (ED) for an abrasion. An abrasion is when you have cut or scraped off the top layers of skin. Most of the time, you can care for your wound at home. How long it will take to heal is based on how serious the wound is.  What care is needed at home?   · Call your regular doctor to let them know you were in the ED. Make a follow-up appointment if you were told to.  · The doctor may want you to keep your wound covered as it heals. You may want to use a thin layer of antibiotic ointment to help keep the wound moist. This will also keep the dressing from sticking to the wound.  · After 24 hours, you can gently wash the wound with soap and water. Pat dry and put on a clean dressing.  · Change your dressing at least once a day or if it gets dirty. Gently wash the wound each day.  · Always wash your hands before and after touching the wound.  · Each time you change the dressing, look closely at the wound to be sure it is healing the right way. The wound may have a thin, yellowish discharge, and this is normal.  · Avoid picking the scab or scratching the site which may cause more irritation.  · You may take a shower, but do not soak in water or swim with an open wound. After 2 days, you can use a waterproof bandage for swimming.  When do I need to call the doctor?   · You have a fever of 100.4°F (38°C) or higher or chills.  · Your wound is swollen, red, or warm.  · Your wound has thick yellow or green drainage.  · Your wound has not healed after 10 days.  · You have new or worsening symptoms.  Last Reviewed Date   2021-05-14  Consumer Information Use and Disclaimer   This information is not specific medical advice and does not replace information you receive from your health care provider. This is only a brief summary of general information. It does NOT include all information about conditions, illnesses, injuries, tests,  procedures, treatments, therapies, discharge instructions or life-style choices that may apply to you. You must talk with your health care provider for complete information about your health and treatment options. This information should not be used to decide whether or not to accept your health care providers advice, instructions or recommendations. Only your health care provider has the knowledge and training to provide advice that is right for you.  Copyright   Copyright © 2021 [employer] and its affiliates and/or licensors. All rights reserved.  Patient Education       Minor Contusion ED   General Information   You came to the Emergency Department (ED) for a contusion. This is the medical name for a bruise. A bruise happens when blood vessels under the skin break. The blood leaks into the tissues and causes pain and swelling. It also causes skin discoloration that starts as red, blue, or purple and changes to green or yellow as the bruise heals.  What care is needed at home?   · Call your regular doctor to let them know you were in the ED. Make a follow-up appointment if you were told to.  · Rest your bruised area. You may want to place the bruised area on pillows when you rest. Slowly increase your activity level as you are able to.  · Use an elastic bandage or compression pants to help limit swelling.  · Place an ice pack or a bag of frozen vegetables wrapped in a towel over the painful part. Never put ice right on the skin. Use ice every 1 to 2 hours for 10 to 15 minutes at a time. Use for the first 24 to 48 hours after your injury.  · You may want to take medicine like ibuprofen, naproxen, or acetaminophen to help with pain.  When do I need to call the doctor?   · Your joint swells.  · You are not able to move or walk because of the pain.  · You have bruises for no reason.  · You develop bleeding in addition to skin bruises.  · You have new or worsening symptoms.  Last Reviewed Date    2020-07-15  Consumer Information Use and Disclaimer   This information is not specific medical advice and does not replace information you receive from your health care provider. This is only a brief summary of general information. It does NOT include all information about conditions, illnesses, injuries, tests, procedures, treatments, therapies, discharge instructions or life-style choices that may apply to you. You must talk with your health care provider for complete information about your health and treatment options. This information should not be used to decide whether or not to accept your health care providers advice, instructions or recommendations. Only your health care provider has the knowledge and training to provide advice that is right for you.  Copyright   Copyright © 2021 [employer] and its affiliates and/or licensors. All rights reserved.

## 2022-03-09 ENCOUNTER — TELEPHONE (OUTPATIENT)
Dept: ENDOSCOPY | Facility: HOSPITAL | Age: 72
End: 2022-03-09
Payer: COMMERCIAL

## 2022-03-09 DIAGNOSIS — Z12.11 COLON CANCER SCREENING: Primary | ICD-10-CM

## 2022-03-09 NOTE — TELEPHONE ENCOUNTER
I have signed for the following orders AND/OR meds.  Please call the patient and ask the patient to schedule the testing AND/OR inform about any medications that were sent.      Orders Placed This Encounter   Procedures    Case Request Endoscopy: COLONOSCOPY     Order Specific Question:   Pre-op Diagnosis     Answer:   Screening [116180]     Order Specific Question:   CPT Code:     Answer:   [place] COLON CA SCRN NOT HI RSK IND []     Order Specific Question:   Case Referring Provider     Answer:   ANNEL GILLESPIE [[id number]]     Order Specific Question:   CPT Code:     Answer:   [place] COLON CA SCRN NOT HI RSK IND []     Order Specific Question:   Medical Necessity:     Answer:   Medically Non-Urgent [100]     Order Specific Question:   Is an on-site pathologist required for this procedure?     Answer:   N/A

## 2022-03-09 NOTE — TELEPHONE ENCOUNTER
Patient called to schedule colonoscopy.  Informed her of no open order being in the system to schedule procedure.  Please enter order if needed and the patient will be contacted for scheduling.

## 2022-04-05 ENCOUNTER — OFFICE VISIT (OUTPATIENT)
Dept: PRIMARY CARE CLINIC | Facility: CLINIC | Age: 72
End: 2022-04-05
Payer: COMMERCIAL

## 2022-04-05 ENCOUNTER — LAB VISIT (OUTPATIENT)
Dept: LAB | Facility: HOSPITAL | Age: 72
End: 2022-04-05
Attending: FAMILY MEDICINE
Payer: COMMERCIAL

## 2022-04-05 DIAGNOSIS — E78.2 MIXED HYPERLIPIDEMIA: ICD-10-CM

## 2022-04-05 DIAGNOSIS — R73.03 PREDIABETES: ICD-10-CM

## 2022-04-05 DIAGNOSIS — Z86.010 PERSONAL HISTORY OF COLONIC POLYPS: ICD-10-CM

## 2022-04-05 DIAGNOSIS — I10 PRIMARY HYPERTENSION: Primary | ICD-10-CM

## 2022-04-05 DIAGNOSIS — D64.9 ANEMIA, UNSPECIFIED TYPE: ICD-10-CM

## 2022-04-05 DIAGNOSIS — R73.09 ABNORMAL GLUCOSE: ICD-10-CM

## 2022-04-05 DIAGNOSIS — G47.33 OSA ON CPAP: ICD-10-CM

## 2022-04-05 DIAGNOSIS — M85.80 OSTEOPENIA, UNSPECIFIED LOCATION: ICD-10-CM

## 2022-04-05 DIAGNOSIS — I10 PRIMARY HYPERTENSION: ICD-10-CM

## 2022-04-05 LAB
ALBUMIN SERPL BCP-MCNC: 3.6 G/DL (ref 3.5–5.2)
ALP SERPL-CCNC: 70 U/L (ref 55–135)
ALT SERPL W/O P-5'-P-CCNC: 11 U/L (ref 10–44)
ANION GAP SERPL CALC-SCNC: 9 MMOL/L (ref 8–16)
AST SERPL-CCNC: 18 U/L (ref 10–40)
BASOPHILS # BLD AUTO: 0.02 K/UL (ref 0–0.2)
BASOPHILS NFR BLD: 0.4 % (ref 0–1.9)
BILIRUB SERPL-MCNC: 0.4 MG/DL (ref 0.1–1)
BUN SERPL-MCNC: 21 MG/DL (ref 8–23)
CALCIUM SERPL-MCNC: 9.8 MG/DL (ref 8.7–10.5)
CHLORIDE SERPL-SCNC: 104 MMOL/L (ref 95–110)
CHOLEST SERPL-MCNC: 200 MG/DL (ref 120–199)
CHOLEST/HDLC SERPL: 2.4 {RATIO} (ref 2–5)
CO2 SERPL-SCNC: 29 MMOL/L (ref 23–29)
CREAT SERPL-MCNC: 0.8 MG/DL (ref 0.5–1.4)
DIFFERENTIAL METHOD: ABNORMAL
EOSINOPHIL # BLD AUTO: 0.2 K/UL (ref 0–0.5)
EOSINOPHIL NFR BLD: 3.1 % (ref 0–8)
ERYTHROCYTE [DISTWIDTH] IN BLOOD BY AUTOMATED COUNT: 14.6 % (ref 11.5–14.5)
EST. GFR  (AFRICAN AMERICAN): >60 ML/MIN/1.73 M^2
EST. GFR  (NON AFRICAN AMERICAN): >60 ML/MIN/1.73 M^2
ESTIMATED AVG GLUCOSE: 120 MG/DL (ref 68–131)
FERRITIN SERPL-MCNC: 88 NG/ML (ref 20–300)
GLUCOSE SERPL-MCNC: 98 MG/DL (ref 70–110)
HBA1C MFR BLD: 5.8 % (ref 4–5.6)
HCT VFR BLD AUTO: 35.9 % (ref 37–48.5)
HDLC SERPL-MCNC: 84 MG/DL (ref 40–75)
HDLC SERPL: 42 % (ref 20–50)
HGB BLD-MCNC: 11.3 G/DL (ref 12–16)
IMM GRANULOCYTES # BLD AUTO: 0 K/UL (ref 0–0.04)
IMM GRANULOCYTES NFR BLD AUTO: 0 % (ref 0–0.5)
IRON SERPL-MCNC: 65 UG/DL (ref 30–160)
LDLC SERPL CALC-MCNC: 106 MG/DL (ref 63–159)
LYMPHOCYTES # BLD AUTO: 2 K/UL (ref 1–4.8)
LYMPHOCYTES NFR BLD: 40.9 % (ref 18–48)
MCH RBC QN AUTO: 30.1 PG (ref 27–31)
MCHC RBC AUTO-ENTMCNC: 31.5 G/DL (ref 32–36)
MCV RBC AUTO: 96 FL (ref 82–98)
MONOCYTES # BLD AUTO: 0.4 K/UL (ref 0.3–1)
MONOCYTES NFR BLD: 8.6 % (ref 4–15)
NEUTROPHILS # BLD AUTO: 2.3 K/UL (ref 1.8–7.7)
NEUTROPHILS NFR BLD: 47 % (ref 38–73)
NONHDLC SERPL-MCNC: 116 MG/DL
NRBC BLD-RTO: 0 /100 WBC
PLATELET # BLD AUTO: 245 K/UL (ref 150–450)
PMV BLD AUTO: 10.4 FL (ref 9.2–12.9)
POTASSIUM SERPL-SCNC: 3.9 MMOL/L (ref 3.5–5.1)
PROT SERPL-MCNC: 7.6 G/DL (ref 6–8.4)
RBC # BLD AUTO: 3.75 M/UL (ref 4–5.4)
SATURATED IRON: 15 % (ref 20–50)
SODIUM SERPL-SCNC: 142 MMOL/L (ref 136–145)
T4 FREE SERPL-MCNC: 0.96 NG/DL (ref 0.71–1.51)
TOTAL IRON BINDING CAPACITY: 435 UG/DL (ref 250–450)
TRANSFERRIN SERPL-MCNC: 294 MG/DL (ref 200–375)
TRIGL SERPL-MCNC: 50 MG/DL (ref 30–150)
TSH SERPL DL<=0.005 MIU/L-ACNC: 1.87 UIU/ML (ref 0.4–4)
WBC # BLD AUTO: 4.87 K/UL (ref 3.9–12.7)

## 2022-04-05 PROCEDURE — 80061 LIPID PANEL: CPT | Performed by: FAMILY MEDICINE

## 2022-04-05 PROCEDURE — 3078F DIAST BP <80 MM HG: CPT | Mod: CPTII,S$GLB,, | Performed by: FAMILY MEDICINE

## 2022-04-05 PROCEDURE — 99214 OFFICE O/P EST MOD 30 MIN: CPT | Mod: S$GLB,,, | Performed by: FAMILY MEDICINE

## 2022-04-05 PROCEDURE — 1160F RVW MEDS BY RX/DR IN RCRD: CPT | Mod: CPTII,S$GLB,, | Performed by: FAMILY MEDICINE

## 2022-04-05 PROCEDURE — 3078F PR MOST RECENT DIASTOLIC BLOOD PRESSURE < 80 MM HG: ICD-10-PCS | Mod: CPTII,S$GLB,, | Performed by: FAMILY MEDICINE

## 2022-04-05 PROCEDURE — 3008F BODY MASS INDEX DOCD: CPT | Mod: CPTII,S$GLB,, | Performed by: FAMILY MEDICINE

## 2022-04-05 PROCEDURE — 36415 COLL VENOUS BLD VENIPUNCTURE: CPT | Mod: PN | Performed by: FAMILY MEDICINE

## 2022-04-05 PROCEDURE — 3288F FALL RISK ASSESSMENT DOCD: CPT | Mod: CPTII,S$GLB,, | Performed by: FAMILY MEDICINE

## 2022-04-05 PROCEDURE — 84466 ASSAY OF TRANSFERRIN: CPT | Performed by: FAMILY MEDICINE

## 2022-04-05 PROCEDURE — 1159F PR MEDICATION LIST DOCUMENTED IN MEDICAL RECORD: ICD-10-PCS | Mod: CPTII,S$GLB,, | Performed by: FAMILY MEDICINE

## 2022-04-05 PROCEDURE — 99214 PR OFFICE/OUTPT VISIT, EST, LEVL IV, 30-39 MIN: ICD-10-PCS | Mod: S$GLB,,, | Performed by: FAMILY MEDICINE

## 2022-04-05 PROCEDURE — 84439 ASSAY OF FREE THYROXINE: CPT | Performed by: FAMILY MEDICINE

## 2022-04-05 PROCEDURE — 80053 COMPREHEN METABOLIC PANEL: CPT | Performed by: FAMILY MEDICINE

## 2022-04-05 PROCEDURE — 1100F PR PT FALLS ASSESS DOC 2+ FALLS/FALL W/INJURY/YR: ICD-10-PCS | Mod: CPTII,S$GLB,, | Performed by: FAMILY MEDICINE

## 2022-04-05 PROCEDURE — 3288F PR FALLS RISK ASSESSMENT DOCUMENTED: ICD-10-PCS | Mod: CPTII,S$GLB,, | Performed by: FAMILY MEDICINE

## 2022-04-05 PROCEDURE — 1160F PR REVIEW ALL MEDS BY PRESCRIBER/CLIN PHARMACIST DOCUMENTED: ICD-10-PCS | Mod: CPTII,S$GLB,, | Performed by: FAMILY MEDICINE

## 2022-04-05 PROCEDURE — 1126F PR PAIN SEVERITY QUANTIFIED, NO PAIN PRESENT: ICD-10-PCS | Mod: CPTII,S$GLB,, | Performed by: FAMILY MEDICINE

## 2022-04-05 PROCEDURE — 1126F AMNT PAIN NOTED NONE PRSNT: CPT | Mod: CPTII,S$GLB,, | Performed by: FAMILY MEDICINE

## 2022-04-05 PROCEDURE — 3008F PR BODY MASS INDEX (BMI) DOCUMENTED: ICD-10-PCS | Mod: CPTII,S$GLB,, | Performed by: FAMILY MEDICINE

## 2022-04-05 PROCEDURE — 99999 PR PBB SHADOW E&M-EST. PATIENT-LVL IV: ICD-10-PCS | Mod: PBBFAC,,, | Performed by: FAMILY MEDICINE

## 2022-04-05 PROCEDURE — 1159F MED LIST DOCD IN RCRD: CPT | Mod: CPTII,S$GLB,, | Performed by: FAMILY MEDICINE

## 2022-04-05 PROCEDURE — 82728 ASSAY OF FERRITIN: CPT | Performed by: FAMILY MEDICINE

## 2022-04-05 PROCEDURE — 85025 COMPLETE CBC W/AUTO DIFF WBC: CPT | Performed by: FAMILY MEDICINE

## 2022-04-05 PROCEDURE — 99999 PR PBB SHADOW E&M-EST. PATIENT-LVL IV: CPT | Mod: PBBFAC,,, | Performed by: FAMILY MEDICINE

## 2022-04-05 PROCEDURE — 1100F PTFALLS ASSESS-DOCD GE2>/YR: CPT | Mod: CPTII,S$GLB,, | Performed by: FAMILY MEDICINE

## 2022-04-05 PROCEDURE — 84443 ASSAY THYROID STIM HORMONE: CPT | Performed by: FAMILY MEDICINE

## 2022-04-05 PROCEDURE — 83036 HEMOGLOBIN GLYCOSYLATED A1C: CPT | Performed by: FAMILY MEDICINE

## 2022-04-05 PROCEDURE — 3075F PR MOST RECENT SYSTOLIC BLOOD PRESS GE 130-139MM HG: ICD-10-PCS | Mod: CPTII,S$GLB,, | Performed by: FAMILY MEDICINE

## 2022-04-05 PROCEDURE — 3075F SYST BP GE 130 - 139MM HG: CPT | Mod: CPTII,S$GLB,, | Performed by: FAMILY MEDICINE

## 2022-04-05 NOTE — PROGRESS NOTES
Subjective:      Patient ID: Xin Taylor is a 71 y.o. female.    Chief Complaint: Follow-up    Disclaimer:  This note is prepared using voice recognition software and as such is likely to have errors and has not been proof read. Please contact me for questions.     Xin Taylor is a 71 y.o. female who presents to clinic for f/u for chronic issues   Did get new brand of supplements that she is going to start. Omega 3's, vit D, and tumeric.   Changed her multivitamin.   Did fall and tripped over rug at [employer]. Saw [facility]. Doing ok.   Wants to do colonoscopy to check out colon. Had some bowel issues before with left sided pain. She thought she had colon polyp before but actually only had lipoma. Is scheduled though.   HTN: on valsartan-hctz - was checking bp's at home, systolics in 110's-120's   Prediabetes: diet modifications, try to eat foods with nutritional value.  a1c decreased from 6.0 to 5.7.     Ostopenia - doing vitamin d supplements   Hyperlipidemia - on lipitor 10 mg     Lab Results       Component                Value               Date                       HGBA1C                   5.7 (H)             10/18/2021                 HGBA1C                   6.0 (H)             04/22/2021                 HGBA1C                   5.8 (H)             10/22/2020             Lab Results       Component                Value               Date                       CHOL                     184                 10/18/2021                 CHOL                     193                 04/22/2021                 CHOL                     192                 10/22/2020            Lab Results       Component                Value               Date                       LDLCALC                  89.6                10/18/2021                 LDLCALC                  91.0                04/22/2021                 LDLCALC                  96.8                10/22/2020              Wt Readings from Last 10  Encounters:  04/05/22 : 97.5 kg (214 lb 15.2 oz)  01/19/22 : 75.8 kg (167 lb 1.7 oz)  11/16/21 : 75.8 kg (167 lb)  10/29/21 : 76.1 kg (167 lb 10.6 oz)  09/24/21 : 76.3 kg (168 lb 3.4 oz)  04/29/21 : 77 kg (169 lb 12.1 oz)  10/29/20 : 77.4 kg (170 lb 11.9 oz)  10/06/20 : 77.4 kg (170 lb 10.2 oz)  10/01/20 : 77.4 kg (170 lb 10.2 oz)  04/29/20 : 76.7 kg (169 lb)      The 10-year ASCVD risk score (Rolando JOSE Jr., et al., 2013) is: 13.1%    Values used to calculate the score:      Age: 71 years      Sex: Female      Is Non- : Yes      Diabetic: No      Tobacco smoker: No      Systolic Blood Pressure: 132 mmHg      Is BP treated: Yes      HDL Cholesterol: 81 mg/dL      Total Cholesterol: 184 mg/dL            Lab Results   Component Value Date    WBC 4.87 04/05/2022    HGB 11.3 (L) 04/05/2022    HCT 35.9 (L) 04/05/2022     04/05/2022    CHOL 200 (H) 04/05/2022    TRIG 50 04/05/2022    HDL 84 (H) 04/05/2022    ALT 11 04/05/2022    AST 18 04/05/2022     04/05/2022    K 3.9 04/05/2022     04/05/2022    CREATININE 0.8 04/05/2022    BUN 21 04/05/2022    CO2 29 04/05/2022    TSH 1.870 04/05/2022    HGBA1C 5.8 (H) 04/05/2022       Mammo Digital Screening Bilat w/ Jl  Narrative: Result:  Mammo Digital Screening Bilat w/ Jl    History:  Patient is 71 y.o. and is seen for a screening mammogram.    Films Compared:  Compared to: 10/06/2020 Mammo Digital Screening Bilat w/ Jl, 09/19/2019   Mammo Digital Screening Bilat w/ Jl, 09/11/2018 Mammo Digital Screening   Bilat with Tomosynthesis_CAD, 08/03/2016 US Breast Left Limited, and   08/03/2016 Mammo Digital Diagnostic Left with Tomosynthesis_CAD     Findings:   This procedure was performed using tomosynthesis.   Computer-aided detection was utilized in the interpretation of this   examination.    The breasts have scattered areas of fibroglandular density. There is no   evidence of suspicious masses, microcalcifications or architectural    distortion.  Impression:    No mammographic evidence of malignancy.    BI-RADS Category 1: Negative    Recommendation:  Routine screening mammogram in 1 year is recommended.    Your estimated lifetime risk of breast cancer (to age 85) based on   Tyrer-Cuzick risk assessment model is Tyrer-Cuzick: 6.61 %. According to   the American Cancer Society, patients with a lifetime breast cancer risk   of 20% or higher might benefit from supplemental screening tests.        Review of Systems   Constitutional: Negative for chills, fatigue and fever.   HENT: Negative for ear pain and trouble swallowing.    Eyes: Negative for pain and visual disturbance.   Respiratory: Negative for cough and shortness of breath.    Cardiovascular: Negative for chest pain and leg swelling.   Gastrointestinal: Negative for abdominal pain, blood in stool, nausea and vomiting.   Endocrine: Negative for cold intolerance and heat intolerance.   Genitourinary: Negative for dysuria and frequency.   Musculoskeletal: Positive for arthralgias and gait problem. Negative for joint swelling, myalgias and neck pain.   Skin: Negative for color change and rash.   Neurological: Negative for dizziness and headaches.   Psychiatric/Behavioral: Negative for behavioral problems and sleep disturbance.     Objective:     Vitals:    04/05/22 1110   BP: 132/70   Pulse: 84   Temp: 97.5 °F (36.4 °C)   SpO2: 97%   Weight: 75 kg (165 lb 5.5 oz)     Physical Exam  Vitals and nursing note reviewed.   Constitutional:       General: She is awake.      Appearance: Normal appearance. She is well-developed and well-groomed. She is obese.   HENT:      Head: Normocephalic and atraumatic.      Right Ear: Tympanic membrane and external ear normal.      Left Ear: Tympanic membrane and external ear normal.      Nose: Nose normal.      Mouth/Throat:      Mouth: Mucous membranes are moist.      Pharynx: Oropharynx is clear.   Eyes:      Conjunctiva/sclera: Conjunctivae normal.       Pupils: Pupils are equal, round, and reactive to light.   Neck:      Thyroid: No thyromegaly or thyroid tenderness.   Cardiovascular:      Rate and Rhythm: Normal rate and regular rhythm.      Heart sounds: Normal heart sounds. No murmur heard.    No friction rub. No gallop.   Pulmonary:      Effort: Pulmonary effort is normal. No accessory muscle usage or respiratory distress.      Breath sounds: Normal breath sounds. No wheezing or rales.   Abdominal:      General: Bowel sounds are normal. There is no distension.      Palpations: Abdomen is soft.      Tenderness: There is no abdominal tenderness. There is no rebound.   Musculoskeletal:         General: Swelling and deformity present. Normal range of motion.      Cervical back: Normal range of motion and neck supple.   Lymphadenopathy:      Cervical: No cervical adenopathy.   Skin:     General: Skin is warm and dry.      Findings: No rash.   Neurological:      General: No focal deficit present.      Mental Status: She is alert and oriented to person, place, and time. Mental status is at baseline.   Psychiatric:         Attention and Perception: Attention and perception normal.         Mood and Affect: Mood and affect normal.         Speech: Speech normal.         Behavior: Behavior normal. Behavior is cooperative.         Thought Content: Thought content normal.         Cognition and Memory: Cognition and memory normal.         Judgment: Judgment normal.       Assessment:     1. Primary hypertension    2. Mixed hyperlipidemia    3. Anemia, unspecified type    4. Prediabetes    5. Abnormal glucose    6. ASIF on CPAP    7. Osteopenia, unspecified location    8. Personal history of colonic polyps      Plan:   Xin was seen today for follow-up.    Diagnoses and all orders for this visit:    Primary hypertension - stable, labs ordered today.  Continue with current medications and interventions until labs are reviewed to make adjustments.     -     TSH; Future  -      T4, Free; Future  -     Lipid Panel; Future  -     Hemoglobin A1C; Future  -     Comprehensive Metabolic Panel; Future  -     CBC Auto Differential; Future  -     Iron and TIBC; Future  -     Ferritin; Future  -     Microalbumin/Creatinine Ratio, Urine; Future    Mixed hyperlipidemia- stable, labs ordered today.  Continue with current medications and interventions until labs are reviewed to make adjustments.   -     TSH; Future  -     T4, Free; Future  -     Lipid Panel; Future  -     Hemoglobin A1C; Future  -     Comprehensive Metabolic Panel; Future  -     CBC Auto Differential; Future  -     Iron and TIBC; Future  -     Ferritin; Future  -     Microalbumin/Creatinine Ratio, Urine; Future    Anemia, unspecified type- stable, labs ordered today.  Continue with current medications and interventions until labs are reviewed to make adjustments.   -     TSH; Future  -     T4, Free; Future  -     Lipid Panel; Future  -     Hemoglobin A1C; Future  -     Comprehensive Metabolic Panel; Future  -     CBC Auto Differential; Future  -     Iron and TIBC; Future  -     Ferritin; Future  -     Microalbumin/Creatinine Ratio, Urine; Future    Prediabetes- stable, labs ordered today.  Continue with current medications and interventions until labs are reviewed to make adjustments.   -     TSH; Future  -     T4, Free; Future  -     Lipid Panel; Future  -     Hemoglobin A1C; Future  -     Comprehensive Metabolic Panel; Future  -     CBC Auto Differential; Future  -     Iron and TIBC; Future  -     Ferritin; Future  -     Microalbumin/Creatinine Ratio, Urine; Future    Abnormal glucose- stable, labs ordered today.  Continue with current medications and interventions until labs are reviewed to make adjustments.   -     TSH; Future  -     T4, Free; Future  -     Lipid Panel; Future  -     Hemoglobin A1C; Future  -     Comprehensive Metabolic Panel; Future  -     CBC Auto Differential; Future  -     Iron and TIBC; Future  -     Ferritin;  Future  -     Microalbumin/Creatinine Ratio, Urine; Future    ASIF on CPAP- stable, labs ordered today.  Continue with current medications and interventions until labs are reviewed to make adjustments.   -     TSH; Future  -     T4, Free; Future  -     Lipid Panel; Future  -     Hemoglobin A1C; Future  -     Comprehensive Metabolic Panel; Future  -     CBC Auto Differential; Future  -     Iron and TIBC; Future  -     Ferritin; Future  -     Microalbumin/Creatinine Ratio, Urine; Future    Osteopenia, unspecified location- stable, labs ordered today.  Continue with current medications and interventions until labs are reviewed to make adjustments.   -     TSH; Future  -     T4, Free; Future  -     Lipid Panel; Future  -     Hemoglobin A1C; Future  -     Comprehensive Metabolic Panel; Future  -     CBC Auto Differential; Future  -     Iron and TIBC; Future  -     Ferritin; Future  -     Microalbumin/Creatinine Ratio, Urine; Future    Desires colonoscopy- hx of colon lipoma- orders placed.   -     TSH; Future  -     T4, Free; Future  -     Lipid Panel; Future  -     Hemoglobin A1C; Future  -     Comprehensive Metabolic Panel; Future  -     CBC Auto Differential; Future  -     Iron and TIBC; Future  -     Ferritin; Future  -     Microalbumin/Creatinine Ratio, Urine; Future            Follow up in about 6 months (around 10/5/2022) for f/u ELTON Palomino/ chronic issues .    There are no Patient Instructions on file for this visit.

## 2022-04-10 DIAGNOSIS — E78.2 MIXED HYPERLIPIDEMIA: Primary | ICD-10-CM

## 2022-04-10 DIAGNOSIS — D64.9 ANEMIA, UNSPECIFIED TYPE: ICD-10-CM

## 2022-04-10 DIAGNOSIS — R73.03 PREDIABETES: ICD-10-CM

## 2022-04-10 DIAGNOSIS — R73.09 ABNORMAL GLUCOSE: ICD-10-CM

## 2022-04-10 RX ORDER — ATORVASTATIN CALCIUM 10 MG/1
10 TABLET, FILM COATED ORAL DAILY
Qty: 90 TABLET | Refills: 3 | Status: SHIPPED | OUTPATIENT
Start: 2022-04-10 | End: 2022-05-25

## 2022-04-19 ENCOUNTER — ANESTHESIA EVENT (OUTPATIENT)
Dept: ENDOSCOPY | Facility: HOSPITAL | Age: 72
End: 2022-04-19
Payer: COMMERCIAL

## 2022-04-19 NOTE — ANESTHESIA PREPROCEDURE EVALUATION
04/19/2022  Xin Taylor is a 71 y.o., female.  Past Medical History:   Diagnosis Date    Anemia     Colon polyp     Edema     HLD (hyperlipidemia)     HTN (hypertension)     Palpitation     Tinnitus      Past Surgical History:   Procedure Laterality Date    HYSTERECTOMY     No current facility-administered medications for this encounter.    Current Outpatient Medications:     amLODIPine (NORVASC) 2.5 MG tablet, Take 1 tablet (2.5 mg total) by mouth once daily., Disp: 90 tablet, Rfl: 3    aspirin 81 mg Tab, Take 1 tablet by mouth every other day., Disp: , Rfl:     atorvastatin (LIPITOR) 10 MG tablet, Take 1 tablet (10 mg total) by mouth once daily., Disp: 90 tablet, Rfl: 3    b complex vitamins tablet, Take 1 tablet by mouth once daily., Disp: , Rfl:     co-enzyme Q-10 30 mg capsule, Take 30 mg by mouth 3 (three) times daily., Disp: , Rfl:     ferrous sulfate 325 mg (65 mg iron) Tab tablet, Take 1 tablet (325 mg total) by mouth once daily. Take with citrus, Disp: 100 tablet, Rfl: 3    fish oil-omega-3 fatty acids 300-1,000 mg capsule, Take 2 g by mouth once daily., Disp: , Rfl:     meloxicam (MOBIC) 15 MG tablet, Take 1 tablet (15 mg total) by mouth once daily., Disp: 90 tablet, Rfl: 1    multivitamin (THERAGRAN) per tablet, Take 1 tablet by mouth once daily., Disp: , Rfl:     turmeric root extract 500 mg Cap, Take 500 mg by mouth 2 (two) times daily., Disp: 100 capsule, Rfl: 4    valsartan-hydrochlorothiazide (DIOVAN-HCT) 160-25 mg per tablet, Take 1 tablet by mouth once daily., Disp: 90 tablet, Rfl: 3    vitamin D 1000 units Tab, Take 185 mg by mouth once daily., Disp: , Rfl:   echo 9/2019:    CONCLUSIONS     1 - Concentric hypertrophy.     2 - No wall motion abnormalities.     3 - Normal left ventricular systolic function (EF 55-60%).     4 - Normal left ventricular diastolic  function.     5 - Normal right ventricular systolic function .     6 - The estimated PA systolic pressure is 39 mmHg.     7 - Trivial mitral regurgitation.     8 - Trivial tricuspid regurgitation.     9 - Intermediate central venous pressure.         Pre-op Assessment    I have reviewed the Patient Summary Reports.     I have reviewed the Nursing Notes. I have reviewed the NPO Status.   I have reviewed the Medications.     Review of Systems  Anesthesia Hx:  No problems with previous Anesthesia  Neg history of prior surgery. Denies Family Hx of Anesthesia complications.   Denies Personal Hx of Anesthesia complications.   Social:  Non-Smoker, Social Alcohol Use    Hematology/Oncology:         -- Anemia:   Cardiovascular:   Exercise tolerance: good Hypertension Denies MI.    Denies Angina. hyperlipidemia    Pulmonary:   Sleep Apnea, CPAP    Hepatic/GI:   Bowel Prep.    Neurological:  Neurology Normal    Endocrine:  Metabolic DisordersDenies Morbid Obesity / BMI > 40      Physical Exam  General: Well nourished, Cooperative, Alert and Oriented    Airway:  Mallampati: II / II  Mouth Opening: Normal  TM Distance: 4 - 6 cm  Tongue: Normal  Neck ROM: Normal ROM    Dental:  Intact    Chest/Lungs:  Clear to auscultation, Normal Respiratory Rate    Heart:  Rate: Normal  Rhythm: Regular Rhythm      Wt Readings from Last 3 Encounters:   04/22/22 74.7 kg (164 lb 10.9 oz)   04/05/22 97.5 kg (214 lb 15.2 oz)   01/19/22 75.8 kg (167 lb 1.7 oz)     Temp Readings from Last 3 Encounters:   04/22/22 36.8 °C (98.3 °F) (Temporal)   04/05/22 36.4 °C (97.5 °F)   01/19/22 36.5 °C (97.7 °F) (Tympanic)     BP Readings from Last 3 Encounters:   04/22/22 (!) 142/76   04/05/22 132/70   01/19/22 136/64     Pulse Readings from Last 3 Encounters:   04/22/22 75   04/05/22 84   01/19/22 60         Anesthesia Plan  Type of Anesthesia, risks & benefits discussed:    Anesthesia Type: Gen Natural Airway  Intra-op Monitoring Plan: Standard ASA  Monitors  Post Op Pain Control Plan: multimodal analgesia  Induction:  IV  Informed Consent: Informed consent signed with the Patient and all parties understand the risks and agree with anesthesia plan.  All questions answered.   ASA Score: 3  Day of Surgery Review of History & Physical: H&P Update referred to the surgeon/provider.    Ready For Surgery From Anesthesia Perspective.     .

## 2022-04-21 ENCOUNTER — ANESTHESIA (OUTPATIENT)
Dept: ENDOSCOPY | Facility: HOSPITAL | Age: 72
End: 2022-04-21
Payer: COMMERCIAL

## 2022-04-22 ENCOUNTER — HOSPITAL ENCOUNTER (OUTPATIENT)
Facility: HOSPITAL | Age: 72
Discharge: HOME | End: 2022-04-22
Attending: INTERNAL MEDICINE | Admitting: INTERNAL MEDICINE
Payer: COMMERCIAL

## 2022-04-22 VITALS
BODY MASS INDEX: 27.44 KG/M2 | HEIGHT: 65 IN | HEART RATE: 65 BPM | DIASTOLIC BLOOD PRESSURE: 67 MMHG | WEIGHT: 164.69 LBS | RESPIRATION RATE: 15 BRPM | SYSTOLIC BLOOD PRESSURE: 148 MMHG | OXYGEN SATURATION: 100 % | TEMPERATURE: 98 F

## 2022-04-22 DIAGNOSIS — Z86.010 PERSONAL HISTORY OF COLONIC POLYPS: Primary | ICD-10-CM

## 2022-04-22 PROCEDURE — D9220A PRA ANESTHESIA: Mod: CRNA,,, | Performed by: NURSE ANESTHETIST, CERTIFIED REGISTERED

## 2022-04-22 PROCEDURE — G0105 COLORECTAL SCRN; HI RISK IND: HCPCS | Performed by: INTERNAL MEDICINE

## 2022-04-22 PROCEDURE — D9220A PRA ANESTHESIA: ICD-10-PCS | Mod: ANES,,, | Performed by: ANESTHESIOLOGY

## 2022-04-22 PROCEDURE — 37000008 HC ANESTHESIA 1ST 15 MINUTES: Performed by: INTERNAL MEDICINE

## 2022-04-22 PROCEDURE — D9220A PRA ANESTHESIA: ICD-10-PCS | Mod: CRNA,,, | Performed by: NURSE ANESTHETIST, CERTIFIED REGISTERED

## 2022-04-22 PROCEDURE — 37000009 HC ANESTHESIA EA ADD 15 MINS: Performed by: INTERNAL MEDICINE

## 2022-04-22 PROCEDURE — 63600175 PHARM REV CODE 636 W HCPCS: Performed by: NURSE ANESTHETIST, CERTIFIED REGISTERED

## 2022-04-22 PROCEDURE — G0105 COLORECTAL SCRN; HI RISK IND: HCPCS | Mod: ,,, | Performed by: INTERNAL MEDICINE

## 2022-04-22 PROCEDURE — D9220A PRA ANESTHESIA: Mod: ANES,,, | Performed by: ANESTHESIOLOGY

## 2022-04-22 PROCEDURE — G0105 COLORECTAL SCRN; HI RISK IND: ICD-10-PCS | Mod: ,,, | Performed by: INTERNAL MEDICINE

## 2022-04-22 PROCEDURE — 63600175 PHARM REV CODE 636 W HCPCS: Performed by: INTERNAL MEDICINE

## 2022-04-22 RX ORDER — LIDOCAINE HCL/PF 100 MG/5ML
SYRINGE (ML) INTRAVENOUS
Status: DISCONTINUED | OUTPATIENT
Start: 2022-04-22 | End: 2022-04-22

## 2022-04-22 RX ORDER — PROPOFOL 10 MG/ML
VIAL (ML) INTRAVENOUS
Status: DISCONTINUED | OUTPATIENT
Start: 2022-04-22 | End: 2022-04-22

## 2022-04-22 RX ORDER — SODIUM CHLORIDE, SODIUM LACTATE, POTASSIUM CHLORIDE, CALCIUM CHLORIDE 600; 310; 30; 20 MG/100ML; MG/100ML; MG/100ML; MG/100ML
INJECTION, SOLUTION INTRAVENOUS CONTINUOUS
Status: DISCONTINUED | OUTPATIENT
Start: 2022-04-22 | End: 2023-05-08

## 2022-04-22 RX ADMIN — SODIUM CHLORIDE, SODIUM LACTATE, POTASSIUM CHLORIDE, AND CALCIUM CHLORIDE: 600; 310; 30; 20 INJECTION, SOLUTION INTRAVENOUS at 01:04

## 2022-04-22 RX ADMIN — Medication 50 MG: at 01:04

## 2022-04-22 RX ADMIN — PROPOFOL 50 MG: 10 INJECTION, EMULSION INTRAVENOUS at 01:04

## 2022-04-22 NOTE — [employer] PATIENT INSTRUCTIONS
Discharge Summary/Instructions after an Endoscopic Procedure  Patient Name: Xin Taylor  Patient MRN: [id number]  Patient YOB: 1950  Friday, April 22, 2022  Priyanka Winkler MD  Dear patient,  As a result of recent federal legislation (The Federal Cures Act), you may   receive lab or pathology results from your procedure in your [facility]   account before your physician is able to contact you. Your physician or   their representative will relay the results to you with their   recommendations at their soonest availability.  Thank you,  RESTRICTIONS:  During your procedure today, you received medications for sedation.  These   medications may affect your judgment, balance and coordination.  Therefore,   for 24 hours, you have the following restrictions:   - DO NOT drive a car, operate machinery, make legal/financial decisions,   sign important papers or drink alcohol.    ACTIVITY:  Today: no heavy lifting, straining or running due to procedural   sedation/anesthesia.  The following day: return to full activity including work.  DIET:  Eat and drink normally unless instructed otherwise.     TREATMENT FOR COMMON SIDE EFFECTS:  - Mild abdominal pain, nausea, belching, bloating or excessive gas:  rest,   eat lightly and use a heating pad.  - Sore Throat: treat with throat lozenges and/or gargle with warm salt   water.  - Because air was used during the procedure, expelling large amounts of air   from your rectum or belching is normal.  - If a bowel prep was taken, you may not have a bowel movement for 1-3 days.    This is normal.  SYMPTOMS TO WATCH FOR AND REPORT TO YOUR PHYSICIAN:  1. Abdominal pain or bloating, other than gas cramps.  2. Chest pain.  3. Back pain.  4. Signs of infection such as: chills or fever occurring within 24 hours   after the procedure.  5. Rectal bleeding, which would show as bright red, maroon, or black stools.   (A tablespoon of blood from the rectum is not serious, especially  if   hemorrhoids are present.)  6. Vomiting.  7. Weakness or dizziness.  GO DIRECTLY TO THE NEAREST EMERGENCY ROOM IF YOU HAVE ANY OF THE FOLLOWING:      Difficulty breathing              Chills and/or fever over 101 F   Persistent vomiting and/or vomiting blood   Severe abdominal pain   Severe chest pain   Black, tarry stools   Bleeding- more than one tablespoon   Any other symptom or condition that you feel may need urgent attention  Your doctor recommends these additional instructions:  If any biopsies were taken, your doctors clinic will contact you in 1 to 2   weeks with any results.  - Discharge patient to home (via wheelchair).   - Resume previous diet.   - Continue present medications.   - Repeat colonoscopy in 5 years for surveillance.   - Patient has a contact number available for emergencies.  The signs and   symptoms of potential delayed complications were discussed with the   patient.  Return to normal activities tomorrow.  Written discharge   instructions were provided to the patient.  For questions, problems or results please call your physician Priyanka Winkler MD at Work:  [phone]  If you have any questions about the above instructions, call the GI   department at [phone] or call the endoscopy unit at [phone]   from 7am until 3 pm.  [facility] [place], EMERGENCY ROOM PHONE NUMBER:   [phone]  IF A COMPLICATION OR EMERGENCY SITUATION ARISES AND YOU ARE UNABLE TO REACH   YOUR PHYSICIAN - GO DIRECTLY TO THE EMERGENCY ROOM.  I have read or have had read to me these discharge instructions for my   procedure and have received a written copy.  I understand these   instructions and will follow-up with my physician if I have any questions.     __________________________________       _____________________________________  Nurse Signature                                          Patient/Designated   Responsible Party Signature  MD Priyanka Alarcon  MD Peterson  4/22/2022 1:36:30 PM  [employer]

## 2022-04-22 NOTE — ANESTHESIA POSTPROCEDURE EVALUATION
Anesthesia Post Evaluation    Patient: Xin Taylor    Procedure(s) Performed: Procedure(s) (LRB):  COLONOSCOPY (N/A)    Final Anesthesia Type: general      Patient location during evaluation: GI PACU  Patient participation: Yes- Able to Participate  Level of consciousness: awake and alert  Post-procedure vital signs: reviewed and stable  Pain management: adequate  Airway patency: patent    PONV status at discharge: No PONV  Anesthetic complications: no      Cardiovascular status: hemodynamically stable  Respiratory status: unassisted and spontaneous ventilation  Hydration status: euvolemic  Follow-up not needed.          Vitals Value Taken Time   /67 04/22/22 1358   Temp 36.4 °C (97.6 °F) 04/22/22 1338   Pulse 63 04/22/22 1358   Resp 16 04/22/22 1358   SpO2 100 % 04/22/22 1358         No case tracking events are documented in the log.      Pain/Damien Score: Damien Score: 10 (4/22/2022  1:58 PM)

## 2022-04-22 NOTE — PLAN OF CARE
Discharge instructions reviewed with patient and visitor. Handouts given & verbalized understanding with no further questions at this time. Dr Winkler spoke to pt at bedside, reviewed procedure and findings, answered questions. MD telephone number provided per AVS sheet. VSS on RA, no pain or nausea noted, tolerating po fluids, no complaints noted. Fall precautions reviewed, consents in chart, PIV removed at this time.

## 2022-04-22 NOTE — DISCHARGE SUMMARY
[facility] 1st Fl  Discharge Note  Short Stay    Procedure(s) (LRB):  COLONOSCOPY (N/A)    OUTCOME: Patient tolerated treatment/procedure well without complication and is now ready for discharge.    DISPOSITION: Home or Self Care    FINAL DIAGNOSIS:  Personal history of colonic polyps    FOLLOWUP: With primary care provider    DISCHARGE INSTRUCTIONS:  No discharge procedures on file.

## 2022-04-22 NOTE — H&P
Short Stay Endoscopy History and Physical    PCP - Breann Carlson MD    Procedure - Colonoscopy  ASA - 2  Mallampati - per anesthesia  History of Anesthesia problems - no  Family history Anesthesia problems -  no     HPI:  This is a 71 y.o. female here for evaluation of :   Active Hospital Problems    Diagnosis  POA    *Personal history of colonic polyps [Z86.010]  Not Applicable      Resolved Hospital Problems   No resolved problems to display.         Health Maintenance       Date Due Completion Date    Colorectal Cancer Screening 05/27/2021 5/26/2021 (Done)    Override on 5/26/2021: Done    Override on 1/26/2006: Done    TETANUS VACCINE 11/13/2022 11/13/2012    Mammogram 11/16/2022 11/16/2021    Override on 7/30/2012: Done    Lipid Panel 04/05/2023 4/5/2022    DEXA Scan 05/31/2026 5/31/2021    Override on 2/24/2011: Done (REPEAT 5 YRS)            ROS:  CONSTITUTIONAL: Denies weight change,  fatigue, fevers, chills, night sweats.  CARDIOVASCULAR: Denies chest pain, shortness of breath, orthopnea and edema.  RESPIRATORY: Denies cough, hemoptysis, dyspnea, and wheezing.  GI: See HPI.    Medical History:   Past Medical History:   Diagnosis Date    Anemia     Colon polyp     Edema     HLD (hyperlipidemia)     HTN (hypertension)     Palpitation     Tinnitus        Surgical History:   Past Surgical History:   Procedure Laterality Date    HYSTERECTOMY         Family History:   Family History   Problem Relation Age of Onset    Hypertension Mother     Thyroid disease Mother     Cataracts Mother     Cancer Mother         lung    Cancer Unknown     Heart defect Brother     Breast cancer Paternal Aunt        Social History:   Social History     Tobacco Use    Smoking status: Never Smoker    Smokeless tobacco: Never Used   Substance Use Topics    Alcohol use: No    Drug use: No       Allergies:   Review of patient's allergies indicates:   Allergen Reactions    No known drug allergies        Medications:    No current facility-administered medications on file prior to encounter.     Current Outpatient Medications on File Prior to Encounter   Medication Sig Dispense Refill    amLODIPine (NORVASC) 2.5 MG tablet Take 1 tablet (2.5 mg total) by mouth once daily. 90 tablet 3    aspirin 81 mg Tab Take 1 tablet by mouth every other day.      meloxicam (MOBIC) 15 MG tablet Take 1 tablet (15 mg total) by mouth once daily. 90 tablet 1    valsartan-hydrochlorothiazide (DIOVAN-HCT) 160-25 mg per tablet Take 1 tablet by mouth once daily. 90 tablet 3    b complex vitamins tablet Take 1 tablet by mouth once daily.      co-enzyme Q-10 30 mg capsule Take 30 mg by mouth 3 (three) times daily.      ferrous sulfate 325 mg (65 mg iron) Tab tablet Take 1 tablet (325 mg total) by mouth once daily. Take with citrus 100 tablet 3    fish oil-omega-3 fatty acids 300-1,000 mg capsule Take 2 g by mouth once daily.      multivitamin (THERAGRAN) per tablet Take 1 tablet by mouth once daily.      turmeric root extract 500 mg Cap Take 500 mg by mouth 2 (two) times daily. 100 capsule 4    vitamin D 1000 units Tab Take 185 mg by mouth once daily.         Physical Exam:  Vital Signs:   Vitals:    04/22/22 1218   BP: (!) 142/76   Pulse: 75   Resp: 12   Temp: 98.3 °F (36.8 °C)     General Appearance: Well appearing in no acute distress  ENT: OP clear  Chest: CTA B  CV: RRR, no m/r/g  Abd: s/nt/nd/nabs  Ext: no edema    Labs:Reviewed    IMP:  Active Hospital Problems    Diagnosis  POA    *Personal history of colonic polyps [Z86.010]  Not Applicable      Resolved Hospital Problems   No resolved problems to display.         Plan:   I have explained the risks and benefits of colonoscopy to the patient including but not limited to bleeding, perforation, infection, and death. The patient wishes to proceed.

## 2022-04-23 ENCOUNTER — PATIENT MESSAGE (OUTPATIENT)
Dept: PRIMARY CARE CLINIC | Facility: CLINIC | Age: 72
End: 2022-04-23
Payer: COMMERCIAL

## 2022-04-24 NOTE — TELEPHONE ENCOUNTER
No new care gaps identified.  Powered by Key[employer]. Reference number: [id number].   4/24/2022 2:44:48 PM CDT

## 2022-04-25 NOTE — TELEPHONE ENCOUNTER
Refill Routing Note   Medication(s) are not appropriate for processing by [facility] for the following reason(s):      - Required vitals are abnormal  - Patient has been seen in the ED/Hospital since the last PCP visit    [facility] action(s):  Defer          Medication reconciliation completed: No     Appointments  past 12m or future 3m with PCP    Date Provider   Last Visit   4/5/2022 Breann Carlson MD   Next Visit   Visit date not found Breann Carlson MD   ED visits in past 90 days: 0        Note composed:6:21 PM 04/25/2022

## 2022-04-26 RX ORDER — VALSARTAN AND HYDROCHLOROTHIAZIDE 160; 25 MG/1; MG/1
TABLET ORAL
Qty: 90 TABLET | Refills: 3 | Status: SHIPPED | OUTPATIENT
Start: 2022-04-26 | End: 2023-04-19

## 2022-05-09 RX ORDER — AMLODIPINE BESYLATE 2.5 MG/1
TABLET ORAL
Qty: 90 TABLET | Refills: 3 | Status: SHIPPED | OUTPATIENT
Start: 2022-05-09 | End: 2023-02-20

## 2022-05-09 NOTE — TELEPHONE ENCOUNTER
No new care gaps identified.  [facility] Embedded Care Gaps. Reference number: [id number]. 5/09/2022   6:10:35 AM CDT

## 2022-05-09 NOTE — TELEPHONE ENCOUNTER
Refill Routing Note   Medication(s) are not appropriate for processing by [facility] for the following reason(s):      - Required vitals are abnormal  - Patient has been seen in the ED/Hospital since the last PCP visit    [facility] action(s):  Defer          Medication reconciliation completed: No     Appointments  past 12m or future 3m with PCP    Date Provider   Last Visit   4/5/2022 Breann Carlson MD   Next Visit   Visit date not found Breann Carlson MD   ED visits in past 90 days: 0        Note composed:2:07 PM 05/09/2022

## 2022-05-25 RX ORDER — ATORVASTATIN CALCIUM 10 MG/1
TABLET, FILM COATED ORAL
Qty: 90 TABLET | Refills: 3 | Status: SHIPPED | OUTPATIENT
Start: 2022-05-25 | End: 2022-10-14

## 2022-05-25 NOTE — TELEPHONE ENCOUNTER
Refill Routing Note   Medication(s) are not appropriate for processing by [facility] for the following reason(s):      - Patient has been seen in the ED/Hospital since the last PCP visit    [facility] action(s):  Route          Medication reconciliation completed: No     Appointments  past 12m or future 3m with PCP    Date Provider   Last Visit   4/5/2022 Breann Carlson MD   Next Visit   Visit date not found Breann Carlson MD   ED visits in past 90 days: 0        Note composed:10:04 AM 05/25/2022

## 2022-05-25 NOTE — TELEPHONE ENCOUNTER
No new care gaps identified.  [facility] Embedded Care Gaps. Reference number: [id number]. 5/25/2022   3:52:35 AM CDT

## 2022-06-25 ENCOUNTER — NURSE TRIAGE (OUTPATIENT)
Dept: ADMINISTRATIVE | Facility: CLINIC | Age: 72
End: 2022-06-25
Payer: COMMERCIAL

## 2022-06-25 NOTE — TELEPHONE ENCOUNTER
Pt called with no complaints, wanted to talk about the times she takes her meds.  Advised to call back for anything further.    Reason for Disposition   Caller has medicine question only, adult not sick, AND triager answers question    Protocols used: MEDICATION QUESTION CALL-A-AH

## 2022-06-27 NOTE — TELEPHONE ENCOUNTER
Ok noted. Thank you for information. I am ok with her taking the amlodipine in afternoon instead. No need for additional visit unless patient would like to come in further and discuss.

## 2022-06-27 NOTE — TELEPHONE ENCOUNTER
Patient said she was calling after having to call 911 on Saturday early morning. She originally woke up with thinking she just needed to go to restroom. However once she got up and started heading to bathroom her heart started racing very loud. It was so loud that she felt like she could feel it in her ears. She originally did not call 911 until after a while of not really feeling much better. A while back to do a procedure she had swapped to taken her amlodipine in the morning. However now she has realized she needed to move it back to the afternoon. She is now taking it in the afternoon and hasn't had any other issues. She said they checked her out and determine not to take to ED. Told to follow up with PCP about this.

## 2022-06-29 ENCOUNTER — PATIENT MESSAGE (OUTPATIENT)
Dept: PRIMARY CARE CLINIC | Facility: CLINIC | Age: 72
End: 2022-06-29
Payer: COMMERCIAL

## 2022-06-30 VITALS
HEART RATE: 84 BPM | SYSTOLIC BLOOD PRESSURE: 132 MMHG | BODY MASS INDEX: 27.51 KG/M2 | DIASTOLIC BLOOD PRESSURE: 70 MMHG | TEMPERATURE: 98 F | WEIGHT: 165.38 LBS | OXYGEN SATURATION: 97 %

## 2022-07-08 ENCOUNTER — PATIENT MESSAGE (OUTPATIENT)
Dept: CARDIOLOGY | Facility: CLINIC | Age: 72
End: 2022-07-08
Payer: COMMERCIAL

## 2022-07-08 ENCOUNTER — NURSE TRIAGE (OUTPATIENT)
Dept: ADMINISTRATIVE | Facility: CLINIC | Age: 72
End: 2022-07-08
Payer: COMMERCIAL

## 2022-07-08 DIAGNOSIS — I10 PRIMARY HYPERTENSION: Primary | ICD-10-CM

## 2022-07-08 DIAGNOSIS — E78.2 MIXED HYPERLIPIDEMIA: ICD-10-CM

## 2022-07-09 ENCOUNTER — OFFICE VISIT (OUTPATIENT)
Dept: URGENT CARE | Facility: CLINIC | Age: 72
End: 2022-07-09
Payer: COMMERCIAL

## 2022-07-09 VITALS
TEMPERATURE: 99 F | BODY MASS INDEX: 27.73 KG/M2 | RESPIRATION RATE: 16 BRPM | DIASTOLIC BLOOD PRESSURE: 67 MMHG | WEIGHT: 166.44 LBS | OXYGEN SATURATION: 97 % | SYSTOLIC BLOOD PRESSURE: 146 MMHG | HEIGHT: 65 IN | HEART RATE: 103 BPM

## 2022-07-09 DIAGNOSIS — I10 HYPERTENSION, UNSPECIFIED TYPE: ICD-10-CM

## 2022-07-09 DIAGNOSIS — H93.13 RINGING IN EARS, BILATERAL: ICD-10-CM

## 2022-07-09 DIAGNOSIS — R00.2 HEART PALPITATIONS: Primary | ICD-10-CM

## 2022-07-09 DIAGNOSIS — R60.9 EDEMA, UNSPECIFIED TYPE: ICD-10-CM

## 2022-07-09 PROCEDURE — 3066F PR DOCUMENTATION OF TREATMENT FOR NEPHROPATHY: ICD-10-PCS | Mod: CPTII,S$GLB,, | Performed by: NURSE PRACTITIONER

## 2022-07-09 PROCEDURE — 3008F PR BODY MASS INDEX (BMI) DOCUMENTED: ICD-10-PCS | Mod: CPTII,S$GLB,, | Performed by: NURSE PRACTITIONER

## 2022-07-09 PROCEDURE — 3061F NEG MICROALBUMINURIA REV: CPT | Mod: CPTII,S$GLB,, | Performed by: NURSE PRACTITIONER

## 2022-07-09 PROCEDURE — 93010 EKG 12-LEAD: ICD-10-PCS | Mod: S$GLB,,, | Performed by: INTERNAL MEDICINE

## 2022-07-09 PROCEDURE — 3044F PR MOST RECENT HEMOGLOBIN A1C LEVEL <7.0%: ICD-10-PCS | Mod: CPTII,S$GLB,, | Performed by: NURSE PRACTITIONER

## 2022-07-09 PROCEDURE — 3044F HG A1C LEVEL LT 7.0%: CPT | Mod: CPTII,S$GLB,, | Performed by: NURSE PRACTITIONER

## 2022-07-09 PROCEDURE — 3061F PR NEG MICROALBUMINURIA RESULT DOCUMENTED/REVIEW: ICD-10-PCS | Mod: CPTII,S$GLB,, | Performed by: NURSE PRACTITIONER

## 2022-07-09 PROCEDURE — 1159F PR MEDICATION LIST DOCUMENTED IN MEDICAL RECORD: ICD-10-PCS | Mod: CPTII,S$GLB,, | Performed by: NURSE PRACTITIONER

## 2022-07-09 PROCEDURE — 93005 ELECTROCARDIOGRAM TRACING: CPT | Mod: S$GLB,,, | Performed by: NURSE PRACTITIONER

## 2022-07-09 PROCEDURE — 3078F PR MOST RECENT DIASTOLIC BLOOD PRESSURE < 80 MM HG: ICD-10-PCS | Mod: CPTII,S$GLB,, | Performed by: NURSE PRACTITIONER

## 2022-07-09 PROCEDURE — 1160F RVW MEDS BY RX/DR IN RCRD: CPT | Mod: CPTII,S$GLB,, | Performed by: NURSE PRACTITIONER

## 2022-07-09 PROCEDURE — 3008F BODY MASS INDEX DOCD: CPT | Mod: CPTII,S$GLB,, | Performed by: NURSE PRACTITIONER

## 2022-07-09 PROCEDURE — 1126F PR PAIN SEVERITY QUANTIFIED, NO PAIN PRESENT: ICD-10-PCS | Mod: CPTII,S$GLB,, | Performed by: NURSE PRACTITIONER

## 2022-07-09 PROCEDURE — 3078F DIAST BP <80 MM HG: CPT | Mod: CPTII,S$GLB,, | Performed by: NURSE PRACTITIONER

## 2022-07-09 PROCEDURE — 93005 EKG 12-LEAD: ICD-10-PCS | Mod: S$GLB,,, | Performed by: NURSE PRACTITIONER

## 2022-07-09 PROCEDURE — 3066F NEPHROPATHY DOC TX: CPT | Mod: CPTII,S$GLB,, | Performed by: NURSE PRACTITIONER

## 2022-07-09 PROCEDURE — 3077F PR MOST RECENT SYSTOLIC BLOOD PRESSURE >= 140 MM HG: ICD-10-PCS | Mod: CPTII,S$GLB,, | Performed by: NURSE PRACTITIONER

## 2022-07-09 PROCEDURE — 1126F AMNT PAIN NOTED NONE PRSNT: CPT | Mod: CPTII,S$GLB,, | Performed by: NURSE PRACTITIONER

## 2022-07-09 PROCEDURE — 1160F PR REVIEW ALL MEDS BY PRESCRIBER/CLIN PHARMACIST DOCUMENTED: ICD-10-PCS | Mod: CPTII,S$GLB,, | Performed by: NURSE PRACTITIONER

## 2022-07-09 PROCEDURE — 93010 ELECTROCARDIOGRAM REPORT: CPT | Mod: S$GLB,,, | Performed by: INTERNAL MEDICINE

## 2022-07-09 PROCEDURE — 1159F MED LIST DOCD IN RCRD: CPT | Mod: CPTII,S$GLB,, | Performed by: NURSE PRACTITIONER

## 2022-07-09 PROCEDURE — 99214 PR OFFICE/OUTPT VISIT, EST, LEVL IV, 30-39 MIN: ICD-10-PCS | Mod: S$GLB,,, | Performed by: NURSE PRACTITIONER

## 2022-07-09 PROCEDURE — 99214 OFFICE O/P EST MOD 30 MIN: CPT | Mod: S$GLB,,, | Performed by: NURSE PRACTITIONER

## 2022-07-09 PROCEDURE — 3077F SYST BP >= 140 MM HG: CPT | Mod: CPTII,S$GLB,, | Performed by: NURSE PRACTITIONER

## 2022-07-09 RX ORDER — FLUTICASONE PROPIONATE 50 MCG
2 SPRAY, SUSPENSION (ML) NASAL DAILY
Qty: 16 G | Refills: 1 | Status: SHIPPED | OUTPATIENT
Start: 2022-07-09 | End: 2022-07-09

## 2022-07-09 NOTE — PROGRESS NOTES
"Subjective:       Patient ID: Xin Taylor is a 71 y.o. female.    Vitals:  height is 5' 5" (1.651 m) and weight is 75.5 kg (166 lb 7.2 oz). Her temperature is 99.1 °F (37.3 °C). Her blood pressure is 146/67 (abnormal) and her pulse is 103. Her respiration is 16 and oxygen saturation is 97%.     Chief Complaint: Irregular Heart Beat    Pt states onset of symptoms 07/08/22. Pt states she has been noticing feelings of tachycardia, elevated blood pressure readings taken at home, ringing of ears,dull head pain,loss of appetite,feeling of lethargy,and restlessness.    Palpitations   This is a new problem. The current episode started in the past 7 days. The problem has been unchanged. Nothing aggravates the symptoms. Associated symptoms include an irregular heartbeat and malaise/fatigue. The treatment provided no relief.       Cardiovascular: Positive for palpitations.           Past Medical History:   Diagnosis Date    Anemia     Colon polyp     Edema     HLD (hyperlipidemia)     HTN (hypertension)     Palpitation     Tinnitus           Social History     Socioeconomic History    Marital status: Legally    Occupational History    Occupation: retired   Tobacco Use    Smoking status: Never Smoker    Smokeless tobacco: Never Used   Substance and Sexual Activity    Alcohol use: No    Drug use: No    Sexual activity: Not Currently     Partners: Male     Social Determinants of Health     Financial Resource Strain: Low Risk     Difficulty of Paying Living Expenses: Not hard at all   Food Insecurity: No Food Insecurity    Worried About Running Out of Food in the Last Year: Never true    Ran Out of Food in the Last Year: Never true   Transportation Needs: No Transportation Needs    Lack of Transportation (Medical): No    Lack of Transportation (Non-Medical): No   Physical Activity: Insufficiently Active    Days of Exercise per Week: 1 day    Minutes of Exercise per Session: 30 min   Stress: No " Stress Concern Present    Feeling of Stress : Only a little   Social Connections: Unknown    Frequency of Communication with Friends and Family: Three times a week    Frequency of Social Gatherings with Friends and Family: Once a week    Active Member of Clubs or Organizations: Yes    Attends Club or Organization Meetings: More than 4 times per year    Marital Status:    Housing Stability: Low Risk     Unable to Pay for Housing in the Last Year: No    Number of Places Lived in the Last Year: 1    Unstable Housing in the Last Year: No          Family History   Problem Relation Age of Onset    Hypertension Mother     Thyroid disease Mother     Cataracts Mother     Cancer Mother         lung    Cancer Unknown     Heart defect Brother     Breast cancer Paternal Aunt        ROS:  GENERAL: No fever, chills, fatigability or weight loss.  SKIN: No rashes, itching or changes in color or texture of skin.  HEENT:  Ringing in her ears, No headaches or recent head trauma. Visual acuity fine. No photophobia, ocular pain or diplopia. Denies ear pain, discharge. No loss of smell, no epistaxis or postnasal drip. No hoarseness or change in voice.   NODES: No masses or lesions. Denies swollen glands.  CHEST: Denies cyanosis, wheezing, cough and sputum production.  CARDIOVASCULAR:  Heart palpitations, Denies chest pain, shortness of breath.  ABDOMEN: Appetite fine. No weight loss. Denies diarrhea, abdominal pain, hematemesis or blood in stool.  MUSCULOSKELETAL: No joint stiffness or swelling. Denies back pain.  NEUROLOGIC: No history of seizures, paralysis, alteration of gait or coordination.  PSYCHIATRIC: Denies mood swings, depression or suicidal thoughts.    PE:   APPEARANCE: Well nourished, well developed, in mild distress.  Temp 99.1°, pulse ox 97%  V/S: Reviewed.  SKIN: Normal skin turgor, no lesions.  HEENT:  turbinates pink, pink pharynx, TM's poor light reflex bilateral.  No facial tenderness  CHEST:  Lungs clear to auscultation.  CARDIOVASCULAR: Regular rate and rhythm .  MUSCULOSKELETAL:  Bilateral ankle with soft tissue swelling, pulses palpable.  NEUROLOGIC: No sensory deficits. Gait & Posture: Normal gait and fine motion. No cerebellar signs.  MENTAL STATUS: Patient alert, oriented x 3 & conversant.    PLAN:  EKG  Consult ENT  Consult cardiology  Advise low-sodium diet  Advise no decongestants  Advise increase p.o. fluids--at least 64 ounces of water/juice & rest  Meds:  Flonase/no refills.  Cool mist humidifier/vaporizer.  Practice good handwashing.  Tylenol for fever, headache and body aches..  Advise follow up with PCP in 2- 3 days for recheck  Advise go to ER if nausea, vomiting, fever, increased pain, or fail to improve with treatment.  AVS provided and reviewed with patient including supportive care, follow up, and red flag symptoms.   Patient verbalizes understanding and agrees with treatment plan. Discharged from Urgent Care in stable condition.    Advise keep cardiology appointment  DIAGNOSIS:  Edema  Heart palpitations  Ringing in her ear  Essential hypertension

## 2022-07-09 NOTE — TELEPHONE ENCOUNTER
Patient c/o hypertension and blood pressure reading of 137/74, Pulse-90 and irregular heart rate. Patient states feelings of skipped or extra beats with exertion.    Care Advice given to visit an Urgent Care Center within the next 4 hours. Patient states understanding of Care Advice and cites no additional needs at this time.    Reason for Disposition   [1] Skipped or extra beat(s) AND [2] increases with exercise or exertion    Additional Information   Negative: Passed out (i.e., lost consciousness, collapsed and was not responding)   Negative: Shock suspected (e.g., cold/pale/clammy skin, too weak to stand, low BP, rapid pulse)   Negative: Difficult to awaken or acting confused (e.g., disoriented, slurred speech)   Negative: Visible sweat on face or sweat dripping down face   Negative: Unable to walk, or can only walk with assistance (e.g., requires support)   Negative: [1] Received SHOCK from implantable cardiac defibrillator AND [2] persisting symptoms (i.e., palpitations, lightheadedness)   Negative: [1] Dizziness, lightheadedness, or weakness AND [2] heart beating very rapidly (e.g., > 140 / minute)   Negative: [1] Dizziness, lightheadedness, or weakness AND [2] heart beating very slowly  (e.g., < 50 / minute)   Negative: Sounds like a life-threatening emergency to the triager   Negative: Implantable Cardiac Defibrillator (ICD) or a pacemaker symptoms or questions   Negative: Chest pain   Negative: Difficulty breathing   Negative: Dizziness, lightheadedness, or weakness   Negative: [1] Heart beating very rapidly (e.g., > 140 / minute) AND [2] present now  (Exception: during exercise)   Negative: Heart beating very slowly (e.g., < 50 / minute)  (Exception: athlete and heart rate normal for caller)   Negative: New or worsened shortness of breath with activity (dyspnea on exertion)   Negative: Patient sounds very sick or weak to the triager   Negative: [1] Heart beating very rapidly (e.g., > 140  / minute) AND [2] not present now  (Exception: during exercise)    Protocols used: HEART RATE AND HEARTBEAT [id number]

## 2022-07-09 NOTE — PATIENT INSTRUCTIONS
PLAN:  EKG  Consult ENT  Consult cardiology  Advise low-sodium diet  Advise no decongestants  Advise increase p.o. fluids--at least 64 ounces of water/juice & rest  Meds:  Flonase/no refills.  Cool mist humidifier/vaporizer.  Practice good handwashing.  Tylenol for fever, headache and body aches..  Advise follow up with PCP in 2- 3 days for recheck  Advise go to ER if nausea, vomiting, fever, increased pain, or fail to improve with treatment.  AVS provided and reviewed with patient including supportive care, follow up, and red flag symptoms.   Patient verbalizes understanding and agrees with treatment plan. Discharged from Urgent Care in stable condition.

## 2022-07-11 ENCOUNTER — HOSPITAL ENCOUNTER (OUTPATIENT)
Dept: CARDIOLOGY | Facility: HOSPITAL | Age: 72
Discharge: HOME | End: 2022-07-11
Attending: STUDENT IN AN ORGANIZED HEALTH CARE EDUCATION/TRAINING PROGRAM
Payer: COMMERCIAL

## 2022-07-11 ENCOUNTER — OFFICE VISIT (OUTPATIENT)
Dept: CARDIOLOGY | Facility: CLINIC | Age: 72
End: 2022-07-11
Payer: COMMERCIAL

## 2022-07-11 VITALS
HEART RATE: 82 BPM | BODY MASS INDEX: 27.77 KG/M2 | HEIGHT: 65 IN | WEIGHT: 166.69 LBS | SYSTOLIC BLOOD PRESSURE: 140 MMHG | DIASTOLIC BLOOD PRESSURE: 80 MMHG | OXYGEN SATURATION: 98 %

## 2022-07-11 DIAGNOSIS — I10 PRIMARY HYPERTENSION: Primary | ICD-10-CM

## 2022-07-11 DIAGNOSIS — E78.2 MIXED HYPERLIPIDEMIA: ICD-10-CM

## 2022-07-11 DIAGNOSIS — I10 PRIMARY HYPERTENSION: ICD-10-CM

## 2022-07-11 DIAGNOSIS — G47.33 OSA ON CPAP: ICD-10-CM

## 2022-07-11 DIAGNOSIS — R00.2 PALPITATIONS: ICD-10-CM

## 2022-07-11 DIAGNOSIS — R73.03 PREDIABETES: ICD-10-CM

## 2022-07-11 DIAGNOSIS — R94.31 NONSPECIFIC ABNORMAL ELECTROCARDIOGRAM (ECG) (EKG): ICD-10-CM

## 2022-07-11 PROCEDURE — 99204 OFFICE O/P NEW MOD 45 MIN: CPT | Mod: S$GLB,,, | Performed by: STUDENT IN AN ORGANIZED HEALTH CARE EDUCATION/TRAINING PROGRAM

## 2022-07-11 PROCEDURE — 3008F BODY MASS INDEX DOCD: CPT | Mod: CPTII,S$GLB,, | Performed by: STUDENT IN AN ORGANIZED HEALTH CARE EDUCATION/TRAINING PROGRAM

## 2022-07-11 PROCEDURE — 3008F PR BODY MASS INDEX (BMI) DOCUMENTED: ICD-10-PCS | Mod: CPTII,S$GLB,, | Performed by: STUDENT IN AN ORGANIZED HEALTH CARE EDUCATION/TRAINING PROGRAM

## 2022-07-11 PROCEDURE — 1101F PR PT FALLS ASSESS DOC 0-1 FALLS W/OUT INJ PAST YR: ICD-10-PCS | Mod: CPTII,S$GLB,, | Performed by: STUDENT IN AN ORGANIZED HEALTH CARE EDUCATION/TRAINING PROGRAM

## 2022-07-11 PROCEDURE — 1126F PR PAIN SEVERITY QUANTIFIED, NO PAIN PRESENT: ICD-10-PCS | Mod: CPTII,S$GLB,, | Performed by: STUDENT IN AN ORGANIZED HEALTH CARE EDUCATION/TRAINING PROGRAM

## 2022-07-11 PROCEDURE — 99204 PR OFFICE/OUTPT VISIT, NEW, LEVL IV, 45-59 MIN: ICD-10-PCS | Mod: S$GLB,,, | Performed by: STUDENT IN AN ORGANIZED HEALTH CARE EDUCATION/TRAINING PROGRAM

## 2022-07-11 PROCEDURE — 99999 PR PBB SHADOW E&M-EST. PATIENT-LVL IV: CPT | Mod: PBBFAC,,, | Performed by: STUDENT IN AN ORGANIZED HEALTH CARE EDUCATION/TRAINING PROGRAM

## 2022-07-11 PROCEDURE — 3044F PR MOST RECENT HEMOGLOBIN A1C LEVEL <7.0%: ICD-10-PCS | Mod: CPTII,S$GLB,, | Performed by: STUDENT IN AN ORGANIZED HEALTH CARE EDUCATION/TRAINING PROGRAM

## 2022-07-11 PROCEDURE — 93005 ELECTROCARDIOGRAM TRACING: CPT

## 2022-07-11 PROCEDURE — 3066F PR DOCUMENTATION OF TREATMENT FOR NEPHROPATHY: ICD-10-PCS | Mod: CPTII,S$GLB,, | Performed by: STUDENT IN AN ORGANIZED HEALTH CARE EDUCATION/TRAINING PROGRAM

## 2022-07-11 PROCEDURE — 3066F NEPHROPATHY DOC TX: CPT | Mod: CPTII,S$GLB,, | Performed by: STUDENT IN AN ORGANIZED HEALTH CARE EDUCATION/TRAINING PROGRAM

## 2022-07-11 PROCEDURE — 3061F NEG MICROALBUMINURIA REV: CPT | Mod: CPTII,S$GLB,, | Performed by: STUDENT IN AN ORGANIZED HEALTH CARE EDUCATION/TRAINING PROGRAM

## 2022-07-11 PROCEDURE — 3288F PR FALLS RISK ASSESSMENT DOCUMENTED: ICD-10-PCS | Mod: CPTII,S$GLB,, | Performed by: STUDENT IN AN ORGANIZED HEALTH CARE EDUCATION/TRAINING PROGRAM

## 2022-07-11 PROCEDURE — 99999 PR PBB SHADOW E&M-EST. PATIENT-LVL IV: ICD-10-PCS | Mod: PBBFAC,,, | Performed by: STUDENT IN AN ORGANIZED HEALTH CARE EDUCATION/TRAINING PROGRAM

## 2022-07-11 PROCEDURE — 3079F PR MOST RECENT DIASTOLIC BLOOD PRESSURE 80-89 MM HG: ICD-10-PCS | Mod: CPTII,S$GLB,, | Performed by: STUDENT IN AN ORGANIZED HEALTH CARE EDUCATION/TRAINING PROGRAM

## 2022-07-11 PROCEDURE — 3077F SYST BP >= 140 MM HG: CPT | Mod: CPTII,S$GLB,, | Performed by: STUDENT IN AN ORGANIZED HEALTH CARE EDUCATION/TRAINING PROGRAM

## 2022-07-11 PROCEDURE — 3288F FALL RISK ASSESSMENT DOCD: CPT | Mod: CPTII,S$GLB,, | Performed by: STUDENT IN AN ORGANIZED HEALTH CARE EDUCATION/TRAINING PROGRAM

## 2022-07-11 PROCEDURE — 93010 ELECTROCARDIOGRAM REPORT: CPT | Mod: ,,, | Performed by: INTERNAL MEDICINE

## 2022-07-11 PROCEDURE — 1126F AMNT PAIN NOTED NONE PRSNT: CPT | Mod: CPTII,S$GLB,, | Performed by: STUDENT IN AN ORGANIZED HEALTH CARE EDUCATION/TRAINING PROGRAM

## 2022-07-11 PROCEDURE — 1159F PR MEDICATION LIST DOCUMENTED IN MEDICAL RECORD: ICD-10-PCS | Mod: CPTII,S$GLB,, | Performed by: STUDENT IN AN ORGANIZED HEALTH CARE EDUCATION/TRAINING PROGRAM

## 2022-07-11 PROCEDURE — 3077F PR MOST RECENT SYSTOLIC BLOOD PRESSURE >= 140 MM HG: ICD-10-PCS | Mod: CPTII,S$GLB,, | Performed by: STUDENT IN AN ORGANIZED HEALTH CARE EDUCATION/TRAINING PROGRAM

## 2022-07-11 PROCEDURE — 3079F DIAST BP 80-89 MM HG: CPT | Mod: CPTII,S$GLB,, | Performed by: STUDENT IN AN ORGANIZED HEALTH CARE EDUCATION/TRAINING PROGRAM

## 2022-07-11 PROCEDURE — 3061F PR NEG MICROALBUMINURIA RESULT DOCUMENTED/REVIEW: ICD-10-PCS | Mod: CPTII,S$GLB,, | Performed by: STUDENT IN AN ORGANIZED HEALTH CARE EDUCATION/TRAINING PROGRAM

## 2022-07-11 PROCEDURE — 93010 EKG 12-LEAD: ICD-10-PCS | Mod: ,,, | Performed by: INTERNAL MEDICINE

## 2022-07-11 PROCEDURE — 1101F PT FALLS ASSESS-DOCD LE1/YR: CPT | Mod: CPTII,S$GLB,, | Performed by: STUDENT IN AN ORGANIZED HEALTH CARE EDUCATION/TRAINING PROGRAM

## 2022-07-11 PROCEDURE — 3044F HG A1C LEVEL LT 7.0%: CPT | Mod: CPTII,S$GLB,, | Performed by: STUDENT IN AN ORGANIZED HEALTH CARE EDUCATION/TRAINING PROGRAM

## 2022-07-11 PROCEDURE — 1159F MED LIST DOCD IN RCRD: CPT | Mod: CPTII,S$GLB,, | Performed by: STUDENT IN AN ORGANIZED HEALTH CARE EDUCATION/TRAINING PROGRAM

## 2022-07-12 ENCOUNTER — TELEPHONE (OUTPATIENT)
Dept: URGENT CARE | Facility: CLINIC | Age: 72
End: 2022-07-12
Payer: COMMERCIAL

## 2022-07-14 ENCOUNTER — PATIENT MESSAGE (OUTPATIENT)
Dept: OPHTHALMOLOGY | Facility: CLINIC | Age: 72
End: 2022-07-14

## 2022-07-14 ENCOUNTER — OFFICE VISIT (OUTPATIENT)
Dept: OPHTHALMOLOGY | Facility: CLINIC | Age: 72
End: 2022-07-14
Payer: COMMERCIAL

## 2022-07-14 DIAGNOSIS — H52.4 BILATERAL PRESBYOPIA: ICD-10-CM

## 2022-07-14 DIAGNOSIS — R73.03 PREDIABETES: Primary | ICD-10-CM

## 2022-07-14 DIAGNOSIS — H25.13 CATARACT, NUCLEAR SCLEROTIC SENILE, BILATERAL: ICD-10-CM

## 2022-07-14 PROCEDURE — 92015 PR REFRACTION: ICD-10-PCS | Mod: S$GLB,,, | Performed by: OPTOMETRIST

## 2022-07-14 PROCEDURE — 3044F HG A1C LEVEL LT 7.0%: CPT | Mod: CPTII,S$GLB,, | Performed by: OPTOMETRIST

## 2022-07-14 PROCEDURE — 3066F PR DOCUMENTATION OF TREATMENT FOR NEPHROPATHY: ICD-10-PCS | Mod: CPTII,S$GLB,, | Performed by: OPTOMETRIST

## 2022-07-14 PROCEDURE — 3061F PR NEG MICROALBUMINURIA RESULT DOCUMENTED/REVIEW: ICD-10-PCS | Mod: CPTII,S$GLB,, | Performed by: OPTOMETRIST

## 2022-07-14 PROCEDURE — 92015 DETERMINE REFRACTIVE STATE: CPT | Mod: S$GLB,,, | Performed by: OPTOMETRIST

## 2022-07-14 PROCEDURE — 3044F PR MOST RECENT HEMOGLOBIN A1C LEVEL <7.0%: ICD-10-PCS | Mod: CPTII,S$GLB,, | Performed by: OPTOMETRIST

## 2022-07-14 PROCEDURE — 92014 PR EYE EXAM, EST PATIENT,COMPREHESV: ICD-10-PCS | Mod: S$GLB,,, | Performed by: OPTOMETRIST

## 2022-07-14 PROCEDURE — 99999 PR PBB SHADOW E&M-EST. PATIENT-LVL III: ICD-10-PCS | Mod: PBBFAC,,, | Performed by: OPTOMETRIST

## 2022-07-14 PROCEDURE — 99999 PR PBB SHADOW E&M-EST. PATIENT-LVL III: CPT | Mod: PBBFAC,,, | Performed by: OPTOMETRIST

## 2022-07-14 PROCEDURE — 1159F MED LIST DOCD IN RCRD: CPT | Mod: CPTII,S$GLB,, | Performed by: OPTOMETRIST

## 2022-07-14 PROCEDURE — 3066F NEPHROPATHY DOC TX: CPT | Mod: CPTII,S$GLB,, | Performed by: OPTOMETRIST

## 2022-07-14 PROCEDURE — 1159F PR MEDICATION LIST DOCUMENTED IN MEDICAL RECORD: ICD-10-PCS | Mod: CPTII,S$GLB,, | Performed by: OPTOMETRIST

## 2022-07-14 PROCEDURE — 3061F NEG MICROALBUMINURIA REV: CPT | Mod: CPTII,S$GLB,, | Performed by: OPTOMETRIST

## 2022-07-14 PROCEDURE — 92014 COMPRE OPH EXAM EST PT 1/>: CPT | Mod: S$GLB,,, | Performed by: OPTOMETRIST

## 2022-07-14 NOTE — PROGRESS NOTES
HPI     Pre-Diabetic   Blurred vision without glasses when reading.  Glasses loss using OTC readers.  Last eye exam 07/28/2021 TRF.  Update glasses RX.  Lab Results       Component                Value               Date                       HGBA1C                   5.8 (H)             04/05/2022                Last edited by Kristin Parsons MA on 7/14/2022  2:26 PM. (History)            Assessment /Plan     For exam results, see Encounter Report.    Prediabetes    Cataract, nuclear sclerotic senile, bilateral    Bilateral presbyopia      No Background Diabetic Retinopathy    Moderate cataracts OU, not surgical    Dispense Final Rx for glasses.  RTC 1 year  Discussed above and answered questions.

## 2022-07-15 ENCOUNTER — HOSPITAL ENCOUNTER (OUTPATIENT)
Dept: CARDIOLOGY | Facility: HOSPITAL | Age: 72
Discharge: HOME | End: 2022-07-15
Attending: STUDENT IN AN ORGANIZED HEALTH CARE EDUCATION/TRAINING PROGRAM
Payer: COMMERCIAL

## 2022-07-15 VITALS
DIASTOLIC BLOOD PRESSURE: 80 MMHG | WEIGHT: 166 LBS | SYSTOLIC BLOOD PRESSURE: 140 MMHG | BODY MASS INDEX: 27.66 KG/M2 | HEIGHT: 65 IN

## 2022-07-15 DIAGNOSIS — G47.33 OSA ON CPAP: ICD-10-CM

## 2022-07-15 DIAGNOSIS — I10 PRIMARY HYPERTENSION: ICD-10-CM

## 2022-07-15 DIAGNOSIS — E78.2 MIXED HYPERLIPIDEMIA: ICD-10-CM

## 2022-07-15 DIAGNOSIS — R00.2 PALPITATIONS: ICD-10-CM

## 2022-07-15 DIAGNOSIS — R73.03 PREDIABETES: ICD-10-CM

## 2022-07-15 LAB
AORTIC ROOT ANNULUS: 2.44 CM
ASCENDING AORTA: 2.52 CM
AV INDEX (PROSTH): 0.93
AV MEAN GRADIENT: 4 MMHG
AV PEAK GRADIENT: 7 MMHG
AV VALVE AREA: 2.79 CM2
AV VELOCITY RATIO: 0.97
BSA FOR ECHO PROCEDURE: 1.86 M2
DOP CALC AO PEAK VEL: 1.33 M/S
DOP CALC AO VTI: 33.8 CM
DOP CALC LVOT AREA: 3 CM2
DOP CALC LVOT DIAMETER: 1.96 CM
DOP CALC LVOT PEAK VEL: 1.29 M/S
DOP CALC LVOT STROKE VOLUME: 94.39 CM3
DOP CALC RVOT PEAK VEL: 0.78 M/S
DOP CALC RVOT VTI: 20.4 CM
DOP CALCLVOT PEAK VEL VTI: 31.3 CM
E WAVE DECELERATION TIME: 141.23 MSEC
E/A RATIO: 1.23
E/E' RATIO: 7.83 M/S
EJECTION FRACTION: 60 %
IVC DIAMETER: 1.7 CM
IVRT: 71.36 MSEC
LA MAJOR: 5.25 CM
LA MINOR: 4.77 CM
LA WIDTH: 4 CM
LEFT ATRIUM VOLUME INDEX MOD: 28.8 ML/M2
LEFT ATRIUM VOLUME MOD: 52.79 CM3
LV LATERAL E/E' RATIO: 6.92 M/S
LV SEPTAL E/E' RATIO: 9 M/S
LVOT MG: 3.09 MMHG
LVOT MV: 0.81 CM/S
MV PEAK A VEL: 0.73 M/S
MV PEAK E VEL: 0.9 M/S
MV STENOSIS PRESSURE HALF TIME: 40.96 MS
MV VALVE AREA P 1/2 METHOD: 5.37 CM2
PISA TR MAX VEL: 2.66 M/S
PULM VEIN S/D RATIO: 1.65
PV MEAN GRADIENT: 1.45 MMHG
PV MV: 0.74 M/S
PV PEAK D VEL: 0.42 M/S
PV PEAK S VEL: 0.69 M/S
PV PEAK VELOCITY: 1.06 CM/S
RA MAJOR: 4.92 CM
RA PRESSURE: 3 MMHG
RA WIDTH: 3.94 CM
RIGHT VENTRICULAR END-DIASTOLIC DIMENSION: 3.6 CM
SINUS: 2.53 CM
STJ: 2.29 CM
TDI LATERAL: 0.13 M/S
TDI SEPTAL: 0.1 M/S
TDI: 0.12 M/S
TR MAX PG: 28 MMHG
TRICUSPID ANNULAR PLANE SYSTOLIC EXCURSION: 2.42 CM
TV REST PULMONARY ARTERY PRESSURE: 31 MMHG

## 2022-07-15 PROCEDURE — 93306 TTE W/DOPPLER COMPLETE: CPT

## 2022-07-15 PROCEDURE — 93248 EXT ECG>7D<15D REV&INTERPJ: CPT | Mod: ,,, | Performed by: STUDENT IN AN ORGANIZED HEALTH CARE EDUCATION/TRAINING PROGRAM

## 2022-07-15 PROCEDURE — 93306 ECHO (CUPID ONLY): ICD-10-PCS | Mod: 26,,, | Performed by: STUDENT IN AN ORGANIZED HEALTH CARE EDUCATION/TRAINING PROGRAM

## 2022-07-15 PROCEDURE — 93306 TTE W/DOPPLER COMPLETE: CPT | Mod: 26,,, | Performed by: STUDENT IN AN ORGANIZED HEALTH CARE EDUCATION/TRAINING PROGRAM

## 2022-07-15 PROCEDURE — 93248 CV CARDIAC MONITOR - 3-15 DAY ADULT (CUPID ONLY): ICD-10-PCS | Mod: ,,, | Performed by: STUDENT IN AN ORGANIZED HEALTH CARE EDUCATION/TRAINING PROGRAM

## 2022-07-15 PROCEDURE — 93246 EXT ECG>7D<15D RECORDING: CPT

## 2022-07-20 ENCOUNTER — PATIENT MESSAGE (OUTPATIENT)
Dept: CARDIOLOGY | Facility: CLINIC | Age: 72
End: 2022-07-20
Payer: COMMERCIAL

## 2022-07-25 ENCOUNTER — CLINICAL SUPPORT (OUTPATIENT)
Dept: AUDIOLOGY | Facility: CLINIC | Age: 72
End: 2022-07-25
Payer: COMMERCIAL

## 2022-07-25 ENCOUNTER — OFFICE VISIT (OUTPATIENT)
Dept: OTOLARYNGOLOGY | Facility: CLINIC | Age: 72
End: 2022-07-25
Payer: COMMERCIAL

## 2022-07-25 VITALS — BODY MASS INDEX: 28.17 KG/M2 | HEIGHT: 65 IN | WEIGHT: 169.06 LBS

## 2022-07-25 DIAGNOSIS — H69.92 ETD (EUSTACHIAN TUBE DYSFUNCTION), LEFT: ICD-10-CM

## 2022-07-25 DIAGNOSIS — H93.19 UNILATERAL SUBJECTIVE NONPULSATILE TINNITUS WITHOUT HEARING LOSS, OTOSCOPIC FINDING, NEUROLOGIC DEFICIT, OR HEAD TRAUMA: Primary | ICD-10-CM

## 2022-07-25 DIAGNOSIS — H90.3 SENSORINEURAL HEARING LOSS, BILATERAL: Primary | ICD-10-CM

## 2022-07-25 DIAGNOSIS — H93.12 SUBJECTIVE TINNITUS OF LEFT EAR: ICD-10-CM

## 2022-07-25 DIAGNOSIS — H90.3 SENSORINEURAL HEARING LOSS (SNHL) OF BOTH EARS: ICD-10-CM

## 2022-07-25 PROCEDURE — 3066F NEPHROPATHY DOC TX: CPT | Mod: CPTII,S$GLB,, | Performed by: STUDENT IN AN ORGANIZED HEALTH CARE EDUCATION/TRAINING PROGRAM

## 2022-07-25 PROCEDURE — 99204 PR OFFICE/OUTPT VISIT, NEW, LEVL IV, 45-59 MIN: ICD-10-PCS | Mod: S$GLB,,, | Performed by: STUDENT IN AN ORGANIZED HEALTH CARE EDUCATION/TRAINING PROGRAM

## 2022-07-25 PROCEDURE — 3066F PR DOCUMENTATION OF TREATMENT FOR NEPHROPATHY: ICD-10-PCS | Mod: CPTII,S$GLB,, | Performed by: STUDENT IN AN ORGANIZED HEALTH CARE EDUCATION/TRAINING PROGRAM

## 2022-07-25 PROCEDURE — 1126F AMNT PAIN NOTED NONE PRSNT: CPT | Mod: CPTII,S$GLB,, | Performed by: STUDENT IN AN ORGANIZED HEALTH CARE EDUCATION/TRAINING PROGRAM

## 2022-07-25 PROCEDURE — 92557 PR COMPREHENSIVE HEARING TEST: ICD-10-PCS | Mod: S$GLB,,, | Performed by: AUDIOLOGIST-HEARING AID FITTER

## 2022-07-25 PROCEDURE — 1101F PR PT FALLS ASSESS DOC 0-1 FALLS W/OUT INJ PAST YR: ICD-10-PCS | Mod: CPTII,S$GLB,, | Performed by: STUDENT IN AN ORGANIZED HEALTH CARE EDUCATION/TRAINING PROGRAM

## 2022-07-25 PROCEDURE — 99999 PR PBB SHADOW E&M-EST. PATIENT-LVL I: CPT | Mod: PBBFAC,,, | Performed by: AUDIOLOGIST-HEARING AID FITTER

## 2022-07-25 PROCEDURE — 3061F PR NEG MICROALBUMINURIA RESULT DOCUMENTED/REVIEW: ICD-10-PCS | Mod: CPTII,S$GLB,, | Performed by: STUDENT IN AN ORGANIZED HEALTH CARE EDUCATION/TRAINING PROGRAM

## 2022-07-25 PROCEDURE — 99999 PR PBB SHADOW E&M-EST. PATIENT-LVL III: CPT | Mod: PBBFAC,,, | Performed by: STUDENT IN AN ORGANIZED HEALTH CARE EDUCATION/TRAINING PROGRAM

## 2022-07-25 PROCEDURE — 92550 TYMPANOMETRY & REFLEX THRESH: CPT | Mod: S$GLB,,, | Performed by: AUDIOLOGIST-HEARING AID FITTER

## 2022-07-25 PROCEDURE — 99999 PR PBB SHADOW E&M-EST. PATIENT-LVL I: ICD-10-PCS | Mod: PBBFAC,,, | Performed by: AUDIOLOGIST-HEARING AID FITTER

## 2022-07-25 PROCEDURE — 3288F PR FALLS RISK ASSESSMENT DOCUMENTED: ICD-10-PCS | Mod: CPTII,S$GLB,, | Performed by: STUDENT IN AN ORGANIZED HEALTH CARE EDUCATION/TRAINING PROGRAM

## 2022-07-25 PROCEDURE — 3044F PR MOST RECENT HEMOGLOBIN A1C LEVEL <7.0%: ICD-10-PCS | Mod: CPTII,S$GLB,, | Performed by: STUDENT IN AN ORGANIZED HEALTH CARE EDUCATION/TRAINING PROGRAM

## 2022-07-25 PROCEDURE — 3288F FALL RISK ASSESSMENT DOCD: CPT | Mod: CPTII,S$GLB,, | Performed by: STUDENT IN AN ORGANIZED HEALTH CARE EDUCATION/TRAINING PROGRAM

## 2022-07-25 PROCEDURE — 3044F HG A1C LEVEL LT 7.0%: CPT | Mod: CPTII,S$GLB,, | Performed by: STUDENT IN AN ORGANIZED HEALTH CARE EDUCATION/TRAINING PROGRAM

## 2022-07-25 PROCEDURE — 3008F BODY MASS INDEX DOCD: CPT | Mod: CPTII,S$GLB,, | Performed by: STUDENT IN AN ORGANIZED HEALTH CARE EDUCATION/TRAINING PROGRAM

## 2022-07-25 PROCEDURE — 1159F MED LIST DOCD IN RCRD: CPT | Mod: CPTII,S$GLB,, | Performed by: STUDENT IN AN ORGANIZED HEALTH CARE EDUCATION/TRAINING PROGRAM

## 2022-07-25 PROCEDURE — 1101F PT FALLS ASSESS-DOCD LE1/YR: CPT | Mod: CPTII,S$GLB,, | Performed by: STUDENT IN AN ORGANIZED HEALTH CARE EDUCATION/TRAINING PROGRAM

## 2022-07-25 PROCEDURE — 92550 PR TYMPANOMETRY AND REFLEX THRESHOLD MEASUREMENTS: ICD-10-PCS | Mod: S$GLB,,, | Performed by: AUDIOLOGIST-HEARING AID FITTER

## 2022-07-25 PROCEDURE — 3061F NEG MICROALBUMINURIA REV: CPT | Mod: CPTII,S$GLB,, | Performed by: STUDENT IN AN ORGANIZED HEALTH CARE EDUCATION/TRAINING PROGRAM

## 2022-07-25 PROCEDURE — 3008F PR BODY MASS INDEX (BMI) DOCUMENTED: ICD-10-PCS | Mod: CPTII,S$GLB,, | Performed by: STUDENT IN AN ORGANIZED HEALTH CARE EDUCATION/TRAINING PROGRAM

## 2022-07-25 PROCEDURE — 1126F PR PAIN SEVERITY QUANTIFIED, NO PAIN PRESENT: ICD-10-PCS | Mod: CPTII,S$GLB,, | Performed by: STUDENT IN AN ORGANIZED HEALTH CARE EDUCATION/TRAINING PROGRAM

## 2022-07-25 PROCEDURE — 92557 COMPREHENSIVE HEARING TEST: CPT | Mod: S$GLB,,, | Performed by: AUDIOLOGIST-HEARING AID FITTER

## 2022-07-25 PROCEDURE — 99999 PR PBB SHADOW E&M-EST. PATIENT-LVL III: ICD-10-PCS | Mod: PBBFAC,,, | Performed by: STUDENT IN AN ORGANIZED HEALTH CARE EDUCATION/TRAINING PROGRAM

## 2022-07-25 PROCEDURE — 1159F PR MEDICATION LIST DOCUMENTED IN MEDICAL RECORD: ICD-10-PCS | Mod: CPTII,S$GLB,, | Performed by: STUDENT IN AN ORGANIZED HEALTH CARE EDUCATION/TRAINING PROGRAM

## 2022-07-25 PROCEDURE — 99204 OFFICE O/P NEW MOD 45 MIN: CPT | Mod: S$GLB,,, | Performed by: STUDENT IN AN ORGANIZED HEALTH CARE EDUCATION/TRAINING PROGRAM

## 2022-07-25 NOTE — PATIENT INSTRUCTIONS
Tinnitus (ringing in the ears)  Tinnitus is the perception of sound when no actual external noise is present. While it is commonly referred to as ringing in the ears, tinnitus can manifest many different perceptions of sound, including buzzing, hissing, whistling, swooshing, and clicking.     Preventative measures to help prevent and minimize tinnitus include:     Reduce exposure to extremely loud noise  Avoid total silence  Decrease salt intake  Monitor one's blood pressure  Avoid stimulants such as caffeine and nicotine  Exercise  Reduce fatigue  Manage stress    Sound Therapy: Sound therapy is a broad term that may be used in many ways. In general, sound therapy means the use of external noise in order to alter your perception of tinnitus. Like other tinnitus treatments, sound therapies do not cure the condition, but they may significantly lower the burden and intensity of tinnitus. Some options for sound therapy include:    Portable sound generator/sound machines: these can be purchased at certain electronic suppliers. They can produce soothing sounds that help to dampen your brain's recognition of the tinnitus.   Example of a portable and affordable sound machine: https://[employer].CINEPASS/Portable-Relaxing-Soothing-Charging-Auto-Off/dp/Q82H0QSMVX/ref=pd_lpo_[email or web address]?pd_rd_i=[email or web address]&psc=1    Home masking: such as the use of electric fans, radios or television to dampen the tinnitus.    Music therapy: Many patients find that music, particularly classical passages that don't contain wide variations in loudness (ampliltude) can be both soothing to the limbic system (the emotional processor in the brain that is commonly negatively linked to a patient's reaction to tinnitus) and stimulating to the auditory cortex. There are several Apps available that have preselected music that can help with tinnitus. Some examples include: Audio Care, Faison, Beltone Tinnitus Calmer, and MyNoise, among others.    Amplification: The use  of hearing aids and a combination of hearing aids and maskers are often effective ways to minimize tinnitus. While it is not clear whether hearing aids help by amplifying background sounds that can mask out the tinnitus or by actually altering the production of tinnitus, most hearing aid wearers report at least some reduction in their tinnitus. This may be due to the reduction in contrast between tinnitus and silence, or because of the new stimulation provided to the brain. This an especially helpful option when the patient also suffers from hearing loss as the hearing aids may simultaneously improving the hearing and tinnitus.    Tinnitus Retraining Therapy (TRT) does exist, but is not offered by any providers in our state. If all other measures fail and your tinnitus is debilitating it might be worth searching for TRT providers in nearby states. These techniques consist of two main components -- directive counseling and low level sound generators. Directive counseling provides intensive, individualized education regarding the causes and effects of tinnitus on the ear, the brain, and the coping mechanism. Low-level sound generators may help the brain relearn a pattern that will de-emphasize the importance of the tinnitus. These devices also may be helpful in desensitizing patients who are overly sensitive to sound.    There are also support groups that exist which can be helpful for some patients.     More helpful information can be found on the American Tinnitus Association website: https://www.elpidio.org/      Eustachian Tube Dysfunction  We had a long discussion regarding the anatomy and function of the eustachian tube. We discussed that the eustachian tube acts as a pump to keep the appropriate amount of pressure behind the ear drum.  We discussed using a nasal steroid spray (Flonase) to be used on a daily basis, and we discussed that it will take 2-3 weeks of daily use to achieve maximal effectiveness.

## 2022-07-25 NOTE — PROGRESS NOTES
Referring provider: Keya Rodriguez NP    Xin Taylor was seen 07/25/2022 for an audiological evaluation.  Patient complains of tinnitus. Onset of symptoms was gradual starting 1 year ago ago with stable course since that time. Patient describes the tinnitus as constant located in the left ear. The quality is described as medium range pitch. The pattern is nonpulsatile with an intensity that is medium. Patient describes her level of annoyance as minimally annoying. Associated symptoms include cracking sensation and pressure in the left ear (worse at night when lying down, only happened once or twice). She noted a period of increase in her tinnitus with increased pulse rate. She also reports tonal tinnitus bilaterally that has been present for longer time. Denies otalgia, otorrhea, vertigo. Patient has known HF-SNHL with her last audiogram in July 2020.     Results reveal a normal-to-severe sensorineural hearing loss 250-8000 Hz for the right ear, and a normal-to-moderate sensorineural hearing loss 250-8000 Hz for the left ear.   Speech Reception Thresholds were 20 dBHL for the right ear and 15 dBHL for the left ear.   Word recognition scores were excellent for the right ear and excellent for the left ear.   Tympanograms were Type A for the right ear and Type A for the left ear. Acoustic reflexes were normal for each ear at 500-2000 Hz and absent for each ear at 4000 Hz, in agreement with sensorineural hearing loss.     Patient was counseled on the above findings.    Recommendations:  1. ENT at completion of this visit.

## 2022-07-25 NOTE — PROGRESS NOTES
Chief complaint:   Chief Complaint   Patient presents with    Tinnitus     Left side - x 1 year          Referring Provider:  Keya Rodriguez, Joe  [address] [place] [address]    History of Present Illness:     Ms. Taylor is a 71 y.o. female with HTN, palpitations presenting for evaluation of tinnitus.     Patient presents with tinnitus. Onset of symptoms was gradual starting 1 year ago ago with stable course since that time. Patient describes the tinnitus as constant located in the left ear. The quality is described as medium range pitch. The pattern is nonpulsatile with an intensity that is medium. Patient describes her level of annoyance as minimally annoying. Associated symptoms include cracking sensation and pressure in the left ear (worse at night when lying down, only happened once or twice). Denies otalgia, otorrhea, vertigo.     Previous treatments include staying hydrated.     The patient also denies pain deep within the ear, tenderness around the ear canal or pre-auricular area, or headaches.         Review of Systems     A complete 10 point ROS was completed and are positive as per above HPI.    Otherwise negative for fever, diplopia, chest pain, shortness of breath, vomiting, blood in urine, joint pain, skin rash, seizures and unusual bleeding.       History        Past Medical History:   Past Medical History:   Diagnosis Date    Anemia     Colon polyp     Edema     HLD (hyperlipidemia)     HTN (hypertension)     Palpitation     Tinnitus     .          Past Surgical History:  Past Surgical History:   Procedure Laterality Date    COLONOSCOPY N/A 4/22/2022    Procedure: COLONOSCOPY;  Surgeon: Priyanka Winkler MD;  Location: [facility];  Service: Endoscopy;  Laterality: N/A;    HYSTERECTOMY     .         Medications: Medication list was reviewed. She  has a current medication list which includes the following prescription(s): amlodipine, aspirin, atorvastatin, b complex  vitamins, co-enzyme q-10, ferrous sulfate, fish oil-omega-3 fatty acids, fluticasone propionate, meloxicam, multivitamin, turmeric root extract, valsartan-hydrochlorothiazide, and vitamin d, and the following Facility-Administered Medications: lactated ringers.         Allergies:   Review of patient's allergies indicates:   Allergen Reactions    No known drug allergies             Family history: family history includes Breast cancer in her paternal aunt; Cancer in her mother and unknown relative; Cataracts in her mother; Heart defect in her brother; Hypertension in her mother; Thyroid disease in her mother.         Social History          Alcohol use:  reports no history of alcohol use.            Tobacco:  reports that she has never smoked. She has never used smokeless tobacco.         Please see the patient's intake form for full details of past medical history, past surgical history, family history, social history and review of systems. ?This information was reviewed by me and noted.      Physical Examination     General: Well developed, well nourished, well hydrated. Verbal with a strong voice and not dysphonic.     Head/Face: Normocephalic, atraumatic. No scars or lesions. Facial musculature equal.     Eyes: No scleral icterus or conjunctival hemorrhage. EOMI. PERRLA.     Ears:     · Right ear: No gross deformity. EAC is clear of debris and erythema. The TM is intact with a pneumatized middle ear. No signs of retraction, fluid or infection.      · Left ear: No gross deformity. EAC is clear of debris and erythema. The TM is intact with a pneumatized middle ear. No signs of retraction, fluid or infection.       Neurologic: Moving all extremities without gross abnormality.CN II-XII grossly intact. House-Brackmann 1/6. No signs of nystagmus.      Psych: Alert and oriented to person, place, and time with an appropriate mood and affect.       Audiogram     Audiogram, 07/25/2022 was independently  reviewed      Imaging    I have independently reviewed the following imaging with the findings noted below:      CT sinus 2019  No middle ear effusion  Normal bony labyrinth   No IAC widening     Labs  A1c 5.8      Assessment     Unilateral tinnitus, left  Eustachian Tube Dysfunction, left  Physiologic bilateral pulsatile tinnitus       Unilateral tinnitus, constant, nonpulsatile >1 year  MRI IAC to r/o retrocohclear lesion      Tinnitus (ringing in the ears)  The diagnosis and options of management were discussed at length with the patient including hearing function, hearing conservation, and tinnitus management. We spent a considerable amount of time discussing drug therapy, antioxidants, biofeedback, masking and sound therapy for tinnitus.     We discussed some preventative measures to help prevent and minimize tinnitus including:      Reduce exposure to extremely loud noise   Avoid total silence   Decrease salt intake   Monitor one's blood pressure   Avoid stimulants such as caffeine and nicotine   Exercise   Reduce fatigue   Manage stress    We also discussed different types of Sound Therapy. Sound therapy is a broad term that may be used in many ways, depending on the specific product, clinical setting, or individual clinician. In general, sound therapy means the use of external noise in order to alter your perception of, or reaction to, tinnitus. Like other tinnitus treatments, sound therapies do not cure the condition, but they may significantly lower the perceived burden and intensity of tinnitus. Some options for sound therapy include:     Portable sound generator/sound machines: these can be purchased at certain electronic suppliers. They can produce soothing sounds that help to dampen your brain's recognition of the tinnitus.   Home masking: such as the use of electric fans, radios or television.   Music therapy: Many patients find that music, particularly classical passages that don't  contain wide variations in loudness (ampliltude) can be both soothing to the limbic system (the emotional processor in the brain that is commonly negatively linked to a patient's reaction to tinnitus) and stimulating to the auditory cortex. There are several Apps available that have preselected music that can help with tinnitus. Some examples include: Audio Care, Berwind, Beltone Tinnitus Calmer, and MyNoise, among others.   Amplification: The use of hearing aids and a combination of hearing aids and maskers are often effective ways to minimize tinnitus. While it is not clear whether hearing aids help by amplifying background sounds that can mask out the tinnitus or by actually altering the production of tinnitus, most hearing aid wearers report at least some reduction in their tinnitus. This may be due to the reduction in contrast between tinnitus and silence, or because of the new stimulation provided to the brain. This an especially helpful option when the patient also suffers from hearing loss as the hearing aids may simultaneously improving the hearing and tinnitus.    Formal Tinnitus Retraining Therapy (TRT) does exist, but is not offered by any providers in our state. If all other measures fail and your tinnitus is debilitating it might be worth searching for TRT providers in nearby states. These techniques consist of two main components -- directive counseling and low level sound generators. Directive counseling provides intensive, individualized education regarding the causes and effects of tinnitus on the ear, the brain, and the coping mechanism. Low-level sound generators may help the brain relearn a pattern that will de-emphasize the importance of the tinnitus. These devices also may be helpful in desensitizing patients who are overly sensitive to sound.    There are also support groups that exist which can be helpful for some patients.     More helpful information can be found ton the American Tinnitus  Association website: https://www.elpidio.org/    I recommend conservative preventative measures and sound therapy techniques listed above.      Eustachian Tube Dysfunction  We had a long discussion regarding the anatomy and function of the eustachian tube. We discussed that the eustachian tube acts as a pump to keep the appropriate amount of pressure behind the ear drum.  We discussed using a nasal steroid spray (Flonase) to be used on a daily basis, and we discussed that it will take 2-3 weeks of daily use to achieve maximal effectiveness.    High fz Sensorineural Hearing Loss  Repeat audio 1 year                Brad Mccabe MD  [facility]   [facility]  [id number] [address] [facility].  [place] [address]  P: [phone]  F: [phone]

## 2022-08-01 ENCOUNTER — LAB VISIT (OUTPATIENT)
Dept: LAB | Facility: HOSPITAL | Age: 72
End: 2022-08-01
Attending: STUDENT IN AN ORGANIZED HEALTH CARE EDUCATION/TRAINING PROGRAM
Payer: COMMERCIAL

## 2022-08-01 DIAGNOSIS — H90.3 SENSORINEURAL HEARING LOSS (SNHL) OF BOTH EARS: ICD-10-CM

## 2022-08-01 LAB
CREAT SERPL-MCNC: 0.9 MG/DL (ref 0.5–1.4)
EST. GFR  (NO RACE VARIABLE): >60 ML/MIN/1.73 M^2

## 2022-08-01 PROCEDURE — 36415 COLL VENOUS BLD VENIPUNCTURE: CPT | Performed by: STUDENT IN AN ORGANIZED HEALTH CARE EDUCATION/TRAINING PROGRAM

## 2022-08-01 PROCEDURE — 82565 ASSAY OF CREATININE: CPT | Performed by: STUDENT IN AN ORGANIZED HEALTH CARE EDUCATION/TRAINING PROGRAM

## 2022-08-07 LAB
OHS CV HOLTER SINUS AVERAGE HR: 68
OHS CV HOLTER SINUS MAX HR: 147
OHS CV HOLTER SINUS MIN HR: 42

## 2022-08-23 ENCOUNTER — TELEPHONE (OUTPATIENT)
Dept: PULMONOLOGY | Facility: CLINIC | Age: 72
End: 2022-08-23
Payer: COMMERCIAL

## 2022-08-23 NOTE — TELEPHONE ENCOUNTER
----- Message from Sara Suárez sent at 8/23/2022 10:39 AM CDT -----  Pt is needing a call back regarding getting a new disc from the office if possible. Please call .[phone]

## 2022-08-24 ENCOUNTER — OFFICE VISIT (OUTPATIENT)
Dept: CARDIOLOGY | Facility: CLINIC | Age: 72
End: 2022-08-24
Payer: COMMERCIAL

## 2022-08-24 VITALS
HEART RATE: 92 BPM | OXYGEN SATURATION: 99 % | DIASTOLIC BLOOD PRESSURE: 82 MMHG | BODY MASS INDEX: 28.14 KG/M2 | SYSTOLIC BLOOD PRESSURE: 140 MMHG | HEIGHT: 65 IN | WEIGHT: 168.88 LBS

## 2022-08-24 DIAGNOSIS — I10 PRIMARY HYPERTENSION: Primary | ICD-10-CM

## 2022-08-24 DIAGNOSIS — R00.2 PALPITATIONS: ICD-10-CM

## 2022-08-24 DIAGNOSIS — E78.2 MIXED HYPERLIPIDEMIA: ICD-10-CM

## 2022-08-24 DIAGNOSIS — G47.33 OSA ON CPAP: ICD-10-CM

## 2022-08-24 DIAGNOSIS — R73.03 PREDIABETES: ICD-10-CM

## 2022-08-24 PROCEDURE — 1126F PR PAIN SEVERITY QUANTIFIED, NO PAIN PRESENT: ICD-10-PCS | Mod: CPTII,S$GLB,, | Performed by: STUDENT IN AN ORGANIZED HEALTH CARE EDUCATION/TRAINING PROGRAM

## 2022-08-24 PROCEDURE — 3008F BODY MASS INDEX DOCD: CPT | Mod: CPTII,S$GLB,, | Performed by: STUDENT IN AN ORGANIZED HEALTH CARE EDUCATION/TRAINING PROGRAM

## 2022-08-24 PROCEDURE — 1101F PR PT FALLS ASSESS DOC 0-1 FALLS W/OUT INJ PAST YR: ICD-10-PCS | Mod: CPTII,S$GLB,, | Performed by: STUDENT IN AN ORGANIZED HEALTH CARE EDUCATION/TRAINING PROGRAM

## 2022-08-24 PROCEDURE — 3077F PR MOST RECENT SYSTOLIC BLOOD PRESSURE >= 140 MM HG: ICD-10-PCS | Mod: CPTII,S$GLB,, | Performed by: STUDENT IN AN ORGANIZED HEALTH CARE EDUCATION/TRAINING PROGRAM

## 2022-08-24 PROCEDURE — 3044F HG A1C LEVEL LT 7.0%: CPT | Mod: CPTII,S$GLB,, | Performed by: STUDENT IN AN ORGANIZED HEALTH CARE EDUCATION/TRAINING PROGRAM

## 2022-08-24 PROCEDURE — 3008F PR BODY MASS INDEX (BMI) DOCUMENTED: ICD-10-PCS | Mod: CPTII,S$GLB,, | Performed by: STUDENT IN AN ORGANIZED HEALTH CARE EDUCATION/TRAINING PROGRAM

## 2022-08-24 PROCEDURE — 3061F NEG MICROALBUMINURIA REV: CPT | Mod: CPTII,S$GLB,, | Performed by: STUDENT IN AN ORGANIZED HEALTH CARE EDUCATION/TRAINING PROGRAM

## 2022-08-24 PROCEDURE — 1159F PR MEDICATION LIST DOCUMENTED IN MEDICAL RECORD: ICD-10-PCS | Mod: CPTII,S$GLB,, | Performed by: STUDENT IN AN ORGANIZED HEALTH CARE EDUCATION/TRAINING PROGRAM

## 2022-08-24 PROCEDURE — 99999 PR PBB SHADOW E&M-EST. PATIENT-LVL IV: ICD-10-PCS | Mod: PBBFAC,,, | Performed by: STUDENT IN AN ORGANIZED HEALTH CARE EDUCATION/TRAINING PROGRAM

## 2022-08-24 PROCEDURE — 3077F SYST BP >= 140 MM HG: CPT | Mod: CPTII,S$GLB,, | Performed by: STUDENT IN AN ORGANIZED HEALTH CARE EDUCATION/TRAINING PROGRAM

## 2022-08-24 PROCEDURE — 3044F PR MOST RECENT HEMOGLOBIN A1C LEVEL <7.0%: ICD-10-PCS | Mod: CPTII,S$GLB,, | Performed by: STUDENT IN AN ORGANIZED HEALTH CARE EDUCATION/TRAINING PROGRAM

## 2022-08-24 PROCEDURE — 99999 PR PBB SHADOW E&M-EST. PATIENT-LVL IV: CPT | Mod: PBBFAC,,, | Performed by: STUDENT IN AN ORGANIZED HEALTH CARE EDUCATION/TRAINING PROGRAM

## 2022-08-24 PROCEDURE — 3066F PR DOCUMENTATION OF TREATMENT FOR NEPHROPATHY: ICD-10-PCS | Mod: CPTII,S$GLB,, | Performed by: STUDENT IN AN ORGANIZED HEALTH CARE EDUCATION/TRAINING PROGRAM

## 2022-08-24 PROCEDURE — 3066F NEPHROPATHY DOC TX: CPT | Mod: CPTII,S$GLB,, | Performed by: STUDENT IN AN ORGANIZED HEALTH CARE EDUCATION/TRAINING PROGRAM

## 2022-08-24 PROCEDURE — 99214 OFFICE O/P EST MOD 30 MIN: CPT | Mod: S$GLB,,, | Performed by: STUDENT IN AN ORGANIZED HEALTH CARE EDUCATION/TRAINING PROGRAM

## 2022-08-24 PROCEDURE — 3288F FALL RISK ASSESSMENT DOCD: CPT | Mod: CPTII,S$GLB,, | Performed by: STUDENT IN AN ORGANIZED HEALTH CARE EDUCATION/TRAINING PROGRAM

## 2022-08-24 PROCEDURE — 1101F PT FALLS ASSESS-DOCD LE1/YR: CPT | Mod: CPTII,S$GLB,, | Performed by: STUDENT IN AN ORGANIZED HEALTH CARE EDUCATION/TRAINING PROGRAM

## 2022-08-24 PROCEDURE — 3079F DIAST BP 80-89 MM HG: CPT | Mod: CPTII,S$GLB,, | Performed by: STUDENT IN AN ORGANIZED HEALTH CARE EDUCATION/TRAINING PROGRAM

## 2022-08-24 PROCEDURE — 3061F PR NEG MICROALBUMINURIA RESULT DOCUMENTED/REVIEW: ICD-10-PCS | Mod: CPTII,S$GLB,, | Performed by: STUDENT IN AN ORGANIZED HEALTH CARE EDUCATION/TRAINING PROGRAM

## 2022-08-24 PROCEDURE — 99214 PR OFFICE/OUTPT VISIT, EST, LEVL IV, 30-39 MIN: ICD-10-PCS | Mod: S$GLB,,, | Performed by: STUDENT IN AN ORGANIZED HEALTH CARE EDUCATION/TRAINING PROGRAM

## 2022-08-24 PROCEDURE — 3288F PR FALLS RISK ASSESSMENT DOCUMENTED: ICD-10-PCS | Mod: CPTII,S$GLB,, | Performed by: STUDENT IN AN ORGANIZED HEALTH CARE EDUCATION/TRAINING PROGRAM

## 2022-08-24 PROCEDURE — 1159F MED LIST DOCD IN RCRD: CPT | Mod: CPTII,S$GLB,, | Performed by: STUDENT IN AN ORGANIZED HEALTH CARE EDUCATION/TRAINING PROGRAM

## 2022-08-24 PROCEDURE — 3079F PR MOST RECENT DIASTOLIC BLOOD PRESSURE 80-89 MM HG: ICD-10-PCS | Mod: CPTII,S$GLB,, | Performed by: STUDENT IN AN ORGANIZED HEALTH CARE EDUCATION/TRAINING PROGRAM

## 2022-08-24 PROCEDURE — 1126F AMNT PAIN NOTED NONE PRSNT: CPT | Mod: CPTII,S$GLB,, | Performed by: STUDENT IN AN ORGANIZED HEALTH CARE EDUCATION/TRAINING PROGRAM

## 2022-08-24 NOTE — PROGRESS NOTES
Section of Cardiology                  Cardiac Clinic Note    Chief Complaint/Reason for consultation: hypertension, palpitations       HPI:   Xin Taylor is a 71 y.o. female with h/o HTN, ASIF, HLD who comes in as a referral to cardiology clinic by PCP Dr. Carlson for evaluation.     7/11/22  About 2 weeks ago,had palpitations, started when she was going to the bathroom  symptoms   Would go away after resting, drinking water, raising her feet  Reports left ear tinnitus, has seen ENT, normal workup   Does not exercise regularly, no limitations with this   Worked in healthcare for many years, currently a     Denies tobacco or ETOH abuse  Family hx: mother- DM; brother- arteriosclerosis     Denies DM, CVA    Denies chest pain, SOB, dizziness, syncope           8/24/22  14 day monitor- no significant arrhythmias seen. Symptoms correlated ST. Average heart rate is normal at 68 bpm  Denies palpitations   Echo with normal EF  BP elevated today, but says normotensive BP at home have been more 120s-130s systolic, diastolic 60s  Has gained 2-3 lbs overall, but no major changes  Cycling 30 min 3 days a week     Denies chest pain, SOB, dizziness, syncope       EKG 7/11/22 NSR, possible LAE, minimal LVH criteria     ECHO  7/22  · The left ventricle is normal in size with normal systolic function.  · Normal left ventricular diastolic function.  · The estimated PA systolic pressure is 31 mmHg.  · Normal right ventricular size with normal right ventricular systolic function.  · Normal central venous pressure (3 mmHg).  · Moderate pulmonic regurgitation.  · Mild mitral regurgitation.  · Mild tricuspid regurgitation.  · The estimated ejection fraction is 60%.        STRESS TEST    [facility]      ROS: All 10 systems reviewed. Please refer to the HPI for pertinent positives. All other systems negative.     Past Medical History  Past Medical History:   Diagnosis Date    Anemia     Colon polyp     Edema      HLD (hyperlipidemia)     HTN (hypertension)     Palpitation     Tinnitus        Surgical History  Past Surgical History:   Procedure Laterality Date    COLONOSCOPY N/A 4/22/2022    Procedure: COLONOSCOPY;  Surgeon: Priyanka Winkler MD;  Location: [facility];  Service: Endoscopy;  Laterality: N/A;    HYSTERECTOMY            Allergies:   Review of patient's allergies indicates:   Allergen Reactions    No known drug allergies        Social History:  Social History     Socioeconomic History    Marital status: Legally    Occupational History    Occupation: retired   Tobacco Use    Smoking status: Never Smoker    Smokeless tobacco: Never Used   Substance and Sexual Activity    Alcohol use: No    Drug use: No    Sexual activity: Not Currently     Partners: Male     Social Determinants of Health     Financial Resource Strain: Low Risk     Difficulty of Paying Living Expenses: Not hard at all   Food Insecurity: No Food Insecurity    Worried About Running Out of Food in the Last Year: Never true    Ran Out of Food in the Last Year: Never true   Transportation Needs: No Transportation Needs    Lack of Transportation (Medical): No    Lack of Transportation (Non-Medical): No   Physical Activity: Insufficiently Active    Days of Exercise per Week: 3 days    Minutes of Exercise per Session: 30 min   Stress: No Stress Concern Present    Feeling of Stress : Not at all   Social Connections: Unknown    Frequency of Communication with Friends and Family: Three times a week    Frequency of Social Gatherings with Friends and Family: Twice a week    Active Member of Clubs or Organizations: Yes    Attends Club or Organization Meetings: More than 4 times per year    Marital Status:    Housing Stability: Low Risk     Unable to Pay for Housing in the Last Year: No    Number of Places Lived in the Last Year: 1    Unstable Housing in the Last Year: No       Family History:  family history  includes Breast cancer in her paternal aunt; Cancer in her mother and another family member; Cataracts in her mother; Heart defect in her brother; Hypertension in her mother; Thyroid disease in her mother.    Home Medications:  Current Outpatient Medications on File Prior to Visit   Medication Sig Dispense Refill    amLODIPine (NORVASC) 2.5 MG tablet TAKE 1 TABLET(2.5 MG) BY MOUTH EVERY DAY 90 tablet 3    aspirin 81 mg Tab Take 1 tablet by mouth every other day.      atorvastatin (LIPITOR) 10 MG tablet TAKE 1/2 TABLET BY MOUTH ONCE DAILY (Patient taking differently: Take 10 mg by mouth once daily.) 90 tablet 3    b complex vitamins tablet Take 1 tablet by mouth once daily.      co-enzyme Q-10 30 mg capsule Take 30 mg by mouth 3 (three) times daily.      ferrous sulfate 325 mg (65 mg iron) Tab tablet Take 1 tablet (325 mg total) by mouth once daily. Take with citrus 100 tablet 3    fish oil-omega-3 fatty acids 300-1,000 mg capsule Take 2 g by mouth once daily.      fluticasone propionate (FLONASE) 50 mcg/actuation nasal spray SHAKE LIQUID AND USE 2 SPRAYS(100 MCG) IN EACH NOSTRIL EVERY DAY 48 g 0    meloxicam (MOBIC) 15 MG tablet Take 1 tablet (15 mg total) by mouth once daily. (Patient taking differently: Take 15 mg by mouth daily as needed.) 90 tablet 1    multivitamin (THERAGRAN) per tablet Take 1 tablet by mouth once daily.      turmeric root extract 500 mg Cap Take 500 mg by mouth 2 (two) times daily. 100 capsule 4    valsartan-hydrochlorothiazide (DIOVAN-HCT) 160-25 mg per tablet TAKE 1 TABLET BY MOUTH EVERY DAY 90 tablet 3    vitamin D 1000 units Tab Take 185 mg by mouth once daily.       Current Facility-Administered Medications on File Prior to Visit   Medication Dose Route Frequency Provider Last Rate Last Admin    lactated ringers infusion   Intravenous Continuous Priyanka Winkler MD   Stopped at 04/22/22 1336       Physical exam:  BP (!) 140/82 (BP Location: Left arm, Patient Position:  "Sitting, BP Method: Medium (Manual))   Pulse 92   Ht 5' 5" (1.651 m)   Wt 76.6 kg (168 lb 14 oz)   LMP  (LMP Unknown)   SpO2 99%   BMI 28.10 kg/m²         General: Pt is a 71 y.o. year old female who is AAOx3, in NAD, is pleasant, well nourished, looks stated age  HEENT: PERRL, EOMI, Oral mucosa pink & moist  CVS  No abnormal cardiac pulsations noted on inspection. JVP not raised. The apical impulse is normal on palpation, and is located in the left 5th intercostal space in the mid - clavicular line. No palpable thrills or abnormal pulsations noted. RR, S1 - S2 heard, no murmurs, rubs or gallops appreciated.   PUL : CTA B/L. No wheezes/crackles heard   ABD : BS +, soft. No tenderness elicited   LE : No C/C/E. Distal Pulses palpable B/L         LABS:    Chemistry:   Lab Results   Component Value Date     04/05/2022    K 3.9 04/05/2022     04/05/2022    CO2 29 04/05/2022    BUN 21 04/05/2022    CREATININE 0.9 08/01/2022    CALCIUM 9.8 04/05/2022     Cardiac Markers: No results found for: CKTOTAL, CKMB, CKMBINDEX, TROPONINI  Cardiac Markers (Last 3): No results found for: CKTOTAL, CKMB, CKMBINDEX, TROPONINI  CBC:   Lab Results   Component Value Date    WBC 4.87 04/05/2022    HGB 11.3 (L) 04/05/2022    HCT 35.9 (L) 04/05/2022    MCV 96 04/05/2022     04/05/2022     Lipids:   Lab Results   Component Value Date    CHOL 200 (H) 04/05/2022    TRIG 50 04/05/2022    HDL 84 (H) 04/05/2022     Coagulation: No results found for: PT, INR, APTT        Assessment      1. Primary hypertension    2. Mixed hyperlipidemia    3. ASIF on CPAP    4. Prediabetes    5. Palpitations         Plan:    Palpitations  Resolved  14 day cardiac monitor- no significant arrhythmias   Echocardiogram with normal EF, mod DE, mild MR/TR    ASIF  Compliant with CPAP    HTN  Elevated, home BP log with essentially normotensive BP   Keep blood pressure log  Continue Diovan-hydrochlorothiazide in a.m., amlodipine in p.m.  Low-salt, " low-fat diet  Exercise as tolerated, at least 30 minutes daily    Pre diabetes  A1c 5.8  Continue therapy    HLD   as of 4/22  Continue statin        This note was prepared using voice recognition system and is likely to have sound alike errors that may have been overlooked even after proofreading.     I have reviewed all pertinent chart information.  Plans and recommendations have been formulated under my direct supervision. All questions answered and patient voiced understanding.   If symptoms persist go to the ED.    RTC in 6 months         Naz Martinez MD  Cardiology

## 2022-09-02 ENCOUNTER — HOSPITAL ENCOUNTER (OUTPATIENT)
Dept: RADIOLOGY | Facility: HOSPITAL | Age: 72
Discharge: HOME | End: 2022-09-02
Attending: STUDENT IN AN ORGANIZED HEALTH CARE EDUCATION/TRAINING PROGRAM
Payer: COMMERCIAL

## 2022-09-02 DIAGNOSIS — H93.19 UNILATERAL SUBJECTIVE NONPULSATILE TINNITUS WITHOUT HEARING LOSS, OTOSCOPIC FINDING, NEUROLOGIC DEFICIT, OR HEAD TRAUMA: ICD-10-CM

## 2022-09-02 PROCEDURE — 25500020 PHARM REV CODE 255: Performed by: STUDENT IN AN ORGANIZED HEALTH CARE EDUCATION/TRAINING PROGRAM

## 2022-09-02 PROCEDURE — 70553 MRI BRAIN STEM W/O & W/DYE: CPT | Mod: TC

## 2022-09-02 PROCEDURE — A9585 GADOBUTROL INJECTION: HCPCS | Performed by: STUDENT IN AN ORGANIZED HEALTH CARE EDUCATION/TRAINING PROGRAM

## 2022-09-02 RX ORDER — GADOBUTROL 604.72 MG/ML
10 INJECTION INTRAVENOUS
Status: COMPLETED | OUTPATIENT
Start: 2022-09-02 | End: 2022-09-02

## 2022-09-02 RX ADMIN — GADOBUTROL 7 ML: 604.72 INJECTION INTRAVENOUS at 11:09

## 2022-09-04 NOTE — PROGRESS NOTES
Please let the patient know her MRI was normal. The ringing is likely related to hear mild hearing loss. I recommend she continue the conservative measures for ringing we discussed and repeat a hearing test in about 1 year.     Thank you.

## 2022-09-06 ENCOUNTER — TELEPHONE (OUTPATIENT)
Dept: OTOLARYNGOLOGY | Facility: CLINIC | Age: 72
End: 2022-09-06
Payer: COMMERCIAL

## 2022-09-06 NOTE — TELEPHONE ENCOUNTER
Attempted to contact patient but no answer. No VM prompt. Will attempt to contact patient again today.     ----- Message from Brad Mccabe MD sent at 9/4/2022  6:22 AM CDT -----  Please let the patient know her MRI was normal. The ringing is likely related to hear mild hearing loss. I recommend she continue the conservative measures for ringing we discussed and repeat a hearing test in about 1 year.     Thank you.

## 2022-09-09 ENCOUNTER — TELEPHONE (OUTPATIENT)
Dept: OTOLARYNGOLOGY | Facility: CLINIC | Age: 72
End: 2022-09-09
Payer: COMMERCIAL

## 2022-09-09 NOTE — TELEPHONE ENCOUNTER
----- Message from Brandy Page sent at 9/9/2022  8:30 AM CDT -----  Contact: CARLEE GILES [[id number]]  .Type:  Patient Returning Call    Who Called:CARLEE GILES [[id number]]  Who Left Message for Patient:  Does the patient know what this is regarding?:  Would the patient rather a call back or a response via [facility]? call  Best Call Back Number:.[phone] ([place])    Additional Information:

## 2022-10-07 ENCOUNTER — PATIENT MESSAGE (OUTPATIENT)
Dept: PRIMARY CARE CLINIC | Facility: CLINIC | Age: 72
End: 2022-10-07
Payer: COMMERCIAL

## 2022-10-14 ENCOUNTER — OFFICE VISIT (OUTPATIENT)
Dept: PRIMARY CARE CLINIC | Facility: CLINIC | Age: 72
End: 2022-10-14
Payer: COMMERCIAL

## 2022-10-14 VITALS
TEMPERATURE: 98 F | SYSTOLIC BLOOD PRESSURE: 135 MMHG | BODY MASS INDEX: 26.63 KG/M2 | WEIGHT: 159.81 LBS | HEIGHT: 65 IN | HEART RATE: 93 BPM | DIASTOLIC BLOOD PRESSURE: 85 MMHG

## 2022-10-14 DIAGNOSIS — E78.49 OTHER HYPERLIPIDEMIA: ICD-10-CM

## 2022-10-14 DIAGNOSIS — I10 PRIMARY HYPERTENSION: Primary | ICD-10-CM

## 2022-10-14 PROCEDURE — 99214 PR OFFICE/OUTPT VISIT, EST, LEVL IV, 30-39 MIN: ICD-10-PCS | Mod: S$GLB,,, | Performed by: PHYSICIAN ASSISTANT

## 2022-10-14 PROCEDURE — 1101F PT FALLS ASSESS-DOCD LE1/YR: CPT | Mod: CPTII,S$GLB,, | Performed by: PHYSICIAN ASSISTANT

## 2022-10-14 PROCEDURE — 1159F MED LIST DOCD IN RCRD: CPT | Mod: CPTII,S$GLB,, | Performed by: PHYSICIAN ASSISTANT

## 2022-10-14 PROCEDURE — 3075F SYST BP GE 130 - 139MM HG: CPT | Mod: CPTII,S$GLB,, | Performed by: PHYSICIAN ASSISTANT

## 2022-10-14 PROCEDURE — 1160F RVW MEDS BY RX/DR IN RCRD: CPT | Mod: CPTII,S$GLB,, | Performed by: PHYSICIAN ASSISTANT

## 2022-10-14 PROCEDURE — 3061F NEG MICROALBUMINURIA REV: CPT | Mod: CPTII,S$GLB,, | Performed by: PHYSICIAN ASSISTANT

## 2022-10-14 PROCEDURE — 3044F HG A1C LEVEL LT 7.0%: CPT | Mod: CPTII,S$GLB,, | Performed by: PHYSICIAN ASSISTANT

## 2022-10-14 PROCEDURE — 99214 OFFICE O/P EST MOD 30 MIN: CPT | Mod: S$GLB,,, | Performed by: PHYSICIAN ASSISTANT

## 2022-10-14 PROCEDURE — 1159F PR MEDICATION LIST DOCUMENTED IN MEDICAL RECORD: ICD-10-PCS | Mod: CPTII,S$GLB,, | Performed by: PHYSICIAN ASSISTANT

## 2022-10-14 PROCEDURE — 1101F PR PT FALLS ASSESS DOC 0-1 FALLS W/OUT INJ PAST YR: ICD-10-PCS | Mod: CPTII,S$GLB,, | Performed by: PHYSICIAN ASSISTANT

## 2022-10-14 PROCEDURE — 99999 PR PBB SHADOW E&M-EST. PATIENT-LVL V: CPT | Mod: PBBFAC,,, | Performed by: PHYSICIAN ASSISTANT

## 2022-10-14 PROCEDURE — 3288F PR FALLS RISK ASSESSMENT DOCUMENTED: ICD-10-PCS | Mod: CPTII,S$GLB,, | Performed by: PHYSICIAN ASSISTANT

## 2022-10-14 PROCEDURE — 1160F PR REVIEW ALL MEDS BY PRESCRIBER/CLIN PHARMACIST DOCUMENTED: ICD-10-PCS | Mod: CPTII,S$GLB,, | Performed by: PHYSICIAN ASSISTANT

## 2022-10-14 PROCEDURE — 1126F AMNT PAIN NOTED NONE PRSNT: CPT | Mod: CPTII,S$GLB,, | Performed by: PHYSICIAN ASSISTANT

## 2022-10-14 PROCEDURE — 3079F PR MOST RECENT DIASTOLIC BLOOD PRESSURE 80-89 MM HG: ICD-10-PCS | Mod: CPTII,S$GLB,, | Performed by: PHYSICIAN ASSISTANT

## 2022-10-14 PROCEDURE — 3066F PR DOCUMENTATION OF TREATMENT FOR NEPHROPATHY: ICD-10-PCS | Mod: CPTII,S$GLB,, | Performed by: PHYSICIAN ASSISTANT

## 2022-10-14 PROCEDURE — 3066F NEPHROPATHY DOC TX: CPT | Mod: CPTII,S$GLB,, | Performed by: PHYSICIAN ASSISTANT

## 2022-10-14 PROCEDURE — 3061F PR NEG MICROALBUMINURIA RESULT DOCUMENTED/REVIEW: ICD-10-PCS | Mod: CPTII,S$GLB,, | Performed by: PHYSICIAN ASSISTANT

## 2022-10-14 PROCEDURE — 3288F FALL RISK ASSESSMENT DOCD: CPT | Mod: CPTII,S$GLB,, | Performed by: PHYSICIAN ASSISTANT

## 2022-10-14 PROCEDURE — 99999 PR PBB SHADOW E&M-EST. PATIENT-LVL V: ICD-10-PCS | Mod: PBBFAC,,, | Performed by: PHYSICIAN ASSISTANT

## 2022-10-14 PROCEDURE — 1126F PR PAIN SEVERITY QUANTIFIED, NO PAIN PRESENT: ICD-10-PCS | Mod: CPTII,S$GLB,, | Performed by: PHYSICIAN ASSISTANT

## 2022-10-14 PROCEDURE — 3079F DIAST BP 80-89 MM HG: CPT | Mod: CPTII,S$GLB,, | Performed by: PHYSICIAN ASSISTANT

## 2022-10-14 PROCEDURE — 3044F PR MOST RECENT HEMOGLOBIN A1C LEVEL <7.0%: ICD-10-PCS | Mod: CPTII,S$GLB,, | Performed by: PHYSICIAN ASSISTANT

## 2022-10-14 PROCEDURE — 3075F PR MOST RECENT SYSTOLIC BLOOD PRESS GE 130-139MM HG: ICD-10-PCS | Mod: CPTII,S$GLB,, | Performed by: PHYSICIAN ASSISTANT

## 2022-10-14 RX ORDER — ATORVASTATIN CALCIUM 10 MG/1
10 TABLET, FILM COATED ORAL DAILY
Qty: 90 TABLET | Refills: 3 | Status: SHIPPED | OUTPATIENT
Start: 2022-10-14 | End: 2023-11-06 | Stop reason: SDUPTHER

## 2022-10-14 NOTE — PROGRESS NOTES
"Subjective:      Patient ID: Xin Taylor is a 72 y.o. female.    Chief Complaint: Follow-up    Xin Taylor is a 72 y.o.female who presents to clinic visit to discuss medications.  Just wants to understand what possible side effects that could have with each of medicines.  Has read about medications and concerned about side effects that she could experience     Ringing/pulsating in ear - recently saw ENT   HTN - blood pressure readings reviewed with patient, discussed goals, questions about bp goals   HLD - want to discuss possible side effects of statin, dose was increased to 10 mg but not reflected on chart             Follow-up  Associated symptoms include arthralgias. Pertinent negatives include no chest pain, headaches, joint swelling, neck pain, vomiting or weakness.   Review of Systems   Constitutional:  Negative for activity change and unexpected weight change.   HENT:  Positive for rhinorrhea. Negative for hearing loss and trouble swallowing.    Eyes:  Negative for discharge and visual disturbance.   Respiratory:  Negative for chest tightness and wheezing.    Cardiovascular:  Positive for palpitations. Negative for chest pain.   Gastrointestinal:  Negative for blood in stool, constipation, diarrhea and vomiting.   Endocrine: Negative for polydipsia and polyuria.   Genitourinary:  Negative for difficulty urinating, dysuria, hematuria and menstrual problem.   Musculoskeletal:  Positive for arthralgias. Negative for joint swelling and neck pain.   Neurological:  Negative for weakness and headaches.   Psychiatric/Behavioral:  Negative for confusion and dysphoric mood.      Objective:   /85   Pulse 93   Temp 97.6 °F (36.4 °C)   Ht 5' 5" (1.651 m)   Wt 72.5 kg (159 lb 13.3 oz)   LMP  (LMP Unknown)   BMI 26.60 kg/m²   Physical Exam  Vitals reviewed.   Constitutional:       General: She is not in acute distress.     Appearance: She is well-developed. She is not ill-appearing, toxic-appearing or " diaphoretic.   HENT:      Head: Normocephalic and atraumatic.      Right Ear: External ear normal.      Left Ear: External ear normal.      Nose: Nose normal.   Cardiovascular:      Rate and Rhythm: Normal rate and regular rhythm.      Pulses:           Radial pulses are 2+ on the right side and 2+ on the left side.      Heart sounds: Normal heart sounds, S1 normal and S2 normal.   Pulmonary:      Effort: Pulmonary effort is normal. No tachypnea, bradypnea, accessory muscle usage or respiratory distress.      Breath sounds: Normal breath sounds. No decreased breath sounds, wheezing, rhonchi or rales.   Chest:      Chest wall: No tenderness.   Skin:     General: Skin is warm and dry.      Capillary Refill: Capillary refill takes less than 2 seconds.   Neurological:      Mental Status: She is alert and oriented to person, place, and time. She is not disoriented.      Cranial Nerves: No cranial nerve deficit.      Sensory: No sensory deficit.      Motor: No abnormal muscle tone.   Psychiatric:         Behavior: Behavior normal.     Assessment:      1. Primary hypertension    2. Other hyperlipidemia       Plan:   Primary hypertension  Comments:  chronic, reviewed blood pressure medications, how take meds, potential side effects of each med     Other hyperlipidemia  Comments:  chronic, reviewed lipitor, and possible side effects of lipitor     Other orders  -     atorvastatin (LIPITOR) 10 MG tablet; Take 1 tablet (10 mg total) by mouth once daily.  Dispense: 90 tablet; Refill: 3        I spent greater than 30 minutes between face to face time, chart review, coordination of care and documentation       Breann Emerson PA-C   Physician Assistant   [facility]

## 2022-10-17 ENCOUNTER — PATIENT MESSAGE (OUTPATIENT)
Dept: PRIMARY CARE CLINIC | Facility: CLINIC | Age: 72
End: 2022-10-17
Payer: COMMERCIAL

## 2022-10-17 ENCOUNTER — TELEPHONE (OUTPATIENT)
Dept: PRIMARY CARE CLINIC | Facility: CLINIC | Age: 72
End: 2022-10-17
Payer: COMMERCIAL

## 2022-10-17 NOTE — TELEPHONE ENCOUNTER
----- Message from Kirby Blanco sent at 10/17/2022  3:03 PM CDT -----  Contact: ria Denton is calling in regards to having orders for mammogram in the system . Please call her in regards to this matter .    Thank you    James Denton call back number is [phone]

## 2022-11-16 NOTE — PROGRESS NOTES
Subjective:       Patient ID: Xin Taylor is a 72 y.o. female.  Patient Active Problem List   Diagnosis    HTN (hypertension)    HLD (hyperlipidemia)    Anemia    Personal history of colonic polyps    Osteopenia    Mass of oral cavity    ASIF on CPAP    Prediabetes    Abnormal glucose     Immunization History   Administered Date(s) Administered    COVID-19, MRNA, LN-S, PF (Pfizer) (Purple Cap) 03/05/2021, 03/26/2021, 11/02/2021    Hepatitis A, Adult 11/13/2012, 05/13/2013    Hepatitis A, Pediatric/Adolescent, 2 Dose 05/13/2013    Influenza 10/29/2020    Influenza (FLUAD) - Quadrivalent - Adjuvanted - PF *Preferred* (65+) 10/29/2021, 11/17/2022    Influenza - High Dose - PF (65 years and older) 10/27/2015, 11/10/2016, 11/29/2017, 11/21/2018, 10/10/2019    Influenza - Intradermal - Quadrivalent - PF 11/13/2012    Influenza - Intradermal - Trivalent - PF 11/13/2012    Influenza - Quadrivalent - PF *Preferred* (6 months and older) 10/30/2013    Influenza - Trivalent (ADULT) 10/06/2008, 10/06/2009, 10/25/2010, 10/30/2013    Influenza Split 10/06/2008, 10/06/2009, 10/25/2010, 11/13/2012, 10/30/2013    Meningococcal Conjugate (MCV4P) 11/13/2012    Pneumococcal Conjugate - 13 Valent 01/27/2016    Pneumococcal Polysaccharide - 23 Valent 11/10/2016    Tdap 11/13/2012    Typhoid - ViCPs 11/09/2012    Yellow Fever 11/09/2012    Zoster 08/08/2012    Zoster Recombinant 04/29/2021, 06/29/2021     Social History     Tobacco Use   Smoking Status Never   Smokeless Tobacco Never     EPWORTH SLEEPINESS SCALE 11/17/2022   Sitting and reading 0   Watching TV 2   Sitting, inactive in a public place (e.g. a theatre or a meeting) 0   As a passenger in a car for an hour without a break 0   Lying down to rest in the afternoon when circumstances permit 0   Sitting and talking to someone 0   Sitting quietly after a lunch without alcohol 0   In a car, while stopped for a few minutes in traffic 1   Total score 3           Chief Complaint: Sleep  "Apnea    Xin Taylor comes to see me as a follow-up  Last visit was 09/224/2021  She has ASIF on APAP 6-14  CPAP affected by recall.   Has replacement  Residual AHI 3.0, some mask leak 12 min     [place] score 3  Her usage greater than 4 hours was 96.7 %.  She'll follow-up annually.  Information on equipment change given  Goals of CPAP discussed      Review of Systems   Constitutional: Negative.    HENT: Negative.     Eyes: Negative.    Respiratory: Negative.     Genitourinary: Negative.    Endocrine: endocrine negative    Musculoskeletal: Negative.    Skin: Negative.    Gastrointestinal: Negative.    Neurological: Negative.    Psychiatric/Behavioral: Negative.     All other systems reviewed and are negative.    Objective:       Vitals:    11/17/22 1115   BP: 132/86   Pulse: 98   Resp: 17   SpO2: 98%   Weight: 75.2 kg (165 lb 12.6 oz)   Height: 5' 5" (1.651 m)       Physical Exam   Constitutional: She is oriented to person, place, and time. She appears well-developed and well-nourished.   HENT:   Head: Normocephalic.   Nose: Nose normal.   Cardiovascular: Normal rate and regular rhythm.   Pulmonary/Chest: Normal expansion, symmetric chest wall expansion, effort normal and breath sounds normal.   Musculoskeletal:         General: Normal range of motion.      Cervical back: Normal range of motion.        Legs:    Neurological: She is alert and oriented to person, place, and time.   Skin: Skin is warm and dry.   Psychiatric: She has a normal mood and affect.   Nursing note and vitals reviewed.  Personal Diagnostic Review  DOWNLOAD    Compliance Information 10/16/2022 - 11/14/2022  Compliance Summary  10/16/2022 - 11/14/2022 (30 days)  Days with Device Usage 30 days  Days without Device Usage 0 days  Percent Days with Device Usage 100.0%  Cumulative Usage 10 days 4 hrs. 41 mins.  Maximum Usage (1 Day) 10 hrs. 46 mins. 11 secs.  Average Usage (All Days) 8 hrs. 9 mins. 22 secs.  Average Usage (Days Used) 8 hrs. 9 mins. " 22 secs.  Minimum Usage (1 Day) 2 hrs. 15 mins. 15 secs.  Percent of Days with Usage >= 4 Hours 96.7%  Percent of Days with Usage < 4 Hours 3.3%  Date Range  Total Blower Time 10 days 4 hrs. 41 mins. 17 secs.  Average AHI 2.6  Auto-CPAP Summary  Auto-CPAP Mean Pressure 9.0 cmH2O  Auto-CPAP Peak Average Pressure 13.1 cmH2O  Device Pressure <= 90% of Time 12.7 cmH2O  Average Time in Large Leak Per Day 15 mins. 24 secs.      CXR today   X-Ray Chest PA And Lateral  Narrative: EXAMINATION:  XR CHEST PA AND LATERAL    CLINICAL HISTORY:  Obstructive sleep apnea (adult) (pediatric)    TECHNIQUE:  PA and lateral views of the chest were performed.    COMPARISON:  10/1/20    FINDINGS:  The lungs are clear, with normal appearance of pulmonary vasculature and no pleural effusion or pneumothorax.    The cardiac silhouette is normal in size. The hilar and mediastinal contours are unremarkable.    Bones are intact.  Impression: No acute abnormality.    Electronically signed by: Michael Diaz  Date:    11/17/2022  Time:    11:04   Assessment:       Problem List Items Addressed This Visit       HTN (hypertension)     Stable on Norvasc         HLD (hyperlipidemia)     Stable on Lipitor         ASIF on CPAP - Primary     [place] score 3  Bed time: 11 pm  Wake time: 9 am  Usage > 4 hours was 96.7 %  Leak 15 min 24 sec  Average AHI 2.6  APAP 6-14    Has new Dream station 2 will switch out    Discussed therapeutic goals for positive airway pressure therapy(CPAP or BiPAP):   Ideal is usage 100% of nights for 6 - 8 hours per night.   Minimum usage is 70% of night for at least 4 hours per night used.  Mask choices discussed.  Patient expressed understanding. All Questions answered.           Relevant Orders    CPAP/BIPAP SUPPLIES    Prediabetes     Stable diet          Plan:       Patient using device and benefits    Avoid salt indiscretion    Follow up supplies, FLU shot, Download.    This note was prepared using voice recognition system and  is likely to have sound alike errors that may have been overlooked even after proof reading.  Please call me with any questions    TIME SPENT WITH PATIENT:     Time spent: 20 minutes in face to face  discussion concerning diagnosis, prognosis, review of lab and test results, benefits of treatment as well as management of disease, counseling of patient and coordination of care between various health  care providers . Greater than half the time spent was used for coordination of care and counseling of patient.     Vito Muse    Pulmonary/Critical care/Sleepmedicine

## 2022-11-17 ENCOUNTER — HOSPITAL ENCOUNTER (OUTPATIENT)
Dept: RADIOLOGY | Facility: HOSPITAL | Age: 72
Discharge: HOME | End: 2022-11-17
Attending: INTERNAL MEDICINE
Payer: COMMERCIAL

## 2022-11-17 ENCOUNTER — OFFICE VISIT (OUTPATIENT)
Dept: SLEEP MEDICINE | Facility: CLINIC | Age: 72
End: 2022-11-17
Payer: COMMERCIAL

## 2022-11-17 VITALS
OXYGEN SATURATION: 98 % | BODY MASS INDEX: 27.63 KG/M2 | DIASTOLIC BLOOD PRESSURE: 86 MMHG | HEART RATE: 98 BPM | WEIGHT: 165.81 LBS | SYSTOLIC BLOOD PRESSURE: 132 MMHG | HEIGHT: 65 IN | RESPIRATION RATE: 17 BRPM

## 2022-11-17 DIAGNOSIS — E78.2 MIXED HYPERLIPIDEMIA: ICD-10-CM

## 2022-11-17 DIAGNOSIS — G47.33 OSA ON CPAP: Primary | ICD-10-CM

## 2022-11-17 DIAGNOSIS — I10 PRIMARY HYPERTENSION: ICD-10-CM

## 2022-11-17 DIAGNOSIS — G47.33 OSA ON CPAP: ICD-10-CM

## 2022-11-17 DIAGNOSIS — R73.03 PREDIABETES: ICD-10-CM

## 2022-11-17 PROCEDURE — 3044F HG A1C LEVEL LT 7.0%: CPT | Mod: CPTII,S$GLB,, | Performed by: INTERNAL MEDICINE

## 2022-11-17 PROCEDURE — 99214 PR OFFICE/OUTPT VISIT, EST, LEVL IV, 30-39 MIN: ICD-10-PCS | Mod: 25,S$GLB,, | Performed by: INTERNAL MEDICINE

## 2022-11-17 PROCEDURE — 3075F SYST BP GE 130 - 139MM HG: CPT | Mod: CPTII,S$GLB,, | Performed by: INTERNAL MEDICINE

## 2022-11-17 PROCEDURE — 3066F PR DOCUMENTATION OF TREATMENT FOR NEPHROPATHY: ICD-10-PCS | Mod: CPTII,S$GLB,, | Performed by: INTERNAL MEDICINE

## 2022-11-17 PROCEDURE — 3288F FALL RISK ASSESSMENT DOCD: CPT | Mod: CPTII,S$GLB,, | Performed by: INTERNAL MEDICINE

## 2022-11-17 PROCEDURE — 1159F PR MEDICATION LIST DOCUMENTED IN MEDICAL RECORD: ICD-10-PCS | Mod: CPTII,S$GLB,, | Performed by: INTERNAL MEDICINE

## 2022-11-17 PROCEDURE — 1101F PT FALLS ASSESS-DOCD LE1/YR: CPT | Mod: CPTII,S$GLB,, | Performed by: INTERNAL MEDICINE

## 2022-11-17 PROCEDURE — 3079F DIAST BP 80-89 MM HG: CPT | Mod: CPTII,S$GLB,, | Performed by: INTERNAL MEDICINE

## 2022-11-17 PROCEDURE — 99214 OFFICE O/P EST MOD 30 MIN: CPT | Mod: 25,S$GLB,, | Performed by: INTERNAL MEDICINE

## 2022-11-17 PROCEDURE — 1101F PR PT FALLS ASSESS DOC 0-1 FALLS W/OUT INJ PAST YR: ICD-10-PCS | Mod: CPTII,S$GLB,, | Performed by: INTERNAL MEDICINE

## 2022-11-17 PROCEDURE — 3061F PR NEG MICROALBUMINURIA RESULT DOCUMENTED/REVIEW: ICD-10-PCS | Mod: CPTII,S$GLB,, | Performed by: INTERNAL MEDICINE

## 2022-11-17 PROCEDURE — 3008F BODY MASS INDEX DOCD: CPT | Mod: CPTII,S$GLB,, | Performed by: INTERNAL MEDICINE

## 2022-11-17 PROCEDURE — 71046 X-RAY EXAM CHEST 2 VIEWS: CPT | Mod: 26,,, | Performed by: RADIOLOGY

## 2022-11-17 PROCEDURE — 3075F PR MOST RECENT SYSTOLIC BLOOD PRESS GE 130-139MM HG: ICD-10-PCS | Mod: CPTII,S$GLB,, | Performed by: INTERNAL MEDICINE

## 2022-11-17 PROCEDURE — 90471 IMMUNIZATION ADMIN: CPT | Mod: S$GLB,,, | Performed by: INTERNAL MEDICINE

## 2022-11-17 PROCEDURE — 3079F PR MOST RECENT DIASTOLIC BLOOD PRESSURE 80-89 MM HG: ICD-10-PCS | Mod: CPTII,S$GLB,, | Performed by: INTERNAL MEDICINE

## 2022-11-17 PROCEDURE — 1159F MED LIST DOCD IN RCRD: CPT | Mod: CPTII,S$GLB,, | Performed by: INTERNAL MEDICINE

## 2022-11-17 PROCEDURE — 99999 PR PBB SHADOW E&M-EST. PATIENT-LVL V: ICD-10-PCS | Mod: PBBFAC,,, | Performed by: INTERNAL MEDICINE

## 2022-11-17 PROCEDURE — 90471 FLU VACCINE - QUADRIVALENT - ADJUVANTED: ICD-10-PCS | Mod: S$GLB,,, | Performed by: INTERNAL MEDICINE

## 2022-11-17 PROCEDURE — 71046 X-RAY EXAM CHEST 2 VIEWS: CPT | Mod: TC

## 2022-11-17 PROCEDURE — 3288F PR FALLS RISK ASSESSMENT DOCUMENTED: ICD-10-PCS | Mod: CPTII,S$GLB,, | Performed by: INTERNAL MEDICINE

## 2022-11-17 PROCEDURE — 3061F NEG MICROALBUMINURIA REV: CPT | Mod: CPTII,S$GLB,, | Performed by: INTERNAL MEDICINE

## 2022-11-17 PROCEDURE — 99999 PR PBB SHADOW E&M-EST. PATIENT-LVL V: CPT | Mod: PBBFAC,,, | Performed by: INTERNAL MEDICINE

## 2022-11-17 PROCEDURE — 3044F PR MOST RECENT HEMOGLOBIN A1C LEVEL <7.0%: ICD-10-PCS | Mod: CPTII,S$GLB,, | Performed by: INTERNAL MEDICINE

## 2022-11-17 PROCEDURE — 3066F NEPHROPATHY DOC TX: CPT | Mod: CPTII,S$GLB,, | Performed by: INTERNAL MEDICINE

## 2022-11-17 PROCEDURE — 90694 VACC AIIV4 NO PRSRV 0.5ML IM: CPT | Mod: S$GLB,,, | Performed by: INTERNAL MEDICINE

## 2022-11-17 PROCEDURE — 3008F PR BODY MASS INDEX (BMI) DOCUMENTED: ICD-10-PCS | Mod: CPTII,S$GLB,, | Performed by: INTERNAL MEDICINE

## 2022-11-17 PROCEDURE — 71046 XR CHEST PA AND LATERAL: ICD-10-PCS | Mod: 26,,, | Performed by: RADIOLOGY

## 2022-11-17 PROCEDURE — 90694 FLU VACCINE - QUADRIVALENT - ADJUVANTED: ICD-10-PCS | Mod: S$GLB,,, | Performed by: INTERNAL MEDICINE

## 2022-11-17 NOTE — ASSESSMENT & PLAN NOTE
[place] score 3  Bed time: 11 pm  Wake time: 9 am  Usage > 4 hours was 96.7 %  Leak 15 min 24 sec  Average AHI 2.6  APAP 6-14    Has new Dream station 2 will switch out    Discussed therapeutic goals for positive airway pressure therapy(CPAP or BiPAP):   Ideal is usage 100% of nights for 6 - 8 hours per night.   Minimum usage is 70% of night for at least 4 hours per night used.  Mask choices discussed.  Patient expressed understanding. All Questions answered.

## 2022-12-14 DIAGNOSIS — Z12.31 OTHER SCREENING MAMMOGRAM: ICD-10-CM

## 2022-12-20 ENCOUNTER — PATIENT MESSAGE (OUTPATIENT)
Dept: PRIMARY CARE CLINIC | Facility: CLINIC | Age: 72
End: 2022-12-20
Payer: COMMERCIAL

## 2023-01-25 ENCOUNTER — HOSPITAL ENCOUNTER (OUTPATIENT)
Dept: RADIOLOGY | Facility: HOSPITAL | Age: 73
Discharge: HOME | End: 2023-01-25
Attending: FAMILY MEDICINE
Payer: COMMERCIAL

## 2023-01-25 DIAGNOSIS — Z12.31 OTHER SCREENING MAMMOGRAM: ICD-10-CM

## 2023-01-25 PROCEDURE — 77067 MAMMO DIGITAL SCREENING BILAT WITH TOMO: ICD-10-PCS | Mod: 26,,, | Performed by: RADIOLOGY

## 2023-01-25 PROCEDURE — 77067 SCR MAMMO BI INCL CAD: CPT | Mod: TC

## 2023-01-25 PROCEDURE — 77063 BREAST TOMOSYNTHESIS BI: CPT | Mod: 26,,, | Performed by: RADIOLOGY

## 2023-01-25 PROCEDURE — 77063 MAMMO DIGITAL SCREENING BILAT WITH TOMO: ICD-10-PCS | Mod: 26,,, | Performed by: RADIOLOGY

## 2023-01-25 PROCEDURE — 77067 SCR MAMMO BI INCL CAD: CPT | Mod: 26,,, | Performed by: RADIOLOGY

## 2023-01-30 NOTE — PROGRESS NOTES
Your mammogram is normal. You will need to repeat the exam again in 1-2 years. If you have further questions please let me know. Breann Carlson MD

## 2023-02-17 DIAGNOSIS — E78.2 MIXED HYPERLIPIDEMIA: ICD-10-CM

## 2023-02-17 DIAGNOSIS — I10 PRIMARY HYPERTENSION: Primary | ICD-10-CM

## 2023-02-20 ENCOUNTER — OFFICE VISIT (OUTPATIENT)
Dept: CARDIOLOGY | Facility: CLINIC | Age: 73
End: 2023-02-20
Payer: COMMERCIAL

## 2023-02-20 ENCOUNTER — HOSPITAL ENCOUNTER (OUTPATIENT)
Dept: CARDIOLOGY | Facility: HOSPITAL | Age: 73
Discharge: HOME | End: 2023-02-20
Attending: STUDENT IN AN ORGANIZED HEALTH CARE EDUCATION/TRAINING PROGRAM
Payer: COMMERCIAL

## 2023-02-20 VITALS
BODY MASS INDEX: 26.77 KG/M2 | SYSTOLIC BLOOD PRESSURE: 144 MMHG | WEIGHT: 160.69 LBS | HEIGHT: 65 IN | HEART RATE: 83 BPM | OXYGEN SATURATION: 99 % | DIASTOLIC BLOOD PRESSURE: 84 MMHG

## 2023-02-20 DIAGNOSIS — G47.33 OSA ON CPAP: ICD-10-CM

## 2023-02-20 DIAGNOSIS — I10 PRIMARY HYPERTENSION: ICD-10-CM

## 2023-02-20 DIAGNOSIS — R00.2 PALPITATIONS: ICD-10-CM

## 2023-02-20 DIAGNOSIS — E78.2 MIXED HYPERLIPIDEMIA: Primary | ICD-10-CM

## 2023-02-20 DIAGNOSIS — R73.03 PREDIABETES: ICD-10-CM

## 2023-02-20 DIAGNOSIS — E78.2 MIXED HYPERLIPIDEMIA: ICD-10-CM

## 2023-02-20 DIAGNOSIS — R94.31 NONSPECIFIC ABNORMAL ELECTROCARDIOGRAM (ECG) (EKG): ICD-10-CM

## 2023-02-20 PROCEDURE — 1101F PT FALLS ASSESS-DOCD LE1/YR: CPT | Mod: CPTII,S$GLB,, | Performed by: STUDENT IN AN ORGANIZED HEALTH CARE EDUCATION/TRAINING PROGRAM

## 2023-02-20 PROCEDURE — 93010 EKG 12-LEAD: ICD-10-PCS | Mod: ,,, | Performed by: INTERNAL MEDICINE

## 2023-02-20 PROCEDURE — 1126F PR PAIN SEVERITY QUANTIFIED, NO PAIN PRESENT: ICD-10-PCS | Mod: CPTII,S$GLB,, | Performed by: STUDENT IN AN ORGANIZED HEALTH CARE EDUCATION/TRAINING PROGRAM

## 2023-02-20 PROCEDURE — 93010 ELECTROCARDIOGRAM REPORT: CPT | Mod: ,,, | Performed by: INTERNAL MEDICINE

## 2023-02-20 PROCEDURE — 1159F PR MEDICATION LIST DOCUMENTED IN MEDICAL RECORD: ICD-10-PCS | Mod: CPTII,S$GLB,, | Performed by: STUDENT IN AN ORGANIZED HEALTH CARE EDUCATION/TRAINING PROGRAM

## 2023-02-20 PROCEDURE — 3079F PR MOST RECENT DIASTOLIC BLOOD PRESSURE 80-89 MM HG: ICD-10-PCS | Mod: CPTII,S$GLB,, | Performed by: STUDENT IN AN ORGANIZED HEALTH CARE EDUCATION/TRAINING PROGRAM

## 2023-02-20 PROCEDURE — 3008F PR BODY MASS INDEX (BMI) DOCUMENTED: ICD-10-PCS | Mod: CPTII,S$GLB,, | Performed by: STUDENT IN AN ORGANIZED HEALTH CARE EDUCATION/TRAINING PROGRAM

## 2023-02-20 PROCEDURE — 1159F MED LIST DOCD IN RCRD: CPT | Mod: CPTII,S$GLB,, | Performed by: STUDENT IN AN ORGANIZED HEALTH CARE EDUCATION/TRAINING PROGRAM

## 2023-02-20 PROCEDURE — 3079F DIAST BP 80-89 MM HG: CPT | Mod: CPTII,S$GLB,, | Performed by: STUDENT IN AN ORGANIZED HEALTH CARE EDUCATION/TRAINING PROGRAM

## 2023-02-20 PROCEDURE — 1101F PR PT FALLS ASSESS DOC 0-1 FALLS W/OUT INJ PAST YR: ICD-10-PCS | Mod: CPTII,S$GLB,, | Performed by: STUDENT IN AN ORGANIZED HEALTH CARE EDUCATION/TRAINING PROGRAM

## 2023-02-20 PROCEDURE — 99214 OFFICE O/P EST MOD 30 MIN: CPT | Mod: S$GLB,,, | Performed by: STUDENT IN AN ORGANIZED HEALTH CARE EDUCATION/TRAINING PROGRAM

## 2023-02-20 PROCEDURE — 99999 PR PBB SHADOW E&M-EST. PATIENT-LVL III: ICD-10-PCS | Mod: PBBFAC,,, | Performed by: STUDENT IN AN ORGANIZED HEALTH CARE EDUCATION/TRAINING PROGRAM

## 2023-02-20 PROCEDURE — 3077F SYST BP >= 140 MM HG: CPT | Mod: CPTII,S$GLB,, | Performed by: STUDENT IN AN ORGANIZED HEALTH CARE EDUCATION/TRAINING PROGRAM

## 2023-02-20 PROCEDURE — 3288F FALL RISK ASSESSMENT DOCD: CPT | Mod: CPTII,S$GLB,, | Performed by: STUDENT IN AN ORGANIZED HEALTH CARE EDUCATION/TRAINING PROGRAM

## 2023-02-20 PROCEDURE — 93005 ELECTROCARDIOGRAM TRACING: CPT

## 2023-02-20 PROCEDURE — 3008F BODY MASS INDEX DOCD: CPT | Mod: CPTII,S$GLB,, | Performed by: STUDENT IN AN ORGANIZED HEALTH CARE EDUCATION/TRAINING PROGRAM

## 2023-02-20 PROCEDURE — 99214 PR OFFICE/OUTPT VISIT, EST, LEVL IV, 30-39 MIN: ICD-10-PCS | Mod: S$GLB,,, | Performed by: STUDENT IN AN ORGANIZED HEALTH CARE EDUCATION/TRAINING PROGRAM

## 2023-02-20 PROCEDURE — 3288F PR FALLS RISK ASSESSMENT DOCUMENTED: ICD-10-PCS | Mod: CPTII,S$GLB,, | Performed by: STUDENT IN AN ORGANIZED HEALTH CARE EDUCATION/TRAINING PROGRAM

## 2023-02-20 PROCEDURE — 3077F PR MOST RECENT SYSTOLIC BLOOD PRESSURE >= 140 MM HG: ICD-10-PCS | Mod: CPTII,S$GLB,, | Performed by: STUDENT IN AN ORGANIZED HEALTH CARE EDUCATION/TRAINING PROGRAM

## 2023-02-20 PROCEDURE — 99999 PR PBB SHADOW E&M-EST. PATIENT-LVL III: CPT | Mod: PBBFAC,,, | Performed by: STUDENT IN AN ORGANIZED HEALTH CARE EDUCATION/TRAINING PROGRAM

## 2023-02-20 PROCEDURE — 1126F AMNT PAIN NOTED NONE PRSNT: CPT | Mod: CPTII,S$GLB,, | Performed by: STUDENT IN AN ORGANIZED HEALTH CARE EDUCATION/TRAINING PROGRAM

## 2023-02-20 RX ORDER — NEBIVOLOL 2.5 MG/1
2.5 TABLET ORAL DAILY
Qty: 30 TABLET | Refills: 11 | Status: SHIPPED | OUTPATIENT
Start: 2023-02-20 | End: 2023-05-19

## 2023-02-20 NOTE — PROGRESS NOTES
"                Section of Cardiology                  Cardiac Clinic Note    Chief Complaint/Reason for consultation: hypertension, palpitations       HPI:   Xin Taylor is a 72 y.o. female with h/o HTN, ASIF, HLD who comes in as a referral to cardiology clinic by PCP Dr. Carlson for evaluation.     7/11/22  About 2 weeks ago,had palpitations, started when she was going to the bathroom  symptoms   Would go away after resting, drinking water, raising her feet  Reports left ear tinnitus, has seen ENT, normal workup   Does not exercise regularly, no limitations with this   Worked in healthcare for many years, currently a     Denies tobacco or ETOH abuse  Family hx: mother- DM; brother- arteriosclerosis     Denies DM, CVA    Denies chest pain, SOB, dizziness, syncope         8/24/22  14 day monitor- no significant arrhythmias seen. Symptoms correlated ST. Average heart rate is normal at 68 bpm  Denies palpitations   Echo with normal EF  BP elevated today, but says normotensive BP at home have been more 120s-130s systolic, diastolic 60s  Has gained 2-3 lbs overall, but no major changes  Cycling 30 min 3 days a week     Denies chest pain, SOB, dizziness, syncope       2/20/23  Had post nasal drip recently with a cough   2 weeks ago, had pain right below the breast, worse with breathing in, described as a "twinge", became positional, but one issue with deep breathing  Symptoms pretty much resolved  Still exercising with bicycle   BP high today, normotensive on prior visits  Reports ankle swelling     Denies SOB, chest pain       EKG 2/20/23 NSR, possible LAE, incomplete RBBB, miminal LVH  EKG 7/11/22 NSR, possible LAE, minimal LVH criteria     ECHO  7/22  The left ventricle is normal in size with normal systolic function.  Normal left ventricular diastolic function.  The estimated PA systolic pressure is 31 mmHg.  Normal right ventricular size with normal right ventricular systolic function.  Normal central " venous pressure (3 mmHg).  Moderate pulmonic regurgitation.  Mild mitral regurgitation.  Mild tricuspid regurgitation.  The estimated ejection fraction is 60%.        STRESS TEST    [facility]      ROS: All 10 systems reviewed. Please refer to the HPI for pertinent positives. All other systems negative.     Past Medical History  Past Medical History:   Diagnosis Date    Anemia     Colon polyp     Edema     HLD (hyperlipidemia)     HTN (hypertension)     Palpitation     Tinnitus        Surgical History  Past Surgical History:   Procedure Laterality Date    COLONOSCOPY N/A 04/22/2022    Procedure: COLONOSCOPY;  Surgeon: Priyanka Winkler MD;  Location: [facility];  Service: Endoscopy;  Laterality: N/A;    HYSTERECTOMY            Allergies:   Review of patient's allergies indicates:   Allergen Reactions    No known drug allergies        Social History:  Social History     Socioeconomic History    Marital status: Legally    Occupational History    Occupation: retired   Tobacco Use    Smoking status: Never    Smokeless tobacco: Never   Substance and Sexual Activity    Alcohol use: No    Drug use: No    Sexual activity: Not Currently     Partners: Male     Social Determinants of Health     Financial Resource Strain: Low Risk     Difficulty of Paying Living Expenses: Not hard at all   Food Insecurity: No Food Insecurity    Worried About Running Out of Food in the Last Year: Never true    Ran Out of Food in the Last Year: Never true   Transportation Needs: No Transportation Needs    Lack of Transportation (Medical): No    Lack of Transportation (Non-Medical): No   Physical Activity: Insufficiently Active    Days of Exercise per Week: 4 days    Minutes of Exercise per Session: 30 min   Stress: No Stress Concern Present    Feeling of Stress : Only a little   Social Connections: Unknown    Frequency of Communication with Friends and Family: More than three times a week    Frequency of Social Gatherings with Friends and  Family: More than three times a week    Active Member of Clubs or Organizations: Yes    Attends Club or Organization Meetings: More than 4 times per year    Marital Status:    Housing Stability: Low Risk     Unable to Pay for Housing in the Last Year: No    Number of Places Lived in the Last Year: 1    Unstable Housing in the Last Year: No       Family History:  family history includes Breast cancer in her paternal aunt; Cancer in her mother and another family member; Cataracts in her mother; Heart defect in her brother; Hypertension in her mother; Thyroid disease in her mother.    Home Medications:  Current Outpatient Medications on File Prior to Visit   Medication Sig Dispense Refill    aspirin 81 mg Tab Take 1 tablet by mouth every other day.      atorvastatin (LIPITOR) 10 MG tablet Take 1 tablet (10 mg total) by mouth once daily. 90 tablet 3    b complex vitamins tablet Take 1 tablet by mouth once daily.      co-enzyme Q-10 30 mg capsule Take 30 mg by mouth 3 (three) times daily.      ferrous sulfate 325 mg (65 mg iron) Tab tablet Take 1 tablet (325 mg total) by mouth once daily. Take with citrus 100 tablet 3    fish oil-omega-3 fatty acids 300-1,000 mg capsule Take 2 g by mouth once daily.      fluticasone propionate (FLONASE) 50 mcg/actuation nasal spray SHAKE LIQUID AND USE 2 SPRAYS(100 MCG) IN EACH NOSTRIL EVERY DAY 48 g 0    meloxicam (MOBIC) 15 MG tablet Take 1 tablet (15 mg total) by mouth once daily. (Patient taking differently: Take 15 mg by mouth daily as needed.) 90 tablet 1    multivitamin (THERAGRAN) per tablet Take 1 tablet by mouth once daily.      turmeric root extract 500 mg Cap Take 500 mg by mouth 2 (two) times daily. 100 capsule 4    valsartan-hydrochlorothiazide (DIOVAN-HCT) 160-25 mg per tablet TAKE 1 TABLET BY MOUTH EVERY DAY 90 tablet 3    vitamin D 1000 units Tab Take 185 mg by mouth once daily.      [DISCONTINUED] amLODIPine (NORVASC) 2.5 MG tablet TAKE 1 TABLET(2.5 MG) BY  "MOUTH EVERY DAY 90 tablet 3     Current Facility-Administered Medications on File Prior to Visit   Medication Dose Route Frequency Provider Last Rate Last Admin    lactated ringers infusion   Intravenous Continuous Priyanka Winkler MD   Stopped at 04/22/22 1336       Physical exam:  BP (!) 144/84 (BP Location: Left arm, Patient Position: Sitting, BP Method: Large (Manual))   Pulse 83   Ht 5' 5" (1.651 m)   Wt 72.9 kg (160 lb 11.5 oz)   LMP  (LMP Unknown)   SpO2 99%   BMI 26.74 kg/m²         General: Pt is a 72 y.o. year old female who is AAOx3, in NAD, is pleasant, well nourished, looks stated age  HEENT: PERRL, EOMI, Oral mucosa pink & moist  CVS  No abnormal cardiac pulsations noted on inspection. JVP not raised. The apical impulse is normal on palpation, and is located in the left 5th intercostal space in the mid - clavicular line. No palpable thrills or abnormal pulsations noted. RR, S1 - S2 heard, no murmurs, rubs or gallops appreciated.   PUL : CTA B/L. No wheezes/crackles heard   ABD : BS +, soft. No tenderness elicited   LE : No C/C/E. Distal Pulses palpable B/L         LABS:    Chemistry:   Lab Results   Component Value Date     04/05/2022    K 3.9 04/05/2022     04/05/2022    CO2 29 04/05/2022    BUN 21 04/05/2022    CREATININE 0.9 08/01/2022    CALCIUM 9.8 04/05/2022     Cardiac Markers: No results found for: CKTOTAL, CKMB, CKMBINDEX, TROPONINI  Cardiac Markers (Last 3): No results found for: CKTOTAL, CKMB, CKMBINDEX, TROPONINI  CBC:   Lab Results   Component Value Date    WBC 4.87 04/05/2022    HGB 11.3 (L) 04/05/2022    HCT 35.9 (L) 04/05/2022    MCV 96 04/05/2022     04/05/2022     Lipids:   Lab Results   Component Value Date    CHOL 200 (H) 04/05/2022    TRIG 50 04/05/2022    HDL 84 (H) 04/05/2022     Coagulation: No results found for: PT, INR, APTT        Assessment      1. Mixed hyperlipidemia    2. Primary hypertension    3. ASIF on CPAP    4. Prediabetes    5. " Palpitations    6. Nonspecific abnormal electrocardiogram (ECG) (EKG)           Plan:    Palpitations  Resolved  14 day cardiac monitor- no significant arrhythmias   Echocardiogram with normal EF, mod MI, mild MR/TR    ASIF  Compliant with CPAP    HTN  Elevated, normotensive on prior visits  Continue Diovan-hydrochlorothiazide in a.m.  Start bystolic 2.5 in p.m.  Stop amlodipine 2/2 ankle swelling   Low-salt, low-fat diet  Exercise as tolerated, at least 30 minutes daily    Pre diabetes  A1c 5.8  Continue therapy    HLD   as of 4/22  Continue statin  Repeat lipid panel today        This note was prepared using voice recognition system and is likely to have sound alike errors that may have been overlooked even after proofreading.     I have reviewed all pertinent chart information.  Plans and recommendations have been formulated under my direct supervision. All questions answered and patient voiced understanding.   If symptoms persist go to the ED.    RTC in 2 months         Naz Martinez MD  Cardiology

## 2023-02-23 ENCOUNTER — LAB VISIT (OUTPATIENT)
Dept: LAB | Facility: HOSPITAL | Age: 73
End: 2023-02-23
Attending: STUDENT IN AN ORGANIZED HEALTH CARE EDUCATION/TRAINING PROGRAM
Payer: COMMERCIAL

## 2023-02-23 ENCOUNTER — PATIENT MESSAGE (OUTPATIENT)
Dept: CARDIOLOGY | Facility: CLINIC | Age: 73
End: 2023-02-23
Payer: COMMERCIAL

## 2023-02-23 DIAGNOSIS — E78.2 MIXED HYPERLIPIDEMIA: ICD-10-CM

## 2023-02-23 DIAGNOSIS — I10 PRIMARY HYPERTENSION: ICD-10-CM

## 2023-02-23 DIAGNOSIS — R73.03 PREDIABETES: ICD-10-CM

## 2023-02-23 DIAGNOSIS — R94.31 NONSPECIFIC ABNORMAL ELECTROCARDIOGRAM (ECG) (EKG): ICD-10-CM

## 2023-02-23 DIAGNOSIS — R00.2 PALPITATIONS: ICD-10-CM

## 2023-02-23 DIAGNOSIS — G47.33 OSA ON CPAP: ICD-10-CM

## 2023-02-23 PROCEDURE — 80061 LIPID PANEL: CPT | Performed by: STUDENT IN AN ORGANIZED HEALTH CARE EDUCATION/TRAINING PROGRAM

## 2023-02-23 PROCEDURE — 36415 COLL VENOUS BLD VENIPUNCTURE: CPT | Performed by: STUDENT IN AN ORGANIZED HEALTH CARE EDUCATION/TRAINING PROGRAM

## 2023-02-23 PROCEDURE — 80053 COMPREHEN METABOLIC PANEL: CPT | Performed by: STUDENT IN AN ORGANIZED HEALTH CARE EDUCATION/TRAINING PROGRAM

## 2023-02-24 LAB
ALBUMIN SERPL BCP-MCNC: 3.8 G/DL (ref 3.5–5.2)
ALP SERPL-CCNC: 78 U/L (ref 55–135)
ALT SERPL W/O P-5'-P-CCNC: 16 U/L (ref 10–44)
ANION GAP SERPL CALC-SCNC: 8 MMOL/L (ref 8–16)
AST SERPL-CCNC: 24 U/L (ref 10–40)
BILIRUB SERPL-MCNC: 0.5 MG/DL (ref 0.1–1)
BUN SERPL-MCNC: 18 MG/DL (ref 8–23)
CALCIUM SERPL-MCNC: 9.7 MG/DL (ref 8.7–10.5)
CHLORIDE SERPL-SCNC: 102 MMOL/L (ref 95–110)
CHOLEST SERPL-MCNC: 184 MG/DL (ref 120–199)
CHOLEST/HDLC SERPL: 2.3 {RATIO} (ref 2–5)
CO2 SERPL-SCNC: 30 MMOL/L (ref 23–29)
CREAT SERPL-MCNC: 0.9 MG/DL (ref 0.5–1.4)
EST. GFR  (NO RACE VARIABLE): >60 ML/MIN/1.73 M^2
GLUCOSE SERPL-MCNC: 91 MG/DL (ref 70–110)
HDLC SERPL-MCNC: 80 MG/DL (ref 40–75)
HDLC SERPL: 43.5 % (ref 20–50)
LDLC SERPL CALC-MCNC: 93.4 MG/DL (ref 63–159)
NONHDLC SERPL-MCNC: 104 MG/DL
POTASSIUM SERPL-SCNC: 4 MMOL/L (ref 3.5–5.1)
PROT SERPL-MCNC: 7.1 G/DL (ref 6–8.4)
SODIUM SERPL-SCNC: 140 MMOL/L (ref 136–145)
TRIGL SERPL-MCNC: 53 MG/DL (ref 30–150)

## 2023-03-02 RX ORDER — AMLODIPINE BESYLATE 2.5 MG/1
2.5 TABLET ORAL
COMMUNITY
Start: 2023-02-27 | End: 2023-03-02 | Stop reason: SDUPTHER

## 2023-03-03 RX ORDER — AMLODIPINE BESYLATE 2.5 MG/1
2.5 TABLET ORAL DAILY
Qty: 30 TABLET | Refills: 11 | Status: SHIPPED | OUTPATIENT
Start: 2023-03-03 | End: 2023-04-24 | Stop reason: SDUPTHER

## 2023-03-28 ENCOUNTER — PATIENT MESSAGE (OUTPATIENT)
Dept: ADMINISTRATIVE | Facility: HOSPITAL | Age: 73
End: 2023-03-28
Payer: COMMERCIAL

## 2023-03-28 ENCOUNTER — PATIENT OUTREACH (OUTPATIENT)
Dept: ADMINISTRATIVE | Facility: HOSPITAL | Age: 73
End: 2023-03-28
Payer: COMMERCIAL

## 2023-03-28 NOTE — PROGRESS NOTES
Blood Pressure Report: Called Pt to obtain home blood pressure reading & schedule Primary Care Visit. Pt did not answer, Phone has continuous ring, unable to LVM. Portal msg sent.

## 2023-04-17 NOTE — ASSESSMENT & PLAN NOTE
Has neglected to see dentist, will make appt   Subjective    Mr. Diaz is 81 y.o. male    Chief Complaint: Elevated PSA    History of Present Illness    81-year-old male established patient followup to review results of prostate biopsy last week done for elevated PSA of 12.4.  PSA shows multifocal bilateral prostate cancer with Dori 9 disease right mid involving 90% of 3 of 3 cores, Dori 8 cancer at right base, and Dori 7 cancer in all other cores.  He denies bothersome LUTS, hematuria, or flank pain.  No bone pain.       PSA                 Date  12.400             3-23-23  8.030               2-3-22  4.810               8-4-21    The following portions of the patient's history were reviewed and updated as appropriate: allergies, current medications, past family history, past medical history, past social history, past surgical history and problem list.    Review of Systems      Current Outpatient Medications:   •  amLODIPine (NORVASC) 5 MG tablet, TAKE 1 TABLET BY MOUTH EVERY DAY FOR 30 DAYS, Disp: , Rfl:   •  levoFLOXacin (LEVAQUIN) 500 MG tablet, 1 tab by mouth the evening prior to prostate biopsy, Disp: 1 tablet, Rfl: 0  •  lisinopril-hydrochlorothiazide (PRINZIDE,ZESTORETIC) 20-25 MG per tablet, TAKE 1 TABLET BY MOUTH EVERY DAY FOR 90 DAYS, Disp: , Rfl:   •  sodium phosphate (FLEET) 7-19 GM/118ML enema, Use rectally the morning of prostate biopsy, Disp: 1 enema, Rfl: 0    No past medical history on file.    No past surgical history on file.    Social History     Socioeconomic History   • Marital status:    Tobacco Use   • Smoking status: Never   • Smokeless tobacco: Never   Vaping Use   • Vaping Use: Never used   Substance and Sexual Activity   • Alcohol use: Never   • Drug use: Never   • Sexual activity: Defer       No family history on file.    Objective    There were no vitals taken for this visit.    Physical Exam        Results for orders placed or performed in visit on 04/13/23   Tissue Pathology Exam    Specimen: A: Prostate; Tissue     B: Prostate; Tissue    C: Prostate; Tissue    D: Prostate; Tissue    E: Prostate; Tissue    F: Prostate; Tissue   Result Value Ref Range    Note to Patients       This report may contain a detailed description of human tissue sent by a health care provider to the laboratory for pathologic evaluation. The content of this report is essential for diagnosis and may provide important critical findings. This information may be unfamiliar to patients to review without a medical professional present. It is advised that the patient review this report in the presence of a health care provider who can answer questions and explain the results.      Case Report       Surgical Pathology Report                         Case: [id number]                                  Authorizing Provider:  Tommie Berrios MD      Collected:           04/13/2023 02:05 PM          Ordering Location:     [facility]     Received:            04/13/2023 02:06 PM                                 [facility] [place]                                                         Pathologist:           Alina Jiménez MD                                                         Specimens:   1) - Prostate, Left apex                                                                            2) - Prostate, Left mid                                                                             3) - Prostate, Left base                                                                            4) - Prostate, Right apex                                                                           5) - Prostate, Right mid                                                                             6) - Prostate, Right base                                                                  Final Diagnosis       Prostate biopsies:    1.  Left apex:  - Acinar adenocarcinoma, grade group 3 (Dori score 4+3 = 7).  - Involving 2 of 3 cores, 51 to 60% and 1 to 5% of  prostatic tissue.  - Percentage of pattern 4: 81 to 90%.    2.  Left mid:  - Acinar adenocarcinoma, grade group 3 (Dori score 4+3 = 7).  - Involving 2 of 2 cores, 51 to 60% of prostatic tissue per core.  - Percentage of pattern 4: 81 to 90%.    3.  Left base:  - Acinar adenocarcinoma, grade group 3 (Dori score 4+3 = 7).  - Involving 2 of 2 cores, 41-50% of prostatic tissue per core.  - Percentage of pattern 4: 71 to 80%.  - Perineural invasion identified.    4.  Right apex:  - Acinar adenocarcinoma, grade group 3 (Dori score 4+3 = 7).  - Involving 2 of 2 cores, 71 to 80% and 21 to 30% of prostatic tissue.  - Percentage of pattern 4: Greater than 90%.    5.  Right mid:  - Acinar adenocarcinoma, grade group 5 (Dori score 4+5 = 9).  - Involving 3 of 3 cores, greater than 90% of prostatic tissue per core.  - Percentage of pattern 5: 1 to 5%.    6.  Right base:  - Acinar adenocarcinoma, grade group 4 ([place] score 4+4 = 8).  - Involving 2 of 2 cores, 60 to 70% and 40 to 50% of prostatic tissue.  - Perineural invasion identified.      Gross Description       1. Prostate.  Received in formalin labeled with the patient's name, date of birth, and “left apex prostate needle biopsy”.  The specimen consists of 3 pink/gray soft tissue core biopsy measuring from 0.5 cm to in length by less than 0.1 cm in diameter.  The specimen is inked with hematoxylin and totally submitted in (block 1A).  2. Prostate.  Received in formalin labeled with the patient's name, date of birth, and “left mid prostate needle biopsy”.  The specimen consists of 2 pink/gray soft tissue core biopsy measuring 0.8 cm and 1.1 cm in length by less than 0.1 cm in diameter.  The specimen is inked with hematoxylin and totally submitted in (block 2A).  3. Prostate.  Received in formalin labeled with the patient's name, date of birth, and “left base prostate needle biopsy”.  The specimen consists of 2 pink/gray soft tissue core biopsy measuring 0.8 cm  and 1.0 cm in length by less than 0.1 cm in diameter.  The specimen is inked with hematoxylin and totally submitted in (block 3A).  4. Prostate.  Received in formalin labeled with the patient's name, date of birth, and “right apex prostate needle biopsy”.  The specimen consists of 3 pink/gray soft tissue core biopsy measuring from 0.4 cm to 0.8 cm in length by less than 0.1 cm in diameter.  The specimen is inked with hematoxylin and totally submitted in (block 4A).  5. Prostate.  Received in formalin labeled with the patient's name, date of birth, and “right mid prostate needle biopsy”.  The specimen consists of 3 pink/gray soft tissue core biopsy measuring from 1.3 cm to 1.7 cm in length by less than 0.1 cm in diameter.  The specimen is inked with hematoxylin and totally submitted in (block 5A).  6. Prostate.  Received in formalin labeled with the patient's name, date of birth, and “right base prostate needle biopsy”.  The specimen consists of 3 Telfa pads with a wispy gray-white fragment and 2 pink/gray soft tissue core biopsies measuring from 0.1 cm to 1.3 cm in length by less than 0.1 cm in diameter.  The specimen is inked with hematoxylin and totally submitted in (block 6A).  The specimen container and contents are retained for additional examination if needed      Microscopic Description       Microscopic evaluation completed.       Assessment and Plan    Diagnoses and all orders for this visit:    1. Malignant neoplasm of prostate (Primary)  -     MRI Pelvis With & Without Contrast; Future  -     NM Bone Scan Whole Body; Future  -     Ambulatory Referral to Radiation Oncology-[place]    2. Elevated prostate specific antigen (PSA)      We spent 30 minutes today discussing the natural history of prostate cancer and all management options including active surveillance, radical prostatectomy, radiation therapy, cryotherapy, and androgen deprivation therapy.  He was provided a copy of his pathology report today.   All questions were answered.  He was also referred to the [facility]'s website for patients for further information on prostate cancer.     Given his high risk Dori 9 prostate cancer, I did not recommend active surveillance.  I do not recommend surgery given his advanced age.  I did recommend consultation with radiation oncology to consider radiation after getting a prostate MRI and bone scan.    We discussed that if he does decide to go with radiation, he will follow-up with my partner Dr. Titus who does SpaceOAR and fiducial prostate markers.  He can then follow-up with me in my [facility] clinic for PSA follow-ups after completing radiation.    I spent approximately 30 minutes providing clinical care for this patient; including review of patient's chart and provider documentation, face to face time spent with patient in examination room (obtaining history, performing physical exam, discussing diagnosis and management options), placing orders, and completing patient documentation.         This document has been signed by TITA Berrios MD on April 20, 2023 13:18 CDT

## 2023-04-19 RX ORDER — VALSARTAN AND HYDROCHLOROTHIAZIDE 160; 25 MG/1; MG/1
TABLET ORAL
Qty: 90 TABLET | Refills: 0 | Status: SHIPPED | OUTPATIENT
Start: 2023-04-19 | End: 2023-04-24 | Stop reason: SDUPTHER

## 2023-04-19 NOTE — TELEPHONE ENCOUNTER
Refill Routing Note   Medication(s) are not appropriate for processing by [facility] for the following reason(s):      Required vitals abnormal    [facility] action(s):  Defer Appointment due            Appointments  past 12m or future 3m with PCP    Date Provider   Last Visit   4/5/2022 Breann Carlson MD   Next Visit   Visit date not found Breann Carlson MD   ED visits in past 90 days: 0        Note composed:8:43 AM 04/19/2023

## 2023-04-19 NOTE — TELEPHONE ENCOUNTER
A 90 day prescription has been sent. Please make sure to have patient make a followup appointment and do annual labs 1 week prior to appointment. This is to avoid future delays in medication refills. Thanks. Breann Carlson MD

## 2023-04-19 NOTE — TELEPHONE ENCOUNTER
Care Due:                  Date            Visit Type   Department     Provider  --------------------------------------------------------------------------------                                EP -                              PRIMARY      [facility] PRIMARY  Last Visit: 04-      CARE ([facility])   CARE           Breann Katie Carlson  Next Visit: None Scheduled  None         None Found                                                            Last  Test          Frequency    Reason                     Performed    Due Date  --------------------------------------------------------------------------------    Office Visit  12 months..  valsartan-hydrochlorothia  04- 03-                             tiny.....................    [employer] Embedded Care Gaps. Reference number: [id number]. 4/19/2023   3:51:20 AM CDT

## 2023-04-24 ENCOUNTER — OFFICE VISIT (OUTPATIENT)
Dept: CARDIOLOGY | Facility: CLINIC | Age: 73
End: 2023-04-24
Payer: COMMERCIAL

## 2023-04-24 VITALS
BODY MASS INDEX: 27.15 KG/M2 | SYSTOLIC BLOOD PRESSURE: 160 MMHG | WEIGHT: 163.13 LBS | OXYGEN SATURATION: 99 % | DIASTOLIC BLOOD PRESSURE: 80 MMHG | HEART RATE: 80 BPM

## 2023-04-24 DIAGNOSIS — R00.2 PALPITATIONS: ICD-10-CM

## 2023-04-24 DIAGNOSIS — I10 PRIMARY HYPERTENSION: Primary | ICD-10-CM

## 2023-04-24 DIAGNOSIS — G47.33 OSA ON CPAP: ICD-10-CM

## 2023-04-24 DIAGNOSIS — R73.03 PREDIABETES: ICD-10-CM

## 2023-04-24 DIAGNOSIS — E78.2 MIXED HYPERLIPIDEMIA: ICD-10-CM

## 2023-04-24 PROCEDURE — 3008F BODY MASS INDEX DOCD: CPT | Mod: CPTII,S$GLB,, | Performed by: STUDENT IN AN ORGANIZED HEALTH CARE EDUCATION/TRAINING PROGRAM

## 2023-04-24 PROCEDURE — 3288F PR FALLS RISK ASSESSMENT DOCUMENTED: ICD-10-PCS | Mod: CPTII,S$GLB,, | Performed by: STUDENT IN AN ORGANIZED HEALTH CARE EDUCATION/TRAINING PROGRAM

## 2023-04-24 PROCEDURE — 99999 PR PBB SHADOW E&M-EST. PATIENT-LVL IV: ICD-10-PCS | Mod: PBBFAC,,, | Performed by: STUDENT IN AN ORGANIZED HEALTH CARE EDUCATION/TRAINING PROGRAM

## 2023-04-24 PROCEDURE — 1159F MED LIST DOCD IN RCRD: CPT | Mod: CPTII,S$GLB,, | Performed by: STUDENT IN AN ORGANIZED HEALTH CARE EDUCATION/TRAINING PROGRAM

## 2023-04-24 PROCEDURE — 3079F PR MOST RECENT DIASTOLIC BLOOD PRESSURE 80-89 MM HG: ICD-10-PCS | Mod: CPTII,S$GLB,, | Performed by: STUDENT IN AN ORGANIZED HEALTH CARE EDUCATION/TRAINING PROGRAM

## 2023-04-24 PROCEDURE — 99214 OFFICE O/P EST MOD 30 MIN: CPT | Mod: S$GLB,,, | Performed by: STUDENT IN AN ORGANIZED HEALTH CARE EDUCATION/TRAINING PROGRAM

## 2023-04-24 PROCEDURE — 1126F PR PAIN SEVERITY QUANTIFIED, NO PAIN PRESENT: ICD-10-PCS | Mod: CPTII,S$GLB,, | Performed by: STUDENT IN AN ORGANIZED HEALTH CARE EDUCATION/TRAINING PROGRAM

## 2023-04-24 PROCEDURE — 1101F PT FALLS ASSESS-DOCD LE1/YR: CPT | Mod: CPTII,S$GLB,, | Performed by: STUDENT IN AN ORGANIZED HEALTH CARE EDUCATION/TRAINING PROGRAM

## 2023-04-24 PROCEDURE — 3077F SYST BP >= 140 MM HG: CPT | Mod: CPTII,S$GLB,, | Performed by: STUDENT IN AN ORGANIZED HEALTH CARE EDUCATION/TRAINING PROGRAM

## 2023-04-24 PROCEDURE — 1101F PR PT FALLS ASSESS DOC 0-1 FALLS W/OUT INJ PAST YR: ICD-10-PCS | Mod: CPTII,S$GLB,, | Performed by: STUDENT IN AN ORGANIZED HEALTH CARE EDUCATION/TRAINING PROGRAM

## 2023-04-24 PROCEDURE — 99999 PR PBB SHADOW E&M-EST. PATIENT-LVL IV: CPT | Mod: PBBFAC,,, | Performed by: STUDENT IN AN ORGANIZED HEALTH CARE EDUCATION/TRAINING PROGRAM

## 2023-04-24 PROCEDURE — 3008F PR BODY MASS INDEX (BMI) DOCUMENTED: ICD-10-PCS | Mod: CPTII,S$GLB,, | Performed by: STUDENT IN AN ORGANIZED HEALTH CARE EDUCATION/TRAINING PROGRAM

## 2023-04-24 PROCEDURE — 1159F PR MEDICATION LIST DOCUMENTED IN MEDICAL RECORD: ICD-10-PCS | Mod: CPTII,S$GLB,, | Performed by: STUDENT IN AN ORGANIZED HEALTH CARE EDUCATION/TRAINING PROGRAM

## 2023-04-24 PROCEDURE — 1126F AMNT PAIN NOTED NONE PRSNT: CPT | Mod: CPTII,S$GLB,, | Performed by: STUDENT IN AN ORGANIZED HEALTH CARE EDUCATION/TRAINING PROGRAM

## 2023-04-24 PROCEDURE — 3079F DIAST BP 80-89 MM HG: CPT | Mod: CPTII,S$GLB,, | Performed by: STUDENT IN AN ORGANIZED HEALTH CARE EDUCATION/TRAINING PROGRAM

## 2023-04-24 PROCEDURE — 3077F PR MOST RECENT SYSTOLIC BLOOD PRESSURE >= 140 MM HG: ICD-10-PCS | Mod: CPTII,S$GLB,, | Performed by: STUDENT IN AN ORGANIZED HEALTH CARE EDUCATION/TRAINING PROGRAM

## 2023-04-24 PROCEDURE — 3288F FALL RISK ASSESSMENT DOCD: CPT | Mod: CPTII,S$GLB,, | Performed by: STUDENT IN AN ORGANIZED HEALTH CARE EDUCATION/TRAINING PROGRAM

## 2023-04-24 PROCEDURE — 99214 PR OFFICE/OUTPT VISIT, EST, LEVL IV, 30-39 MIN: ICD-10-PCS | Mod: S$GLB,,, | Performed by: STUDENT IN AN ORGANIZED HEALTH CARE EDUCATION/TRAINING PROGRAM

## 2023-04-24 RX ORDER — VALSARTAN AND HYDROCHLOROTHIAZIDE 160; 25 MG/1; MG/1
1 TABLET ORAL DAILY
Qty: 90 TABLET | Refills: 3 | Status: SHIPPED | OUTPATIENT
Start: 2023-04-24 | End: 2023-05-22

## 2023-04-24 RX ORDER — AMLODIPINE BESYLATE 5 MG/1
5 TABLET ORAL DAILY
Qty: 30 TABLET | Refills: 11 | Status: SHIPPED | OUTPATIENT
Start: 2023-04-24 | End: 2023-05-08

## 2023-04-24 NOTE — PROGRESS NOTES
"                Section of Cardiology                  Cardiac Clinic Note    Chief Complaint/Reason for consultation: hypertension, palpitations       HPI:   Xin Taylor is a 72 y.o. female with h/o HTN, ASIF, HLD who comes in as a referral to cardiology clinic by PCP Dr. Carlson for evaluation.     7/11/22  About 2 weeks ago,had palpitations, started when she was going to the bathroom  symptoms   Would go away after resting, drinking water, raising her feet  Reports left ear tinnitus, has seen ENT, normal workup   Does not exercise regularly, no limitations with this   Worked in healthcare for many years, currently a     Denies tobacco or ETOH abuse  Family hx: mother- DM; brother- arteriosclerosis     Denies DM, CVA    Denies chest pain, SOB, dizziness, syncope         8/24/22  14 day monitor- no significant arrhythmias seen. Symptoms correlated ST. Average heart rate is normal at 68 bpm  Denies palpitations   Echo with normal EF  BP elevated today, but says normotensive BP at home have been more 120s-130s systolic, diastolic 60s  Has gained 2-3 lbs overall, but no major changes  Cycling 30 min 3 days a week     Denies chest pain, SOB, dizziness, syncope       2/20/23  Had post nasal drip recently with a cough   2 weeks ago, had pain right below the breast, worse with breathing in, described as a "twinge", became positional, but one issue with deep breathing  Symptoms pretty much resolved  Still exercising with bicycle   BP high today, normotensive on prior visits  Reports ankle swelling     Denies SOB, chest pain       4/24/23  Did not want to start bystolic, so continues to take amlodipine  Riding her stationary bike, 3 times a week  Intermittent dizzy, but not concerning   Weight increased 3 lbs from 2/23  Brings in home BP log, average systolic 132, diastolic avg 75  BP elevated today- elevated on repeat and more elevated with BP cuff  reports pulsating in her ear at times- has seen audiology "     Denies SOB, chest pain        EKG 2/20/23 NSR, possible LAE, incomplete RBBB, miminal LVH  EKG 7/11/22 NSR, possible LAE, minimal LVH criteria     ECHO  7/22  The left ventricle is normal in size with normal systolic function.  Normal left ventricular diastolic function.  The estimated PA systolic pressure is 31 mmHg.  Normal right ventricular size with normal right ventricular systolic function.  Normal central venous pressure (3 mmHg).  Moderate pulmonic regurgitation.  Mild mitral regurgitation.  Mild tricuspid regurgitation.  The estimated ejection fraction is 60%.        STRESS TEST    [facility]      ROS: All 10 systems reviewed. Please refer to the HPI for pertinent positives. All other systems negative.     Past Medical History  Past Medical History:   Diagnosis Date    Anemia     Colon polyp     Edema     HLD (hyperlipidemia)     HTN (hypertension)     Palpitation     Tinnitus        Surgical History  Past Surgical History:   Procedure Laterality Date    COLONOSCOPY N/A 04/22/2022    Procedure: COLONOSCOPY;  Surgeon: Priyanka Winkler MD;  Location: [facility];  Service: Endoscopy;  Laterality: N/A;    HYSTERECTOMY            Allergies:   Review of patient's allergies indicates:   Allergen Reactions    No known drug allergies        Social History:  Social History     Socioeconomic History    Marital status: Legally    Occupational History    Occupation: retired   Tobacco Use    Smoking status: Never    Smokeless tobacco: Never   Substance and Sexual Activity    Alcohol use: No    Drug use: No    Sexual activity: Not Currently     Partners: Male     Social Determinants of Health     Financial Resource Strain: Low Risk     Difficulty of Paying Living Expenses: Not hard at all   Food Insecurity: No Food Insecurity    Worried About Running Out of Food in the Last Year: Never true    Ran Out of Food in the Last Year: Never true   Transportation Needs: No Transportation Needs    Lack of Transportation  (Medical): No    Lack of Transportation (Non-Medical): No   Physical Activity: Insufficiently Active    Days of Exercise per Week: 3 days    Minutes of Exercise per Session: 30 min   Stress: No Stress Concern Present    Feeling of Stress : Only a little   Social Connections: Unknown    Frequency of Communication with Friends and Family: More than three times a week    Frequency of Social Gatherings with Friends and Family: More than three times a week    Active Member of Clubs or Organizations: Yes    Attends Club or Organization Meetings: More than 4 times per year    Marital Status:    Housing Stability: Low Risk     Unable to Pay for Housing in the Last Year: No    Number of Places Lived in the Last Year: 1    Unstable Housing in the Last Year: No       Family History:  family history includes Breast cancer in her paternal aunt; Cancer in her mother and another family member; Cataracts in her mother; Heart defect in her brother; Hypertension in her mother; Thyroid disease in her mother.    Home Medications:  Current Outpatient Medications on File Prior to Visit   Medication Sig Dispense Refill    aspirin 81 mg Tab Take 1 tablet by mouth every other day.      atorvastatin (LIPITOR) 10 MG tablet Take 1 tablet (10 mg total) by mouth once daily. 90 tablet 3    b complex vitamins tablet Take 1 tablet by mouth once daily.      co-enzyme Q-10 30 mg capsule Take 30 mg by mouth 3 (three) times daily.      ferrous sulfate 325 mg (65 mg iron) Tab tablet Take 1 tablet (325 mg total) by mouth once daily. Take with citrus 100 tablet 3    fish oil-omega-3 fatty acids 300-1,000 mg capsule Take 2 g by mouth once daily.      fluticasone propionate (FLONASE) 50 mcg/actuation nasal spray SHAKE LIQUID AND USE 2 SPRAYS(100 MCG) IN EACH NOSTRIL EVERY DAY 48 g 0    meloxicam (MOBIC) 15 MG tablet Take 1 tablet (15 mg total) by mouth once daily. (Patient taking differently: Take 15 mg by mouth daily as needed.) 90 tablet 1     multivitamin (THERAGRAN) per tablet Take 1 tablet by mouth once daily.      turmeric root extract 500 mg Cap Take 500 mg by mouth 2 (two) times daily. 100 capsule 4    vitamin D 1000 units Tab Take 185 mg by mouth once daily.      [DISCONTINUED] amLODIPine (NORVASC) 2.5 MG tablet Take 1 tablet (2.5 mg total) by mouth once daily. 30 tablet 11    [DISCONTINUED] valsartan-hydrochlorothiazide (DIOVAN-HCT) 160-25 mg per tablet TAKE 1 TABLET BY MOUTH EVERY DAY 90 tablet 0    nebivoloL (BYSTOLIC) 2.5 MG Tab Take 1 tablet (2.5 mg total) by mouth once daily. (Patient not taking: Reported on 4/24/2023) 30 tablet 11     Current Facility-Administered Medications on File Prior to Visit   Medication Dose Route Frequency Provider Last Rate Last Admin    lactated ringers infusion   Intravenous Continuous Priyanka Winkler MD   Stopped at 04/22/22 1336       Physical exam:  BP (!) 160/80 (BP Location: Right arm)   Pulse 80   Wt 74 kg (163 lb 2.3 oz)   LMP  (LMP Unknown)   SpO2 99%   BMI 27.15 kg/m²         General: Pt is a 72 y.o. year old female who is AAOx3, in NAD, is pleasant, well nourished, looks stated age  HEENT: PERRL, EOMI, Oral mucosa pink & moist  CVS  No abnormal cardiac pulsations noted on inspection. JVP not raised. The apical impulse is normal on palpation, and is located in the left 5th intercostal space in the mid - clavicular line. No palpable thrills or abnormal pulsations noted. RR, S1 - S2 heard, no murmurs, rubs or gallops appreciated.   PUL : CTA B/L. No wheezes/crackles heard   ABD : BS +, soft. No tenderness elicited   LE : No C/C/E. Distal Pulses palpable B/L         LABS:    Chemistry:   Lab Results   Component Value Date     02/23/2023    K 4.0 02/23/2023     02/23/2023    CO2 30 (H) 02/23/2023    BUN 18 02/23/2023    CREATININE 0.9 02/23/2023    CALCIUM 9.7 02/23/2023     Cardiac Markers: No results found for: CKTOTAL, CKMB, CKMBINDEX, TROPONINI  Cardiac Markers (Last 3): No results  found for: CKTOTAL, CKMB, CKMBINDEX, TROPONINI  CBC:   Lab Results   Component Value Date    WBC 4.87 04/05/2022    HGB 11.3 (L) 04/05/2022    HCT 35.9 (L) 04/05/2022    MCV 96 04/05/2022     04/05/2022     Lipids:   Lab Results   Component Value Date    CHOL 184 02/23/2023    TRIG 53 02/23/2023    HDL 80 (H) 02/23/2023     Coagulation: No results found for: PT, INR, APTT        Assessment      1. Primary hypertension    2. Mixed hyperlipidemia    3. ASIF on CPAP    4. Prediabetes    5. Palpitations             Plan:    Palpitations  Resolved  14 day cardiac monitor 7/22- no significant arrhythmias   Echocardiogram 7/22 with normal EF, mod [place], mild MR/TR    ASIF  Compliant with CPAP    HTN  Elevated  Continue Diovan-hydrochlorothiazide in a.m.  Increase amlodipine to 5 mg at night  Low-salt, low-fat diet  Exercise as tolerated, at least 30 minutes daily    Pre diabetes  A1c 5.8  Continue therapy    HLD  LDL 93 as of 2/23  Continue statin        This note was prepared using voice recognition system and is likely to have sound alike errors that may have been overlooked even after proofreading.     I have reviewed all pertinent chart information.  Plans and recommendations have been formulated under my direct supervision. All questions answered and patient voiced understanding.  To see if  If symptoms persist go to the ED.    RTC in 6 weeks  2 weeks with CHRISS Martinez MD  Cardiology

## 2023-05-06 ENCOUNTER — NURSE TRIAGE (OUTPATIENT)
Dept: ADMINISTRATIVE | Facility: CLINIC | Age: 73
End: 2023-05-06
Payer: COMMERCIAL

## 2023-05-07 NOTE — TELEPHONE ENCOUNTER
Reason for Disposition   Systolic BP  >= 160 OR Diastolic >= 100    Additional Information   Negative: Difficult to awaken or acting confused (e.g., disoriented, slurred speech)   Negative: Severe difficulty breathing (e.g., struggling for each breath, speaks in single words)   Negative: [1] Weakness of the face, arm or leg on one side of the body AND [2] new onset   Negative: [1] Numbness (i.e., loss of sensation) of the face, arm or leg on one side of the body AND [2] new onset   Negative: [1] Chest pain lasts > 5 minutes AND [2] history of heart disease  (i.e., heart attack, bypass surgery, angina, angioplasty, CHF)   Negative: [1] Chest pain AND [2] took nitrogylcerin AND [3] pain was not relieved   Negative: Sounds like a life-threatening emergency to the triager   Negative: Symptom is main concern  (e.g., headache, chest pain)   Negative: Low blood pressure is main concern   Negative: [1] Systolic BP  >= 160 OR Diastolic >= 100 AND [2] cardiac or neurologic symptoms (e.g., chest pain, difficulty breathing, unsteady gait, blurred vision)   Negative: [1] Pregnant > 20 weeks (or postpartum < 6 weeks) AND [2] new hand or face swelling   Negative: [1] Pregnant > 20 weeks AND [2] BP Systolic BP  >= 140 OR Diastolic >= 90   Negative: [1] Systolic BP  >= 200 OR Diastolic >= 120  AND [2] having NO cardiac or neurologic symptoms   Negative: [1] Postpartum < 6 weeks AND [2] BP Systolic BP  >= 140 OR Diastolic >= 90   Negative: [1] Systolic BP  >= 180 OR Diastolic >= 110 AND [2] missed most recent dose of blood pressure medication   Negative: Systolic BP  >= 180 OR Diastolic >= 110   Negative: Ran out of BP medications    Protocols used: High Blood Pressure-A-  Pt called re BP - pt states she Has appt fri. Pt states she took BP with old cuff 114/62 HR74. Then waited 30 mins and used new cuff 149/71 HR=73. Pt admits she felt tense. 734pm rechecked BP and again admits she felt anxiety when taking BP. 167/85 HR=81. pt on  HBP meds-amlodipine- not taken yet and valsartan took earlier about 11am. Pt denies sx. Rec to follow up with dr on Monday. Pt agrees. Offered protocol advice.

## 2023-05-08 ENCOUNTER — TELEPHONE (OUTPATIENT)
Dept: CARDIOLOGY | Facility: CLINIC | Age: 73
End: 2023-05-08
Payer: COMMERCIAL

## 2023-05-08 ENCOUNTER — OFFICE VISIT (OUTPATIENT)
Dept: CARDIOLOGY | Facility: CLINIC | Age: 73
End: 2023-05-08
Payer: COMMERCIAL

## 2023-05-08 VITALS
HEIGHT: 65 IN | SYSTOLIC BLOOD PRESSURE: 140 MMHG | WEIGHT: 160.94 LBS | OXYGEN SATURATION: 98 % | HEART RATE: 85 BPM | BODY MASS INDEX: 26.81 KG/M2 | DIASTOLIC BLOOD PRESSURE: 85 MMHG

## 2023-05-08 DIAGNOSIS — R73.03 PREDIABETES: ICD-10-CM

## 2023-05-08 DIAGNOSIS — G47.33 OSA ON CPAP: ICD-10-CM

## 2023-05-08 DIAGNOSIS — E78.2 MIXED HYPERLIPIDEMIA: ICD-10-CM

## 2023-05-08 DIAGNOSIS — I10 PRIMARY HYPERTENSION: Primary | ICD-10-CM

## 2023-05-08 PROCEDURE — 3079F PR MOST RECENT DIASTOLIC BLOOD PRESSURE 80-89 MM HG: ICD-10-PCS | Mod: CPTII,S$GLB,,

## 2023-05-08 PROCEDURE — 1126F AMNT PAIN NOTED NONE PRSNT: CPT | Mod: CPTII,S$GLB,,

## 2023-05-08 PROCEDURE — 99214 PR OFFICE/OUTPT VISIT, EST, LEVL IV, 30-39 MIN: ICD-10-PCS | Mod: S$GLB,,,

## 2023-05-08 PROCEDURE — 3008F BODY MASS INDEX DOCD: CPT | Mod: CPTII,S$GLB,,

## 2023-05-08 PROCEDURE — 1160F RVW MEDS BY RX/DR IN RCRD: CPT | Mod: CPTII,S$GLB,,

## 2023-05-08 PROCEDURE — 3079F DIAST BP 80-89 MM HG: CPT | Mod: CPTII,S$GLB,,

## 2023-05-08 PROCEDURE — 99214 OFFICE O/P EST MOD 30 MIN: CPT | Mod: S$GLB,,,

## 2023-05-08 PROCEDURE — 99999 PR PBB SHADOW E&M-EST. PATIENT-LVL IV: ICD-10-PCS | Mod: PBBFAC,,,

## 2023-05-08 PROCEDURE — 1160F PR REVIEW ALL MEDS BY PRESCRIBER/CLIN PHARMACIST DOCUMENTED: ICD-10-PCS | Mod: CPTII,S$GLB,,

## 2023-05-08 PROCEDURE — 1126F PR PAIN SEVERITY QUANTIFIED, NO PAIN PRESENT: ICD-10-PCS | Mod: CPTII,S$GLB,,

## 2023-05-08 PROCEDURE — 3008F PR BODY MASS INDEX (BMI) DOCUMENTED: ICD-10-PCS | Mod: CPTII,S$GLB,,

## 2023-05-08 PROCEDURE — 3077F SYST BP >= 140 MM HG: CPT | Mod: CPTII,S$GLB,,

## 2023-05-08 PROCEDURE — 99999 PR PBB SHADOW E&M-EST. PATIENT-LVL IV: CPT | Mod: PBBFAC,,,

## 2023-05-08 PROCEDURE — 1159F PR MEDICATION LIST DOCUMENTED IN MEDICAL RECORD: ICD-10-PCS | Mod: CPTII,S$GLB,,

## 2023-05-08 PROCEDURE — 3077F PR MOST RECENT SYSTOLIC BLOOD PRESSURE >= 140 MM HG: ICD-10-PCS | Mod: CPTII,S$GLB,,

## 2023-05-08 PROCEDURE — 1159F MED LIST DOCD IN RCRD: CPT | Mod: CPTII,S$GLB,,

## 2023-05-08 RX ORDER — AMLODIPINE BESYLATE 5 MG/1
10 TABLET ORAL NIGHTLY
Qty: 30 TABLET | Refills: 11
Start: 2023-05-08 | End: 2023-05-08

## 2023-05-08 NOTE — PROGRESS NOTES
Subjective:   Patient ID:  Xin Taylor is a 72 y.o. female who presents for evaluation of No chief complaint on file.      HPI  Xin Taylor is a 72 y.o. female with h/o HTN, ASIF, HLD seen by Dr. Martinez 4/23'  2 weeks ago and amlodipine was increased to 5mg at night and to take her Diovan in am. Here today for follow up, she reports occasional anxiety, still feels fatigued. she also inquired about adding 1 tsp of vinegar in the evenings to help with her BP. denies any HA, dizziness, near syncope, CP.  She does endorse worsening ankle swelling, discussed with her about trying Bystolic, pt agrees    She is taking 2 supplements as well   -Healthy beets superfood powder  -Organic super greens- spirulina +chlorella, Goji+elderberry, Reishi +matcha    Home Bps 140-160s/80-90s  Works at  facility, active  Past Medical History:   Diagnosis Date    Anemia     Colon polyp     Edema     HLD (hyperlipidemia)     HTN (hypertension)     Palpitation     Tinnitus        Past Surgical History:   Procedure Laterality Date    COLONOSCOPY N/A 04/22/2022    Procedure: COLONOSCOPY;  Surgeon: Priyanka Winkler MD;  Location: [facility];  Service: Endoscopy;  Laterality: N/A;    HYSTERECTOMY         Social History     Tobacco Use    Smoking status: Never    Smokeless tobacco: Never   Substance Use Topics    Alcohol use: No    Drug use: No       Family History   Problem Relation Age of Onset    Hypertension Mother     Thyroid disease Mother     Cataracts Mother     Cancer Mother         lung    Cancer Other     Heart defect Brother     Breast cancer Paternal Aunt        Current Outpatient Medications on File Prior to Visit   Medication Sig Dispense Refill    amLODIPine (NORVASC) 5 MG tablet Take 1 tablet (5 mg total) by mouth once daily. 30 tablet 11    aspirin 81 mg Tab Take 1 tablet by mouth every other day.      atorvastatin (LIPITOR) 10 MG tablet Take 1 tablet (10 mg total) by mouth once daily. 90 tablet 3    b complex  vitamins tablet Take 1 tablet by mouth once daily.      co-enzyme Q-10 30 mg capsule Take 30 mg by mouth 3 (three) times daily.      ferrous sulfate 325 mg (65 mg iron) Tab tablet Take 1 tablet (325 mg total) by mouth once daily. Take with citrus 100 tablet 3    fish oil-omega-3 fatty acids 300-1,000 mg capsule Take 2 g by mouth once daily.      fluticasone propionate (FLONASE) 50 mcg/actuation nasal spray SHAKE LIQUID AND USE 2 SPRAYS(100 MCG) IN EACH NOSTRIL EVERY DAY 48 g 0    meloxicam (MOBIC) 15 MG tablet Take 1 tablet (15 mg total) by mouth once daily. (Patient taking differently: Take 15 mg by mouth daily as needed.) 90 tablet 1    multivitamin (THERAGRAN) per tablet Take 1 tablet by mouth once daily.      nebivoloL (BYSTOLIC) 2.5 MG Tab Take 1 tablet (2.5 mg total) by mouth once daily. (Patient not taking: Reported on 4/24/2023) 30 tablet 11    turmeric root extract 500 mg Cap Take 500 mg by mouth 2 (two) times daily. 100 capsule 4    valsartan-hydrochlorothiazide (DIOVAN-HCT) 160-25 mg per tablet Take 1 tablet by mouth once daily. 90 tablet 3    vitamin D 1000 units Tab Take 185 mg by mouth once daily.       Current Facility-Administered Medications on File Prior to Visit   Medication Dose Route Frequency Provider Last Rate Last Admin    lactated ringers infusion   Intravenous Continuous Priyanka Winkler MD   Stopped at 04/22/22 1336      Wt Readings from Last 3 Encounters:   04/24/23 74 kg (163 lb 2.3 oz)   02/20/23 72.9 kg (160 lb 11.5 oz)   11/17/22 75.2 kg (165 lb 12.6 oz)     Temp Readings from Last 3 Encounters:   10/14/22 97.6 °F (36.4 °C)   07/09/22 99.1 °F (37.3 °C)   04/22/22 97.6 °F (36.4 °C) (Temporal)     BP Readings from Last 3 Encounters:   04/24/23 (!) 160/80   02/20/23 (!) 144/84   11/17/22 132/86     Pulse Readings from Last 3 Encounters:   04/24/23 80   02/20/23 83   11/17/22 98        Review of Systems   Constitutional: Positive for malaise/fatigue.   HENT: Negative.     Eyes:  Negative.    Cardiovascular:  Positive for leg swelling.   Respiratory: Negative.     Skin: Negative.    Musculoskeletal: Negative.    Gastrointestinal: Negative.    Genitourinary: Negative.    Neurological: Negative.    Psychiatric/Behavioral:  The patient is nervous/anxious.      Objective:   Physical Exam  Vitals and nursing note reviewed.   Constitutional:       Appearance: Normal appearance.   HENT:      Head: Normocephalic.   Eyes:      Pupils: Pupils are equal, round, and reactive to light.   Cardiovascular:      Rate and Rhythm: Normal rate and regular rhythm.      Heart sounds: Normal heart sounds, S1 normal and S2 normal. No murmur heard.    No S3 or S4 sounds.   Pulmonary:      Effort: Pulmonary effort is normal.      Breath sounds: Normal breath sounds.   Abdominal:      General: Bowel sounds are normal.      Palpations: Abdomen is soft.   Musculoskeletal:         General: Normal range of motion.      Cervical back: Normal range of motion.      Right lower leg: Edema present.      Left lower leg: Edema present.   Skin:     Capillary Refill: Capillary refill takes less than 2 seconds.   Neurological:      General: No focal deficit present.      Mental Status: She is alert and oriented to person, place, and time.   Psychiatric:         Mood and Affect: Mood is anxious.         Behavior: Behavior normal.         Thought Content: Thought content normal.       Lab Results   Component Value Date    CHOL 184 02/23/2023    CHOL 200 (H) 04/05/2022    CHOL 184 10/18/2021     Lab Results   Component Value Date    HDL 80 (H) 02/23/2023    HDL 84 (H) 04/05/2022    HDL 81 (H) 10/18/2021     Lab Results   Component Value Date    LDLCALC 93.4 02/23/2023    LDLCALC 106.0 04/05/2022    LDLCALC 89.6 10/18/2021     Lab Results   Component Value Date    TRIG 53 02/23/2023    TRIG 50 04/05/2022    TRIG 67 10/18/2021     Lab Results   Component Value Date    CHOLHDL 43.5 02/23/2023    CHOLHDL 42.0 04/05/2022    CHOLHDL 44.0  10/18/2021       Chemistry        Component Value Date/Time     02/23/2023 1136    K 4.0 02/23/2023 1136     02/23/2023 1136    CO2 30 (H) 02/23/2023 1136    BUN 18 02/23/2023 1136    CREATININE 0.9 02/23/2023 1136    GLU 91 02/23/2023 1136        Component Value Date/Time    CALCIUM 9.7 02/23/2023 1136    ALKPHOS 78 02/23/2023 1136    AST 24 02/23/2023 1136    ALT 16 02/23/2023 1136    BILITOT 0.5 02/23/2023 1136    ESTGFRAFRICA >60.0 04/05/2022 1147    EGFRNONAA >60.0 04/05/2022 1147          Lab Results   Component Value Date    TSH 1.870 04/05/2022     No results found for: INR, PROTIME  @RESUFAST(WBC,HGB,HCT,MCV,PLT)  @LABRCNTIP(BNP,BNPTRIAGEBLO)@  CrCl cannot be calculated (Patient's most recent lab result is older than the maximum 7 days allowed.).     Results for orders placed during the hospital encounter of 07/15/22    Echo    Interpretation Summary  · The left ventricle is normal in size with normal systolic function.  · Normal left ventricular diastolic function.  · The estimated PA systolic pressure is 31 mmHg.  · Normal right ventricular size with normal right ventricular systolic function.  · Normal central venous pressure (3 mmHg).  · Moderate pulmonic regurgitation.  · Mild mitral regurgitation.  · Mild tricuspid regurgitation.  · The estimated ejection fraction is 60%.     No results found for this or any previous visit.     Assessment:      1. Primary hypertension    2. Mixed hyperlipidemia    3. ASIF on CPAP    4. Prediabetes        Plan:   Primary hypertension    Mixed hyperlipidemia    ASIF on CPAP    Prediabetes    Added Bystolic 2.5mg nightly, Cont Diovan in am  Hold Amlodipine  Home BP logs    Bystolic, Diovan-HCT, Low-salt, low-fat diet, Exercise as tolerated, at least 30 minutes daily-HTN  Statin-HLD  CPAP- ASIF  Discussed heart healthy diet, daily exercise- diabetes    2 week f/u with me  Follow up scheduled with Dr. Juan Forbes, [facility], Cardiology

## 2023-05-08 NOTE — TELEPHONE ENCOUNTER
Contacted pt to f/u after triage call two days ago. Pt states that she believe Macha green tea is increasing her BP. She took a Tbsp of vineager and rechecked her BP 30 mins later. Pt states that her BP dropped 20 points.  I asked if she would like to move her appointment from Friday to today, and pt stated that would be great. She stated she would bring her cuff and log with her as well. Pt verbalized understanding w/o q/c      ----Maggie Baldwin RN----  Protocols used: High Blood Pressure-A-AH  Pt called re BP - pt states she Has appt fri. Pt states she took BP with old cuff 114/62 HR74. Then waited 30 mins and used new cuff 149/71 HR=73. Pt admits she felt tense. 734pm rechecked BP and again admits she felt anxiety when taking BP. 167/85 HR=81. pt on HBP meds-amlodipine- not taken yet and valsartan took earlier about 11am. Pt denies sx. Rec to follow up with  on Monday. Pt agrees. Offered protocol advice

## 2023-05-18 ENCOUNTER — TELEPHONE (OUTPATIENT)
Dept: CARDIOLOGY | Facility: CLINIC | Age: 73
End: 2023-05-18
Payer: COMMERCIAL

## 2023-05-18 ENCOUNTER — PATIENT MESSAGE (OUTPATIENT)
Dept: PRIMARY CARE CLINIC | Facility: CLINIC | Age: 73
End: 2023-05-18
Payer: COMMERCIAL

## 2023-05-18 NOTE — TELEPHONE ENCOUNTER
BP: 180/86 P: 49  Took AM meds around 10:40am - 45 mins before checking BP.  Pt denies any sob, cp, palpitations, or dizziness around time she checked her BP. Pt states further that she has been dealing w/ chronic knee pain and does not know if that is causing high BP. I informed her to contact her pcp to get the knee looked at, and the pt agreed. I informed her that I would pass on the message to Judy and let her know what she says. Pt VU w/o q/c      ----- Message from Danita Carrero sent at 5/18/2023  1:18 PM CDT -----  Contact: Xin  .Type:  Patient Returning Call    Who Called:Xin  Who Left Message for Patient:nurse  Does the patient know what this is regarding?:yes  Would the patient rather a call back or a response via [facility]? Call back   Best Call Back Number:[phone]  Additional Information: na        Thanks  KT

## 2023-05-18 NOTE — TELEPHONE ENCOUNTER
Attempted to contact pt about BP readings. No answer, and no VM    ----- Message from Ricky Christine sent at 5/18/2023 12:27 PM CDT -----  Contact: self  Pt is asking for an return call in reference to elevated blood pressure readings she had this am pt states she is on her way to work just wanted to inform of the readings , please call back at .[phone] Thx CJ

## 2023-05-19 ENCOUNTER — PATIENT MESSAGE (OUTPATIENT)
Dept: CARDIOLOGY | Facility: CLINIC | Age: 73
End: 2023-05-19
Payer: COMMERCIAL

## 2023-05-19 DIAGNOSIS — I10 PRIMARY HYPERTENSION: Primary | ICD-10-CM

## 2023-05-19 RX ORDER — NEBIVOLOL 20 MG/1
20 TABLET ORAL DAILY
Qty: 30 TABLET | Refills: 6 | Status: SHIPPED | OUTPATIENT
Start: 2023-05-19 | End: 2023-05-22

## 2023-05-22 ENCOUNTER — OFFICE VISIT (OUTPATIENT)
Dept: CARDIOLOGY | Facility: CLINIC | Age: 73
End: 2023-05-22
Payer: COMMERCIAL

## 2023-05-22 VITALS
SYSTOLIC BLOOD PRESSURE: 138 MMHG | WEIGHT: 162.69 LBS | DIASTOLIC BLOOD PRESSURE: 72 MMHG | BODY MASS INDEX: 27.1 KG/M2 | HEART RATE: 70 BPM | OXYGEN SATURATION: 97 % | HEIGHT: 65 IN

## 2023-05-22 DIAGNOSIS — E78.2 MIXED HYPERLIPIDEMIA: ICD-10-CM

## 2023-05-22 DIAGNOSIS — I10 PRIMARY HYPERTENSION: ICD-10-CM

## 2023-05-22 DIAGNOSIS — M85.80 OSTEOPENIA, UNSPECIFIED LOCATION: Primary | ICD-10-CM

## 2023-05-22 PROCEDURE — 99999 PR PBB SHADOW E&M-EST. PATIENT-LVL V: CPT | Mod: PBBFAC,,,

## 2023-05-22 PROCEDURE — 99214 PR OFFICE/OUTPT VISIT, EST, LEVL IV, 30-39 MIN: ICD-10-PCS | Mod: S$GLB,,,

## 2023-05-22 PROCEDURE — 1126F AMNT PAIN NOTED NONE PRSNT: CPT | Mod: CPTII,S$GLB,,

## 2023-05-22 PROCEDURE — 1160F PR REVIEW ALL MEDS BY PRESCRIBER/CLIN PHARMACIST DOCUMENTED: ICD-10-PCS | Mod: CPTII,S$GLB,,

## 2023-05-22 PROCEDURE — 1101F PR PT FALLS ASSESS DOC 0-1 FALLS W/OUT INJ PAST YR: ICD-10-PCS | Mod: CPTII,S$GLB,,

## 2023-05-22 PROCEDURE — 3008F PR BODY MASS INDEX (BMI) DOCUMENTED: ICD-10-PCS | Mod: CPTII,S$GLB,,

## 2023-05-22 PROCEDURE — 99214 OFFICE O/P EST MOD 30 MIN: CPT | Mod: S$GLB,,,

## 2023-05-22 PROCEDURE — 3078F DIAST BP <80 MM HG: CPT | Mod: CPTII,S$GLB,,

## 2023-05-22 PROCEDURE — 3288F PR FALLS RISK ASSESSMENT DOCUMENTED: ICD-10-PCS | Mod: CPTII,S$GLB,,

## 2023-05-22 PROCEDURE — 1160F RVW MEDS BY RX/DR IN RCRD: CPT | Mod: CPTII,S$GLB,,

## 2023-05-22 PROCEDURE — 3288F FALL RISK ASSESSMENT DOCD: CPT | Mod: CPTII,S$GLB,,

## 2023-05-22 PROCEDURE — 1159F MED LIST DOCD IN RCRD: CPT | Mod: CPTII,S$GLB,,

## 2023-05-22 PROCEDURE — 3075F SYST BP GE 130 - 139MM HG: CPT | Mod: CPTII,S$GLB,,

## 2023-05-22 PROCEDURE — 3078F PR MOST RECENT DIASTOLIC BLOOD PRESSURE < 80 MM HG: ICD-10-PCS | Mod: CPTII,S$GLB,,

## 2023-05-22 PROCEDURE — 3008F BODY MASS INDEX DOCD: CPT | Mod: CPTII,S$GLB,,

## 2023-05-22 PROCEDURE — 1126F PR PAIN SEVERITY QUANTIFIED, NO PAIN PRESENT: ICD-10-PCS | Mod: CPTII,S$GLB,,

## 2023-05-22 PROCEDURE — 1159F PR MEDICATION LIST DOCUMENTED IN MEDICAL RECORD: ICD-10-PCS | Mod: CPTII,S$GLB,,

## 2023-05-22 PROCEDURE — 3075F PR MOST RECENT SYSTOLIC BLOOD PRESS GE 130-139MM HG: ICD-10-PCS | Mod: CPTII,S$GLB,,

## 2023-05-22 PROCEDURE — 99999 PR PBB SHADOW E&M-EST. PATIENT-LVL V: ICD-10-PCS | Mod: PBBFAC,,,

## 2023-05-22 PROCEDURE — 1101F PT FALLS ASSESS-DOCD LE1/YR: CPT | Mod: CPTII,S$GLB,,

## 2023-05-22 RX ORDER — VALSARTAN AND HYDROCHLOROTHIAZIDE 320; 25 MG/1; MG/1
1 TABLET, FILM COATED ORAL DAILY
Qty: 90 TABLET | Refills: 3 | Status: SHIPPED | OUTPATIENT
Start: 2023-05-22 | End: 2023-06-28

## 2023-05-22 RX ORDER — NEBIVOLOL 20 MG/1
20 TABLET ORAL NIGHTLY
Qty: 30 TABLET | Refills: 6 | Status: SHIPPED | OUTPATIENT
Start: 2023-05-22 | End: 2023-06-01

## 2023-05-22 NOTE — PROGRESS NOTES
Subjective:   Patient ID:  Xin Taylor is a 72 y.o. female who presents for evaluation of No chief complaint on file.      HPI  Xin Taylor is a 72 y.o. female with h/o HTN, ASIF, HLD, anxiety who presents to clinic today for BP follow up. Recently started on bystolic and Diovan. Bystolic was increased 3 days ago to 20mg daily. denies any HA, dizziness, near syncope, CP. Stopped Amlodipine at last visit, still with some sharda ankle swelling. Pt also reports sharda knee pain worsening over years and foot callus pains      Home Bps 130s systolic in am after meds 130s, evenings 150s  Works at  facility, active  Past Medical History:   Diagnosis Date    Anemia     Colon polyp     Edema     HLD (hyperlipidemia)     HTN (hypertension)     Palpitation     Tinnitus        Past Surgical History:   Procedure Laterality Date    COLONOSCOPY N/A 04/22/2022    Procedure: COLONOSCOPY;  Surgeon: Priyanka Winkler MD;  Location: [facility];  Service: Endoscopy;  Laterality: N/A;    HYSTERECTOMY         Social History     Tobacco Use    Smoking status: Never    Smokeless tobacco: Never   Substance Use Topics    Alcohol use: No    Drug use: No       Family History   Problem Relation Age of Onset    Hypertension Mother     Thyroid disease Mother     Cataracts Mother     Cancer Mother         lung    Cancer Other     Heart defect Brother     Breast cancer Paternal Aunt        Current Outpatient Medications on File Prior to Visit   Medication Sig Dispense Refill    aspirin 81 mg Tab Take 1 tablet by mouth every other day.      atorvastatin (LIPITOR) 10 MG tablet Take 1 tablet (10 mg total) by mouth once daily. 90 tablet 3    b complex vitamins tablet Take 1 tablet by mouth once daily.      co-enzyme Q-10 30 mg capsule Take 30 mg by mouth 3 (three) times daily.      ferrous sulfate 325 mg (65 mg iron) Tab tablet Take 1 tablet (325 mg total) by mouth once daily. Take with citrus 100 tablet 3    fish oil-omega-3 fatty acids  300-1,000 mg capsule Take 2 g by mouth once daily.      fluticasone propionate (FLONASE) 50 mcg/actuation nasal spray SHAKE LIQUID AND USE 2 SPRAYS(100 MCG) IN EACH NOSTRIL EVERY DAY 48 g 0    meloxicam (MOBIC) 15 MG tablet Take 1 tablet (15 mg total) by mouth once daily. (Patient taking differently: Take 15 mg by mouth daily as needed.) 90 tablet 1    multivitamin (THERAGRAN) per tablet Take 1 tablet by mouth once daily.      nebivoloL (BYSTOLIC) 20 mg Tab Take 20 mg by mouth once daily. 30 tablet 6    turmeric root extract 500 mg Cap Take 500 mg by mouth 2 (two) times daily. 100 capsule 4    valsartan-hydrochlorothiazide (DIOVAN-HCT) 160-25 mg per tablet Take 1 tablet by mouth once daily. 90 tablet 3    vitamin D 1000 units Tab Take 185 mg by mouth once daily.       No current facility-administered medications on file prior to visit.      Wt Readings from Last 3 Encounters:   05/08/23 73 kg (160 lb 15 oz)   04/24/23 74 kg (163 lb 2.3 oz)   02/20/23 72.9 kg (160 lb 11.5 oz)     Temp Readings from Last 3 Encounters:   10/14/22 97.6 °F (36.4 °C)   07/09/22 99.1 °F (37.3 °C)   04/22/22 97.6 °F (36.4 °C) (Temporal)     BP Readings from Last 3 Encounters:   05/08/23 (!) 140/85   04/24/23 (!) 160/80   02/20/23 (!) 144/84     Pulse Readings from Last 3 Encounters:   05/08/23 85   04/24/23 80   02/20/23 83        Review of Systems   Constitutional: Negative.   HENT: Negative.     Eyes: Negative.    Cardiovascular:  Positive for leg swelling.   Respiratory: Negative.     Skin: Negative.    Musculoskeletal:  Positive for arthritis, back pain, joint pain, joint swelling and myalgias.   Gastrointestinal: Negative.    Genitourinary: Negative.    Neurological: Negative.    Psychiatric/Behavioral: Negative.       Objective:   Physical Exam  Vitals and nursing note reviewed.   Constitutional:       Appearance: Normal appearance.   HENT:      Head: Normocephalic.   Eyes:      Pupils: Pupils are equal, round, and reactive to light.    Cardiovascular:      Rate and Rhythm: Normal rate and regular rhythm.      Heart sounds: Normal heart sounds, S1 normal and S2 normal. No murmur heard.    No S3 or S4 sounds.   Pulmonary:      Effort: Pulmonary effort is normal.      Breath sounds: Normal breath sounds.   Abdominal:      General: Bowel sounds are normal.      Palpations: Abdomen is soft.   Musculoskeletal:         General: Normal range of motion.      Cervical back: Normal range of motion.      Right lower leg: Edema present.      Left lower leg: Edema present.   Skin:     Capillary Refill: Capillary refill takes less than 2 seconds.   Neurological:      General: No focal deficit present.      Mental Status: She is alert and oriented to person, place, and time.   Psychiatric:         Mood and Affect: Mood is anxious.         Behavior: Behavior normal.         Thought Content: Thought content normal.       Lab Results   Component Value Date    CHOL 184 02/23/2023    CHOL 200 (H) 04/05/2022    CHOL 184 10/18/2021     Lab Results   Component Value Date    HDL 80 (H) 02/23/2023    HDL 84 (H) 04/05/2022    HDL 81 (H) 10/18/2021     Lab Results   Component Value Date    LDLCALC 93.4 02/23/2023    LDLCALC 106.0 04/05/2022    LDLCALC 89.6 10/18/2021     Lab Results   Component Value Date    TRIG 53 02/23/2023    TRIG 50 04/05/2022    TRIG 67 10/18/2021     Lab Results   Component Value Date    CHOLHDL 43.5 02/23/2023    CHOLHDL 42.0 04/05/2022    CHOLHDL 44.0 10/18/2021       Chemistry        Component Value Date/Time     02/23/2023 1136    K 4.0 02/23/2023 1136     02/23/2023 1136    CO2 30 (H) 02/23/2023 1136    BUN 18 02/23/2023 1136    CREATININE 0.9 02/23/2023 1136    GLU 91 02/23/2023 1136        Component Value Date/Time    CALCIUM 9.7 02/23/2023 1136    ALKPHOS 78 02/23/2023 1136    AST 24 02/23/2023 1136    ALT 16 02/23/2023 1136    BILITOT 0.5 02/23/2023 1136    ESTGFRAFRICA >60.0 04/05/2022 1147    EGFRNONAA >60.0 04/05/2022 1146           Lab Results   Component Value Date    TSH 1.870 04/05/2022     No results found for: INR, PROTIME  @RESUFAST(WBC,HGB,HCT,MCV,PLT)  @LABRCNTIP(BNP,BNPTRIAGEBLO)@  CrCl cannot be calculated (Patient's most recent lab result is older than the maximum 7 days allowed.).     Results for orders placed during the hospital encounter of 07/15/22    Echo    Interpretation Summary  · The left ventricle is normal in size with normal systolic function.  · Normal left ventricular diastolic function.  · The estimated PA systolic pressure is 31 mmHg.  · Normal right ventricular size with normal right ventricular systolic function.  · Normal central venous pressure (3 mmHg).  · Moderate pulmonic regurgitation.  · Mild mitral regurgitation.  · Mild tricuspid regurgitation.  · The estimated ejection fraction is 60%.     No results found for this or any previous visit.     Assessment:      1. Primary hypertension    2. Mixed hyperlipidemia          Plan:   Primary hypertension    Mixed hyperlipidemia    Increased Diovan  Cont home BP logs, nurse call next week for bp progress    Bystolic, Diovan-HCT, Low-salt, low-fat diet, Exercise as tolerated, at least 30 minutes daily-HTN  Statin-HLD  CPAP- ASIF  Discussed heart healthy diet, daily exercise- diabetes    Follow up with PCP- ortho complaints, referral for ortho put in  Follow up scheduled with Dr. Martinez  CMP ordered    Courtney Guillot, FNP-[facility], Cardiology

## 2023-05-25 ENCOUNTER — PATIENT MESSAGE (OUTPATIENT)
Dept: PRIMARY CARE CLINIC | Facility: CLINIC | Age: 73
End: 2023-05-25

## 2023-05-30 DIAGNOSIS — M25.569 KNEE PAIN: Primary | ICD-10-CM

## 2023-05-31 ENCOUNTER — OFFICE VISIT (OUTPATIENT)
Dept: URGENT CARE | Facility: CLINIC | Age: 73
End: 2023-05-31
Payer: COMMERCIAL

## 2023-05-31 ENCOUNTER — PATIENT MESSAGE (OUTPATIENT)
Dept: CARDIOLOGY | Facility: CLINIC | Age: 73
End: 2023-05-31
Payer: COMMERCIAL

## 2023-05-31 ENCOUNTER — NURSE TRIAGE (OUTPATIENT)
Dept: ADMINISTRATIVE | Facility: CLINIC | Age: 73
End: 2023-05-31
Payer: COMMERCIAL

## 2023-05-31 ENCOUNTER — PATIENT MESSAGE (OUTPATIENT)
Dept: PRIMARY CARE CLINIC | Facility: CLINIC | Age: 73
End: 2023-05-31
Payer: COMMERCIAL

## 2023-05-31 VITALS
BODY MASS INDEX: 27.1 KG/M2 | DIASTOLIC BLOOD PRESSURE: 88 MMHG | WEIGHT: 162.69 LBS | HEART RATE: 74 BPM | HEIGHT: 65 IN | RESPIRATION RATE: 14 BRPM | SYSTOLIC BLOOD PRESSURE: 158 MMHG | OXYGEN SATURATION: 97 % | TEMPERATURE: 99 F

## 2023-05-31 DIAGNOSIS — I10 ESSENTIAL HYPERTENSION: Primary | ICD-10-CM

## 2023-05-31 PROCEDURE — 99213 OFFICE O/P EST LOW 20 MIN: CPT | Mod: S$GLB,,, | Performed by: NURSE PRACTITIONER

## 2023-05-31 PROCEDURE — 99213 PR OFFICE/OUTPT VISIT, EST, LEVL III, 20-29 MIN: ICD-10-PCS | Mod: S$GLB,,, | Performed by: NURSE PRACTITIONER

## 2023-05-31 NOTE — TELEPHONE ENCOUNTER
Pt states she was unable to sleep last PM d/t palpitations, and is also concerned that her new Bystolic is causing bradycardia.   Pt does have cardiology appt for in the AM, but requesting scheduled for later time. Explained to pt that we are unable to change or make appts with specialties at [facility]. Offer to transfer to  declined.   Instructed to call back if any symptoms develop that pt would like evaluated.                 Reason for Disposition   Requesting regular office appointment    Protocols used: Information Only Call - No Triage-A-

## 2023-05-31 NOTE — PATIENT INSTRUCTIONS

## 2023-05-31 NOTE — PROGRESS NOTES
"Subjective:      Patient ID: Xin Taylor is a 72 y.o. female.    Vitals:  height is 5' 5" (1.651 m) and weight is 73.8 kg (162 lb 11.2 oz). Her temperature is 98.6 °F (37 °C). Her blood pressure is 158/88 (abnormal) and her pulse is 74. Her respiration is 14 and oxygen saturation is 97%.     Chief Complaint: Hypertension    72 yr old female presents to the Urgent Care with complaint elevated BP over the last 3 months. Patient currently working with Cardiology adjusting BP medications. Patient last BP med was changed on 05/19 to Bystolic 20mg daily. Patient's BP systolic ranging from 160-180's for the last 2 weeks. Patient next appt with PCP is not until June 7th. Patient concerned about uncontrolled BP. Patient currently taking Diovan /25mg and Bystolic 20mg. Patient denies any CP, SOB, abdominal pain, headache, dizziness, or lightheadedness at this time.     Hypertension  This is a recurrent problem. The current episode started more than 1 month ago. The problem is unchanged. The problem is uncontrolled. Pertinent negatives include no anxiety, blurred vision, chest pain, headaches, malaise/fatigue, neck pain, orthopnea, palpitations, peripheral edema, PND, shortness of breath or sweats. There are no associated agents to hypertension. There are no known risk factors for coronary artery disease. Past treatments include nothing. The current treatment provides no improvement. There is no history of angina, CAD/MI or heart failure. There is no history of chronic renal disease or a thyroid problem.     Constitution: Negative for sweating, fatigue and fever.   Neck: Negative for neck pain.   Cardiovascular:  Negative for chest pain, palpitations and sob on exertion.   Eyes:  Negative for blurred vision.   Respiratory:  Negative for cough and shortness of breath.    Gastrointestinal:  Negative for abdominal pain.   Neurological:  Negative for dizziness, light-headedness and headaches.    Objective:     Physical " Exam   Constitutional: She appears well-developed. She is cooperative.  Non-toxic appearance. She does not appear ill. No distress.   Cardiovascular: Normal rate and normal pulses.   Pulses:       Radial pulses are 2+ on the right side and 2+ on the left side.   Pulmonary/Chest: Effort normal.   Abdominal: Normal appearance.   Neurological: She is alert. Gait normal.   Skin: Skin is not diaphoretic.   Psychiatric: Her speech is normal and behavior is normal. Mood and thought content normal.   Nursing note and vitals reviewed.    Assessment:     1. Essential hypertension        Plan:   72 yr old female presents to the Urgent Care with complaint of uncontrolled BP. Patient's cardiologist has been adjusting BP meds over the last few weeks. Patient concerned about fluctuation of BP. Discussed in length about BP meds prescribed per PCP. Strict ER precautions. Discussed Low Sodium diet. Scheduled appt with Cardiology in the AM. Advised pt to f/u with Cardiology for further evaluation. Patient denies any CP, headache, lightheadedness, dizziness, SOB or abdominal pain while in clinic.     -The pt likely suffers from essential hypertension   -In the absence of a hypertensive emergency, which the pt does not have, there is no indication to aggressively treat her elevated blood pressure, even when it approaches the systolic ~180 range.   -acutely, the pt may still have an elevated pressure, but over time the medication will take effect.      Essential hypertension  -     Ambulatory referral/consult to Cardiology      Patient Instructions   If you were prescribed a narcotic or controlled medication, do not drive or operate heavy equipment or machinery while taking these medications.  You must understand that you've received an Urgent Care treatment only and that you may be released before all your medical problems are known or treated. You, the patient, will arrange for follow up care as instructed.  Follow up with your PCP or  specialty clinic as directed within 2-5 days if not improved or as needed.  You can call [phone] to schedule an appointment with the appropriate provider.  If your condition worsens we recommend that you receive another evaluation at the emergency room immediately or contact your primary medical clinics after hours call service to discuss your concerns.  Please return here or go to the Emergency Department for any concerns or worsening of condition.

## 2023-06-01 ENCOUNTER — PATIENT MESSAGE (OUTPATIENT)
Dept: CARDIOLOGY | Facility: CLINIC | Age: 73
End: 2023-06-01
Payer: COMMERCIAL

## 2023-06-01 ENCOUNTER — PATIENT MESSAGE (OUTPATIENT)
Dept: PRIMARY CARE CLINIC | Facility: CLINIC | Age: 73
End: 2023-06-01

## 2023-06-01 ENCOUNTER — HOSPITAL ENCOUNTER (OUTPATIENT)
Dept: RADIOLOGY | Facility: HOSPITAL | Age: 73
Discharge: HOME | End: 2023-06-01
Attending: ORTHOPAEDIC SURGERY
Payer: COMMERCIAL

## 2023-06-01 ENCOUNTER — TELEPHONE (OUTPATIENT)
Dept: CARDIOLOGY | Facility: CLINIC | Age: 73
End: 2023-06-01

## 2023-06-01 ENCOUNTER — OFFICE VISIT (OUTPATIENT)
Dept: PRIMARY CARE CLINIC | Facility: CLINIC | Age: 73
End: 2023-06-01
Payer: COMMERCIAL

## 2023-06-01 ENCOUNTER — OFFICE VISIT (OUTPATIENT)
Dept: SPORTS MEDICINE | Facility: CLINIC | Age: 73
End: 2023-06-01
Payer: COMMERCIAL

## 2023-06-01 VITALS
HEART RATE: 52 BPM | BODY MASS INDEX: 27.96 KG/M2 | WEIGHT: 163.81 LBS | HEIGHT: 64 IN | OXYGEN SATURATION: 98 % | DIASTOLIC BLOOD PRESSURE: 78 MMHG | TEMPERATURE: 99 F | SYSTOLIC BLOOD PRESSURE: 162 MMHG

## 2023-06-01 VITALS — WEIGHT: 163.81 LBS | HEIGHT: 64 IN | BODY MASS INDEX: 27.96 KG/M2

## 2023-06-01 DIAGNOSIS — M25.569 KNEE PAIN: ICD-10-CM

## 2023-06-01 DIAGNOSIS — I10 PRIMARY HYPERTENSION: ICD-10-CM

## 2023-06-01 DIAGNOSIS — M54.42 CHRONIC MIDLINE LOW BACK PAIN WITH LEFT-SIDED SCIATICA: Primary | ICD-10-CM

## 2023-06-01 DIAGNOSIS — R60.0 LEG EDEMA: ICD-10-CM

## 2023-06-01 DIAGNOSIS — R00.2 PALPITATIONS: ICD-10-CM

## 2023-06-01 DIAGNOSIS — M54.9 BACK PAIN: Primary | ICD-10-CM

## 2023-06-01 DIAGNOSIS — M54.9 BACK PAIN: ICD-10-CM

## 2023-06-01 DIAGNOSIS — I10 PRIMARY HYPERTENSION: Primary | ICD-10-CM

## 2023-06-01 DIAGNOSIS — M85.80 OSTEOPENIA, UNSPECIFIED LOCATION: ICD-10-CM

## 2023-06-01 DIAGNOSIS — G89.29 CHRONIC MIDLINE LOW BACK PAIN WITH LEFT-SIDED SCIATICA: Primary | ICD-10-CM

## 2023-06-01 PROCEDURE — 99999 PR PBB SHADOW E&M-EST. PATIENT-LVL V: ICD-10-PCS | Mod: PBBFAC,,, | Performed by: FAMILY MEDICINE

## 2023-06-01 PROCEDURE — 1101F PT FALLS ASSESS-DOCD LE1/YR: CPT | Mod: CPTII,S$GLB,, | Performed by: FAMILY MEDICINE

## 2023-06-01 PROCEDURE — 3008F BODY MASS INDEX DOCD: CPT | Mod: CPTII,S$GLB,, | Performed by: FAMILY MEDICINE

## 2023-06-01 PROCEDURE — 99214 PR OFFICE/OUTPT VISIT, EST, LEVL IV, 30-39 MIN: ICD-10-PCS | Mod: S$GLB,,, | Performed by: ORTHOPAEDIC SURGERY

## 2023-06-01 PROCEDURE — 99215 PR OFFICE/OUTPT VISIT, EST, LEVL V, 40-54 MIN: ICD-10-PCS | Mod: S$GLB,,, | Performed by: FAMILY MEDICINE

## 2023-06-01 PROCEDURE — 3288F PR FALLS RISK ASSESSMENT DOCUMENTED: ICD-10-PCS | Mod: CPTII,S$GLB,, | Performed by: FAMILY MEDICINE

## 2023-06-01 PROCEDURE — 3008F BODY MASS INDEX DOCD: CPT | Mod: CPTII,S$GLB,, | Performed by: ORTHOPAEDIC SURGERY

## 2023-06-01 PROCEDURE — 1125F AMNT PAIN NOTED PAIN PRSNT: CPT | Mod: CPTII,S$GLB,, | Performed by: ORTHOPAEDIC SURGERY

## 2023-06-01 PROCEDURE — 3288F PR FALLS RISK ASSESSMENT DOCUMENTED: ICD-10-PCS | Mod: CPTII,S$GLB,, | Performed by: ORTHOPAEDIC SURGERY

## 2023-06-01 PROCEDURE — 97110 PR THERAPEUTIC EXERCISES: ICD-10-PCS | Mod: S$GLB,,, | Performed by: ORTHOPAEDIC SURGERY

## 2023-06-01 PROCEDURE — 72100 X-RAY EXAM L-S SPINE 2/3 VWS: CPT | Mod: TC

## 2023-06-01 PROCEDURE — 73564 X-RAY EXAM KNEE 4 OR MORE: CPT | Mod: 26,,, | Performed by: RADIOLOGY

## 2023-06-01 PROCEDURE — 99999 PR PBB SHADOW E&M-EST. PATIENT-LVL IV: CPT | Mod: PBBFAC,,, | Performed by: ORTHOPAEDIC SURGERY

## 2023-06-01 PROCEDURE — 97110 THERAPEUTIC EXERCISES: CPT | Mod: S$GLB,,, | Performed by: ORTHOPAEDIC SURGERY

## 2023-06-01 PROCEDURE — 1126F PR PAIN SEVERITY QUANTIFIED, NO PAIN PRESENT: ICD-10-PCS | Mod: CPTII,S$GLB,, | Performed by: FAMILY MEDICINE

## 2023-06-01 PROCEDURE — 3288F FALL RISK ASSESSMENT DOCD: CPT | Mod: CPTII,S$GLB,, | Performed by: ORTHOPAEDIC SURGERY

## 2023-06-01 PROCEDURE — 1159F PR MEDICATION LIST DOCUMENTED IN MEDICAL RECORD: ICD-10-PCS | Mod: CPTII,S$GLB,, | Performed by: ORTHOPAEDIC SURGERY

## 2023-06-01 PROCEDURE — 99215 OFFICE O/P EST HI 40 MIN: CPT | Mod: S$GLB,,, | Performed by: FAMILY MEDICINE

## 2023-06-01 PROCEDURE — 1159F PR MEDICATION LIST DOCUMENTED IN MEDICAL RECORD: ICD-10-PCS | Mod: CPTII,S$GLB,, | Performed by: FAMILY MEDICINE

## 2023-06-01 PROCEDURE — 1101F PR PT FALLS ASSESS DOC 0-1 FALLS W/OUT INJ PAST YR: ICD-10-PCS | Mod: CPTII,S$GLB,, | Performed by: ORTHOPAEDIC SURGERY

## 2023-06-01 PROCEDURE — 99999 PR PBB SHADOW E&M-EST. PATIENT-LVL V: CPT | Mod: PBBFAC,,, | Performed by: FAMILY MEDICINE

## 2023-06-01 PROCEDURE — 1159F MED LIST DOCD IN RCRD: CPT | Mod: CPTII,S$GLB,, | Performed by: ORTHOPAEDIC SURGERY

## 2023-06-01 PROCEDURE — 99999 PR PBB SHADOW E&M-EST. PATIENT-LVL IV: ICD-10-PCS | Mod: PBBFAC,,, | Performed by: ORTHOPAEDIC SURGERY

## 2023-06-01 PROCEDURE — 1125F PR PAIN SEVERITY QUANTIFIED, PAIN PRESENT: ICD-10-PCS | Mod: CPTII,S$GLB,, | Performed by: ORTHOPAEDIC SURGERY

## 2023-06-01 PROCEDURE — 99214 OFFICE O/P EST MOD 30 MIN: CPT | Mod: S$GLB,,, | Performed by: ORTHOPAEDIC SURGERY

## 2023-06-01 PROCEDURE — 72100 XR LUMBAR SPINE 2 OR 3 VIEWS: ICD-10-PCS | Mod: 26,,, | Performed by: RADIOLOGY

## 2023-06-01 PROCEDURE — 3008F PR BODY MASS INDEX (BMI) DOCUMENTED: ICD-10-PCS | Mod: CPTII,S$GLB,, | Performed by: FAMILY MEDICINE

## 2023-06-01 PROCEDURE — 1101F PR PT FALLS ASSESS DOC 0-1 FALLS W/OUT INJ PAST YR: ICD-10-PCS | Mod: CPTII,S$GLB,, | Performed by: FAMILY MEDICINE

## 2023-06-01 PROCEDURE — 3008F PR BODY MASS INDEX (BMI) DOCUMENTED: ICD-10-PCS | Mod: CPTII,S$GLB,, | Performed by: ORTHOPAEDIC SURGERY

## 2023-06-01 PROCEDURE — 3078F DIAST BP <80 MM HG: CPT | Mod: CPTII,S$GLB,, | Performed by: FAMILY MEDICINE

## 2023-06-01 PROCEDURE — 3078F PR MOST RECENT DIASTOLIC BLOOD PRESSURE < 80 MM HG: ICD-10-PCS | Mod: CPTII,S$GLB,, | Performed by: FAMILY MEDICINE

## 2023-06-01 PROCEDURE — 72100 X-RAY EXAM L-S SPINE 2/3 VWS: CPT | Mod: 26,,, | Performed by: RADIOLOGY

## 2023-06-01 PROCEDURE — 3077F SYST BP >= 140 MM HG: CPT | Mod: CPTII,S$GLB,, | Performed by: FAMILY MEDICINE

## 2023-06-01 PROCEDURE — 1101F PT FALLS ASSESS-DOCD LE1/YR: CPT | Mod: CPTII,S$GLB,, | Performed by: ORTHOPAEDIC SURGERY

## 2023-06-01 PROCEDURE — 3077F PR MOST RECENT SYSTOLIC BLOOD PRESSURE >= 140 MM HG: ICD-10-PCS | Mod: CPTII,S$GLB,, | Performed by: FAMILY MEDICINE

## 2023-06-01 PROCEDURE — 73564 XR KNEE ORTHO BILAT WITH FLEXION: ICD-10-PCS | Mod: 26,,, | Performed by: RADIOLOGY

## 2023-06-01 PROCEDURE — 3288F FALL RISK ASSESSMENT DOCD: CPT | Mod: CPTII,S$GLB,, | Performed by: FAMILY MEDICINE

## 2023-06-01 PROCEDURE — 73564 X-RAY EXAM KNEE 4 OR MORE: CPT | Mod: TC,50

## 2023-06-01 PROCEDURE — 1159F MED LIST DOCD IN RCRD: CPT | Mod: CPTII,S$GLB,, | Performed by: FAMILY MEDICINE

## 2023-06-01 PROCEDURE — 1126F AMNT PAIN NOTED NONE PRSNT: CPT | Mod: CPTII,S$GLB,, | Performed by: FAMILY MEDICINE

## 2023-06-01 RX ORDER — NEBIVOLOL 2.5 MG/1
2.5 TABLET ORAL NIGHTLY
Qty: 90 TABLET | Refills: 3 | Status: SHIPPED | OUTPATIENT
Start: 2023-06-01 | End: 2023-06-28

## 2023-06-01 RX ORDER — VALSARTAN AND HYDROCHLOROTHIAZIDE 160; 25 MG/1; MG/1
1 TABLET ORAL DAILY
Qty: 90 TABLET | Refills: 3 | Status: SHIPPED | OUTPATIENT
Start: 2023-06-01 | End: 2024-05-31

## 2023-06-01 RX ORDER — AMLODIPINE BESYLATE 2.5 MG/1
2.5 TABLET ORAL DAILY
Qty: 90 TABLET | Refills: 3 | Status: SHIPPED | OUTPATIENT
Start: 2023-06-01 | End: 2023-06-08

## 2023-06-01 NOTE — TELEPHONE ENCOUNTER
I saw Ms. Taylor today at 11am and adjusted medications. No need for cards appt today. She can keep the June 28th appt at this time. Thanks!   Breann Carlson MD

## 2023-06-01 NOTE — PROGRESS NOTES
"Subjective:      Patient ID: Xin Taylor is a 72 y.o. female.    Chief Complaint: Follow-up      Patient is a 72 y.o. female coming in today for BP f/u.  BP still elevated in clinic. Was seen in Urgent Care last week for elevated BP. Has upcoming appointment with Dr. Martinez, cardiology, at the end this month. Pt requiring medication refill today. Currently on     1. Primary hypertension    2. Palpitations    3. Leg edema       No questionnaires on file.    Pmh, Psh, Family Hx, Social Hx, HM updated in Epic Tabs today.   Review of Systems   Constitutional:  Negative for chills, fatigue and fever.   HENT:  Negative for ear pain and trouble swallowing.    Eyes:  Negative for pain and visual disturbance.   Respiratory:  Negative for cough and shortness of breath.    Cardiovascular:  Negative for chest pain and leg swelling.   Gastrointestinal:  Negative for abdominal pain, blood in stool, nausea and vomiting.   Endocrine: Negative for cold intolerance and heat intolerance.   Genitourinary:  Negative for dysuria and frequency.   Musculoskeletal:  Negative for joint swelling, myalgias and neck pain.   Skin:  Negative for color change and rash.   Neurological:  Negative for dizziness and headaches.   Psychiatric/Behavioral:  Negative for behavioral problems and sleep disturbance.    Objective:     Vitals:    06/01/23 1033 06/01/23 1116   BP: (!) 168/80 (!) 162/78   BP Location: Right arm Left arm   Patient Position: Sitting Sitting   BP Method: Medium (Manual) Medium (Manual)   Pulse: (!) 52    Temp: 98.5 °F (36.9 °C)    TempSrc: Oral    SpO2: 98%    Weight: 74.3 kg (163 lb 12.8 oz)    Height: 5' 4" (1.626 m)      Wt Readings from Last 10 Encounters:   06/01/23 74.3 kg (163 lb 12.8 oz)   05/31/23 73.8 kg (162 lb 11.2 oz)   05/22/23 73.8 kg (162 lb 11.2 oz)   05/08/23 73 kg (160 lb 15 oz)   04/24/23 74 kg (163 lb 2.3 oz)   02/20/23 72.9 kg (160 lb 11.5 oz)   11/17/22 75.2 kg (165 lb 12.6 oz)   10/14/22 72.5 kg (159 lb " 13.3 oz)   08/24/22 76.6 kg (168 lb 14 oz)   07/25/22 76.7 kg (169 lb 1.5 oz)     Physical Exam  Vitals and nursing note reviewed.   Constitutional:       Appearance: Normal appearance. She is obese.   HENT:      Head: Normocephalic and atraumatic.   Cardiovascular:      Rate and Rhythm: Normal rate and regular rhythm.      Pulses: Normal pulses.      Heart sounds: Normal heart sounds. No murmur heard.  Pulmonary:      Effort: Pulmonary effort is normal. No accessory muscle usage.      Breath sounds: Normal breath sounds.   Musculoskeletal:         General: Swelling present.   Neurological:      Mental Status: She is alert.   Psychiatric:         Mood and Affect: Mood normal.         Behavior: Behavior normal.         Thought Content: Thought content normal.         Judgment: Judgment normal.       Assessment:     1. Primary hypertension    2. Palpitations    3. Leg edema        Plan:   Xin was seen today for follow-up.    Diagnoses and all orders for this visit:    Primary hypertension  Comments:  reviewed BP and hr readings, UC notes, cardiology notes, holter monitor. restart back amlodipine 2.5mg qhs, reduce diovan to 160.hctz 25, +bystolic 2.5mg qhs  Orders:  -     amLODIPine (NORVASC) 2.5 MG tablet; Take 1 tablet (2.5 mg total) by mouth once daily. hypertension  -     valsartan-hydrochlorothiazide (DIOVAN-HCT) 160-25 mg per tablet; Take 1 tablet by mouth once daily.    Palpitations  Comments:  reviewed BP and hr readings, UC notes, cardiology notes, holter monitor. restart back amlodipine 2.5mg qhs, reduce diovan to 160.hctz 25, +bystolic 2.5mg qhs  Orders:  -     nebivoloL (BYSTOLIC) 2.5 MG Tab; Take 1 tablet (2.5 mg total) by mouth every evening. For palpitations    Leg edema  Comments:  no change with reduction / d/c of amlodipine so will restart since poor BP control since d/c of amlodipine       HTN is not controlled at this time.   Restart prior regimen when BP was controlled back in 9/2022.   New Rx  Amlodipine 2.5 mg/day for HTN treatment.   Reduced Rx Valsartan-HCTZ to 160-25 mg/day for HTN treatment.  Reduced Rx Nebivolol to 2.5 mg/day for palpitations treatment.   Instructed to continue monitoring BP at home closely. F/u with cards at End of June.   Monitor for palpitations, can increase bystolic to 5mg if needed in 2 weeks to help with palpitations. Discussed valsalva maneuvers to help with palpitations/pvcs at night.   Instructed to f/u in 2 months.     There are no Patient Instructions on file for this visit.    Follow up in about 2 months (around 8/1/2023) for f/u office visit Dr. Carlson.      LABS:   Lab Results   Component Value Date    HGBA1C 5.8 (H) 04/05/2022    HGBA1C 5.7 (H) 10/18/2021    HGBA1C 6.0 (H) 04/22/2021      Lab Results   Component Value Date    CHOL 184 02/23/2023    CHOL 200 (H) 04/05/2022    CHOL 184 10/18/2021     Lab Results   Component Value Date    LDLCALC 93.4 02/23/2023    LDLCALC 106.0 04/05/2022    LDLCALC 89.6 10/18/2021     Lab Results   Component Value Date    WBC 4.87 04/05/2022    HGB 11.3 (L) 04/05/2022    HCT 35.9 (L) 04/05/2022     04/05/2022    CHOL 184 02/23/2023    TRIG 53 02/23/2023    HDL 80 (H) 02/23/2023    ALT 16 02/23/2023    AST 24 02/23/2023     02/23/2023    K 4.0 02/23/2023     02/23/2023    CREATININE 0.9 02/23/2023    BUN 18 02/23/2023    CO2 30 (H) 02/23/2023    TSH 1.870 04/05/2022    HGBA1C 5.8 (H) 04/05/2022       The 10-year ASCVD risk score (Dalton MARTIN, et al., 2019) is: 19.5%    Values used to calculate the score:      Age: 72 years      Sex: Female      Is Non- : Yes      Diabetic: No      Tobacco smoker: No      Systolic Blood Pressure: 162 mmHg      Is BP treated: Yes      HDL Cholesterol: 80 mg/dL      Total Cholesterol: 184 mg/dL  45 minutes of total time spent on the encounter, which includes face to face time and non-face to face time preparing to see the patient (eg, review of tests), Obtaining  and/or reviewing separately obtained history, Documenting clinical information in the electronic or other health record, Independently interpreting results (not separately reported) and communicating results to the patient/family/caregiver, or Care coordination (not separately reported).    Scribe Attestation:   I, Santiago Mckeon, am scribing for, and in the presence of, Dr. Breann Carlson MD. I performed the above scribed service and the documentation accurately describes the services I performed. I attest to the accuracy of the note.    I, Dr. Breann Carlson MD, reviewed documentation as scribed above. I personally performed the services described in this documentation.  I agree that the record reflects my personal performance and is accurate and complete. Breann Carlson MD.    06/01/2023

## 2023-06-01 NOTE — PATIENT INSTRUCTIONS
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    Osteopenia, unspecified location     Chronic midline low back pain with left-sided sciatica Yes    Primary hypertension       Plan:  Continue Meloxicam 15 as needed for pain  Home exercsie program to address quad and hips  At least 15 minutes were spent developing, teaching, and performing a home exercise program.  A written summary was provided and all questions were answered. This service was performed by Dr. Naseem Corona and his assistant under his direction. CPT 21618-LH  Deferred CSI to high blood pressure, but will order visco instead to start in 4 weeks    Although there is not a cure for arthritis, there are effective ways to improve symptoms.     Exercise & Activity:  I recommend low impact activities such as elliptical and bicycle   Walking is great for arthritis: https://www.[email or web address]/3-reasons-walking-with-knee-arthritis/  If walking long distances, I recommend good quality well-cushioned shoes. Varsity sports can help you find the right ones: https://[email or web address]ion/  Aquatic and pool therapy is often helpful because it lessens the impact on the joint, strengthens the leg and thigh muscles, and helps to control swelling.   Knee motion is important to the health of the knee.     Knee Braces:  A compression knee sleeve can help limit swelling and provide proprioceptive feedback.     Prescriptions & Medications:  I do recommend formal physical therapy or at minimum a home exercise program.   Over the counter analgesic (pain-relieving) medications can help. Examples are Tylenol, Ibuprofen, and Aleve. Check with your primary care physician to make sure you don't have contra-indications to taking those medicines.  Some over the counter  supplement solutions such as glucosamine and chondroitin may help with symptoms, although the evidence is mixed.    Healthy Lifestyle:  Excess body weight can have a negative impact on joint health and on pain. I recommend healthy lifestyle choices including nutrition and exercise that help reach and maintain an ideal body weight. Tips for Exercise: https://www.[employer]/13-exercise-tips-for-a-healthier-you/  Some diets cause increased inflammation. I recommend a balanced wholesome diet including some foods such as olives that are shown to decrease inflammation. More diet information available here: https://www.[employer]/8-best-foods-for-knee-arthritis/    Follow-up: 4 weeks or sooner if there are any problems between now and then.    Leave Review:   Google: Leave Google Review  Healthgrades: Leave Healthgrades Review    After Hours Number: [phone]             STRENGTHENING EXERCISES                    If you have any difficulties reading this information, you may visit the online version using the following link: Knee Conditioning Program (https://orthoinfo.aaos.org/globalassets/pdfs/2017-rehab_knee.pdf)

## 2023-06-01 NOTE — PROGRESS NOTES
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 She describes it more as stiffness than pain and rates it as a discomfort of 2/10. She mostly feels stiff when she sits for long periods of time and it is relieved the more she walks. She takes Meloxicam as needed for pain. She has been experiencing sciatica but finds it to be more prevalent on the left side. She had a bone density scan that showed some lumbar spine deterioration. She is experiencing numbness down to the soles of her feet.    HPI    Past Medical History:   Past Medical History:   Diagnosis Date    Anemia     Colon polyp     Edema     HLD (hyperlipidemia)     HTN (hypertension)     Palpitation     Tinnitus      Past Surgical History:   Procedure Laterality Date    COLONOSCOPY N/A 04/22/2022    Procedure: COLONOSCOPY;  Surgeon: Priyanka Winkler MD;  Location: [facility];  Service: Endoscopy;  Laterality: N/A;    HYSTERECTOMY       Family History   Problem Relation Age of Onset    Hypertension Mother     Thyroid disease Mother     Cataracts Mother     Cancer Mother         lung    Cancer Other     Heart defect Brother     Breast cancer Paternal Aunt      Social History     Socioeconomic History    Marital status: Legally    Occupational History    Occupation: retired   Tobacco Use    Smoking status: Never    Smokeless tobacco: Never   Substance and Sexual Activity    Alcohol use: No    Drug use: No    Sexual activity: Not Currently     Partners: Male     Social Determinants of Health     Financial Resource Strain: Low Risk     Difficulty of Paying Living Expenses: Not hard at all   Food Insecurity: No Food  Insecurity    Worried About Running Out of Food in the Last Year: Never true    Ran Out of Food in the Last Year: Never true   Transportation Needs: No Transportation Needs    Lack of Transportation (Medical): No    Lack of Transportation (Non-Medical): No   Physical Activity: Insufficiently Active    Days of Exercise per Week: 3 days    Minutes of Exercise per Session: 30 min   Stress: No Stress Concern Present    Feeling of Stress : Only a little   Social Connections: Unknown    Frequency of Communication with Friends and Family: More than three times a week    Frequency of Social Gatherings with Friends and Family: Three times a week    Active Member of Clubs or Organizations: Yes    Attends Club or Organization Meetings: More than 4 times per year    Marital Status:    Housing Stability: Low Risk     Unable to Pay for Housing in the Last Year: No    Number of Places Lived in the Last Year: 1    Unstable Housing in the Last Year: No     Medication List with Changes/Refills   Current Medications    AMLODIPINE (NORVASC) 2.5 MG TABLET    Take 1 tablet (2.5 mg total) by mouth once daily. hypertension    ASPIRIN 81 MG TAB    Take 1 tablet by mouth every other day.    ATORVASTATIN (LIPITOR) 10 MG TABLET    Take 1 tablet (10 mg total) by mouth once daily.    B COMPLEX VITAMINS TABLET    Take 1 tablet by mouth once daily.    CO-ENZYME Q-10 30 MG CAPSULE    Take 30 mg by mouth 3 (three) times daily.    FERROUS SULFATE 325 MG (65 MG IRON) TAB TABLET    Take 1 tablet (325 mg total) by mouth once daily. Take with citrus    FISH OIL-OMEGA-3 FATTY ACIDS 300-1,000 MG CAPSULE    Take 2 g by mouth once daily.    FLUTICASONE PROPIONATE (FLONASE) 50 MCG/ACTUATION NASAL SPRAY    SHAKE LIQUID AND USE 2 SPRAYS(100 MCG) IN EACH NOSTRIL EVERY DAY    MELOXICAM (MOBIC) 15 MG TABLET    Take 1 tablet (15 mg total) by mouth once daily.    MULTIVITAMIN (THERAGRAN) PER TABLET    Take 1 tablet by mouth once daily.    NEBIVOLOL  (BYSTOLIC) 2.5 MG TAB    Take 1 tablet (2.5 mg total) by mouth every evening. For palpitations    TURMERIC ROOT EXTRACT 500 MG CAP    Take 500 mg by mouth 2 (two) times daily.    VALSARTAN-HYDROCHLOROTHIAZIDE (DIOVAN-HCT) 160-25 MG PER TABLET    Take 1 tablet by mouth once daily.    VITAMIN D 1000 UNITS TAB    Take 185 mg by mouth once daily.     Review of patient's allergies indicates:   Allergen Reactions    No known drug allergies      ROS    Physical Exam:   Body mass index is 28.12 kg/m².  There were no vitals filed for this visit.   GENERAL: Well appearing, appropriate for stated age, no acute distress.  CARDIOVASCULAR: Pulses regular by peripheral palpation.  PULMONARY: Respirations are even and non-labored.  NEURO: Awake, alert, and oriented x 3.  PSYCH: Mood & affect are appropriate.  HEENT: Head is normocephalic and atraumatic.            Right Knee Exam     Inspection   Effusion: present    Tenderness   The patient is tender to palpation of the medial joint line and lateral joint line.    Range of Motion   Extension:  5 abnormal   Flexion:  110     Other   Sensation: normal    Comments:  Intact EHL, FHL, gastrocsoleus, and tibialis anterior. Sensation intact to light touch in superficial peroneal, deep peroneal, tibial, sural, and saphenous nerve distributions. Foot warm and well perfused with capillary refill of less than 2 seconds and palpable pedal pulses.      Left Knee Exam     Tenderness   The patient tender to palpation of the medial joint line and lateral joint line.    Range of Motion   Extension:  0   Flexion:  110     Other   Sensation: normal    Muscle Strength   Right Lower Extremity   Hip Abduction: 5/5   Quadriceps:  4/5   Hamstrin/5   Left Lower Extremity   Hip Abduction: 5/5   Quadriceps:  4/5   Hamstrin/5       Imaging:    X-Ray Lumbar Spine 2 Or 3 Views  Narrative: EXAM:  XR LUMBAR SPINE 2 OR 3 VIEWS    CLINICAL HISTORY:    Low back pain    TECHNIQUE: 3 views of the lumbar  spine.    COMPARISON: None.    FINDINGS:    Prominent lumbar lordosis is noted.    Mild degenerative disc space narrowing is present at L4-5.    Prominent multilevel facet arthrosis with grade 1 L3-4, L4-5 and L5-S1 spondylolisthesis.  Impression:  No acute findings.  Prominent facet arthrosis with multilevel spondylolisthesis.    Finalized on: 6/1/2023 2:21 PM By:  Juan Luna MD  [facility]# [id number]      2023-06-01 14:23:22.481    [facility]  X-ray Knee Ortho Bilateral with Flexion  Narrative: EXAM: XR KNEE ORTHO BILAT WITH FLEXION    CLINICAL HISTORY: Bilateral knee joint pain.    TECHNIQUE: Bilateral 4 view knee series.    COMPARISON: None.    FINDINGS:  Right knee standing x-ray reveals joint space narrowing medially and laterally.  Sclerosis and marginal osteophytosis moderately large patellofemoral joint spurs are present as well.  No obvious joint effusion.    Left knee standing x-ray reveals joint space narrowing medially with subchondral sclerosis and cystic change.  Medial and lateral compartment spurring is present.  Moderate patellofemoral joint spurring.  No definite joint effusion.  Impression:  Bilateral tricompartment osteoarthritis as above.    Finalized on: 6/1/2023 12:49 PM By:  Juan Luna MD  [facility]# [id number]      2023-06-01 12:51:38.237    [facility]      Relevant imaging results reviewed and interpreted by me, discussed with the patient and / or family today.     Other Tests:         Patient Instructions   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    Osteopenia, unspecified location     Chronic midline low back pain with left-sided sciatica Yes    Primary hypertension       Plan:  Continue Meloxicam 15 as needed for pain  Home exercsie program to address quad and hips  At least 15 minutes were  spent developing, teaching, and performing a home exercise program.  A written summary was provided and all questions were answered. This service was performed by Dr. Naseem Corona and his assistant under his direction. CPT 78932-VH  Deferred CSI to high blood pressure, but will order visco instead to start in 4 weeks    Although there is not a cure for arthritis, there are effective ways to improve symptoms.     Exercise & Activity:  I recommend low impact activities such as elliptical and bicycle   Walking is great for arthritis: https://www.[facility] [employer].com/3-reasons-walking-with-knee-arthritis/  If walking long distances, I recommend good quality well-cushioned shoes. Varsity sports can help you find the right ones: [email or web address]/  Aquatic and pool therapy is often helpful because it lessens the impact on the joint, strengthens the leg and thigh muscles, and helps to control swelling.   Knee motion is important to the health of the knee.     Knee Braces:  A compression knee sleeve can help limit swelling and provide proprioceptive feedback.     Prescriptions & Medications:  I do recommend formal physical therapy or at minimum a home exercise program.   Over the counter analgesic (pain-relieving) medications can help. Examples are Tylenol, Ibuprofen, and Aleve. Check with your primary care physician to make sure you don't have contra-indications to taking those medicines.  Some over the counter supplement solutions such as glucosamine and chondroitin may help with symptoms, although the evidence is mixed.    Healthy Lifestyle:  Excess body weight can have a negative impact on joint health and on pain. I recommend healthy lifestyle choices including nutrition and exercise that help reach and maintain an ideal body weight. Tips for Exercise: https://www.[facility].com/13-exercise-tips-for-a-healthier-you/  Some diets cause increased inflammation. I recommend a balanced wholesome diet  including some foods such as olives that are shown to decrease inflammation. More diet information available here: https://www.[employer].ShowMe.tv/8-best-foods-for-knee-arthritis/    Follow-up: 4 weeks or sooner if there are any problems between now and then.    Leave Review:   Google: Leave Google Review  Healthgrades: Leave Healthgrades Review    After Hours Number: [phone]             STRENGTHENING EXERCISES                    If you have any difficulties reading this information, you may visit the online version using the following link: Knee Conditioning Program (https://orthoinfo.aaos.org/globalassets/pdfs/2017-rehab_knee.pdf)   Provider Note/Medical Decision Making:       MEDICAL NECESSITY FOR VISCOSUPPLEMENTATION: After thorough evaluation of the patient, I have determined that visco-supplementation is medically necessary. The patient has painful DJD of the knee with failure of conservative therapy including lifestyle modifications and rehabilitation exercises. Oral analgesis/NSAIDs have not adequately controlled symptoms and there is radiographic evidence of joint space narrowing, subchondral sclerosis, and some early osteophytic changes Kellgren- Armand grade 2 or greater, or in lack of radiographic evidence, there is arthroscopic or other evidence of chondrosis.  I discussed worrisome and red flag signs and symptoms with the patient. The patient expressed understanding and agreed to alert me immediately or to go to the emergency room if they experience any of these.   Treatment plan was developed with input from the patient/family, and they expressed understanding and agreement with the plan. All questions were answered today.          Naseem Corona MD  Orthopaedic Surgery & Sports Medicine       Disclaimer: This note was prepared using a voice recognition system and is likely to have sound alike errors within the text.

## 2023-06-01 NOTE — TELEPHONE ENCOUNTER
Please review patient message with BP reading have been running high. Pt also having some palpitations no SOB or CP.  Please advise.

## 2023-06-01 NOTE — PROGRESS NOTES
Patient ID: Xin Taylor  YOB: 1950  MRN: [id number]    Chief Complaint: Pain of the Right Knee and Pain of the Left Knee      Referred By: Judy Forbes NP    History of Present Illness: Xin Taylor is a  72 y.o. female    worker with a chief complaint of Pain of the Right Knee and Pain of the Left Knee    Xin is here today for bilateral knee pain. She describes it more as stiffness than pain and rates it as a discomfort of 2/10. She mostly feels stiff when she sits for long periods of time and it is relieved the more she walks. She takes Meloxicam as needed for pain. She has been experiencing sciatica but finds it to be more prevalent on the left side. She had a bone density scan that showed some lumbar spine deterioration. She is experiencing numbness down to the soles of her feet.    HPI    Past Medical History:   Past Medical History:   Diagnosis Date    Anemia     Colon polyp     Edema     HLD (hyperlipidemia)     HTN (hypertension)     Palpitation     Tinnitus      Past Surgical History:   Procedure Laterality Date    COLONOSCOPY N/A 04/22/2022    Procedure: COLONOSCOPY;  Surgeon: Priyanka Winkler MD;  Location: [facility];  Service: Endoscopy;  Laterality: N/A;    HYSTERECTOMY       Family History   Problem Relation Age of Onset    Hypertension Mother     Thyroid disease Mother     Cataracts Mother     Cancer Mother         lung    Cancer Other     Heart defect Brother     Breast cancer Paternal Aunt      Social History     Socioeconomic History    Marital status: Legally    Occupational History    Occupation: retired   Tobacco Use    Smoking status: Never    Smokeless tobacco: Never   Substance and Sexual Activity    Alcohol use: No    Drug use: No    Sexual activity: Not Currently     Partners: Male     Social Determinants of Health     Financial Resource Strain: Low Risk     Difficulty of Paying Living Expenses: Not hard at all   Food Insecurity: No Food  Insecurity    Worried About Running Out of Food in the Last Year: Never true    Ran Out of Food in the Last Year: Never true   Transportation Needs: No Transportation Needs    Lack of Transportation (Medical): No    Lack of Transportation (Non-Medical): No   Physical Activity: Insufficiently Active    Days of Exercise per Week: 3 days    Minutes of Exercise per Session: 30 min   Stress: No Stress Concern Present    Feeling of Stress : Only a little   Social Connections: Unknown    Frequency of Communication with Friends and Family: More than three times a week    Frequency of Social Gatherings with Friends and Family: Three times a week    Active Member of Clubs or Organizations: Yes    Attends Club or Organization Meetings: More than 4 times per year    Marital Status:    Housing Stability: Low Risk     Unable to Pay for Housing in the Last Year: No    Number of Places Lived in the Last Year: 1    Unstable Housing in the Last Year: No     Medication List with Changes/Refills   Current Medications    AMLODIPINE (NORVASC) 2.5 MG TABLET    Take 1 tablet (2.5 mg total) by mouth once daily. hypertension    ASPIRIN 81 MG TAB    Take 1 tablet by mouth every other day.    ATORVASTATIN (LIPITOR) 10 MG TABLET    Take 1 tablet (10 mg total) by mouth once daily.    B COMPLEX VITAMINS TABLET    Take 1 tablet by mouth once daily.    CO-ENZYME Q-10 30 MG CAPSULE    Take 30 mg by mouth 3 (three) times daily.    FERROUS SULFATE 325 MG (65 MG IRON) TAB TABLET    Take 1 tablet (325 mg total) by mouth once daily. Take with citrus    FISH OIL-OMEGA-3 FATTY ACIDS 300-1,000 MG CAPSULE    Take 2 g by mouth once daily.    FLUTICASONE PROPIONATE (FLONASE) 50 MCG/ACTUATION NASAL SPRAY    SHAKE LIQUID AND USE 2 SPRAYS(100 MCG) IN EACH NOSTRIL EVERY DAY    MELOXICAM (MOBIC) 15 MG TABLET    Take 1 tablet (15 mg total) by mouth once daily.    MULTIVITAMIN (THERAGRAN) PER TABLET    Take 1 tablet by mouth once daily.    NEBIVOLOL  (BYSTOLIC) 2.5 MG TAB    Take 1 tablet (2.5 mg total) by mouth every evening. For palpitations    TURMERIC ROOT EXTRACT 500 MG CAP    Take 500 mg by mouth 2 (two) times daily.    VALSARTAN-HYDROCHLOROTHIAZIDE (DIOVAN-HCT) 160-25 MG PER TABLET    Take 1 tablet by mouth once daily.    VITAMIN D 1000 UNITS TAB    Take 185 mg by mouth once daily.     Review of patient's allergies indicates:   Allergen Reactions    No known drug allergies      ROS    Physical Exam:   Body mass index is 28.12 kg/m².  There were no vitals filed for this visit.   GENERAL: Well appearing, appropriate for stated age, no acute distress.  CARDIOVASCULAR: Pulses regular by peripheral palpation.  PULMONARY: Respirations are even and non-labored.  NEURO: Awake, alert, and oriented x 3.  PSYCH: Mood & affect are appropriate.  HEENT: Head is normocephalic and atraumatic.  Ortho/SPM Exam  ***    Imaging:    X-ray Knee Ortho Bilateral with Flexion  Narrative: EXAM: XR KNEE ORTHO BILAT WITH FLEXION    CLINICAL HISTORY: Bilateral knee joint pain.    TECHNIQUE: Bilateral 4 view knee series.    COMPARISON: None.    FINDINGS:  Right knee standing x-ray reveals joint space narrowing medially and laterally.  Sclerosis and marginal osteophytosis moderately large patellofemoral joint spurs are present as well.  No obvious joint effusion.    Left knee standing x-ray reveals joint space narrowing medially with subchondral sclerosis and cystic change.  Medial and lateral compartment spurring is present.  Moderate patellofemoral joint spurring.  No definite joint effusion.  Impression:  Bilateral tricompartment osteoarthritis as above.    Finalized on: 6/1/2023 12:49 PM By:  Juan Luna MD  [facility]# [id number]      2023-06-01 12:51:[id number]    [facility]    ***  Relevant imaging results reviewed and interpreted by me, discussed with the patient and / or family today. ***    Other Tests:     ***    There are no Patient Instructions on file for this visit.  Provider  Note/Medical Decision Making: ***      I discussed worrisome and red flag signs and symptoms with the patient. The patient expressed understanding and agreed to alert me immediately or to go to the emergency room if they experience any of these.   Treatment plan was developed with input from the patient/family, and they expressed understanding and agreement with the plan. All questions were answered today.          Naseem Corona MD  Orthopaedic Surgery & Sports Medicine       Disclaimer: This note was prepared using a voice recognition system and is likely to have sound alike errors within the text.

## 2023-06-03 ENCOUNTER — TELEPHONE (OUTPATIENT)
Dept: URGENT CARE | Facility: CLINIC | Age: 73
End: 2023-06-03
Payer: COMMERCIAL

## 2023-06-03 NOTE — TELEPHONE ENCOUNTER
Courtesy call was made for 05/31 visit. Pt feeling better, and she had no further questions or concerns.

## 2023-06-05 DIAGNOSIS — M17.0 BILATERAL PRIMARY OSTEOARTHRITIS OF KNEE: Primary | ICD-10-CM

## 2023-06-08 ENCOUNTER — PATIENT MESSAGE (OUTPATIENT)
Dept: CARDIOLOGY | Facility: CLINIC | Age: 73
End: 2023-06-08
Payer: COMMERCIAL

## 2023-06-08 ENCOUNTER — PATIENT MESSAGE (OUTPATIENT)
Dept: PRIMARY CARE CLINIC | Facility: CLINIC | Age: 73
End: 2023-06-08
Payer: COMMERCIAL

## 2023-06-08 DIAGNOSIS — I10 PRIMARY HYPERTENSION: ICD-10-CM

## 2023-06-08 RX ORDER — AMLODIPINE BESYLATE 5 MG/1
5 TABLET ORAL DAILY
Qty: 30 TABLET | Refills: 1 | Status: SHIPPED | OUTPATIENT
Start: 2023-06-08 | End: 2023-06-12

## 2023-06-12 ENCOUNTER — LAB VISIT (OUTPATIENT)
Dept: LAB | Facility: HOSPITAL | Age: 73
End: 2023-06-12
Payer: COMMERCIAL

## 2023-06-12 DIAGNOSIS — I10 PRIMARY HYPERTENSION: ICD-10-CM

## 2023-06-12 DIAGNOSIS — I10 PRIMARY HYPERTENSION: Primary | ICD-10-CM

## 2023-06-12 PROCEDURE — 80053 COMPREHEN METABOLIC PANEL: CPT

## 2023-06-12 PROCEDURE — 36415 COLL VENOUS BLD VENIPUNCTURE: CPT

## 2023-06-12 RX ORDER — AMLODIPINE BESYLATE 5 MG/1
10 TABLET ORAL DAILY
Qty: 30 TABLET | Refills: 1 | Status: SHIPPED | OUTPATIENT
Start: 2023-06-12 | End: 2023-06-13

## 2023-06-13 DIAGNOSIS — I10 PRIMARY HYPERTENSION: Primary | ICD-10-CM

## 2023-06-13 LAB
ALBUMIN SERPL BCP-MCNC: 3.7 G/DL (ref 3.5–5.2)
ALP SERPL-CCNC: 74 U/L (ref 55–135)
ALT SERPL W/O P-5'-P-CCNC: 13 U/L (ref 10–44)
ANION GAP SERPL CALC-SCNC: 8 MMOL/L (ref 8–16)
AST SERPL-CCNC: 19 U/L (ref 10–40)
BILIRUB SERPL-MCNC: 0.6 MG/DL (ref 0.1–1)
BUN SERPL-MCNC: 18 MG/DL (ref 8–23)
CALCIUM SERPL-MCNC: 10 MG/DL (ref 8.7–10.5)
CHLORIDE SERPL-SCNC: 101 MMOL/L (ref 95–110)
CO2 SERPL-SCNC: 27 MMOL/L (ref 23–29)
CREAT SERPL-MCNC: 0.9 MG/DL (ref 0.5–1.4)
EST. GFR  (NO RACE VARIABLE): >60 ML/MIN/1.73 M^2
GLUCOSE SERPL-MCNC: 96 MG/DL (ref 70–110)
POTASSIUM SERPL-SCNC: 3.8 MMOL/L (ref 3.5–5.1)
PROT SERPL-MCNC: 7.5 G/DL (ref 6–8.4)
SODIUM SERPL-SCNC: 136 MMOL/L (ref 136–145)

## 2023-06-13 RX ORDER — AMLODIPINE BESYLATE 10 MG/1
10 TABLET ORAL DAILY
Qty: 30 TABLET | Refills: 11 | Status: SHIPPED | OUTPATIENT
Start: 2023-06-13 | End: 2024-06-12

## 2023-06-18 ENCOUNTER — PATIENT MESSAGE (OUTPATIENT)
Dept: CARDIOLOGY | Facility: CLINIC | Age: 73
End: 2023-06-18
Payer: COMMERCIAL

## 2023-06-18 ENCOUNTER — PATIENT MESSAGE (OUTPATIENT)
Dept: PRIMARY CARE CLINIC | Facility: CLINIC | Age: 73
End: 2023-06-18
Payer: COMMERCIAL

## 2023-06-19 ENCOUNTER — PATIENT MESSAGE (OUTPATIENT)
Dept: PRIMARY CARE CLINIC | Facility: CLINIC | Age: 73
End: 2023-06-19
Payer: COMMERCIAL

## 2023-06-19 VITALS — DIASTOLIC BLOOD PRESSURE: 59 MMHG | SYSTOLIC BLOOD PRESSURE: 125 MMHG

## 2023-06-27 ENCOUNTER — PATIENT MESSAGE (OUTPATIENT)
Dept: CARDIOLOGY | Facility: CLINIC | Age: 73
End: 2023-06-27
Payer: COMMERCIAL

## 2023-06-28 ENCOUNTER — OFFICE VISIT (OUTPATIENT)
Dept: CARDIOLOGY | Facility: CLINIC | Age: 73
End: 2023-06-28
Payer: COMMERCIAL

## 2023-06-28 VITALS
BODY MASS INDEX: 27.13 KG/M2 | SYSTOLIC BLOOD PRESSURE: 130 MMHG | WEIGHT: 158.94 LBS | DIASTOLIC BLOOD PRESSURE: 70 MMHG | OXYGEN SATURATION: 98 % | HEIGHT: 64 IN | HEART RATE: 95 BPM

## 2023-06-28 DIAGNOSIS — R20.0 NUMBNESS AND TINGLING OF FOOT: ICD-10-CM

## 2023-06-28 DIAGNOSIS — E78.2 MIXED HYPERLIPIDEMIA: ICD-10-CM

## 2023-06-28 DIAGNOSIS — G47.33 OSA ON CPAP: ICD-10-CM

## 2023-06-28 DIAGNOSIS — R20.2 NUMBNESS AND TINGLING OF FOOT: ICD-10-CM

## 2023-06-28 DIAGNOSIS — I10 PRIMARY HYPERTENSION: ICD-10-CM

## 2023-06-28 DIAGNOSIS — R73.03 PREDIABETES: ICD-10-CM

## 2023-06-28 DIAGNOSIS — R00.2 PALPITATIONS: Primary | ICD-10-CM

## 2023-06-28 PROCEDURE — 99999 PR PBB SHADOW E&M-EST. PATIENT-LVL IV: ICD-10-PCS | Mod: PBBFAC,,, | Performed by: STUDENT IN AN ORGANIZED HEALTH CARE EDUCATION/TRAINING PROGRAM

## 2023-06-28 PROCEDURE — 3008F BODY MASS INDEX DOCD: CPT | Mod: CPTII,S$GLB,, | Performed by: STUDENT IN AN ORGANIZED HEALTH CARE EDUCATION/TRAINING PROGRAM

## 2023-06-28 PROCEDURE — 3078F DIAST BP <80 MM HG: CPT | Mod: CPTII,S$GLB,, | Performed by: STUDENT IN AN ORGANIZED HEALTH CARE EDUCATION/TRAINING PROGRAM

## 2023-06-28 PROCEDURE — 99214 OFFICE O/P EST MOD 30 MIN: CPT | Mod: S$GLB,,, | Performed by: STUDENT IN AN ORGANIZED HEALTH CARE EDUCATION/TRAINING PROGRAM

## 2023-06-28 PROCEDURE — 99214 PR OFFICE/OUTPT VISIT, EST, LEVL IV, 30-39 MIN: ICD-10-PCS | Mod: S$GLB,,, | Performed by: STUDENT IN AN ORGANIZED HEALTH CARE EDUCATION/TRAINING PROGRAM

## 2023-06-28 PROCEDURE — 3008F PR BODY MASS INDEX (BMI) DOCUMENTED: ICD-10-PCS | Mod: CPTII,S$GLB,, | Performed by: STUDENT IN AN ORGANIZED HEALTH CARE EDUCATION/TRAINING PROGRAM

## 2023-06-28 PROCEDURE — 3075F PR MOST RECENT SYSTOLIC BLOOD PRESS GE 130-139MM HG: ICD-10-PCS | Mod: CPTII,S$GLB,, | Performed by: STUDENT IN AN ORGANIZED HEALTH CARE EDUCATION/TRAINING PROGRAM

## 2023-06-28 PROCEDURE — 1159F MED LIST DOCD IN RCRD: CPT | Mod: CPTII,S$GLB,, | Performed by: STUDENT IN AN ORGANIZED HEALTH CARE EDUCATION/TRAINING PROGRAM

## 2023-06-28 PROCEDURE — 3075F SYST BP GE 130 - 139MM HG: CPT | Mod: CPTII,S$GLB,, | Performed by: STUDENT IN AN ORGANIZED HEALTH CARE EDUCATION/TRAINING PROGRAM

## 2023-06-28 PROCEDURE — 99999 PR PBB SHADOW E&M-EST. PATIENT-LVL IV: CPT | Mod: PBBFAC,,, | Performed by: STUDENT IN AN ORGANIZED HEALTH CARE EDUCATION/TRAINING PROGRAM

## 2023-06-28 PROCEDURE — 1159F PR MEDICATION LIST DOCUMENTED IN MEDICAL RECORD: ICD-10-PCS | Mod: CPTII,S$GLB,, | Performed by: STUDENT IN AN ORGANIZED HEALTH CARE EDUCATION/TRAINING PROGRAM

## 2023-06-28 PROCEDURE — 3078F PR MOST RECENT DIASTOLIC BLOOD PRESSURE < 80 MM HG: ICD-10-PCS | Mod: CPTII,S$GLB,, | Performed by: STUDENT IN AN ORGANIZED HEALTH CARE EDUCATION/TRAINING PROGRAM

## 2023-06-28 NOTE — PROGRESS NOTES
"              Section of Cardiology                  Cardiac Clinic Note    Chief Complaint/Reason for consultation: hypertension, palpitations       HPI:   Xin Taylor is a 72 y.o. female with h/o HTN, ASIF, HLD who comes in as a referral to cardiology clinic by PCP Dr. Carlson for evaluation.     7/11/22  About 2 weeks ago,had palpitations, started when she was going to the bathroom  symptoms   Would go away after resting, drinking water, raising her feet  Reports left ear tinnitus, has seen ENT, normal workup   Does not exercise regularly, no limitations with this   Worked in healthcare for many years, currently a     Denies tobacco or ETOH abuse  Family hx: mother- DM; brother- arteriosclerosis     Denies DM, CVA    Denies chest pain, SOB, dizziness, syncope         8/24/22  14 day monitor- no significant arrhythmias seen. Symptoms correlated ST. Average heart rate is normal at 68 bpm  Denies palpitations   Echo with normal EF  BP elevated today, but says normotensive BP at home have been more 120s-130s systolic, diastolic 60s  Has gained 2-3 lbs overall, but no major changes  Cycling 30 min 3 days a week     Denies chest pain, SOB, dizziness, syncope       2/20/23  Had post nasal drip recently with a cough   2 weeks ago, had pain right below the breast, worse with breathing in, described as a "twinge", became positional, but one issue with deep breathing  Symptoms pretty much resolved  Still exercising with bicycle   BP high today, normotensive on prior visits  Reports ankle swelling     Denies SOB, chest pain       4/24/23  Did not want to start bystolic, so continues to take amlodipine  Riding her stationary bike, 3 times a week  Intermittent dizzy, but not concerning   Weight increased 3 lbs from 2/23  Brings in home BP log, average systolic 132, diastolic avg 75  BP elevated today- elevated on repeat and more elevated with BP cuff  reports pulsating in her ear at times- has seen audiology "     Denies SOB, chest pain        6/28/23  Doing well  BP has been running high  Says valsartan 320 caused palpitation  Beet juice reduced blood pressure  Taking the 160 valsartan  Has been having numbness in feet      EKG 2/20/23 NSR, possible LAE, incomplete RBBB, miminal LVH  EKG 7/11/22 NSR, possible LAE, minimal LVH criteria     ECHO  7/22  The left ventricle is normal in size with normal systolic function.  Normal left ventricular diastolic function.  The estimated PA systolic pressure is 31 mmHg.  Normal right ventricular size with normal right ventricular systolic function.  Normal central venous pressure (3 mmHg).  Moderate pulmonic regurgitation.  Mild mitral regurgitation.  Mild tricuspid regurgitation.  The estimated ejection fraction is 60%.        STRESS TEST    LHC      ROS: All 10 systems reviewed. Please refer to the HPI for pertinent positives. All other systems negative.     Past Medical History  Past Medical History:   Diagnosis Date    Anemia     Colon polyp     Edema     HLD (hyperlipidemia)     HTN (hypertension)     Palpitation     Tinnitus        Surgical History  Past Surgical History:   Procedure Laterality Date    COLONOSCOPY N/A 04/22/2022    Procedure: COLONOSCOPY;  Surgeon: Priyanka Winkler MD;  Location: [facility];  Service: Endoscopy;  Laterality: N/A;    HYSTERECTOMY            Allergies:   Review of patient's allergies indicates:   Allergen Reactions    No known drug allergies        Social History:  Social History     Socioeconomic History    Marital status: Legally    Occupational History    Occupation: retired   Tobacco Use    Smoking status: Never    Smokeless tobacco: Never   Substance and Sexual Activity    Alcohol use: No    Drug use: No    Sexual activity: Not Currently     Partners: Male     Social Determinants of Health     Financial Resource Strain: Low Risk     Difficulty of Paying Living Expenses: Not hard at all   Food Insecurity: No Food Insecurity     Worried About Running Out of Food in the Last Year: Never true    Ran Out of Food in the Last Year: Never true   Transportation Needs: No Transportation Needs    Lack of Transportation (Medical): No    Lack of Transportation (Non-Medical): No   Physical Activity: Insufficiently Active    Days of Exercise per Week: 3 days    Minutes of Exercise per Session: 30 min   Stress: No Stress Concern Present    Feeling of Stress : Only a little   Social Connections: Unknown    Frequency of Communication with Friends and Family: Three times a week    Frequency of Social Gatherings with Friends and Family: Three times a week    Active Member of Clubs or Organizations: Yes    Attends Club or Organization Meetings: More than 4 times per year    Marital Status:    Housing Stability: Low Risk     Unable to Pay for Housing in the Last Year: No    Number of Places Lived in the Last Year: 1    Unstable Housing in the Last Year: No       Family History:  family history includes Breast cancer in her paternal aunt; Cancer in her mother and another family member; Cataracts in her mother; Heart defect in her brother; Hypertension in her mother; Thyroid disease in her mother.    Home Medications:  Current Outpatient Medications on File Prior to Visit   Medication Sig Dispense Refill    amLODIPine (NORVASC) 10 MG tablet Take 1 tablet (10 mg total) by mouth once daily. 30 tablet 11    aspirin 81 mg Tab Take 1 tablet by mouth every other day.      atorvastatin (LIPITOR) 10 MG tablet Take 1 tablet (10 mg total) by mouth once daily. 90 tablet 3    b complex vitamins tablet Take 1 tablet by mouth once daily.      co-enzyme Q-10 30 mg capsule Take 30 mg by mouth 3 (three) times daily.      ferrous sulfate 325 mg (65 mg iron) Tab tablet Take 1 tablet (325 mg total) by mouth once daily. Take with citrus 100 tablet 3    fish oil-omega-3 fatty acids 300-1,000 mg capsule Take 2 g by mouth once daily.      fluticasone propionate (FLONASE) 50  "mcg/actuation nasal spray SHAKE LIQUID AND USE 2 SPRAYS(100 MCG) IN EACH NOSTRIL EVERY DAY 48 g 0    meloxicam (MOBIC) 15 MG tablet Take 1 tablet (15 mg total) by mouth once daily. (Patient taking differently: Take 15 mg by mouth every 7 days. Once a week or PRN) 90 tablet 1    multivitamin (THERAGRAN) per tablet Take 1 tablet by mouth once daily.      turmeric root extract 500 mg Cap Take 500 mg by mouth 2 (two) times daily. 100 capsule 4    valsartan-hydrochlorothiazide (DIOVAN-HCT) 160-25 mg per tablet Take 1 tablet by mouth once daily. 90 tablet 3    vitamin D 1000 units Tab Take 185 mg by mouth once daily.      [DISCONTINUED] nebivoloL (BYSTOLIC) 2.5 MG Tab Take 1 tablet (2.5 mg total) by mouth every evening. For palpitations 90 tablet 3    [DISCONTINUED] valsartan-hydrochlorothiazide (DIOVAN-HCT) 320-25 mg per tablet Take 1 tablet by mouth once daily. 90 tablet 3     No current facility-administered medications on file prior to visit.       Physical exam:  /70 (BP Location: Right arm, Patient Position: Sitting, BP Method: Large (Manual))   Pulse 95   Ht 5' 4" (1.626 m)   Wt 72.1 kg (158 lb 15.2 oz)   LMP  (LMP Unknown)   SpO2 98%   BMI 27.28 kg/m²         General: Pt is a 72 y.o. year old female who is AAOx3, in NAD, is pleasant, well nourished, looks stated age  HEENT: PERRL, EOMI, Oral mucosa pink & moist  CVS  No abnormal cardiac pulsations noted on inspection. JVP not raised. The apical impulse is normal on palpation, and is located in the left 5th intercostal space in the mid - clavicular line. No palpable thrills or abnormal pulsations noted. RR, S1 - S2 heard, no murmurs, rubs or gallops appreciated.   PUL : CTA B/L. No wheezes/crackles heard   ABD : BS +, soft. No tenderness elicited   LE : No C/C/E. Distal Pulses palpable B/L         LABS:    Chemistry:   Lab Results   Component Value Date     06/12/2023    K 3.8 06/12/2023     06/12/2023    CO2 27 06/12/2023    BUN 18 " 06/12/2023    CREATININE 0.9 06/12/2023    CALCIUM 10.0 06/12/2023     Cardiac Markers: No results found for: CKTOTAL, CKMB, CKMBINDEX, TROPONINI  Cardiac Markers (Last 3): No results found for: CKTOTAL, CKMB, CKMBINDEX, TROPONINI  CBC:   Lab Results   Component Value Date    WBC 4.87 04/05/2022    HGB 11.3 (L) 04/05/2022    HCT 35.9 (L) 04/05/2022    MCV 96 04/05/2022     04/05/2022     Lipids:   Lab Results   Component Value Date    CHOL 184 02/23/2023    TRIG 53 02/23/2023    HDL 80 (H) 02/23/2023     Coagulation: No results found for: PT, INR, APTT        Assessment      1. Palpitations    2. Primary hypertension    3. Mixed hyperlipidemia    4. ASIF on CPAP    5. Prediabetes           Plan:    Palpitations  Intermittent   14 day cardiac monitor 7/22- no significant arrhythmias   Echocardiogram 7/22 with normal EF, mod W[place], mild MR/TR    Foot numbness  Obtain SHEY    ASIF  Compliant with CPAP    HTN  Stable   Continue Diovan-hydrochlorothiazide in a.m. 160/25, [id number] mg at night  Will take beet juice   Low-salt, low-fat diet  Exercise as tolerated, at least 30 minutes daily    Pre diabetes  A1c 5.8  Continue therapy    HLD  LDL 93 as of 2/23  Continue statin        This note was prepared using voice recognition system and is likely to have sound alike errors that may have been overlooked even after proofreading.     I have reviewed all pertinent chart information.  Plans and recommendations have been formulated under my direct supervision. All questions answered and patient voiced understanding.  To see if  If symptoms persist go to the ED.    RTC in 2 months           Naz Martinez MD  Cardiology

## 2023-06-29 ENCOUNTER — OFFICE VISIT (OUTPATIENT)
Dept: SPORTS MEDICINE | Facility: CLINIC | Age: 73
End: 2023-06-29
Payer: COMMERCIAL

## 2023-06-29 VITALS — WEIGHT: 158 LBS | HEIGHT: 64 IN | BODY MASS INDEX: 26.98 KG/M2

## 2023-06-29 DIAGNOSIS — M17.0 BILATERAL PRIMARY OSTEOARTHRITIS OF KNEE: Primary | ICD-10-CM

## 2023-06-29 PROCEDURE — 20610 LARGE JOINT ASPIRATION/INJECTION: BILATERAL KNEE: ICD-10-PCS | Mod: 50,S$GLB,, | Performed by: ORTHOPAEDIC SURGERY

## 2023-06-29 PROCEDURE — 20610 DRAIN/INJ JOINT/BURSA W/O US: CPT | Mod: 50,S$GLB,, | Performed by: ORTHOPAEDIC SURGERY

## 2023-06-29 PROCEDURE — 99499 UNLISTED E&M SERVICE: CPT | Mod: S$GLB,,, | Performed by: ORTHOPAEDIC SURGERY

## 2023-06-29 PROCEDURE — 99999 PR PBB SHADOW E&M-EST. PATIENT-LVL III: CPT | Mod: PBBFAC,,, | Performed by: ORTHOPAEDIC SURGERY

## 2023-06-29 PROCEDURE — 99499 NO LOS: ICD-10-PCS | Mod: S$GLB,,, | Performed by: ORTHOPAEDIC SURGERY

## 2023-06-29 PROCEDURE — 99999 PR PBB SHADOW E&M-EST. PATIENT-LVL III: ICD-10-PCS | Mod: PBBFAC,,, | Performed by: ORTHOPAEDIC SURGERY

## 2023-06-29 NOTE — PROCEDURES
Large Joint Aspiration/Injection: bilateral knee    Date/Time: 6/29/2023 10:45 AM  Performed by: Naseem Corona MD  Authorized by: Naseem Corona MD     Consent Done?:  Yes (Verbal)  Indications:  Pain  Site marked: the procedure site was marked    Timeout: prior to procedure the correct patient, procedure, and site was verified    Prep: patient was prepped and draped in usual sterile fashion      Local anesthesia used?: Yes    Local anesthetic:  Topical anesthetic    Details:  Needle Size:  22 G  Ultrasonic Guidance for needle placement?: No    Approach:  Superior  Location:  Knee  Laterality:  Bilateral  Site:  Bilateral knee  Medications (Right):  20 mg sodium hyaluronate (EUFLEXXA) 10 mg/mL(mw 2.4 -3.6 million)  Medications (Left):  20 mg sodium hyaluronate (EUFLEXXA) 10 mg/mL(mw 2.4 -3.6 million)  Patient tolerance:  Patient tolerated the procedure well with no immediate complications     Bilateral Euflexxa injection 1/3

## 2023-07-06 ENCOUNTER — OFFICE VISIT (OUTPATIENT)
Dept: SPORTS MEDICINE | Facility: CLINIC | Age: 73
End: 2023-07-06
Payer: COMMERCIAL

## 2023-07-06 VITALS — HEIGHT: 64 IN | WEIGHT: 158.06 LBS | BODY MASS INDEX: 26.98 KG/M2

## 2023-07-06 DIAGNOSIS — M17.0 BILATERAL PRIMARY OSTEOARTHRITIS OF KNEE: Primary | ICD-10-CM

## 2023-07-06 PROCEDURE — 20610 DRAIN/INJ JOINT/BURSA W/O US: CPT | Mod: 50,S$GLB,, | Performed by: ORTHOPAEDIC SURGERY

## 2023-07-06 PROCEDURE — 99999 PR PBB SHADOW E&M-EST. PATIENT-LVL III: ICD-10-PCS | Mod: PBBFAC,,, | Performed by: ORTHOPAEDIC SURGERY

## 2023-07-06 PROCEDURE — 99999 PR PBB SHADOW E&M-EST. PATIENT-LVL III: CPT | Mod: PBBFAC,,, | Performed by: ORTHOPAEDIC SURGERY

## 2023-07-06 PROCEDURE — 99499 UNLISTED E&M SERVICE: CPT | Mod: S$GLB,,, | Performed by: ORTHOPAEDIC SURGERY

## 2023-07-06 PROCEDURE — 20610 LARGE JOINT ASPIRATION/INJECTION: BILATERAL KNEE: ICD-10-PCS | Mod: 50,S$GLB,, | Performed by: ORTHOPAEDIC SURGERY

## 2023-07-06 PROCEDURE — 99499 NO LOS: ICD-10-PCS | Mod: S$GLB,,, | Performed by: ORTHOPAEDIC SURGERY

## 2023-07-07 ENCOUNTER — HOSPITAL ENCOUNTER (OUTPATIENT)
Dept: CARDIOLOGY | Facility: HOSPITAL | Age: 73
Discharge: HOME | End: 2023-07-07
Attending: STUDENT IN AN ORGANIZED HEALTH CARE EDUCATION/TRAINING PROGRAM
Payer: COMMERCIAL

## 2023-07-07 VITALS
WEIGHT: 158 LBS | DIASTOLIC BLOOD PRESSURE: 62 MMHG | HEIGHT: 64 IN | BODY MASS INDEX: 26.98 KG/M2 | SYSTOLIC BLOOD PRESSURE: 126 MMHG

## 2023-07-07 DIAGNOSIS — R20.2 NUMBNESS AND TINGLING OF FOOT: ICD-10-CM

## 2023-07-07 DIAGNOSIS — R20.0 NUMBNESS AND TINGLING OF FOOT: ICD-10-CM

## 2023-07-07 PROCEDURE — 93922 UPR/L XTREMITY ART 2 LEVELS: CPT | Mod: 26,,, | Performed by: INTERNAL MEDICINE

## 2023-07-07 PROCEDURE — 93922 UPR/L XTREMITY ART 2 LEVELS: CPT

## 2023-07-07 PROCEDURE — 93922 ANKLE BRACHIAL INDICES (ABI): ICD-10-PCS | Mod: 26,,, | Performed by: INTERNAL MEDICINE

## 2023-07-09 DIAGNOSIS — R20.0 NUMBNESS OF FOOT: Primary | ICD-10-CM

## 2023-07-09 LAB
LEFT ABI: 0.94
LEFT ARM BP: 126 MMHG
LEFT DORSALIS PEDIS: 121 MMHG
LEFT POSTERIOR TIBIAL: 113 MMHG
LEFT TBI: 0.67
LEFT TOE PRESSURE: 87 MMHG
RIGHT ABI: 1.02
RIGHT ARM BP: 129 MMHG
RIGHT DORSALIS PEDIS: 131 MMHG
RIGHT POSTERIOR TIBIAL: 123 MMHG
RIGHT TBI: 0.7
RIGHT TOE PRESSURE: 90 MMHG

## 2023-07-10 NOTE — PROGRESS NOTES
Call patient   Blood flow study of the legs showed mildly decreased blood flow in the left leg, normal blood flow in the right leg  Will order an ultrasound for further evaluation of the left leg pain please schedule

## 2023-07-14 ENCOUNTER — PATIENT MESSAGE (OUTPATIENT)
Dept: OPHTHALMOLOGY | Facility: CLINIC | Age: 73
End: 2023-07-14

## 2023-07-14 ENCOUNTER — OFFICE VISIT (OUTPATIENT)
Dept: OPHTHALMOLOGY | Facility: CLINIC | Age: 73
End: 2023-07-14
Payer: COMMERCIAL

## 2023-07-14 DIAGNOSIS — H52.4 BILATERAL PRESBYOPIA: ICD-10-CM

## 2023-07-14 DIAGNOSIS — H25.13 CATARACT, NUCLEAR SCLEROTIC SENILE, BILATERAL: ICD-10-CM

## 2023-07-14 DIAGNOSIS — R73.03 PREDIABETES: Primary | ICD-10-CM

## 2023-07-14 PROCEDURE — 2023F PR DILATED RETINAL EXAM W/O EVID OF RETINOPATHY: ICD-10-PCS | Mod: CPTII,S$GLB,, | Performed by: OPTOMETRIST

## 2023-07-14 PROCEDURE — 99999 PR PBB SHADOW E&M-EST. PATIENT-LVL III: CPT | Mod: PBBFAC,,, | Performed by: OPTOMETRIST

## 2023-07-14 PROCEDURE — 1159F PR MEDICATION LIST DOCUMENTED IN MEDICAL RECORD: ICD-10-PCS | Mod: CPTII,S$GLB,, | Performed by: OPTOMETRIST

## 2023-07-14 PROCEDURE — 2023F DILAT RTA XM W/O RTNOPTHY: CPT | Mod: CPTII,S$GLB,, | Performed by: OPTOMETRIST

## 2023-07-14 PROCEDURE — 99999 PR PBB SHADOW E&M-EST. PATIENT-LVL III: ICD-10-PCS | Mod: PBBFAC,,, | Performed by: OPTOMETRIST

## 2023-07-14 PROCEDURE — 92015 PR REFRACTION: ICD-10-PCS | Mod: S$GLB,,, | Performed by: OPTOMETRIST

## 2023-07-14 PROCEDURE — 92015 DETERMINE REFRACTIVE STATE: CPT | Mod: S$GLB,,, | Performed by: OPTOMETRIST

## 2023-07-14 PROCEDURE — 92014 PR EYE EXAM, EST PATIENT,COMPREHESV: ICD-10-PCS | Mod: S$GLB,,, | Performed by: OPTOMETRIST

## 2023-07-14 PROCEDURE — 92014 COMPRE OPH EXAM EST PT 1/>: CPT | Mod: S$GLB,,, | Performed by: OPTOMETRIST

## 2023-07-14 PROCEDURE — 1159F MED LIST DOCD IN RCRD: CPT | Mod: CPTII,S$GLB,, | Performed by: OPTOMETRIST

## 2023-07-14 NOTE — PROGRESS NOTES
HPI    Pre-Diabetic   Blurred vision without glasses when reading.  Glasses loss using OTC readers.  Last eye exam 07/14/2022 TRF.  Update glasses RX.  Lab Results       Component                Value               Date                       HGBA1C                   5.8 (H)             04/05/2022              Last edited by Kristin Parsons MA on 7/14/2023 11:06 AM.            Assessment /Plan     For exam results, see Encounter Report.    Prediabetes    Cataract, nuclear sclerotic senile, bilateral    Bilateral presbyopia      No Background Diabetic Retinopathy  Strict BG control, f/u w/ PCP, and annual DFE  Stressed importance of DM control to preserve vision    Moderate cataracts OU, not surgical  Follow annually.    Dispense Final Rx for glasses.  RTC 1 year  Discussed above and answered questions.

## 2023-07-20 ENCOUNTER — OFFICE VISIT (OUTPATIENT)
Dept: SPORTS MEDICINE | Facility: CLINIC | Age: 73
End: 2023-07-20
Payer: COMMERCIAL

## 2023-07-20 VITALS — HEIGHT: 64 IN | BODY MASS INDEX: 26.98 KG/M2 | WEIGHT: 158.06 LBS

## 2023-07-20 DIAGNOSIS — M17.0 BILATERAL PRIMARY OSTEOARTHRITIS OF KNEE: Primary | ICD-10-CM

## 2023-07-20 PROCEDURE — 20610 LARGE JOINT ASPIRATION/INJECTION: BILATERAL KNEE: ICD-10-PCS | Mod: 50,S$GLB,, | Performed by: ORTHOPAEDIC SURGERY

## 2023-07-20 PROCEDURE — 99999 PR PBB SHADOW E&M-EST. PATIENT-LVL III: CPT | Mod: PBBFAC,,, | Performed by: ORTHOPAEDIC SURGERY

## 2023-07-20 PROCEDURE — 99499 NO LOS: ICD-10-PCS | Mod: S$GLB,,, | Performed by: ORTHOPAEDIC SURGERY

## 2023-07-20 PROCEDURE — 99999 PR PBB SHADOW E&M-EST. PATIENT-LVL III: ICD-10-PCS | Mod: PBBFAC,,, | Performed by: ORTHOPAEDIC SURGERY

## 2023-07-20 PROCEDURE — 20610 DRAIN/INJ JOINT/BURSA W/O US: CPT | Mod: 50,S$GLB,, | Performed by: ORTHOPAEDIC SURGERY

## 2023-07-20 PROCEDURE — 99499 UNLISTED E&M SERVICE: CPT | Mod: S$GLB,,, | Performed by: ORTHOPAEDIC SURGERY

## 2023-07-20 NOTE — PROCEDURES
Large Joint Aspiration/Injection: bilateral knee    Date/Time: 7/20/2023 1:15 PM  Performed by: Naseem Corona MD  Authorized by: Naseem Corona MD     Consent Done?:  Yes (Verbal)  Indications:  Pain  Site marked: the procedure site was marked    Timeout: prior to procedure the correct patient, procedure, and site was verified    Prep: patient was prepped and draped in usual sterile fashion      Local anesthesia used?: Yes    Local anesthetic:  Topical anesthetic    Details:  Needle Size:  22 G  Ultrasonic Guidance for needle placement?: No    Approach:  Superior  Location:  Knee  Laterality:  Bilateral  Site:  Bilateral knee  Medications (Right):  20 mg sodium hyaluronate (EUFLEXXA) 10 mg/mL(mw 2.4 -3.6 million)  Medications (Left):  20 mg sodium hyaluronate (EUFLEXXA) 10 mg/mL(mw 2.4 -3.6 million)  Patient tolerance:  Patient tolerated the procedure well with no immediate complications     Bilateral Euflexxa injection 3/3

## 2023-07-21 ENCOUNTER — HOSPITAL ENCOUNTER (OUTPATIENT)
Dept: CARDIOLOGY | Facility: HOSPITAL | Age: 73
Discharge: HOME | End: 2023-07-21
Attending: STUDENT IN AN ORGANIZED HEALTH CARE EDUCATION/TRAINING PROGRAM
Payer: COMMERCIAL

## 2023-07-21 VITALS — HEIGHT: 64 IN | BODY MASS INDEX: 26.98 KG/M2 | WEIGHT: 158 LBS

## 2023-07-21 DIAGNOSIS — R20.0 NUMBNESS OF FOOT: ICD-10-CM

## 2023-07-21 LAB
LEFT ANT TIBIAL SYS PSV: 79 CM/S
LEFT CFA PSV: 151 CM/S
LEFT PERONEAL SYS PSV: 72 CM/S
LEFT POPLITEAL PSV: 84 CM/S
LEFT POST TIBIAL SYS PSV: 58 CM/S
LEFT PROFUNDA SYS PSV: 126 CM/S
LEFT SUPER FEMORAL DIST SYS PSV: 126 CM/S
LEFT SUPER FEMORAL MID SYS PSV: 129 CM/S
LEFT SUPER FEMORAL OSTIAL SYS PSV: 141 CM/S
LEFT SUPER FEMORAL PROX SYS PSV: 170 CM/S
LEFT TIB/PER TRUNK SYS PSV: 56 CM/S
OHS CV LEFT LOWER EXTREMITY ABI (NO CALC): 0.94

## 2023-07-21 PROCEDURE — 93926 LOWER EXTREMITY STUDY: CPT | Mod: 26,LT,, | Performed by: INTERNAL MEDICINE

## 2023-07-21 PROCEDURE — 93926 LOWER EXTREMITY STUDY: CPT | Mod: LT

## 2023-07-21 PROCEDURE — 93926 CV US DOPPLER ARTERIAL LEG LEFT (CUPID ONLY): ICD-10-PCS | Mod: 26,LT,, | Performed by: INTERNAL MEDICINE

## 2023-07-25 ENCOUNTER — OFFICE VISIT (OUTPATIENT)
Dept: PRIMARY CARE CLINIC | Facility: CLINIC | Age: 73
End: 2023-07-25
Payer: COMMERCIAL

## 2023-07-25 ENCOUNTER — PATIENT MESSAGE (OUTPATIENT)
Dept: PRIMARY CARE CLINIC | Facility: CLINIC | Age: 73
End: 2023-07-25

## 2023-07-25 ENCOUNTER — PATIENT MESSAGE (OUTPATIENT)
Dept: CARDIOLOGY | Facility: CLINIC | Age: 73
End: 2023-07-25
Payer: COMMERCIAL

## 2023-07-25 ENCOUNTER — TELEPHONE (OUTPATIENT)
Dept: PRIMARY CARE CLINIC | Facility: CLINIC | Age: 73
End: 2023-07-25
Payer: COMMERCIAL

## 2023-07-25 VITALS
HEART RATE: 88 BPM | OXYGEN SATURATION: 96 % | HEIGHT: 64 IN | WEIGHT: 162.5 LBS | DIASTOLIC BLOOD PRESSURE: 70 MMHG | BODY MASS INDEX: 27.74 KG/M2 | SYSTOLIC BLOOD PRESSURE: 126 MMHG

## 2023-07-25 DIAGNOSIS — G47.33 OSA ON CPAP: Primary | ICD-10-CM

## 2023-07-25 DIAGNOSIS — R73.03 PREDIABETES: ICD-10-CM

## 2023-07-25 DIAGNOSIS — I10 PRIMARY HYPERTENSION: ICD-10-CM

## 2023-07-25 DIAGNOSIS — D64.9 ANEMIA, UNSPECIFIED TYPE: ICD-10-CM

## 2023-07-25 DIAGNOSIS — E78.2 MIXED HYPERLIPIDEMIA: ICD-10-CM

## 2023-07-25 PROCEDURE — 3074F PR MOST RECENT SYSTOLIC BLOOD PRESSURE < 130 MM HG: ICD-10-PCS | Mod: CPTII,S$GLB,, | Performed by: NURSE PRACTITIONER

## 2023-07-25 PROCEDURE — 99999 PR PBB SHADOW E&M-EST. PATIENT-LVL III: CPT | Mod: PBBFAC,,, | Performed by: NURSE PRACTITIONER

## 2023-07-25 PROCEDURE — 99214 PR OFFICE/OUTPT VISIT, EST, LEVL IV, 30-39 MIN: ICD-10-PCS | Mod: S$GLB,,, | Performed by: NURSE PRACTITIONER

## 2023-07-25 PROCEDURE — 3008F PR BODY MASS INDEX (BMI) DOCUMENTED: ICD-10-PCS | Mod: CPTII,S$GLB,, | Performed by: NURSE PRACTITIONER

## 2023-07-25 PROCEDURE — 3078F PR MOST RECENT DIASTOLIC BLOOD PRESSURE < 80 MM HG: ICD-10-PCS | Mod: CPTII,S$GLB,, | Performed by: NURSE PRACTITIONER

## 2023-07-25 PROCEDURE — 1159F PR MEDICATION LIST DOCUMENTED IN MEDICAL RECORD: ICD-10-PCS | Mod: CPTII,S$GLB,, | Performed by: NURSE PRACTITIONER

## 2023-07-25 PROCEDURE — 99214 OFFICE O/P EST MOD 30 MIN: CPT | Mod: S$GLB,,, | Performed by: NURSE PRACTITIONER

## 2023-07-25 PROCEDURE — 3074F SYST BP LT 130 MM HG: CPT | Mod: CPTII,S$GLB,, | Performed by: NURSE PRACTITIONER

## 2023-07-25 PROCEDURE — 3008F BODY MASS INDEX DOCD: CPT | Mod: CPTII,S$GLB,, | Performed by: NURSE PRACTITIONER

## 2023-07-25 PROCEDURE — 1159F MED LIST DOCD IN RCRD: CPT | Mod: CPTII,S$GLB,, | Performed by: NURSE PRACTITIONER

## 2023-07-25 PROCEDURE — 3078F DIAST BP <80 MM HG: CPT | Mod: CPTII,S$GLB,, | Performed by: NURSE PRACTITIONER

## 2023-07-25 PROCEDURE — 99999 PR PBB SHADOW E&M-EST. PATIENT-LVL III: ICD-10-PCS | Mod: PBBFAC,,, | Performed by: NURSE PRACTITIONER

## 2023-07-25 NOTE — TELEPHONE ENCOUNTER
----- Message from Alisha Parsons sent at 7/25/2023 11:43 AM CDT -----  Contact: self [phone]  Patient called in this morning stating she is on her way, she will be there in 10 minutes. Please call back if she will not be able to be seen. [phone]. Thanks enrique

## 2023-07-25 NOTE — PROGRESS NOTES
Chief Complaint  Chief Complaint   Patient presents with    Follow-up         HPI     HPI  Xin Taylor is a 72 y.o. female with medical diagnoses as listed in the medical history and problem list that presents for HTN Follow-up.  This patient is new to me.     HTN Follow-up: B/P 162/78 approx two months ago. Started below Hypertensive regimen. B/P today is 126/70, Palpitations reported two months ago have resolved. Followed by Cardiology.    -Current Amlodipine 10 mg/day for HTN treatment.   -Current Valsartan-HCTZ to 160-25 mg/day for HTN treatment.  - STOPPED Nebivolol to 2.5 mg/day       History     PAST MEDICAL HISTORY:  Past Medical History:   Diagnosis Date    Anemia     Colon polyp     Edema     HLD (hyperlipidemia)     HTN (hypertension)     Palpitation     Tinnitus        PAST SURGICAL HISTORY:  Past Surgical History:   Procedure Laterality Date    COLONOSCOPY N/A 04/22/2022    Procedure: COLONOSCOPY;  Surgeon: Priyanka Winkler MD;  Location: [facility];  Service: Endoscopy;  Laterality: N/A;    HYSTERECTOMY         SOCIAL HISTORY:  Social History     Socioeconomic History    Marital status: Legally    Occupational History    Occupation: retired   Tobacco Use    Smoking status: Never    Smokeless tobacco: Never   Substance and Sexual Activity    Alcohol use: No    Drug use: No    Sexual activity: Not Currently     Partners: Male     Social Determinants of Health     Financial Resource Strain: Low Risk     Difficulty of Paying Living Expenses: Not hard at all   Food Insecurity: No Food Insecurity    Worried About Running Out of Food in the Last Year: Never true    Ran Out of Food in the Last Year: Never true   Transportation Needs: No Transportation Needs    Lack of Transportation (Medical): No    Lack of Transportation (Non-Medical): No   Physical Activity: Insufficiently Active    Days of Exercise per Week: 3 days    Minutes of Exercise per Session: 30 min   Stress: No Stress Concern Present     Feeling of Stress : Only a little   Social Connections: Unknown    Frequency of Communication with Friends and Family: More than three times a week    Frequency of Social Gatherings with Friends and Family: Twice a week    Active Member of Clubs or Organizations: Yes    Attends Club or Organization Meetings: More than 4 times per year    Marital Status:    Housing Stability: Low Risk     Unable to Pay for Housing in the Last Year: No    Number of Places Lived in the Last Year: 1    Unstable Housing in the Last Year: No       FAMILY HISTORY:  Family History   Problem Relation Age of Onset    Hypertension Mother     Thyroid disease Mother     Cataracts Mother     Cancer Mother         lung    Cancer Other     Heart defect Brother     Breast cancer Paternal Aunt        ALLERGIES AND MEDICATIONS: updated and reviewed.  Review of patient's allergies indicates:   Allergen Reactions    No known drug allergies      Current Outpatient Medications   Medication Sig Dispense Refill    amLODIPine (NORVASC) 10 MG tablet Take 1 tablet (10 mg total) by mouth once daily. 30 tablet 11    aspirin 81 mg Tab Take 1 tablet by mouth every other day.      atorvastatin (LIPITOR) 10 MG tablet Take 1 tablet (10 mg total) by mouth once daily. 90 tablet 3    b complex vitamins tablet Take 1 tablet by mouth once daily.      co-enzyme Q-10 30 mg capsule Take 30 mg by mouth 3 (three) times daily.      ferrous sulfate 325 mg (65 mg iron) Tab tablet Take 1 tablet (325 mg total) by mouth once daily. Take with citrus 100 tablet 3    fish oil-omega-3 fatty acids 300-1,000 mg capsule Take 2 g by mouth once daily.      meloxicam (MOBIC) 15 MG tablet Take 1 tablet (15 mg total) by mouth once daily. (Patient taking differently: Take 15 mg by mouth every 7 days. Once a week or PRN) 90 tablet 1    multivitamin (THERAGRAN) per tablet Take 1 tablet by mouth once daily.      turmeric root extract 500 mg Cap Take 500 mg by mouth 2 (two) times daily.  "100 capsule 4    valsartan-hydrochlorothiazide (DIOVAN-HCT) 160-25 mg per tablet Take 1 tablet by mouth once daily. 90 tablet 3    vitamin D 1000 units Tab Take 185 mg by mouth once daily.       No current facility-administered medications for this visit.           Exam     ROS  Review of Systems   Constitutional:  Negative for appetite change, chills, fatigue and fever.   HENT:  Negative for congestion, ear pain, postnasal drip, rhinorrhea, sinus pressure, sneezing and sore throat.    Respiratory:  Negative for shortness of breath.    Cardiovascular:  Negative for chest pain and palpitations.   Gastrointestinal:  Negative for abdominal pain, constipation, diarrhea, nausea and vomiting.   Genitourinary:  Negative for dysuria.   Musculoskeletal:  Negative for arthralgias.   Neurological:  Negative for headaches.         Physical Exam  Vitals:    07/25/23 1153   BP: 126/70   Pulse: 88   SpO2: 96%   Weight: 73.7 kg (162 lb 7.7 oz)   Height: 5' 4" (1.626 m)    Body mass index is 27.89 kg/m².  Weight: 73.7 kg (162 lb 7.7 oz)   Height: 5' 4" (162.6 cm)   Physical Exam  Constitutional:       General: She is not in acute distress.     Appearance: Normal appearance.   HENT:      Head: Normocephalic and atraumatic.      Right Ear: External ear normal.      Left Ear: External ear normal.      Nose: Nose normal.   Eyes:      Pupils: Pupils are equal, round, and reactive to light.   Cardiovascular:      Rate and Rhythm: Normal rate and regular rhythm.      Pulses: Normal pulses.   Pulmonary:      Effort: Pulmonary effort is normal. No respiratory distress.      Breath sounds: Normal breath sounds. No wheezing.   Abdominal:      General: Bowel sounds are normal.      Palpations: Abdomen is soft.   Musculoskeletal:      Cervical back: Neck supple.   Lymphadenopathy:      Cervical: No cervical adenopathy.   Skin:     General: Skin is warm and dry.   Neurological:      Mental Status: She is alert and oriented to person, place, and " time.   Psychiatric:         Mood and Affect: Mood normal.           Health Maintenance         Date Due Completion Date    COVID-19 Vaccine (4 - Pfizer series) 12/28/2021 11/2/2021    TETANUS VACCINE 11/13/2022 11/13/2012    Hemoglobin A1c (Prediabetes) 04/05/2023 4/5/2022    Influenza Vaccine (1) 09/01/2023 11/17/2022    Mammogram 01/25/2024 1/25/2023    Override on 7/30/2012: Done    Lipid Panel 02/23/2024 2/23/2023    DEXA Scan 05/31/2026 5/31/2021    Override on 2/24/2011: Done (REPEAT 5 YRS)    Colorectal Cancer Screening 04/22/2027 4/22/2022    Override on 5/26/2021: Done    Override on 1/26/2006: Done              Assessment & Plan     Assessment & Plan  Problem List Items Addressed This Visit          Cardiac/Vascular    HTN (hypertension)  -Current Amlodipine 10 mg/day for HTN treatment.   -Current Valsartan-HCTZ to 160-25 mg/day for HTN treatment.    HLD (hyperlipidemia)  -The current medical regimen is effective;  continue present plan and medications.        Oncology    Anemia  -Stable;Monitor       Endocrine    Prediabetes  -Diet controlled        Other    ASIF on CPAP - Primary         Health Maintenance reviewed: Deferred     Follow-up: Schedule 03/2023 for Annual Physical with Dr. Carlson    30+ minutes of total time spent on the encounter, which includes face to face time and non-face to face time preparing to see the patient (eg, review of tests), Obtaining and/or reviewing separately obtained history, documenting clinical information in the electronic or other health record, independently interpreting results (not separately reported) and communicating results to the patient/family/caregiver, or Care coordination (not separately reported).

## 2023-07-26 NOTE — PROCEDURES
Large Joint Aspiration/Injection: bilateral knee    Date/Time: 7/6/2023 11:30 AM  Performed by: Naseem Corona MD  Authorized by: Naseem Corona MD     Consent Done?:  Yes (Verbal)  Indications:  Pain  Site marked: the procedure site was marked    Timeout: prior to procedure the correct patient, procedure, and site was verified    Prep: patient was prepped and draped in usual sterile fashion      Local anesthesia used?: Yes    Local anesthetic:  Topical anesthetic    Details:  Needle Size:  22 G  Ultrasonic Guidance for needle placement?: No    Approach:  Superior  Location:  Knee  Laterality:  Bilateral  Site:  Bilateral knee  Medications (Right):  20 mg sodium hyaluronate (EUFLEXXA) 10 mg/mL(mw 2.4 -3.6 million)  Medications (Left):  20 mg sodium hyaluronate (EUFLEXXA) 10 mg/mL(mw 2.4 -3.6 million)  Patient tolerance:  Patient tolerated the procedure well with no immediate complications     Bilateral Euflexxa injection 2/3

## 2023-07-27 ENCOUNTER — PATIENT MESSAGE (OUTPATIENT)
Dept: PRIMARY CARE CLINIC | Facility: CLINIC | Age: 73
End: 2023-07-27
Payer: COMMERCIAL

## 2023-07-29 ENCOUNTER — HOSPITAL ENCOUNTER (EMERGENCY)
Facility: HOSPITAL | Age: 73
Discharge: HOME | End: 2023-07-29
Attending: EMERGENCY MEDICINE
Payer: COMMERCIAL

## 2023-07-29 VITALS
SYSTOLIC BLOOD PRESSURE: 155 MMHG | WEIGHT: 160.06 LBS | TEMPERATURE: 98 F | RESPIRATION RATE: 16 BRPM | HEIGHT: 64 IN | DIASTOLIC BLOOD PRESSURE: 77 MMHG | BODY MASS INDEX: 27.33 KG/M2 | HEART RATE: 101 BPM | OXYGEN SATURATION: 98 %

## 2023-07-29 DIAGNOSIS — K04.7 DENTAL ABSCESS: Primary | ICD-10-CM

## 2023-07-29 PROCEDURE — 99283 EMERGENCY DEPT VISIT LOW MDM: CPT

## 2023-07-29 RX ORDER — CLINDAMYCIN HYDROCHLORIDE 150 MG/1
300 CAPSULE ORAL 4 TIMES DAILY
Qty: 40 CAPSULE | Refills: 0 | Status: SHIPPED | OUTPATIENT
Start: 2023-07-29 | End: 2023-08-03

## 2023-07-30 NOTE — ED PROVIDER NOTES
"Encounter Date: 7/29/2023       History     Chief Complaint   Patient presents with    Dental Pain     Pt reports dental pain, started Wednesday. "Need a root canal, have a dentist appt on Monday"     Patient complains of left upper dental pain for several days.  Made an appointment with her dentist but is not till Monday.  Denies fevers sweats chills or inability to move her head at the neck        Review of patient's allergies indicates:   Allergen Reactions    No known drug allergies      Past Medical History:   Diagnosis Date    Anemia     Colon polyp     Edema     HLD (hyperlipidemia)     HTN (hypertension)     Palpitation     Tinnitus      Past Surgical History:   Procedure Laterality Date    COLONOSCOPY N/A 04/22/2022    Procedure: COLONOSCOPY;  Surgeon: Priyanka Winkler MD;  Location: [facility];  Service: Endoscopy;  Laterality: N/A;    HYSTERECTOMY       Family History   Problem Relation Age of Onset    Hypertension Mother     Thyroid disease Mother     Cataracts Mother     Cancer Mother         lung    Cancer Other     Heart defect Brother     Breast cancer Paternal Aunt      Social History     Tobacco Use    Smoking status: Never    Smokeless tobacco: Never   Substance Use Topics    Alcohol use: No    Drug use: No     Review of Systems   Constitutional:  Negative for fever.   HENT:  Negative for sore throat.    Respiratory:  Negative for shortness of breath.    Cardiovascular:  Negative for chest pain.   Gastrointestinal:  Negative for nausea.   Genitourinary:  Negative for dysuria.   Musculoskeletal:  Negative for back pain.   Skin:  Negative for rash.   Neurological:  Negative for weakness.   Hematological:  Does not bruise/bleed easily.       Physical Exam     Initial Vitals [07/29/23 1914]   BP Pulse Resp Temp SpO2   (!) 155/77 101 16 98.4 °F (36.9 °C) 98 %      MAP       --         Physical Exam    Nursing note and vitals reviewed.  Constitutional: She appears well-developed and well-nourished. " She is not diaphoretic. She is active.  Non-toxic appearance. No distress.   HENT:   Head: Normocephalic and atraumatic.   Dentition no distinct abscess   Eyes: Conjunctivae are normal. Right eye exhibits no discharge. Left eye exhibits no discharge. No scleral icterus.   Neck:   Normal range of motion.  Cardiovascular:  Normal rate, regular rhythm and intact distal pulses.           No murmur heard.  Pulmonary/Chest: Breath sounds normal. No respiratory distress. She has no wheezes.   Abdominal: She exhibits no distension.   Musculoskeletal:         General: No tenderness. Normal range of motion.      Cervical back: Normal range of motion.     Neurological: She is alert and oriented to person, place, and time. No cranial nerve deficit. GCS score is 15. GCS eye subscore is 4. GCS verbal subscore is 5. GCS motor subscore is 6.   Skin: Skin is warm and dry. Capillary refill takes less than 2 seconds. No rash noted.   Psychiatric: She has a normal mood and affect. Her behavior is normal. Judgment and thought content normal.         ED Course   Procedures  Labs Reviewed - No data to display       Imaging Results    None          Medications - No data to display                           Clinical Impression:   Final diagnoses:  [K04.7] Dental abscess (Primary)        ED Disposition Condition    Discharge Stable          ED Prescriptions       Medication Sig Dispense Start Date End Date Auth. Provider    clindamycin (CLEOCIN) 150 MG capsule Take 2 capsules (300 mg total) by mouth 4 (four) times daily. for 5 days 40 capsule 7/29/2023 8/3/2023 Timur Sullivan NP          Follow-up Information       Follow up With Specialties Details Why Contact Info    Dentist  Go today               Timur Sullivan NP  07/29/23 2122

## 2023-08-18 ENCOUNTER — CLINICAL SUPPORT (OUTPATIENT)
Dept: URGENT CARE | Facility: CLINIC | Age: 73
End: 2023-08-18
Payer: COMMERCIAL

## 2023-08-18 ENCOUNTER — OFFICE VISIT (OUTPATIENT)
Dept: URGENT CARE | Facility: CLINIC | Age: 73
End: 2023-08-18
Payer: OTHER MISCELLANEOUS

## 2023-08-18 VITALS
TEMPERATURE: 98 F | OXYGEN SATURATION: 97 % | HEIGHT: 64 IN | WEIGHT: 161 LBS | SYSTOLIC BLOOD PRESSURE: 140 MMHG | BODY MASS INDEX: 27.49 KG/M2 | RESPIRATION RATE: 20 BRPM | HEART RATE: 99 BPM | DIASTOLIC BLOOD PRESSURE: 63 MMHG

## 2023-08-18 VITALS
DIASTOLIC BLOOD PRESSURE: 57 MMHG | HEART RATE: 87 BPM | WEIGHT: 161.19 LBS | HEIGHT: 65 IN | TEMPERATURE: 100 F | OXYGEN SATURATION: 98 % | BODY MASS INDEX: 26.85 KG/M2 | SYSTOLIC BLOOD PRESSURE: 118 MMHG | RESPIRATION RATE: 19 BRPM
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
## 2023-08-18 DIAGNOSIS — S09.90XA INJURY OF HEAD, INITIAL ENCOUNTER: ICD-10-CM
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
## 2023-08-18 DIAGNOSIS — S00.531A CONTUSION OF LIP, INITIAL ENCOUNTER: ICD-10-CM

## 2023-08-18 DIAGNOSIS — Z02.6 ENCOUNTER RELATED TO WORKER'S COMPENSATION CLAIM: ICD-10-CM

## 2023-08-18 PROCEDURE — 99213 PR OFFICE/OUTPT VISIT, EST, LEVL III, 20-29 MIN: ICD-10-PCS | Mod: S$GLB,,, | Performed by: NURSE PRACTITIONER

## 2023-08-18 PROCEDURE — 99213 OFFICE O/P EST LOW 20 MIN: CPT | Mod: S$GLB,,, | Performed by: NURSE PRACTITIONER

## 2023-08-18 NOTE — PROGRESS NOTES
Subjective:      Patient ID: Xin Taylor is a 72 y.o. female.    Vitals:  vitals were not taken for this visit.     Chief Complaint: Fall    Patient presents with irration to head after a fall today. Injury to upper lip. No c/o pain,     Fall  The accident occurred 6 to 12 hours ago. The fall occurred while walking. She fell from an unknown height. She landed on Carpet. There was no blood loss. The point of impact was the head. The pain is present in the head. The pain is at a severity of 0/10. The patient is experiencing no pain. Pertinent negatives include no abdominal pain, bowel incontinence, fever, headaches, hearing loss, hematuria, loss of consciousness, nausea, numbness, tingling, visual change or vomiting. She has tried nothing for the symptoms. The treatment provided no relief.       Constitution: Negative for fever.   Gastrointestinal:  Negative for abdominal pain, nausea, vomiting and bowel incontinence.   Genitourinary:  Negative for hematuria.   Neurological:  Negative for headaches, loss of consciousness and numbness.    Objective:     Physical Exam    Assessment:     No diagnosis found.    Plan:       There are no diagnoses linked to this encounter.

## 2023-08-18 NOTE — LETTER
[facility]  [address], [address]  [facility] [address]  Phone: [phone]  Fax: [phone]  [facility]: [phone]    Pt Name: Xin Taylor  Injury Date: 08/18/2023   Employee ID:  Date of First Treatment: 08/18/2023   Company: [employer] reference to record [employer]       Appointment Time: 06:45 PM Arrived: 07:00PM   Provider: Rizwan Logan NP Time Out:0:41PM     Office Treatment:   1. Fall, initial encounter    2. Contusion of lip, initial encounter    3. Injury of head, initial encounter    4. Work related injury                     Return Appointment: Visit date not found at [facility]

## 2023-08-19 NOTE — PROGRESS NOTES
Subjective:      Patient ID: Xin Taylor is a 72 y.o. female.    Chief Complaint: Fall    Patient's place of employment -  [employer]  Patient's job title -  worker  Date of injury - 8/18/23  Body part injured including left or right - right head and inner lip  Injury Mechanism - floor/carpet  What they were doing when they got hurt - fell over child  What they did immediately after - check on child. Accident report  Pain scale right now - 0    Xin Wolff I a 72 year old female whom presents to urgent care with a work related injury. Patient works at [employer]. Patient reports a 3 year old crawled in front of her as she was starting her walking motion resulting in her falling onto the right side of her face. Patient reports hitting her head but denies loss of consciousness. Patient reports falling onto a carpeted floor which had a carpet on top of it. Patient denies any nausea, vomiting, blurred vision ,disorientation or discharge from the ear. Patient reports taking a baby asprin daily but denies taking any additional blood thinner. Patient denies any headache or head pain at this time and reports it is hard to describe exactly how her head  feels. Patient reports swelling and minimal bleeding to the right lateral upper lip. No treatments prior to arrival.     Fall  The accident occurred 3 to 6 hours ago. The fall occurred while walking. She fell from an unknown height. She landed on Carpet. The volume of blood lost was minimal. The point of impact was the head and face. The pain is present in the head and face. The pain is at a severity of 0/10. The patient is experiencing no pain. Pertinent negatives include no abdominal pain, bowel incontinence, fever, headaches, hearing loss, hematuria, loss of consciousness, nausea, numbness, tingling, visual change or vomiting. She has tried nothing for the symptoms. The treatment provided no relief.       Constitution: Negative for fever.    Gastrointestinal:  Negative for abdominal pain, nausea, vomiting and bowel incontinence.   Genitourinary:  Negative for hematuria.   Neurological:  Negative for headaches, loss of consciousness and numbness.     Objective:     Physical Exam  Vitals and nursing note reviewed.   Constitutional:       Appearance: Normal appearance. She is normal weight.   HENT:      Head: Normocephalic and atraumatic.      Nose: Nose normal.      Mouth/Throat:      Mouth: Mucous membranes are moist. Injury present. No angioedema.      Dentition: Normal dentition.      Tongue: No lesions.      Pharynx: Oropharynx is clear.      Comments: Dried blood Contusion/ bruising noted to the right lateral upper lip, no loose teeth noted.   Eyes:      General: Vision grossly intact. Gaze aligned appropriately. No visual field deficit.     Extraocular Movements: Extraocular movements intact.      Right eye: Normal extraocular motion and no nystagmus.      Left eye: Normal extraocular motion and no nystagmus.      Conjunctiva/sclera: Conjunctivae normal.      Pupils: Pupils are equal, round, and reactive to light.   Cardiovascular:      Rate and Rhythm: Normal rate and regular rhythm.   Musculoskeletal:         General: Normal range of motion.      Cervical back: Normal range of motion.   Neurological:      General: No focal deficit present.      Mental Status: She is alert and oriented to person, place, and time. Mental status is at baseline.      Cranial Nerves: No cranial nerve deficit.      Motor: No weakness.      Coordination: Coordination normal.      Gait: Gait normal.      Comments: Alert, oriented x 3. EOMI, PERRLA. Cranial nerves intact: facial expressions (smile, raising eyebrows, shutting eyes, pursed lips) symmetric. Shoulder shrug strength 5/5; sternocleidomastoid muscle strength 5/5 bilaterally. Jaw is midline without deviation. Tongue protrudes at midline without fasciculations. Sensation to face in distribution of CN V1, V2, and  V3 intact. Sensation to upper and lower extremities intact. Finger to nose, rapid rhythmic alternating movements, and heel to shin test are intact and smooth bilaterally. Patient ambulates unassisted without rigidity or ataxia. Romberg negative. Voice quality, comprehension, articulation, coherence assessed as appropriate.      Psychiatric:         Mood and Affect: Mood normal.        Assessment:      1. Fall, initial encounter    2. Contusion of lip, initial encounter    3. Injury of head, initial encounter    4. Work related injury    5. Encounter related to worker's compensation claim      Plan:     [place] [facility] [place]             No follow-ups on file.    Patient Instructions   You have been seen for a work-related injury/ailment. If you have been given restrictions/precautions, please follow instructions as advised. It is important that you follow up at one of the Occupational Health Clinics in the next business day if further treatment or evaluation is needed.     Please call [phone] for help setting up the appointment.      A list of clinics is listed below:    - [address] [facility] [facility] [address], [place] [address] ([phone])  - [address] [address] ([phone])  - [address] [facility] [facility], [place] [address] ([phone])  - [address], [place] [address] ([phone])        Tylenol as needed for pain. Please apply ICE to the lip contusion.   If your condition worsens or fails to improve we recommend that you receive another evaluation at the emergency room immediately or contact your primary medical clinic to discuss your concerns.   You must understand that you have received an Urgent Care treatment only and that you may be released before all of your medical problems are known or treated. You, the patient, will arrange for follow up care as instructed.     RED FLAGS/WARNING SYMPTOMS DISCUSSED WITH PATIENT THAT WOULD WARRANT EMERGENT MEDICAL  ATTENTION. PATIENT VERBALIZED UNDERSTANDING.

## 2023-08-19 NOTE — PATIENT INSTRUCTIONS
You have been seen for a work-related injury/ailment. If you have been given restrictions/precautions, please follow instructions as advised. It is important that you follow up at one of the Occupational Health Clinics in the next business day if further treatment or evaluation is needed.     Please call [phone] for help setting up the appointment.      A list of clinics is listed below:    - [address] [facility] [address], [place] [address] ([phone])  - [address] [address] ([phone])  - [address] [facility], [place] [address] ([phone])  - [address], [place] [address] ([phone])        Tylenol as needed for pain. Please apply ICE to the lip contusion.   If your condition worsens or fails to improve we recommend that you receive another evaluation at the emergency room immediately or contact your primary medical clinic to discuss your concerns.   You must understand that you have received an Urgent Care treatment only and that you may be released before all of your medical problems are known or treated. You, the patient, will arrange for follow up care as instructed.     RED FLAGS/WARNING SYMPTOMS DISCUSSED WITH PATIENT THAT WOULD WARRANT EMERGENT MEDICAL ATTENTION. PATIENT VERBALIZED UNDERSTANDING.

## 2023-08-21 ENCOUNTER — TELEPHONE (OUTPATIENT)
Dept: URGENT CARE | Facility: CLINIC | Age: 73
End: 2023-08-21
Payer: COMMERCIAL

## 2023-08-21 ENCOUNTER — OFFICE VISIT (OUTPATIENT)
Dept: URGENT CARE | Facility: CLINIC | Age: 73
End: 2023-08-21
Payer: OTHER MISCELLANEOUS

## 2023-08-21 VITALS
OXYGEN SATURATION: 99 % | BODY MASS INDEX: 26.82 KG/M2 | SYSTOLIC BLOOD PRESSURE: 138 MMHG | WEIGHT: 161 LBS | TEMPERATURE: 98 F | RESPIRATION RATE: 18 BRPM | HEART RATE: 75 BPM | DIASTOLIC BLOOD PRESSURE: 64 MMHG | HEIGHT: 65 IN
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
## 2023-08-21 DIAGNOSIS — S09.90XD INJURY OF HEAD, SUBSEQUENT ENCOUNTER: ICD-10-CM

## 2023-08-21 DIAGNOSIS — S00.531D CONTUSION OF LIP, SUBSEQUENT ENCOUNTER: ICD-10-CM

## 2023-08-21 PROCEDURE — 99212 OFFICE O/P EST SF 10 MIN: CPT | Mod: S$GLB,,, | Performed by: PHYSICIAN ASSISTANT

## 2023-08-21 PROCEDURE — 99212 PR OFFICE/OUTPT VISIT, EST, LEVL II, 10-19 MIN: ICD-10-PCS | Mod: S$GLB,,, | Performed by: PHYSICIAN ASSISTANT

## 2023-08-21 NOTE — PROGRESS NOTES
Subjective:      Patient ID: Xin Taylor is a 72 y.o. female.    Chief Complaint: Follow-up (Fall on Friday at work and here to get cleared for work)    Patient presents today for follow up from fall at her job on Friday. Her lip contusion has healed and her head seems to have cleared up. She is here to get clearance for work.  She states no new or residual symptoms.  Denies vision changes, headache, or N/V.      Follow-up  This is a new problem. The current episode started in the past 7 days. The problem has been resolved.     ROS  Objective:     Physical Exam  Constitutional:       Appearance: Normal appearance.   HENT:      Head: Normocephalic and atraumatic.      Right Ear: Hearing, tympanic membrane, ear canal and external ear normal.      Left Ear: Hearing, tympanic membrane, ear canal and external ear normal.      Nose: Nose normal. No nasal tenderness.      Mouth/Throat:      Lips: Pink.      Mouth: Mucous membranes are moist.      Pharynx: Oropharynx is clear. Uvula midline.   Eyes:      Pupils: Pupils are equal, round, and reactive to light.   Neurological:      Mental Status: She is alert.        Assessment:      1. Fall, subsequent encounter    2. Injury of head, subsequent encounter    3. Contusion of lip, subsequent encounter      Plan:     May return to work 8/22/2023.    Tylenol and/or Ibuprofen as listed on packaging as needed for pain.  Rest as needed.  RTC or go to the ER with new or worsening symptoms.            Follow up if symptoms worsen or fail to improve.

## 2023-08-21 NOTE — LETTER
[facility]  [address]  [place] [address]  Phone: [phone]  [facility]: [phone]    Pt Name: Xin Taylor  Injury Date:     Employee ID:  Date of First Treatment: 08/21/2023   Company: [employer] reference to record [employer]       Appointment Time: 03:00 PM Arrived: 1500   Provider: Josef Parsons PA-C Time Out:1542     Office Treatment:   1. Fall, subsequent encounter    2. Injury of head, subsequent encounter    3. Contusion of lip, subsequent encounter                   May return to work on 8/22/2023.

## 2023-08-21 NOTE — TELEPHONE ENCOUNTER
Patient called in reference to a injury she had and was seen and treated for by one of our locations. Patient is in need of a follow up visit t be cleared to go back to work and she states that she was told to give us a call. Marisela the  took care of getting her scheduled to be seen at a location that would be close for her to get to. The patient has been informed of the appointment. RUDDY

## 2023-09-14 ENCOUNTER — HOSPITAL ENCOUNTER (OUTPATIENT)
Dept: CARDIOLOGY | Facility: HOSPITAL | Age: 73
Discharge: HOME | End: 2023-09-14
Attending: STUDENT IN AN ORGANIZED HEALTH CARE EDUCATION/TRAINING PROGRAM
Payer: COMMERCIAL

## 2023-09-14 ENCOUNTER — OFFICE VISIT (OUTPATIENT)
Dept: CARDIOLOGY | Facility: CLINIC | Age: 73
End: 2023-09-14
Payer: COMMERCIAL

## 2023-09-14 VITALS
BODY MASS INDEX: 26.89 KG/M2 | SYSTOLIC BLOOD PRESSURE: 130 MMHG | HEART RATE: 73 BPM | WEIGHT: 161.38 LBS | HEIGHT: 65 IN | DIASTOLIC BLOOD PRESSURE: 72 MMHG

## 2023-09-14 DIAGNOSIS — M85.80 OSTEOPENIA, UNSPECIFIED LOCATION: ICD-10-CM

## 2023-09-14 DIAGNOSIS — R00.2 PALPITATIONS: ICD-10-CM

## 2023-09-14 DIAGNOSIS — I10 PRIMARY HYPERTENSION: Primary | ICD-10-CM

## 2023-09-14 DIAGNOSIS — R20.2 NUMBNESS AND TINGLING OF FOOT: Primary | ICD-10-CM

## 2023-09-14 DIAGNOSIS — R20.0 NUMBNESS AND TINGLING OF FOOT: Primary | ICD-10-CM

## 2023-09-14 DIAGNOSIS — R73.03 PREDIABETES: ICD-10-CM

## 2023-09-14 DIAGNOSIS — I10 PRIMARY HYPERTENSION: ICD-10-CM

## 2023-09-14 DIAGNOSIS — E78.2 MIXED HYPERLIPIDEMIA: ICD-10-CM

## 2023-09-14 DIAGNOSIS — G47.33 OSA ON CPAP: ICD-10-CM

## 2023-09-14 PROCEDURE — 1159F MED LIST DOCD IN RCRD: CPT | Mod: CPTII,S$GLB,, | Performed by: STUDENT IN AN ORGANIZED HEALTH CARE EDUCATION/TRAINING PROGRAM

## 2023-09-14 PROCEDURE — 93010 EKG 12-LEAD: ICD-10-PCS | Mod: ,,, | Performed by: INTERNAL MEDICINE

## 2023-09-14 PROCEDURE — 99999 PR PBB SHADOW E&M-EST. PATIENT-LVL III: CPT | Mod: PBBFAC,,, | Performed by: STUDENT IN AN ORGANIZED HEALTH CARE EDUCATION/TRAINING PROGRAM

## 2023-09-14 PROCEDURE — 3075F PR MOST RECENT SYSTOLIC BLOOD PRESS GE 130-139MM HG: ICD-10-PCS | Mod: CPTII,S$GLB,, | Performed by: STUDENT IN AN ORGANIZED HEALTH CARE EDUCATION/TRAINING PROGRAM

## 2023-09-14 PROCEDURE — 93005 ELECTROCARDIOGRAM TRACING: CPT

## 2023-09-14 PROCEDURE — 3075F SYST BP GE 130 - 139MM HG: CPT | Mod: CPTII,S$GLB,, | Performed by: STUDENT IN AN ORGANIZED HEALTH CARE EDUCATION/TRAINING PROGRAM

## 2023-09-14 PROCEDURE — 3078F DIAST BP <80 MM HG: CPT | Mod: CPTII,S$GLB,, | Performed by: STUDENT IN AN ORGANIZED HEALTH CARE EDUCATION/TRAINING PROGRAM

## 2023-09-14 PROCEDURE — 93010 ELECTROCARDIOGRAM REPORT: CPT | Mod: ,,, | Performed by: INTERNAL MEDICINE

## 2023-09-14 PROCEDURE — 3078F PR MOST RECENT DIASTOLIC BLOOD PRESSURE < 80 MM HG: ICD-10-PCS | Mod: CPTII,S$GLB,, | Performed by: STUDENT IN AN ORGANIZED HEALTH CARE EDUCATION/TRAINING PROGRAM

## 2023-09-14 PROCEDURE — 3008F BODY MASS INDEX DOCD: CPT | Mod: CPTII,S$GLB,, | Performed by: STUDENT IN AN ORGANIZED HEALTH CARE EDUCATION/TRAINING PROGRAM

## 2023-09-14 PROCEDURE — 3008F PR BODY MASS INDEX (BMI) DOCUMENTED: ICD-10-PCS | Mod: CPTII,S$GLB,, | Performed by: STUDENT IN AN ORGANIZED HEALTH CARE EDUCATION/TRAINING PROGRAM

## 2023-09-14 PROCEDURE — 99214 PR OFFICE/OUTPT VISIT, EST, LEVL IV, 30-39 MIN: ICD-10-PCS | Mod: S$GLB,,, | Performed by: STUDENT IN AN ORGANIZED HEALTH CARE EDUCATION/TRAINING PROGRAM

## 2023-09-14 PROCEDURE — 99999 PR PBB SHADOW E&M-EST. PATIENT-LVL III: ICD-10-PCS | Mod: PBBFAC,,, | Performed by: STUDENT IN AN ORGANIZED HEALTH CARE EDUCATION/TRAINING PROGRAM

## 2023-09-14 PROCEDURE — 1159F PR MEDICATION LIST DOCUMENTED IN MEDICAL RECORD: ICD-10-PCS | Mod: CPTII,S$GLB,, | Performed by: STUDENT IN AN ORGANIZED HEALTH CARE EDUCATION/TRAINING PROGRAM

## 2023-09-14 PROCEDURE — 99214 OFFICE O/P EST MOD 30 MIN: CPT | Mod: S$GLB,,, | Performed by: STUDENT IN AN ORGANIZED HEALTH CARE EDUCATION/TRAINING PROGRAM

## 2023-09-14 NOTE — PROGRESS NOTES
"              Section of Cardiology                  Cardiac Clinic Note    Chief Complaint/Reason for consultation: hypertension, palpitations       HPI:   Xin Taylor is a 72 y.o. female with h/o HTN, ASIF, HLD who comes in as a referral to cardiology clinic by PCP Dr. Carlson for evaluation.     7/11/22  About 2 weeks ago,had palpitations, started when she was going to the bathroom  symptoms   Would go away after resting, drinking water, raising her feet  Reports left ear tinnitus, has seen ENT, normal workup   Does not exercise regularly, no limitations with this   Worked in healthcare for many years, currently a     Denies tobacco or ETOH abuse  Family hx: mother- DM; brother- arteriosclerosis     Denies DM, CVA    Denies chest pain, SOB, dizziness, syncope         8/24/22  14 day monitor- no significant arrhythmias seen. Symptoms correlated ST. Average heart rate is normal at 68 bpm  Denies palpitations   Echo with normal EF  BP elevated today, but says normotensive BP at home have been more 120s-130s systolic, diastolic 60s  Has gained 2-3 lbs overall, but no major changes  Cycling 30 min 3 days a week     Denies chest pain, SOB, dizziness, syncope       2/20/23  Had post nasal drip recently with a cough   2 weeks ago, had pain right below the breast, worse with breathing in, described as a "twinge", became positional, but one issue with deep breathing  Symptoms pretty much resolved  Still exercising with bicycle   BP high today, normotensive on prior visits  Reports ankle swelling     Denies SOB, chest pain       4/24/23  Did not want to start bystolic, so continues to take amlodipine  Riding her stationary bike, 3 times a week  Intermittent dizzy, but not concerning   Weight increased 3 lbs from 2/23  Brings in home BP log, average systolic 132, diastolic avg 75  BP elevated today- elevated on repeat and more elevated with BP cuff  reports pulsating in her ear at times- has seen audiology "     Denies SOB, chest pain        6/28/23  Doing well  BP has been running high  Says valsartan 320 caused palpitation  Beet juice reduced blood pressure  Taking the 160 valsartan  Has been having numbness in feet      9/14/23  Arterial U/S 7/23 left leg with plaque build up, no sig stenosis  Started on ASA daily, but was taking every other work  BP stable   Stopped taking the beet juice due to low BP  Still having foot numbness   Fell about 1 month ago after tripping  No leg weakness        EKG 9/14/23 NSR, incomplete RBBB,   EKG 2/20/23 NSR, possible LAE, incomplete RBBB, miminal LVH  EKG 7/11/22 NSR, possible LAE, minimal LVH criteria     ECHO  7/22  The left ventricle is normal in size with normal systolic function.  Normal left ventricular diastolic function.  The estimated PA systolic pressure is 31 mmHg.  Normal right ventricular size with normal right ventricular systolic function.  Normal central venous pressure (3 mmHg).  Moderate pulmonic regurgitation.  Mild mitral regurgitation.  Mild tricuspid regurgitation.  The estimated ejection fraction is 60%.        STRESS TEST    [facility]      ROS: All 10 systems reviewed. Please refer to the HPI for pertinent positives. All other systems negative.     Past Medical History  Past Medical History:   Diagnosis Date    Anemia     Colon polyp     Edema     HLD (hyperlipidemia)     HTN (hypertension)     Palpitation     Tinnitus        Surgical History  Past Surgical History:   Procedure Laterality Date    COLONOSCOPY N/A 04/22/2022    Procedure: COLONOSCOPY;  Surgeon: Priyanka Winkler MD;  Location: [facility];  Service: Endoscopy;  Laterality: N/A;    HYSTERECTOMY            Allergies:   Review of patient's allergies indicates:   Allergen Reactions    No known drug allergies        Social History:  Social History     Socioeconomic History    Marital status: Legally    Occupational History    Occupation: retired   Tobacco Use    Smoking status: Never     Smokeless tobacco: Never   Substance and Sexual Activity    Alcohol use: No    Drug use: No    Sexual activity: Not Currently     Partners: Male     Social Determinants of Health     Financial Resource Strain: Low Risk  (7/23/2023)    Overall Financial Resource Strain (CARDIA)     Difficulty of Paying Living Expenses: Not hard at all   Food Insecurity: No Food Insecurity (7/23/2023)    Hunger Vital Sign     Worried About Running Out of Food in the Last Year: Never true     Ran Out of Food in the Last Year: Never true   Transportation Needs: No Transportation Needs (7/23/2023)    PRAPARE - Transportation     Lack of Transportation (Medical): No     Lack of Transportation (Non-Medical): No   Physical Activity: Insufficiently Active (7/23/2023)    Exercise Vital Sign     Days of Exercise per Week: 3 days     Minutes of Exercise per Session: 30 min   Stress: No Stress Concern Present (7/23/2023)    [employer] [place] [employer] - Occupational Stress Questionnaire     Feeling of Stress : Only a little   Social Connections: Unknown (7/23/2023)    Social Connection and Isolation Panel [NHANES]     Frequency of Communication with Friends and Family: More than three times a week     Frequency of Social Gatherings with Friends and Family: Twice a week     Active Member of Clubs or Organizations: Yes     Attends Club or Organization Meetings: More than 4 times per year     Marital Status:    Housing Stability: Low Risk  (7/23/2023)    Housing Stability Vital Sign     Unable to Pay for Housing in the Last Year: No     Number of Places Lived in the Last Year: 1     Unstable Housing in the Last Year: No       Family History:  family history includes Breast cancer in her paternal aunt; Cancer in her mother and another family member; Cataracts in her mother; Heart defect in her brother; Hypertension in her mother; Thyroid disease in her mother.    Home Medications:  Current Outpatient Medications on File Prior  "to Visit   Medication Sig Dispense Refill    amLODIPine (NORVASC) 10 MG tablet Take 1 tablet (10 mg total) by mouth once daily. 30 tablet 11    aspirin 81 mg Tab Take 1 tablet by mouth every other day.      atorvastatin (LIPITOR) 10 MG tablet Take 1 tablet (10 mg total) by mouth once daily. 90 tablet 3    b complex vitamins tablet Take 1 tablet by mouth once daily.      co-enzyme Q-10 30 mg capsule Take 30 mg by mouth 3 (three) times daily.      ferrous sulfate 325 mg (65 mg iron) Tab tablet Take 1 tablet (325 mg total) by mouth once daily. Take with citrus 100 tablet 3    fish oil-omega-3 fatty acids 300-1,000 mg capsule Take 2 g by mouth once daily.      meloxicam (MOBIC) 15 MG tablet Take 1 tablet (15 mg total) by mouth once daily. (Patient taking differently: Take 15 mg by mouth every 7 days. Once a week or PRN) 90 tablet 1    multivitamin (THERAGRAN) per tablet Take 1 tablet by mouth once daily.      turmeric root extract 500 mg Cap Take 500 mg by mouth 2 (two) times daily. 100 capsule 4    valsartan-hydrochlorothiazide (DIOVAN-HCT) 160-25 mg per tablet Take 1 tablet by mouth once daily. 90 tablet 3    vitamin D 1000 units Tab Take 185 mg by mouth once daily.       No current facility-administered medications on file prior to visit.       Physical exam:  /72 (BP Location: Left arm, Patient Position: Sitting, BP Method: Medium (Manual))   Pulse 73   Ht 5' 5" (1.651 m)   Wt 73.2 kg (161 lb 6 oz)   LMP  (LMP Unknown)   BMI 26.85 kg/m²         General: Pt is a 72 y.o. year old female who is AAOx3, in NAD, is pleasant, well nourished, looks stated age  HEENT: PERRL, EOMI, Oral mucosa pink & moist  CVS  No abnormal cardiac pulsations noted on inspection. JVP not raised. The apical impulse is normal on palpation, and is located in the left 5th intercostal space in the mid - clavicular line. No palpable thrills or abnormal pulsations noted. RR, S1 - S2 heard, no murmurs, rubs or gallops appreciated.   PUL : " "CTA B/L. No wheezes/crackles heard   ABD : BS +, soft. No tenderness elicited   LE : No C/C/E. Distal Pulses palpable B/L         LABS:    Chemistry:   Lab Results   Component Value Date     06/12/2023    K 3.8 06/12/2023     06/12/2023    CO2 27 06/12/2023    BUN 18 06/12/2023    CREATININE 0.9 06/12/2023    CALCIUM 10.0 06/12/2023     Cardiac Markers: No results found for: "CKTOTAL", "CKMB", "CKMBINDEX", "TROPONINI"  Cardiac Markers (Last 3): No results found for: "CKTOTAL", "CKMB", "CKMBINDEX", "TROPONINI"  CBC:   Lab Results   Component Value Date    WBC 4.87 04/05/2022    HGB 11.3 (L) 04/05/2022    HCT 35.9 (L) 04/05/2022    MCV 96 04/05/2022     04/05/2022     Lipids:   Lab Results   Component Value Date    CHOL 184 02/23/2023    TRIG 53 02/23/2023    HDL 80 (H) 02/23/2023     Coagulation: No results found for: "PT", "INR", "APTT"        Assessment      1. Numbness and tingling of foot    2. Palpitations    3. Primary hypertension    4. Mixed hyperlipidemia    5. ASIF on CPAP    6. Prediabetes    7. Osteopenia, unspecified location             Plan:    Palpitations  Intermittent   14 day cardiac monitor 7/22- no significant arrhythmias   Echocardiogram 7/22 with normal EF, mod MN, mild MR/TR    Foot numbness  SHEY 7/23 RLE normal, mildly abnormal LLE  Arterial U/S 7/23 left leg with plaque build up, no sig stenosis    ASIF  Compliant with CPAP    HTN  Stable   Continue Diovan-hydrochlorothiazide in a.m. 160/25, [id number] mg at night  Will take beet juice   Low-salt, low-fat diet  Exercise as tolerated, at least 30 minutes daily    Pre diabetes  A1c 5.8  Continue therapy    HLD  LDL 93 as of 2/23  Continue statin        This note was prepared using voice recognition system and is likely to have sound alike errors that may have been overlooked even after proofreading.     I have reviewed all pertinent chart information.  Plans and recommendations have been formulated under my direct " supervision. All questions answered and patient voiced understanding.  To see if  If symptoms persist go to the ED.    RTC in 6 months           Naz Martinez MD  Cardiology

## 2023-09-25 ENCOUNTER — PATIENT MESSAGE (OUTPATIENT)
Dept: PRIMARY CARE CLINIC | Facility: CLINIC | Age: 73
End: 2023-09-25
Payer: COMMERCIAL

## 2023-09-26 ENCOUNTER — IMMUNIZATION (OUTPATIENT)
Dept: URGENT CARE | Facility: CLINIC | Age: 73
End: 2023-09-26
Payer: COMMERCIAL

## 2023-09-26 DIAGNOSIS — Z23 FLU VACCINE NEED: Primary | ICD-10-CM

## 2023-09-26 PROCEDURE — 90471 FLU VACCINE - QUADRIVALENT - ADJUVANTED: ICD-10-PCS | Mod: S$GLB,,,

## 2023-09-26 PROCEDURE — 90471 IMMUNIZATION ADMIN: CPT | Mod: S$GLB,,,

## 2023-09-26 PROCEDURE — 90694 VACC AIIV4 NO PRSRV 0.5ML IM: CPT | Mod: S$GLB,,,

## 2023-09-26 PROCEDURE — 90694 FLU VACCINE - QUADRIVALENT - ADJUVANTED: ICD-10-PCS | Mod: S$GLB,,,

## 2023-11-06 ENCOUNTER — PATIENT MESSAGE (OUTPATIENT)
Dept: PRIMARY CARE CLINIC | Facility: CLINIC | Age: 73
End: 2023-11-06
Payer: COMMERCIAL

## 2023-11-06 RX ORDER — ATORVASTATIN CALCIUM 10 MG/1
10 TABLET, FILM COATED ORAL DAILY
Qty: 90 TABLET | Refills: 3 | Status: SHIPPED | OUTPATIENT
Start: 2023-11-06 | End: 2024-11-05

## 2023-11-06 NOTE — TELEPHONE ENCOUNTER
No care due was identified.  Health [facility] Embedded Care Due Messages. Reference number: [id number].   11/06/2023 10:53:22 AM CST

## 2023-11-07 ENCOUNTER — NURSE TRIAGE (OUTPATIENT)
Dept: ADMINISTRATIVE | Facility: CLINIC | Age: 73
End: 2023-11-07
Payer: COMMERCIAL

## 2023-11-08 NOTE — TELEPHONE ENCOUNTER
169/90, hr 88  164/90, hr 92    She has not taken her nightly dose of amlodipine yet. She is asymptomatic. Advised per protocol to administer amlodipine now and be seen within 3 days. Patient verbalizes understanding.  Advised the patient to call back with any further questions or if symptoms worsen.        Reason for Disposition   Systolic BP  >= 160 OR Diastolic >= 100    Additional Information   Negative: Difficult to awaken or acting confused (e.g., disoriented, slurred speech)   Negative: SEVERE difficulty breathing (e.g., struggling for each breath, speaks in single words)   Negative: [1] Weakness of the face, arm or leg on one side of the body AND [2] new-onset   Negative: [1] Numbness (i.e., loss of sensation) of the face, arm or leg on one side of the body AND [2] new-onset   Negative: [1] Chest pain lasts > 5 minutes AND [2] history of heart disease (i.e., heart attack, bypass surgery, angina, angioplasty, CHF)   Negative: [1] Chest pain AND [2] took nitrogylcerin AND [3] pain was not relieved   Negative: Sounds like a life-threatening emergency to the triager   Negative: [1] Systolic BP  >= 160 OR Diastolic >= 100 AND [2] cardiac (e.g., breathing difficulty, chest pain) or neurologic symptoms (e.g., new-onset blurred or double vision, unsteady gait)   Negative: [1] Pregnant 20 or more weeks (or postpartum < 6 weeks) AND [2] new hand or face swelling   Negative: [1] Pregnant 20 or more weeks (or postpartum < 6 weeks) AND [2] Systolic BP >= 160 OR Diastolic >= 110   Negative: [1] Systolic BP  >= 200 OR Diastolic >= 120 AND [2] having NO cardiac or neurologic symptoms   Negative: [1] Pregnant 20 or more weeks (or postpartum < 6 weeks) AND [2] Systolic BP  >= 140 OR Diastolic >= 90   Negative: [1] Systolic BP  >= 180 OR Diastolic >= 110 AND [2] missed most recent dose of blood pressure medication   Negative: Systolic BP  >= 180 OR Diastolic >= 110   Negative: Ran out of BP medications    Protocols used: Blood  Pressure - High-A-AH

## 2023-11-08 NOTE — TELEPHONE ENCOUNTER
Called patient this am to follow up on visit stated she took night blood pressure medication early per on call nurse directive and she feels better and b/p better. Offered her an office visit but declined stating she is ok. Encouraged patient to let us know if condition worsens verbalized understanding

## 2023-11-15 NOTE — PROGRESS NOTES
Subjective:       Patient ID: Xin Taylor is a 73 y.o. female.  Patient Active Problem List   Diagnosis    HTN (hypertension)    HLD (hyperlipidemia)    Anemia    Personal history of colonic polyps    Osteopenia    Mass of oral cavity    ASIF on CPAP    Prediabetes    Abnormal glucose    Palpitations     Immunization History   Administered Date(s) Administered    COVID-19, MRNA, LN-S, PF (Pfizer) (Purple Cap) 03/05/2021, 03/26/2021, 11/02/2021    Hepatitis A, Adult 11/13/2012, 05/13/2013    Hepatitis A, Pediatric/Adolescent, 2 Dose 05/13/2013    Influenza 10/29/2020    Influenza (FLUAD) - Quadrivalent - Adjuvanted - PF *Preferred* (65+) 10/29/2021, 11/17/2022, 09/26/2023    Influenza - High Dose - PF (65 years and older) 10/27/2015, 11/10/2016, 11/29/2017, 11/21/2018, 10/10/2019    Influenza - Intradermal - Quadrivalent - PF 11/13/2012    Influenza - Intradermal - Trivalent - PF 11/13/2012    Influenza - Quadrivalent - PF *Preferred* (6 months and older) 10/30/2013    Influenza - Trivalent (ADULT) 10/06/2008, 10/06/2009, 10/25/2010, 10/30/2013    Influenza Split 10/06/2008, 10/06/2009, 10/25/2010, 11/13/2012, 10/30/2013    Meningococcal Conjugate (MCV4P) 11/13/2012    Pneumococcal Conjugate - 13 Valent 01/27/2016    Pneumococcal Polysaccharide - 23 Valent 11/10/2016    Tdap 11/13/2012    Typhoid - ViCPs 11/09/2012    Yellow Fever 11/09/2012    Zoster 08/08/2012    Zoster Recombinant 04/29/2021, 06/29/2021     Social History     Tobacco Use   Smoking Status Never   Smokeless Tobacco Never          11/16/2023    10:52 AM   EPWORTH SLEEPINESS SCALE   Sitting and reading 3   Watching TV 3   Sitting, inactive in a public place (e.g. a theatre or a meeting) 0   As a passenger in a car for an hour without a break 0   Lying down to rest in the afternoon when circumstances permit 3   Sitting and talking to someone 0   Sitting quietly after a lunch without alcohol 3   In a car, while stopped for a few minutes in traffic 1  "  Total score 13           Chief Complaint: Apnea      Xin Taylor comes to see me as a follow-up  Last visit was 09/224/2021  She has ASIF on APAP 6-14  CPAP affected by recall.   Has replacement  Residual AHI 3.0, some mask leak 12 min     [place] score 3  Her usage greater than 4 hours was 96.7 %.  She'll follow-up annually.  Information on equipment change given  Goals of CPAP discussed      11/16/2023  Followup  No snoring  Has dream station 1 and 2  Has not switched device  Says using device  Works at : fell at work  [place] 13          Review of Systems   Constitutional: Negative.    HENT: Negative.     Eyes: Negative.    Respiratory: Negative.     Genitourinary: Negative.    Endocrine: endocrine negative    Musculoskeletal: Negative.    Skin: Negative.    Gastrointestinal: Negative.    Neurological: Negative.    Psychiatric/Behavioral: Negative.     All other systems reviewed and are negative.          Objective:       Vitals:    11/16/23 1047   BP: 132/78   BP Location: Right arm   Patient Position: Sitting   BP Method: Medium (Manual)   Pulse: 80   Resp: 18   SpO2: 98%   Weight: 74 kg (163 lb 2.3 oz)   Height: 5' 5" (1.651 m)         Physical Exam   Constitutional: She is oriented to person, place, and time. She appears well-developed and well-nourished.   HENT:   Head: Normocephalic.   Nose: Nose normal.   Cardiovascular: Normal rate and regular rhythm.   Pulmonary/Chest: Normal expansion, symmetric chest wall expansion, effort normal and breath sounds normal.   Musculoskeletal:         General: Normal range of motion.      Cervical back: Normal range of motion.        Legs:    Neurological: She is alert and oriented to person, place, and time.   Skin: Skin is warm and dry.   Psychiatric: She has a normal mood and affect.   Nursing note and vitals reviewed.    Personal Diagnostic Review  DOWNLOAD    Compliance Information 10/16/2022 - 11/14/2022  Compliance Summary  10/16/2022 - 11/14/2022 (30 " days)  Days with Device Usage 30 days  Days without Device Usage 0 days  Percent Days with Device Usage 100.0%  Cumulative Usage 10 days 4 hrs. 41 mins.  Maximum Usage (1 Day) 10 hrs. 46 mins. 11 secs.  Average Usage (All Days) 8 hrs. 9 mins. 22 secs.  Average Usage (Days Used) 8 hrs. 9 mins. 22 secs.  Minimum Usage (1 Day) 2 hrs. 15 mins. 15 secs.  Percent of Days with Usage >= 4 Hours 96.7%  Percent of Days with Usage < 4 Hours 3.3%  Date Range  Total Blower Time 10 days 4 hrs. 41 mins. 17 secs.  Average AHI 2.6  Auto-CPAP Summary  Auto-CPAP Mean Pressure 9.0 cmH2O  Auto-CPAP Peak Average Pressure 13.1 cmH2O  Device Pressure <= 90% of Time 12.7 cmH2O  Average Time in Large Leak Per Day 15 mins. 24 secs.      CXR today   CV Ultrasound doppler arterial leg left  · SCATTERED CALCIFIED PLAQUES W/O ANY SIGNIFICANT STENOSIS WITH NORMAL   WAVEFPRMS EXCEPT IN TPT .SHEY SUGGEST MINIMAL DISEASE.        Assessment:       Problem List Items Addressed This Visit       HTN (hypertension)     Stable on NORVASC         HLD (hyperlipidemia) - Primary     Stable on LIPITOR         ASIF on CPAP     [place] score 13  Bed time: 11 pm  Wake time: 9 am  Usage > 4 hours       Average AHI 2.6  APAP 6-14     Has new Dream station 2 did not switch out     Discussed therapeutic goals for positive airway pressure therapy(CPAP or BiPAP):   Ideal is usage 100% of nights for 6 - 8 hours per night.   Minimum usage is 70% of night for at least 4 hours per night used.  Mask choices discussed.  Patient expressed understanding. All Questions answered.         Prediabetes     Stable on diet          Plan:       Patient using device and benefits    Avoid salt indiscretion    Follow up in about 1 year (around 11/16/2024), or Need Download, adherence.    This note was prepared using voice recognition system and is likely to have sound alike errors that may have been overlooked even after proof reading.  Please call me with any questions    TIME SPENT WITH  PATIENT:     Time spent: 20 minutes in face to face  discussion concerning diagnosis, prognosis, review of lab and test results, benefits of treatment as well as management of disease, counseling of patient and coordination of care between various health  care providers . Greater than half the time spent was used for coordination of care and counseling of patient.     Vito Muse    Pulmonary/Critical care/Sleepmedicine

## 2023-11-16 ENCOUNTER — OFFICE VISIT (OUTPATIENT)
Dept: SLEEP MEDICINE | Facility: CLINIC | Age: 73
End: 2023-11-16
Payer: COMMERCIAL

## 2023-11-16 ENCOUNTER — PATIENT MESSAGE (OUTPATIENT)
Dept: PULMONOLOGY | Facility: CLINIC | Age: 73
End: 2023-11-16
Payer: COMMERCIAL

## 2023-11-16 VITALS
SYSTOLIC BLOOD PRESSURE: 132 MMHG | WEIGHT: 163.13 LBS | HEART RATE: 80 BPM | RESPIRATION RATE: 18 BRPM | BODY MASS INDEX: 27.18 KG/M2 | OXYGEN SATURATION: 98 % | DIASTOLIC BLOOD PRESSURE: 78 MMHG | HEIGHT: 65 IN

## 2023-11-16 DIAGNOSIS — R73.03 PREDIABETES: ICD-10-CM

## 2023-11-16 DIAGNOSIS — I10 PRIMARY HYPERTENSION: ICD-10-CM

## 2023-11-16 DIAGNOSIS — G47.33 OSA ON CPAP: ICD-10-CM

## 2023-11-16 DIAGNOSIS — E78.2 MIXED HYPERLIPIDEMIA: Primary | ICD-10-CM

## 2023-11-16 PROCEDURE — 3078F PR MOST RECENT DIASTOLIC BLOOD PRESSURE < 80 MM HG: ICD-10-PCS | Mod: CPTII,S$GLB,, | Performed by: INTERNAL MEDICINE

## 2023-11-16 PROCEDURE — 3288F PR FALLS RISK ASSESSMENT DOCUMENTED: ICD-10-PCS | Mod: CPTII,S$GLB,, | Performed by: INTERNAL MEDICINE

## 2023-11-16 PROCEDURE — 99214 OFFICE O/P EST MOD 30 MIN: CPT | Mod: S$GLB,,, | Performed by: INTERNAL MEDICINE

## 2023-11-16 PROCEDURE — 99214 PR OFFICE/OUTPT VISIT, EST, LEVL IV, 30-39 MIN: ICD-10-PCS | Mod: S$GLB,,, | Performed by: INTERNAL MEDICINE

## 2023-11-16 PROCEDURE — 1101F PT FALLS ASSESS-DOCD LE1/YR: CPT | Mod: CPTII,S$GLB,, | Performed by: INTERNAL MEDICINE

## 2023-11-16 PROCEDURE — 3078F DIAST BP <80 MM HG: CPT | Mod: CPTII,S$GLB,, | Performed by: INTERNAL MEDICINE

## 2023-11-16 PROCEDURE — 99999 PR PBB SHADOW E&M-EST. PATIENT-LVL IV: CPT | Mod: PBBFAC,,, | Performed by: INTERNAL MEDICINE

## 2023-11-16 PROCEDURE — 1159F MED LIST DOCD IN RCRD: CPT | Mod: CPTII,S$GLB,, | Performed by: INTERNAL MEDICINE

## 2023-11-16 PROCEDURE — 1159F PR MEDICATION LIST DOCUMENTED IN MEDICAL RECORD: ICD-10-PCS | Mod: CPTII,S$GLB,, | Performed by: INTERNAL MEDICINE

## 2023-11-16 PROCEDURE — 3288F FALL RISK ASSESSMENT DOCD: CPT | Mod: CPTII,S$GLB,, | Performed by: INTERNAL MEDICINE

## 2023-11-16 PROCEDURE — 3075F PR MOST RECENT SYSTOLIC BLOOD PRESS GE 130-139MM HG: ICD-10-PCS | Mod: CPTII,S$GLB,, | Performed by: INTERNAL MEDICINE

## 2023-11-16 PROCEDURE — 3008F PR BODY MASS INDEX (BMI) DOCUMENTED: ICD-10-PCS | Mod: CPTII,S$GLB,, | Performed by: INTERNAL MEDICINE

## 2023-11-16 PROCEDURE — 99999 PR PBB SHADOW E&M-EST. PATIENT-LVL IV: ICD-10-PCS | Mod: PBBFAC,,, | Performed by: INTERNAL MEDICINE

## 2023-11-16 PROCEDURE — 3075F SYST BP GE 130 - 139MM HG: CPT | Mod: CPTII,S$GLB,, | Performed by: INTERNAL MEDICINE

## 2023-11-16 PROCEDURE — 1101F PR PT FALLS ASSESS DOC 0-1 FALLS W/OUT INJ PAST YR: ICD-10-PCS | Mod: CPTII,S$GLB,, | Performed by: INTERNAL MEDICINE

## 2023-11-16 PROCEDURE — 3008F BODY MASS INDEX DOCD: CPT | Mod: CPTII,S$GLB,, | Performed by: INTERNAL MEDICINE

## 2023-11-16 NOTE — ASSESSMENT & PLAN NOTE
[place] score 13  Bed time: 11 pm  Wake time: 9 am  Usage > 4 hours       Average AHI 2.6  APAP 6-14     Has new Dream station 2 did not switch out     Discussed therapeutic goals for positive airway pressure therapy(CPAP or BiPAP):   Ideal is usage 100% of nights for 6 - 8 hours per night.   Minimum usage is 70% of night for at least 4 hours per night used.  Mask choices discussed.  Patient expressed understanding. All Questions answered.

## 2023-11-29 DIAGNOSIS — M17.0 BILATERAL PRIMARY OSTEOARTHRITIS OF KNEE: Primary | ICD-10-CM

## 2023-12-02 ENCOUNTER — PATIENT MESSAGE (OUTPATIENT)
Dept: SPORTS MEDICINE | Facility: CLINIC | Age: 73
End: 2023-12-02
Payer: COMMERCIAL

## 2023-12-10 ENCOUNTER — PATIENT MESSAGE (OUTPATIENT)
Dept: PRIMARY CARE CLINIC | Facility: CLINIC | Age: 73
End: 2023-12-10
Payer: COMMERCIAL

## 2023-12-11 ENCOUNTER — NURSE TRIAGE (OUTPATIENT)
Dept: ADMINISTRATIVE | Facility: CLINIC | Age: 73
End: 2023-12-11
Payer: COMMERCIAL

## 2023-12-11 RX ORDER — MELOXICAM 15 MG/1
15 TABLET ORAL
Qty: 30 TABLET | Refills: 1 | Status: SHIPPED | OUTPATIENT
Start: 2023-12-11

## 2023-12-11 NOTE — TELEPHONE ENCOUNTER
Care Due:                  Date            Visit Type   Department     Provider  --------------------------------------------------------------------------------                                EP -                              PRIMARY      [facility] PRIMARY  Last Visit: 06-      CARE ([facility])   KANDICE Carlson                              EP -                              PRIMARY      [facility] PRIMARY  Next Visit: 07-      CARE ([facility])   KANDICE Carlson                                                            Last  Test          Frequency    Reason                     Performed    Due Date  --------------------------------------------------------------------------------    Lipid Panel.  12 months..  atorvastatin.............  02- 02-    Health [facility] Embedded Care Due Messages. Reference number: [id number].   12/11/2023 7:45:00 AM CST

## 2023-12-12 ENCOUNTER — TELEPHONE (OUTPATIENT)
Dept: PRIMARY CARE CLINIC | Facility: CLINIC | Age: 73
End: 2023-12-12
Payer: COMMERCIAL

## 2023-12-12 ENCOUNTER — PATIENT MESSAGE (OUTPATIENT)
Dept: PRIMARY CARE CLINIC | Facility: CLINIC | Age: 73
End: 2023-12-12
Payer: COMMERCIAL

## 2023-12-12 NOTE — TELEPHONE ENCOUNTER
----- Message from Omari Gamez sent at 12/12/2023 10:50 AM CST -----  Contact: Xin  Type:  Patient Returning Call    Who Called:Xin  Who Left Message for Patient:Mirella  Does the patient know what this is regarding?:blood pressure   Would the patient rather a call back or a response via [facility]? Call back  Best Call Back Number:[phone]  Additional Information:     Thanks   Am

## 2023-12-12 NOTE — TELEPHONE ENCOUNTER
"Pt reports Bp of 89/55 this afternoon which is low for her, has been sick this past week with flu like symptoms, states her BP at noon today was 116/70. Took her BP med around 11:30 pm, so got nervous and called to see what she should do, afraid to fall asleep. Had pt retake her BP which is now 144/82. Pt denies ever having any symptoms. Pt advised to see her PCP within 24 hours per protocol, Pt encouraged to retake BP when it has been an hour from taking her BP med and to call back with any worsening symptoms or questions. Pt verbalized understanding.     Reason for Disposition   [1] Systolic BP  AND [2] taking blood pressure medications AND [3] NOT dizzy, lightheaded or weak    Additional Information   Negative: Started suddenly after an allergic medicine, an allergic food, or bee sting   Negative: Shock suspected (e.g., cold/pale/clammy skin, too weak to stand, low BP, rapid pulse)   Negative: Difficult to awaken or acting confused (e.g., disoriented, slurred speech)   Negative: Fainted   Negative: [1] Systolic BP < 90 AND [2] dizzy, lightheaded, or weak   Negative: Chest pain   Negative: Bleeding (e.g., vomiting blood, rectal bleeding or tarry stools, severe vaginal bleeding)(Exception: Fainted from sight of small amount of blood; small cut or abrasion.)   Negative: Extra heartbeats, irregular heart beating, or heart is beating very fast  (i.e., "palpitations")   Negative: Sounds like a life-threatening emergency to the triager   Negative: [1] Systolic BP < 80 AND [2] NOT dizzy, lightheaded or weak   Negative: Abdominal pain   Negative: [1] Fall in systolic BP > 20 mm Hg from normal AND [2] dizzy, lightheaded, or weak   Negative: Patient sounds very sick or weak to the triager   Negative: Fever > 100.4 F (38.0 C)   Negative: Major surgery in the past month   Negative: [1] Drinking very little AND [2] dehydration suspected (e.g., no urine > 12 hours, very dry mouth, very lightheaded)   Negative: [1] " Systolic BP < 90 AND [2] NOT dizzy, lightheaded or weak   Negative: [1] Systolic BP  AND [2] taking blood pressure medications AND [3] dizzy, lightheaded or weak    Protocols used: Blood Pressure - Low-A-AH

## 2023-12-13 ENCOUNTER — HOSPITAL ENCOUNTER (OUTPATIENT)
Dept: RADIOLOGY | Facility: HOSPITAL | Age: 73
Discharge: HOME | End: 2023-12-13
Attending: NURSE PRACTITIONER
Payer: COMMERCIAL

## 2023-12-13 ENCOUNTER — OFFICE VISIT (OUTPATIENT)
Dept: PULMONOLOGY | Facility: CLINIC | Age: 73
End: 2023-12-13
Payer: COMMERCIAL

## 2023-12-13 ENCOUNTER — CLINICAL SUPPORT (OUTPATIENT)
Dept: PULMONOLOGY | Facility: CLINIC | Age: 73
End: 2023-12-13
Payer: COMMERCIAL

## 2023-12-13 ENCOUNTER — TELEPHONE (OUTPATIENT)
Dept: PRIMARY CARE CLINIC | Facility: CLINIC | Age: 73
End: 2023-12-13
Payer: COMMERCIAL

## 2023-12-13 VITALS
HEART RATE: 95 BPM | SYSTOLIC BLOOD PRESSURE: 124 MMHG | OXYGEN SATURATION: 99 % | RESPIRATION RATE: 18 BRPM | DIASTOLIC BLOOD PRESSURE: 80 MMHG | BODY MASS INDEX: 26.72 KG/M2 | WEIGHT: 160.38 LBS | HEIGHT: 65 IN

## 2023-12-13 DIAGNOSIS — R05.9 COUGH, UNSPECIFIED TYPE: Primary | ICD-10-CM

## 2023-12-13 DIAGNOSIS — M54.9 MID-BACK PAIN, ACUTE: ICD-10-CM

## 2023-12-13 DIAGNOSIS — G47.33 OSA ON CPAP: ICD-10-CM

## 2023-12-13 DIAGNOSIS — R05.9 COUGH, UNSPECIFIED TYPE: ICD-10-CM

## 2023-12-13 DIAGNOSIS — R05.8 POST-VIRAL COUGH SYNDROME: Primary | ICD-10-CM

## 2023-12-13 DIAGNOSIS — J06.9 VIRAL URI WITH COUGH: ICD-10-CM

## 2023-12-13 LAB
INFLUENZA A, MOLECULAR: NEGATIVE
INFLUENZA B, MOLECULAR: NEGATIVE
SPECIMEN SOURCE: NORMAL

## 2023-12-13 PROCEDURE — 99999 PR PBB SHADOW E&M-EST. PATIENT-LVL I: ICD-10-PCS | Mod: PBBFAC,,,

## 2023-12-13 PROCEDURE — 3008F PR BODY MASS INDEX (BMI) DOCUMENTED: ICD-10-PCS | Mod: CPTII,S$GLB,, | Performed by: NURSE PRACTITIONER

## 2023-12-13 PROCEDURE — 1160F PR REVIEW ALL MEDS BY PRESCRIBER/CLIN PHARMACIST DOCUMENTED: ICD-10-PCS | Mod: CPTII,S$GLB,, | Performed by: NURSE PRACTITIONER

## 2023-12-13 PROCEDURE — 71046 XR CHEST PA AND LATERAL: ICD-10-PCS | Mod: 26,,, | Performed by: RADIOLOGY

## 2023-12-13 PROCEDURE — 3074F SYST BP LT 130 MM HG: CPT | Mod: CPTII,S$GLB,, | Performed by: NURSE PRACTITIONER

## 2023-12-13 PROCEDURE — 1101F PT FALLS ASSESS-DOCD LE1/YR: CPT | Mod: CPTII,S$GLB,, | Performed by: NURSE PRACTITIONER

## 2023-12-13 PROCEDURE — 1159F MED LIST DOCD IN RCRD: CPT | Mod: CPTII,S$GLB,, | Performed by: NURSE PRACTITIONER

## 2023-12-13 PROCEDURE — 3288F FALL RISK ASSESSMENT DOCD: CPT | Mod: CPTII,S$GLB,, | Performed by: NURSE PRACTITIONER

## 2023-12-13 PROCEDURE — 1101F PR PT FALLS ASSESS DOC 0-1 FALLS W/OUT INJ PAST YR: ICD-10-PCS | Mod: CPTII,S$GLB,, | Performed by: NURSE PRACTITIONER

## 2023-12-13 PROCEDURE — 71046 X-RAY EXAM CHEST 2 VIEWS: CPT | Mod: 26,,, | Performed by: RADIOLOGY

## 2023-12-13 PROCEDURE — 95012 NITRIC OXIDE EXP GAS DETER: CPT | Mod: S$GLB,,, | Performed by: INTERNAL MEDICINE

## 2023-12-13 PROCEDURE — 99999 PR PBB SHADOW E&M-EST. PATIENT-LVL I: CPT | Mod: PBBFAC,,,

## 2023-12-13 PROCEDURE — 87502 INFLUENZA DNA AMP PROBE: CPT | Performed by: NURSE PRACTITIONER

## 2023-12-13 PROCEDURE — 99999 PR PBB SHADOW E&M-EST. PATIENT-LVL IV: ICD-10-PCS | Mod: PBBFAC,,, | Performed by: NURSE PRACTITIONER

## 2023-12-13 PROCEDURE — 95012 PR NITRIC OXIDE EXPIRED GAS DETERMINATION: ICD-10-PCS | Mod: S$GLB,,, | Performed by: INTERNAL MEDICINE

## 2023-12-13 PROCEDURE — 99215 OFFICE O/P EST HI 40 MIN: CPT | Mod: 25,S$GLB,, | Performed by: NURSE PRACTITIONER

## 2023-12-13 PROCEDURE — 1159F PR MEDICATION LIST DOCUMENTED IN MEDICAL RECORD: ICD-10-PCS | Mod: CPTII,S$GLB,, | Performed by: NURSE PRACTITIONER

## 2023-12-13 PROCEDURE — 3288F PR FALLS RISK ASSESSMENT DOCUMENTED: ICD-10-PCS | Mod: CPTII,S$GLB,, | Performed by: NURSE PRACTITIONER

## 2023-12-13 PROCEDURE — 3008F BODY MASS INDEX DOCD: CPT | Mod: CPTII,S$GLB,, | Performed by: NURSE PRACTITIONER

## 2023-12-13 PROCEDURE — 1160F RVW MEDS BY RX/DR IN RCRD: CPT | Mod: CPTII,S$GLB,, | Performed by: NURSE PRACTITIONER

## 2023-12-13 PROCEDURE — 3079F PR MOST RECENT DIASTOLIC BLOOD PRESSURE 80-89 MM HG: ICD-10-PCS | Mod: CPTII,S$GLB,, | Performed by: NURSE PRACTITIONER

## 2023-12-13 PROCEDURE — 3074F PR MOST RECENT SYSTOLIC BLOOD PRESSURE < 130 MM HG: ICD-10-PCS | Mod: CPTII,S$GLB,, | Performed by: NURSE PRACTITIONER

## 2023-12-13 PROCEDURE — 99215 PR OFFICE/OUTPT VISIT, EST, LEVL V, 40-54 MIN: ICD-10-PCS | Mod: 25,S$GLB,, | Performed by: NURSE PRACTITIONER

## 2023-12-13 PROCEDURE — 99999 PR PBB SHADOW E&M-EST. PATIENT-LVL IV: CPT | Mod: PBBFAC,,, | Performed by: NURSE PRACTITIONER

## 2023-12-13 PROCEDURE — 3079F DIAST BP 80-89 MM HG: CPT | Mod: CPTII,S$GLB,, | Performed by: NURSE PRACTITIONER

## 2023-12-13 PROCEDURE — 71046 X-RAY EXAM CHEST 2 VIEWS: CPT | Mod: TC

## 2023-12-13 RX ORDER — GUAIFENESIN 1200 MG/1
1200 TABLET, EXTENDED RELEASE ORAL 2 TIMES DAILY
Qty: 20 TABLET | Refills: 1 | Status: SHIPPED | OUTPATIENT
Start: 2023-12-13 | End: 2023-12-23

## 2023-12-13 RX ORDER — ALBUTEROL SULFATE 90 UG/1
2 AEROSOL, METERED RESPIRATORY (INHALATION) EVERY 4 HOURS PRN
Qty: 18 G | Refills: 11 | Status: SHIPPED | OUTPATIENT
Start: 2023-12-13 | End: 2024-03-14

## 2023-12-13 NOTE — PROGRESS NOTES
Chief Complaint   Patient presents with    Cough    Sleep Apnea    Mid-back Pain    Fever     Subjective:   HPI:  Xin Taylor presents for acute care visit in Pulmonary related to 1 week ago began with mid back pain, temp 100.2.  Slight cough.  Some lost of taste last week, improved this week.  She reports she had no significant URI symptoms that she has noticed.  No urinary frequency.  No dysuria.  She is overall feeling somewhat improved today with continue slight cough and pain going across the mid back region.  She felt important to have pulmonary evaluation today with x-ray.  She did not test for COVID or flu prior to this presentation.     Same day testing accomplishing reviewed with the patient  12/13/2023 chest x-ray lungs are clear  12/13/2023 FeNO 13, no inflammation of the lung suggested  12/13/2023 negative influenza     Exam lungs clear, white mucus seen in posterior pharynx no exudates or erythema.    Encouraged patient to visit Urgent Care or contact PCP as further evaluation with urinalysis to evaluate for UTI is in order with negative pulmonary evaluation, patient states understanding.     Patient is followed in Pulmonary by Dr. Muse of for obstructive sleep apnea on CPAP.  Patient is new to me.    ASIF and CPAP complaince assessment.   She is on Auto CPAP of 6-14 cmH2O pressure which is optimally controlling sleep apnea with apneic index (AHI) 2.2 events an hour.   She is compliant with CPAP use. Complaince download today reveals 100% of days with greater than 4 hours of device use.   Patient reports benefit from CPAP use and denies snoring and excessive daytime sleepiness.  Patient reports no complaints. Full face mask is used.     Patient has obtained Dream Station 2 CPAP machine from [employer] related to recall she is not yet begun use of her replacement CPAP machine. She plans to begin use of Dream station 2 machine send recalled Dream station 1 machine current in use back to  [facility].    6/12/2017 Home Sleep Study Mild ASIF  AHI 13.4     Compliance Summary  Min Pressure 6 cmH2O  Max Pressure 14 cmH2O  11/13/2023 - 12/12/2023 (30 days)  Days with Device Usage 30 days  Days without Device Usage 0 days  Percent Days with Device Usage 100.0%  Cumulative Usage 11 days 5 hrs. 27 mins. 29 secs.  Maximum Usage (1 Day) 12 hrs. 25 mins. 9 secs.  Average Usage (All Days) 8 hrs. 58 mins. 54 secs.  Average Usage (Days Used) 8 hrs. 58 mins. 54 secs.  Minimum Usage (1 Day) 6 hrs. 42 mins. 33 secs.  Percent of Days with Usage >= 4 Hours 100.0%  Percent of Days with Usage < 4 Hours 0.0%  Date Range  Total Blower Time 11 days 5 hrs. 31 mins. 42 secs.  Average AHI 2.2  Auto-CPAP Summary  Auto-CPAP Mean Pressure 8.2 cmH2O  Auto-CPAP Peak Average Pressure 14.0 cmH2O  Device Pressure <= 90% of Time 9.1 cmH2O  Average Time in Large Leak Per Day 10 mins. 26 secs.      [place] Sleepiness Scale       12/13/2023     2:38 PM 11/16/2023    10:52 AM 11/17/2022    11:17 AM 9/24/2021     9:00 AM 10/1/2020    10:00 AM 9/26/2019    10:00 AM 9/20/2018     9:00 AM   EPWORTH SLEEPINESS SCALE   Sitting and reading 0 3 0 0 2 1 1   Watching TV 0 3 2 1 2 1 1   Sitting, inactive in a public place (e.g. a theatre or a meeting) 0 0 0 0 0 0 0   As a passenger in a car for an hour without a break 0 0 0 0 0 1 0   Lying down to rest in the afternoon when circumstances permit 0 3 0 0 0 3 3   Sitting and talking to someone 0 0 0 0 0 0 0   Sitting quietly after a lunch without alcohol 0 3 0 1 0 0 0   In a car, while stopped for a few minutes in traffic 0 1 1 0 0 0 0   Total score 0 13 3 2 4 6 5        Family/Medical/Surgical/Social History  is allergic to no known drug allergies.  family history includes Breast cancer in her paternal aunt; Cancer in her mother and another family member; Cataracts in her mother; Heart defect in her brother; Hypertension in her mother; Thyroid disease in her mother.  has a current medication list which  "includes the following prescription(s): amlodipine, aspirin, atorvastatin, b complex vitamins, co-enzyme q-10, ferrous sulfate, fish oil-omega-3 fatty acids, meloxicam, multivitamin, turmeric root extract, valsartan-hydrochlorothiazide, vitamin d, albuterol, and guaifenesin.   has a past surgical history that includes Hysterectomy and Colonoscopy (N/A, 04/22/2022).   reports that she has never smoked. She has never used smokeless tobacco. She reports that she does not drink alcohol and does not use drugs.    Review of Systems  Review of Systems   Constitutional:  Positive for fever (last week 100.2). Negative for chills, activity change and appetite change.   HENT:  Negative for nosebleeds, postnasal drip, sinus pressure, sore throat and congestion.    Respiratory:  Positive for cough.    Cardiovascular:  Positive for leg swelling (chronic). Negative for chest pain and palpitations.   Endocrine:  Negative for cold intolerance and heat intolerance.    Musculoskeletal:  Positive for back pain (mid back, since chills/fever last week).   Neurological:  Negative for dizziness, weakness, light-headedness and headaches.   Hematological:  Does not bruise/bleed easily and no excessive bruising.   Psychiatric/Behavioral: Negative.       Objective:   /80   Pulse 95   Resp 18   Ht 5' 5" (1.651 m)   Wt 72.7 kg (160 lb 6.2 oz)   LMP  (LMP Unknown)   SpO2 99%   BMI 26.69 kg/m²   Physical Exam  Vitals and nursing note reviewed.   Constitutional:       General: She is not in acute distress.     Appearance: Normal appearance. She is well-developed. She is not ill-appearing or toxic-appearing.   HENT:      Head: Normocephalic.      Right Ear: Tympanic membrane, ear canal and external ear normal. There is no impacted cerumen.      Left Ear: Tympanic membrane, ear canal and external ear normal. There is no impacted cerumen.      Nose: Nose normal.      Mouth/Throat:      Mouth: Mucous membranes are moist.      Pharynx: " Oropharynx is clear. No oropharyngeal exudate.   Eyes:      Conjunctiva/sclera: Conjunctivae normal.   Cardiovascular:      Rate and Rhythm: Normal rate and regular rhythm.      Heart sounds: Normal heart sounds.   Pulmonary:      Effort: Pulmonary effort is normal.      Breath sounds: Normal breath sounds. No stridor. No wheezing, rhonchi or rales.   Abdominal:      Palpations: Abdomen is soft.   Musculoskeletal:         General: Normal range of motion.      Cervical back: Normal range of motion and neck supple.   Lymphadenopathy:      Cervical: No cervical adenopathy.   Skin:     General: Skin is warm and dry.   Neurological:      Mental Status: She is alert and oriented to person, place, and time.   Psychiatric:         Behavior: Behavior normal. Behavior is cooperative.         Thought Content: Thought content normal.         Judgment: Judgment normal.       Diagnostic Testing Reviewed  All relevant imaging and labs reviewed.  X-Ray Chest PA And Lateral  Narrative: EXAMINATION:  XR CHEST PA AND LATERAL    CLINICAL HISTORY:  Cough, unspecified    TECHNIQUE:  PA and lateral views of the chest were performed.    COMPARISON:  11/17/2022    FINDINGS:  The lungs are clear and free of infiltrate.  No pleural effusion or pneumothorax. The heart is not enlarged. There is tortuosity of the descending thoracic aorta.  Impression: 1.  No acute cardiopulmonary process.    Electronically signed by: Leonardo Harp DO  Date:    12/13/2023  Time:    15:56    12/13/2023 FeNO 13, no inflammation of lungs suggested.  Clinical Guide to Interpretation or FeNO Levels :     FeNO  (ppb) LOW INTERMEDIATE HIGH   ADULT VALUES < 25 25-50          > 50   Th2-driven Inflammation Unlikely Likely Significant      Patients FeNO level at this visit : __13__ (ppb)     Interpretation of FeNO measurement in adults:  [x] FENO is less than 25 ppb implies non eosinophilic airway inflammation or the absence of airway inflammation.               Comment:  Low FENO (<25 ppb) in adult asthmatics with persistent symptoms suggests other etiologies for these symptoms and a lower likelihood of benefit from adding or increasing inhaled glucocorticoids.    Assessment:     1. Post-viral cough syndrome    2. Mid-back pain, acute    3. ASIF on CPAP        Orders Placed This Encounter   Procedures    Influenza A & B by Molecular    Fraction of  Nitric Oxide     Standing Status:   Future     Number of Occurrences:   1     Standing Expiration Date:   2024     Order Specific Question:   Release to patient     Answer:   Immediate     Plan:     Problem List Items Addressed This Visit       ASIF on CPAP     Benefits and compliant with Auto CPAP 6-14 cm with optimal usage and control AHI 2.2  Full face mask  HME: [facility]  Has Dream station 2 has not begun use, has plan to.   Continued adherence and follow up annually 2024 as scheduled with Dr. Muse             Other Visit Diagnoses       Post-viral cough syndrome    -  Primary    trial albuterol inhaler. mucinex 1200 mg twice daily x 10 days. add flonase if needed cxr lungs clear. exam lungs clear. Neg. flu.    Relevant Medications    albuterol (PROVENTIL/VENTOLIN HFA) 90 mcg/actuation inhaler    guaiFENesin (MUCINEX) 1,200 mg Ta12    Other Relevant Orders    Influenza A & B by Molecular (Completed)    Fraction of  Nitric Oxide (Completed)    Mid-back pain, acute        no compression fx seen on cxr lateral view, no falls, proceed with UC or PCP evaluation for UTI. pt declines muscle relaxant          I spent a total of 48 minutes on the day of the visit.  This includes face to face time and non-face to face time preparing to see the patient (eg, review of tests), obtaining and/or reviewing separately obtained history, documenting clinical information in the electronic or other health record, independently interpreting results and communicating results to the patient/family/caregiver, or care  coordinator.      Follow up in about 11 months (around 11/12/2024) for CPAP compliance dnld as scheduled with Dr. Muse, fu sooner if needed.

## 2023-12-13 NOTE — PROCEDURES
Clinical Guide to Interpretation or FeNO Levels :    FeNO  (ppb) LOW INTERMEDIATE HIGH   ADULT VALUES < 25 25-50          > 50   Th2-driven Inflammation Unlikely Likely Significant     Patients FeNO level at this visit : __13__ (ppb)    Interpretation of FeNO measurement in adults:  [x] FENO is less than 25 ppb implies non eosinophilic airway inflammation or the absence of airway inflammation.    Comment: Low FENO (<25 ppb) in adult asthmatics with persistent symptoms suggests other etiologies for these symptoms and a lower likelihood of benefit from adding or increasing inhaled glucocorticoids.    [] FENO between 25 and 50 ppb in adults should be interpreted cautiously with reference to the clinical situation (eg, symptomatic, on or off therapy, current smoking).    [] FENO greater than 50 ppb in adults  suggests eosinophilic airway inflammation   Comment: High FENO (>50 ppb) in adult asthmatics even with atypical symptoms suggests glucocorticoid responsiveness. High FENO (>50 ppb) can help identify poor adherence or uncontrolled inflammation in asthma patients with otherwise seemingly controlled asthma.    Discussion:  A FENO less than 25 ppb in adults and less than 20 ppb in children younger than 12 years of age implies noneosinophilic airway inflammation or the absence of airway inflammation.  A FENO greater than 50 ppb in adults or greater than 35 ppb in children suggests eosinophilic airway inflammation.   Values of FENO between 25 and 50 ppb in adults (20 to 35 ppb in children) should be interpreted cautiously with reference to the clinical situation (eg, symptomatic, on or off therapy, current smoking).  A rising FENO with a greater than 20 percent change and more than 25 ppb (20 ppb in children) from a previously stable level suggests increasing eosinophilic airway inflammation, but there are wide inter-individual differences.  A decrease in FENO greater than 20 percent for values over 50 ppb or more than  10 ppb for values less than 50 ppb may be clinically important.  ?FENO in other respiratory diseases - Several other diseases are associated with altered levels of exhaled NO: low levels of FENO have been noted in cystic fibrosis, current smoking, pulmonary hypertension, hypothermia, primary ciliary dyskinesia, and bronchopulmonary dysplasia. Elevated FENO has been noted in atopy, nonasthmatic eosinophilic bronchitis, COPD exacerbations, noncystic fibrosis bronchiectasis, and viral upper respiratory infections.    REFERENCE:  ATS Board of Directors, December 2004, and by the ERS Executive Committee, June 2004. ATS/ERS Recommendations for Standardized Procedures for the Online and Offline Measurement of Exhaled Lower Respiratory Nitric Oxide and Nasal Nitric Oxide. Guideline 2005

## 2023-12-13 NOTE — ASSESSMENT & PLAN NOTE
Benefits and compliant with Auto CPAP 6-14 cm with optimal usage and control AHI 2.2  Full face mask  HME: [facility]  Has Dream station 2 has not begun use, has plan to.   Continued adherence and follow up annually November 2024 as scheduled with Dr. Muse

## 2024-01-03 ENCOUNTER — OFFICE VISIT (OUTPATIENT)
Dept: CARDIOLOGY | Facility: CLINIC | Age: 74
End: 2024-01-03
Payer: COMMERCIAL

## 2024-01-03 VITALS
HEIGHT: 65 IN | WEIGHT: 161.63 LBS | OXYGEN SATURATION: 97 % | SYSTOLIC BLOOD PRESSURE: 130 MMHG | BODY MASS INDEX: 26.93 KG/M2 | DIASTOLIC BLOOD PRESSURE: 70 MMHG | HEART RATE: 105 BPM

## 2024-01-03 DIAGNOSIS — R20.0 NUMBNESS OF FOOT: ICD-10-CM

## 2024-01-03 DIAGNOSIS — M85.80 OSTEOPENIA, UNSPECIFIED LOCATION: ICD-10-CM

## 2024-01-03 DIAGNOSIS — I10 PRIMARY HYPERTENSION: ICD-10-CM

## 2024-01-03 DIAGNOSIS — R73.03 PREDIABETES: ICD-10-CM

## 2024-01-03 DIAGNOSIS — G47.33 OSA ON CPAP: ICD-10-CM

## 2024-01-03 DIAGNOSIS — E78.2 MIXED HYPERLIPIDEMIA: ICD-10-CM

## 2024-01-03 DIAGNOSIS — R00.2 PALPITATIONS: Primary | ICD-10-CM

## 2024-01-03 PROCEDURE — 3288F FALL RISK ASSESSMENT DOCD: CPT | Mod: CPTII,S$GLB,, | Performed by: STUDENT IN AN ORGANIZED HEALTH CARE EDUCATION/TRAINING PROGRAM

## 2024-01-03 PROCEDURE — 1126F AMNT PAIN NOTED NONE PRSNT: CPT | Mod: CPTII,S$GLB,, | Performed by: STUDENT IN AN ORGANIZED HEALTH CARE EDUCATION/TRAINING PROGRAM

## 2024-01-03 PROCEDURE — 3008F BODY MASS INDEX DOCD: CPT | Mod: CPTII,S$GLB,, | Performed by: STUDENT IN AN ORGANIZED HEALTH CARE EDUCATION/TRAINING PROGRAM

## 2024-01-03 PROCEDURE — 3075F SYST BP GE 130 - 139MM HG: CPT | Mod: CPTII,S$GLB,, | Performed by: STUDENT IN AN ORGANIZED HEALTH CARE EDUCATION/TRAINING PROGRAM

## 2024-01-03 PROCEDURE — 99999 PR PBB SHADOW E&M-EST. PATIENT-LVL IV: CPT | Mod: PBBFAC,,, | Performed by: STUDENT IN AN ORGANIZED HEALTH CARE EDUCATION/TRAINING PROGRAM

## 2024-01-03 PROCEDURE — 1101F PT FALLS ASSESS-DOCD LE1/YR: CPT | Mod: CPTII,S$GLB,, | Performed by: STUDENT IN AN ORGANIZED HEALTH CARE EDUCATION/TRAINING PROGRAM

## 2024-01-03 PROCEDURE — 99214 OFFICE O/P EST MOD 30 MIN: CPT | Mod: S$GLB,,, | Performed by: STUDENT IN AN ORGANIZED HEALTH CARE EDUCATION/TRAINING PROGRAM

## 2024-01-03 PROCEDURE — 3078F DIAST BP <80 MM HG: CPT | Mod: CPTII,S$GLB,, | Performed by: STUDENT IN AN ORGANIZED HEALTH CARE EDUCATION/TRAINING PROGRAM

## 2024-01-03 PROCEDURE — 1159F MED LIST DOCD IN RCRD: CPT | Mod: CPTII,S$GLB,, | Performed by: STUDENT IN AN ORGANIZED HEALTH CARE EDUCATION/TRAINING PROGRAM

## 2024-01-03 NOTE — PROGRESS NOTES
"              Section of Cardiology                  Cardiac Clinic Note    Chief Complaint/Reason for consultation: hypertension, palpitations       HPI:   Xin Taylor is a 73 y.o. female with h/o HTN, ASIF, HLD who comes in as a referral to cardiology clinic by PCP Dr. Carlson for evaluation.     7/11/22  About 2 weeks ago,had palpitations, started when she was going to the bathroom  symptoms   Would go away after resting, drinking water, raising her feet  Reports left ear tinnitus, has seen ENT, normal workup   Does not exercise regularly, no limitations with this   Worked in healthcare for many years, currently a     Denies tobacco or ETOH abuse  Family hx: mother- DM; brother- arteriosclerosis     Denies DM, CVA    Denies chest pain, SOB, dizziness, syncope         8/24/22  14 day monitor- no significant arrhythmias seen. Symptoms correlated ST. Average heart rate is normal at 68 bpm  Denies palpitations   Echo with normal EF  BP elevated today, but says normotensive BP at home have been more 120s-130s systolic, diastolic 60s  Has gained 2-3 lbs overall, but no major changes  Cycling 30 min 3 days a week     Denies chest pain, SOB, dizziness, syncope       2/20/23  Had post nasal drip recently with a cough   2 weeks ago, had pain right below the breast, worse with breathing in, described as a "twinge", became positional, but one issue with deep breathing  Symptoms pretty much resolved  Still exercising with bicycle   BP high today, normotensive on prior visits  Reports ankle swelling     Denies SOB, chest pain       4/24/23  Did not want to start bystolic, so continues to take amlodipine  Riding her stationary bike, 3 times a week  Intermittent dizzy, but not concerning   Weight increased 3 lbs from 2/23  Brings in home BP log, average systolic 132, diastolic avg 75  BP elevated today- elevated on repeat and more elevated with BP cuff  reports pulsating in her ear at times- has seen audiology "     Denies SOB, chest pain        6/28/23  Doing well  BP has been running high  Says valsartan 320 caused palpitation  Beet juice reduced blood pressure  Taking the 160 valsartan  Has been having numbness in feet      9/14/23  Arterial U/S 7/23 left leg with plaque build up, no sig stenosis  Started on ASA daily, but was taking every other work  BP stable   Stopped taking the beet juice due to low BP  Still having foot numbness   Fell about 1 month ago after tripping  No leg weakness      1/3/24  Bp stable today  Has been intermittently high today  Reports HR as high as 111  Was sick prior to [place]  Did not have COVID    Denies chest pain, dizziness, syncope         EKG 9/14/23 NSR, incomplete RBBB,   EKG 2/20/23 NSR, possible LAE, incomplete RBBB, miminal LVH  EKG 7/11/22 NSR, possible LAE, minimal LVH criteria     ECHO  7/22  The left ventricle is normal in size with normal systolic function.  Normal left ventricular diastolic function.  The estimated PA systolic pressure is 31 mmHg.  Normal right ventricular size with normal right ventricular systolic function.  Normal central venous pressure (3 mmHg).  Moderate pulmonic regurgitation.  Mild mitral regurgitation.  Mild tricuspid regurgitation.  The estimated ejection fraction is 60%.        STRESS TEST    [facility]      ROS: All 10 systems reviewed. Please refer to the HPI for pertinent positives. All other systems negative.     Past Medical History  Past Medical History:   Diagnosis Date    Anemia     Colon polyp     Edema     HLD (hyperlipidemia)     HTN (hypertension)     Palpitation     Tinnitus        Surgical History  Past Surgical History:   Procedure Laterality Date    COLONOSCOPY N/A 04/22/2022    Procedure: COLONOSCOPY;  Surgeon: Priyanka Winkler MD;  Location: [facility];  Service: Endoscopy;  Laterality: N/A;    HYSTERECTOMY            Allergies:   Review of patient's allergies indicates:   Allergen Reactions    No known drug allergies        Social  History:  Social History     Socioeconomic History    Marital status: Legally    Occupational History    Occupation: retired   Tobacco Use    Smoking status: Never    Smokeless tobacco: Never   Substance and Sexual Activity    Alcohol use: No    Drug use: No    Sexual activity: Not Currently     Partners: Male     Social Determinants of Health     Financial Resource Strain: Low Risk  (9/24/2023)    Overall Financial Resource Strain (CARDIA)     Difficulty of Paying Living Expenses: Not hard at all   Food Insecurity: No Food Insecurity (9/24/2023)    Hunger Vital Sign     Worried About Running Out of Food in the Last Year: Never true     Ran Out of Food in the Last Year: Never true   Transportation Needs: No Transportation Needs (9/24/2023)    PRAPARE - Transportation     Lack of Transportation (Medical): No     Lack of Transportation (Non-Medical): No   Physical Activity: Insufficiently Active (9/24/2023)    Exercise Vital Sign     Days of Exercise per Week: 3 days     Minutes of Exercise per Session: 30 min   Stress: No Stress Concern Present (9/24/2023)    [employer] - Occupational Stress Questionnaire     Feeling of Stress : Not at all   Social Connections: Unknown (9/24/2023)    Social Connection and Isolation Panel [NHANES]     Frequency of Communication with Friends and Family: More than three times a week     Frequency of Social Gatherings with Friends and Family: More than three times a week     Active Member of Clubs or Organizations: Yes     Attends Club or Organization Meetings: More than 4 times per year     Marital Status:    Housing Stability: Low Risk  (9/24/2023)    Housing Stability Vital Sign     Unable to Pay for Housing in the Last Year: No     Number of Places Lived in the Last Year: 1     Unstable Housing in the Last Year: No       Family History:  family history includes Breast cancer in her paternal aunt; Cancer in her mother and another family  "member; Cataracts in her mother; Heart defect in her brother; Hypertension in her mother; Thyroid disease in her mother.    Home Medications:  Current Outpatient Medications on File Prior to Visit   Medication Sig Dispense Refill    amLODIPine (NORVASC) 10 MG tablet Take 1 tablet (10 mg total) by mouth once daily. 30 tablet 11    aspirin 81 mg Tab Take 1 tablet by mouth once daily.      atorvastatin (LIPITOR) 10 MG tablet Take 1 tablet (10 mg total) by mouth once daily. 90 tablet 3    b complex vitamins tablet Take 1 tablet by mouth once daily.      co-enzyme Q-10 30 mg capsule Take 30 mg by mouth 3 (three) times daily.      ferrous sulfate 325 mg (65 mg iron) Tab tablet Take 1 tablet (325 mg total) by mouth once daily. Take with citrus 100 tablet 3    fish oil-omega-3 fatty acids 300-1,000 mg capsule Take 2 g by mouth once daily.      meloxicam (MOBIC) 15 MG tablet Take 1 tablet (15 mg total) by mouth every 7 days. Once a week or PRN 30 tablet 1    multivitamin (THERAGRAN) per tablet Take 1 tablet by mouth once daily.      turmeric root extract 500 mg Cap Take 500 mg by mouth 2 (two) times daily. 100 capsule 4    valsartan-hydrochlorothiazide (DIOVAN-HCT) 160-25 mg per tablet Take 1 tablet by mouth once daily. 90 tablet 3    vitamin D 1000 units Tab Take 185 mg by mouth once daily.      albuterol (PROVENTIL/VENTOLIN HFA) 90 mcg/actuation inhaler Inhale 2 puffs into the lungs every 4 (four) hours as needed (cough). Rescue (Patient not taking: Reported on 1/3/2024) 18 g 11     No current facility-administered medications on file prior to visit.       Physical exam:  /70 (BP Location: Left arm, Patient Position: Sitting, BP Method: Medium (Manual))   Pulse 105   Ht 5' 5" (1.651 m)   Wt 73.3 kg (161 lb 9.6 oz)   LMP  (LMP Unknown)   SpO2 97%   BMI 26.89 kg/m²         General: Pt is a 73 y.o. year old female who is AAOx3, in NAD, is pleasant, well nourished, looks stated age  HEENT: PERRL, EOMI, Oral mucosa " "pink & moist  CVS  No abnormal cardiac pulsations noted on inspection. JVP not raised. The apical impulse is normal on palpation, and is located in the left 5th intercostal space in the mid - clavicular line. No palpable thrills or abnormal pulsations noted. RR, S1 - S2 heard, no murmurs, rubs or gallops appreciated.   PUL : CTA B/L. No wheezes/crackles heard   ABD : BS +, soft. No tenderness elicited   LE : No C/C/E. Distal Pulses palpable B/L         LABS:    Chemistry:   Lab Results   Component Value Date     06/12/2023    K 3.8 06/12/2023     06/12/2023    CO2 27 06/12/2023    BUN 18 06/12/2023    CREATININE 0.9 06/12/2023    CALCIUM 10.0 06/12/2023     Cardiac Markers: No results found for: "CKTOTAL", "CKMB", "CKMBINDEX", "TROPONINI"  Cardiac Markers (Last 3): No results found for: "CKTOTAL", "CKMB", "CKMBINDEX", "TROPONINI"  CBC:   Lab Results   Component Value Date    WBC 4.87 04/05/2022    HGB 11.3 (L) 04/05/2022    HCT 35.9 (L) 04/05/2022    MCV 96 04/05/2022     04/05/2022     Lipids:   Lab Results   Component Value Date    CHOL 184 02/23/2023    TRIG 53 02/23/2023    HDL 80 (H) 02/23/2023     Coagulation: No results found for: "PT", "INR", "APTT"        Assessment      1. Palpitations    2. ASIF on CPAP    3. Prediabetes    4. Mixed hyperlipidemia    5. Primary hypertension    6. Osteopenia, unspecified location    7. Numbness of foot           Plan:    Palpitations  Intermittent   14 day cardiac monitor 7/22- no significant arrhythmias   Echocardiogram 7/22 with normal EF, mod OR, mild MR/TR  Obtain 48 hr monitor     Foot numbness  SHEY 7/23 RLE normal, mildly abnormal LLE  Arterial U/S 7/23 left leg with plaque build up, no sig stenosis    ASIF  Compliant with CPAP    HTN  Stable   Continue Diovan-hydrochlorothiazide in a.m. 160/25, [id number] mg at night  Will take beet juice   Low-salt, low-fat diet  Exercise as tolerated, at least 30 minutes daily    Pre diabetes  A1c 5.8  Continue " therapy    HLD  LDL 93 as of 2/23  Continue statin        This note was prepared using voice recognition system and is likely to have sound alike errors that may have been overlooked even after proofreading.     I have reviewed all pertinent chart information.  Plans and recommendations have been formulated under my direct supervision. All questions answered and patient voiced understanding.  To see if  If symptoms persist go to the ED.    RTC in 3 months           Naz Martinez MD  Cardiology

## 2024-01-04 ENCOUNTER — HOSPITAL ENCOUNTER (OUTPATIENT)
Dept: CARDIOLOGY | Facility: HOSPITAL | Age: 74
Discharge: HOME | End: 2024-01-04
Attending: STUDENT IN AN ORGANIZED HEALTH CARE EDUCATION/TRAINING PROGRAM
Payer: COMMERCIAL

## 2024-01-04 DIAGNOSIS — R00.2 PALPITATIONS: ICD-10-CM

## 2024-01-04 PROCEDURE — 93226 XTRNL ECG REC<48 HR SCAN A/R: CPT

## 2024-01-04 PROCEDURE — 93227 XTRNL ECG REC<48 HR R&I: CPT | Mod: ,,, | Performed by: INTERNAL MEDICINE

## 2024-01-10 LAB
OHS CV EVENT MONITOR DAY: 0
OHS CV HOLTER LENGTH DECIMAL HOURS: 47.95
OHS CV HOLTER LENGTH HOURS: 47
OHS CV HOLTER LENGTH MINUTES: 57
OHS CV HOLTER SINUS AVERAGE HR: 71
OHS CV HOLTER SINUS MAX HR: 128
OHS CV HOLTER SINUS MIN HR: 48

## 2024-01-30 ENCOUNTER — HOSPITAL ENCOUNTER (OUTPATIENT)
Dept: RADIOLOGY | Facility: HOSPITAL | Age: 74
Discharge: HOME | End: 2024-01-30
Attending: FAMILY MEDICINE
Payer: COMMERCIAL

## 2024-01-30 VITALS — BODY MASS INDEX: 26.93 KG/M2 | WEIGHT: 161.63 LBS | HEIGHT: 65 IN

## 2024-01-30 DIAGNOSIS — Z12.31 ENCOUNTER FOR SCREENING MAMMOGRAM FOR BREAST CANCER: ICD-10-CM

## 2024-01-30 PROCEDURE — 77067 SCR MAMMO BI INCL CAD: CPT | Mod: 26,,, | Performed by: RADIOLOGY

## 2024-01-30 PROCEDURE — 77063 BREAST TOMOSYNTHESIS BI: CPT | Mod: 26,,, | Performed by: RADIOLOGY

## 2024-01-30 PROCEDURE — 77067 SCR MAMMO BI INCL CAD: CPT | Mod: TC

## 2024-02-07 DIAGNOSIS — E11.9 TYPE 2 DIABETES MELLITUS WITHOUT COMPLICATION: ICD-10-CM

## 2024-02-14 ENCOUNTER — PATIENT OUTREACH (OUTPATIENT)
Dept: ADMINISTRATIVE | Facility: HOSPITAL | Age: 74
End: 2024-02-14
Payer: COMMERCIAL

## 2024-03-14 ENCOUNTER — OFFICE VISIT (OUTPATIENT)
Dept: CARDIOLOGY | Facility: CLINIC | Age: 74
End: 2024-03-14
Payer: COMMERCIAL

## 2024-03-14 ENCOUNTER — HOSPITAL ENCOUNTER (OUTPATIENT)
Dept: CARDIOLOGY | Facility: HOSPITAL | Age: 74
Discharge: HOME | End: 2024-03-14
Attending: STUDENT IN AN ORGANIZED HEALTH CARE EDUCATION/TRAINING PROGRAM
Payer: COMMERCIAL

## 2024-03-14 VITALS
HEIGHT: 65 IN | BODY MASS INDEX: 27.92 KG/M2 | OXYGEN SATURATION: 99 % | DIASTOLIC BLOOD PRESSURE: 68 MMHG | HEART RATE: 86 BPM | SYSTOLIC BLOOD PRESSURE: 134 MMHG | WEIGHT: 167.56 LBS

## 2024-03-14 DIAGNOSIS — R00.2 PALPITATIONS: ICD-10-CM

## 2024-03-14 DIAGNOSIS — R00.2 PALPITATIONS: Primary | ICD-10-CM

## 2024-03-14 DIAGNOSIS — G47.33 OSA ON CPAP: ICD-10-CM

## 2024-03-14 DIAGNOSIS — R73.03 PREDIABETES: ICD-10-CM

## 2024-03-14 DIAGNOSIS — E78.2 MIXED HYPERLIPIDEMIA: ICD-10-CM

## 2024-03-14 DIAGNOSIS — M85.80 OSTEOPENIA, UNSPECIFIED LOCATION: ICD-10-CM

## 2024-03-14 DIAGNOSIS — I10 PRIMARY HYPERTENSION: ICD-10-CM

## 2024-03-14 DIAGNOSIS — R94.31 NONSPECIFIC ABNORMAL ELECTROCARDIOGRAM (ECG) (EKG): ICD-10-CM

## 2024-03-14 LAB
OHS QRS DURATION: 98 MS
OHS QTC CALCULATION: 453 MS

## 2024-03-14 PROCEDURE — 3075F SYST BP GE 130 - 139MM HG: CPT | Mod: CPTII,S$GLB,, | Performed by: STUDENT IN AN ORGANIZED HEALTH CARE EDUCATION/TRAINING PROGRAM

## 2024-03-14 PROCEDURE — 3288F FALL RISK ASSESSMENT DOCD: CPT | Mod: CPTII,S$GLB,, | Performed by: STUDENT IN AN ORGANIZED HEALTH CARE EDUCATION/TRAINING PROGRAM

## 2024-03-14 PROCEDURE — 1126F AMNT PAIN NOTED NONE PRSNT: CPT | Mod: CPTII,S$GLB,, | Performed by: STUDENT IN AN ORGANIZED HEALTH CARE EDUCATION/TRAINING PROGRAM

## 2024-03-14 PROCEDURE — 99999 PR PBB SHADOW E&M-EST. PATIENT-LVL III: CPT | Mod: PBBFAC,,, | Performed by: STUDENT IN AN ORGANIZED HEALTH CARE EDUCATION/TRAINING PROGRAM

## 2024-03-14 PROCEDURE — 93010 ELECTROCARDIOGRAM REPORT: CPT | Mod: ,,, | Performed by: INTERNAL MEDICINE

## 2024-03-14 PROCEDURE — 1101F PT FALLS ASSESS-DOCD LE1/YR: CPT | Mod: CPTII,S$GLB,, | Performed by: STUDENT IN AN ORGANIZED HEALTH CARE EDUCATION/TRAINING PROGRAM

## 2024-03-14 PROCEDURE — 3008F BODY MASS INDEX DOCD: CPT | Mod: CPTII,S$GLB,, | Performed by: STUDENT IN AN ORGANIZED HEALTH CARE EDUCATION/TRAINING PROGRAM

## 2024-03-14 PROCEDURE — 99214 OFFICE O/P EST MOD 30 MIN: CPT | Mod: S$GLB,,, | Performed by: STUDENT IN AN ORGANIZED HEALTH CARE EDUCATION/TRAINING PROGRAM

## 2024-03-14 PROCEDURE — 3078F DIAST BP <80 MM HG: CPT | Mod: CPTII,S$GLB,, | Performed by: STUDENT IN AN ORGANIZED HEALTH CARE EDUCATION/TRAINING PROGRAM

## 2024-03-14 PROCEDURE — 93005 ELECTROCARDIOGRAM TRACING: CPT

## 2024-03-14 PROCEDURE — 1159F MED LIST DOCD IN RCRD: CPT | Mod: CPTII,S$GLB,, | Performed by: STUDENT IN AN ORGANIZED HEALTH CARE EDUCATION/TRAINING PROGRAM

## 2024-03-14 NOTE — PROGRESS NOTES
"              Section of Cardiology                  Cardiac Clinic Note    Chief Complaint/Reason for consultation: hypertension, palpitations       HPI:   Xin Taylor is a 73 y.o. female with h/o HTN, ASIF, HLD who comes in as a referral to cardiology clinic by PCP Dr. Carlson for evaluation.     7/11/22  About 2 weeks ago,had palpitations, started when she was going to the bathroom  symptoms   Would go away after resting, drinking water, raising her feet  Reports left ear tinnitus, has seen ENT, normal workup   Does not exercise regularly, no limitations with this   Worked in healthcare for many years, currently a     Denies tobacco or ETOH abuse  Family hx: mother- DM; brother- arteriosclerosis     Denies DM, CVA    Denies chest pain, SOB, dizziness, syncope         8/24/22  14 day monitor- no significant arrhythmias seen. Symptoms correlated ST. Average heart rate is normal at 68 bpm  Denies palpitations   Echo with normal EF  BP elevated today, but says normotensive BP at home have been more 120s-130s systolic, diastolic 60s  Has gained 2-3 lbs overall, but no major changes  Cycling 30 min 3 days a week     Denies chest pain, SOB, dizziness, syncope       2/20/23  Had post nasal drip recently with a cough   2 weeks ago, had pain right below the breast, worse with breathing in, described as a "twinge", became positional, but one issue with deep breathing  Symptoms pretty much resolved  Still exercising with bicycle   BP high today, normotensive on prior visits  Reports ankle swelling     Denies SOB, chest pain       4/24/23  Did not want to start bystolic, so continues to take amlodipine  Riding her stationary bike, 3 times a week  Intermittent dizzy, but not concerning   Weight increased 3 lbs from 2/23  Brings in home BP log, average systolic 132, diastolic avg 75  BP elevated today- elevated on repeat and more elevated with BP cuff  reports pulsating in her ear at times- has seen audiology "     Denies SOB, chest pain        6/28/23  Doing well  BP has been running high  Says valsartan 320 caused palpitation  Beet juice reduced blood pressure  Taking the 160 valsartan  Has been having numbness in feet      9/14/23  Arterial U/S 7/23 left leg with plaque build up, no sig stenosis  Started on ASA daily, but was taking every other work  BP stable   Stopped taking the beet juice due to low BP  Still having foot numbness   Fell about 1 month ago after tripping  No leg weakness      1/3/24  Bp stable today  Has been intermittently high today  Reports HR as high as 111  Was sick prior to [place]  Did not have COVID    Denies chest pain, dizziness, syncope         3/14/24  BP stable doing, mildly elevated  Does intermittent fasting  Still using beet juice with her meals (not frequent)  Has knee pain, mostly in AM, both knees   Weight went up 6 lbs since last visit  Not exercising much and reduced salads intake       Denies chest pain, dizziness, syncope       EKG 3/14/24 NSR, cannot r/o septal infarct  EKG 9/14/23 NSR, incomplete RBBB,   EKG 2/20/23 NSR, possible LAE, incomplete RBBB, miminal LVH  EKG 7/11/22 NSR, possible LAE, minimal LVH criteria     ECHO  7/22  The left ventricle is normal in size with normal systolic function.  Normal left ventricular diastolic function.  The estimated PA systolic pressure is 31 mmHg.  Normal right ventricular size with normal right ventricular systolic function.  Normal central venous pressure (3 mmHg).  Moderate pulmonic regurgitation.  Mild mitral regurgitation.  Mild tricuspid regurgitation.  The estimated ejection fraction is 60%.        STRESS TEST    [facility]      ROS: All 10 systems reviewed. Please refer to the HPI for pertinent positives. All other systems negative.     Past Medical History  Past Medical History:   Diagnosis Date    Anemia     Colon polyp     Edema     HLD (hyperlipidemia)     HTN (hypertension)     Palpitation     Tinnitus        Surgical  History  Past Surgical History:   Procedure Laterality Date    COLONOSCOPY N/A 04/22/2022    Procedure: COLONOSCOPY;  Surgeon: Priyanka Winkler MD;  Location: [facility];  Service: Endoscopy;  Laterality: N/A;    HYSTERECTOMY            Allergies:   Review of patient's allergies indicates:   Allergen Reactions    No known drug allergies        Social History:  Social History     Socioeconomic History    Marital status: Legally    Occupational History    Occupation: retired   Tobacco Use    Smoking status: Never    Smokeless tobacco: Never   Substance and Sexual Activity    Alcohol use: No    Drug use: No    Sexual activity: Not Currently     Partners: Male     Social Determinants of Health     Financial Resource Strain: Low Risk  (9/24/2023)    Overall Financial Resource Strain (CARDIA)     Difficulty of Paying Living Expenses: Not hard at all   Food Insecurity: No Food Insecurity (9/24/2023)    Hunger Vital Sign     Worried About Running Out of Food in the Last Year: Never true     Ran Out of Food in the Last Year: Never true   Transportation Needs: No Transportation Needs (9/24/2023)    PRAPARE - Transportation     Lack of Transportation (Medical): No     Lack of Transportation (Non-Medical): No   Physical Activity: Insufficiently Active (9/24/2023)    Exercise Vital Sign     Days of Exercise per Week: 3 days     Minutes of Exercise per Session: 30 min   Stress: No Stress Concern Present (9/24/2023)    [employer] [place] [employer] - Occupational Stress Questionnaire     Feeling of Stress : Not at all   Social Connections: Unknown (9/24/2023)    Social Connection and Isolation Panel [NHANES]     Frequency of Communication with Friends and Family: More than three times a week     Frequency of Social Gatherings with Friends and Family: More than three times a week     Active Member of Clubs or Organizations: Yes     Attends Club or Organization Meetings: More than 4 times per year     Marital  Status:    Housing Stability: Low Risk  (9/24/2023)    Housing Stability Vital Sign     Unable to Pay for Housing in the Last Year: No     Number of Places Lived in the Last Year: 1     Unstable Housing in the Last Year: No       Family History:  family history includes Breast cancer in her paternal aunt; Cancer in her mother and another family member; Cataracts in her mother; Heart defect in her brother; Hypertension in her mother; Thyroid disease in her mother.    Home Medications:  Current Outpatient Medications on File Prior to Visit   Medication Sig Dispense Refill    amLODIPine (NORVASC) 10 MG tablet Take 1 tablet (10 mg total) by mouth once daily. 30 tablet 11    aspirin 81 mg Tab Take 1 tablet by mouth once daily.      atorvastatin (LIPITOR) 10 MG tablet Take 1 tablet (10 mg total) by mouth once daily. 90 tablet 3    b complex vitamins tablet Take 1 tablet by mouth once daily.      co-enzyme Q-10 30 mg capsule Take 30 mg by mouth 3 (three) times daily.      ferrous sulfate 325 mg (65 mg iron) Tab tablet Take 1 tablet (325 mg total) by mouth once daily. Take with citrus 100 tablet 3    fish oil-omega-3 fatty acids 300-1,000 mg capsule Take 2 g by mouth once daily.      meloxicam (MOBIC) 15 MG tablet Take 1 tablet (15 mg total) by mouth every 7 days. Once a week or PRN 30 tablet 1    multivitamin (THERAGRAN) per tablet Take 1 tablet by mouth once daily.      turmeric root extract 500 mg Cap Take 500 mg by mouth 2 (two) times daily. 100 capsule 4    valsartan-hydrochlorothiazide (DIOVAN-HCT) 160-25 mg per tablet Take 1 tablet by mouth once daily. 90 tablet 3    vitamin D 1000 units Tab Take 185 mg by mouth once daily.      [DISCONTINUED] albuterol (PROVENTIL/VENTOLIN HFA) 90 mcg/actuation inhaler Inhale 2 puffs into the lungs every 4 (four) hours as needed (cough). Rescue (Patient not taking: Reported on 1/3/2024) 18 g 11     No current facility-administered medications on file prior to visit.  "      Physical exam:  /68 (BP Location: Right arm, Patient Position: Sitting)   Pulse 86   Ht 5' 5" (1.651 m)   Wt 76 kg (167 lb 8.8 oz)   LMP  (LMP Unknown)   SpO2 99%   BMI 27.88 kg/m²         General: Pt is a 73 y.o. year old female who is AAOx3, in NAD, is pleasant, well nourished, looks stated age  HEENT: PERRL, EOMI, Oral mucosa pink & moist  CVS  No abnormal cardiac pulsations noted on inspection. JVP not raised. The apical impulse is normal on palpation, and is located in the left 5th intercostal space in the mid - clavicular line. No palpable thrills or abnormal pulsations noted. RR, S1 - S2 heard, no murmurs, rubs or gallops appreciated.   PUL : CTA B/L. No wheezes/crackles heard   ABD : BS +, soft. No tenderness elicited   LE : No C/C/E. Distal Pulses palpable B/L         LABS:    Chemistry:   Lab Results   Component Value Date     06/12/2023    K 3.8 06/12/2023     06/12/2023    CO2 27 06/12/2023    BUN 18 06/12/2023    CREATININE 0.9 06/12/2023    CALCIUM 10.0 06/12/2023     Cardiac Markers: No results found for: "CKTOTAL", "CKMB", "CKMBINDEX", "TROPONINI"  Cardiac Markers (Last 3): No results found for: "CKTOTAL", "CKMB", "CKMBINDEX", "TROPONINI"  CBC:   Lab Results   Component Value Date    WBC 4.87 04/05/2022    HGB 11.3 (L) 04/05/2022    HCT 35.9 (L) 04/05/2022    MCV 96 04/05/2022     04/05/2022     Lipids:   Lab Results   Component Value Date    CHOL 184 02/23/2023    TRIG 53 02/23/2023    HDL 80 (H) 02/23/2023     Coagulation: No results found for: "PT", "INR", "APTT"        Assessment      1. Palpitations    2. ASIF on CPAP    3. Prediabetes    4. Mixed hyperlipidemia    5. Primary hypertension    6. Osteopenia, unspecified location    7. Nonspecific abnormal electrocardiogram (ECG) (EKG)             Plan:    Palpitations  Intermittent   14 day cardiac monitor 7/22- no significant arrhythmias   Echocardiogram 7/22 with normal EF, mod PA, mild MR/TR  48 hr monitor " 1/24 no significant arrhythmias     Foot numbness  SHEY 7/23 RLE normal, mildly abnormal LLE  Arterial U/S 7/23 left leg with plaque build up, no sig stenosis    ASIF  Compliant with CPAP    HTN  Mildly elevated   Continue Diovan-hydrochlorothiazide in a.m. 160/25, [id number] mg at night  Check at home- and next visit   Low-salt, low-fat diet  Exercise as tolerated, at least 30 minutes daily    Pre diabetes  A1c 5.8  Continue therapy    HLD  LDL 93 as of 2/23  Continue statin        This note was prepared using voice recognition system and is likely to have sound alike errors that may have been overlooked even after proofreading.     I have reviewed all pertinent chart information.  Plans and recommendations have been formulated under my direct supervision. All questions answered and patient voiced understanding.  To see if  If symptoms persist go to the ED.    RTC in 3 months           Naz Martinez MD  Cardiology

## 2024-04-03 ENCOUNTER — PATIENT MESSAGE (OUTPATIENT)
Dept: PULMONOLOGY | Facility: CLINIC | Age: 74
End: 2024-04-03
Payer: COMMERCIAL

## 2024-04-03 ENCOUNTER — PATIENT MESSAGE (OUTPATIENT)
Dept: PRIMARY CARE CLINIC | Facility: CLINIC | Age: 74
End: 2024-04-03
Payer: COMMERCIAL

## 2024-04-18 ENCOUNTER — PATIENT MESSAGE (OUTPATIENT)
Dept: PRIMARY CARE CLINIC | Facility: CLINIC | Age: 74
End: 2024-04-18
Payer: COMMERCIAL

## 2024-04-28 ENCOUNTER — PATIENT MESSAGE (OUTPATIENT)
Dept: PRIMARY CARE CLINIC | Facility: CLINIC | Age: 74
End: 2024-04-28
Payer: COMMERCIAL

## 2024-04-28 DIAGNOSIS — I10 PRIMARY HYPERTENSION: Primary | ICD-10-CM

## 2024-04-28 DIAGNOSIS — D64.9 ANEMIA, UNSPECIFIED TYPE: ICD-10-CM

## 2024-04-28 DIAGNOSIS — R73.09 ABNORMAL GLUCOSE: ICD-10-CM

## 2024-04-28 DIAGNOSIS — R00.2 PALPITATIONS: ICD-10-CM

## 2024-04-28 DIAGNOSIS — R73.03 PREDIABETES: ICD-10-CM

## 2024-04-28 DIAGNOSIS — E78.2 MIXED HYPERLIPIDEMIA: ICD-10-CM

## 2024-04-28 DIAGNOSIS — M85.80 OSTEOPENIA, UNSPECIFIED LOCATION: ICD-10-CM

## 2024-04-28 DIAGNOSIS — G47.33 OSA ON CPAP: ICD-10-CM

## 2024-04-29 NOTE — TELEPHONE ENCOUNTER
I have signed for the following orders AND/OR meds.  Please call the patient and ask the patient to schedule the testing    Orders Placed This Encounter   Procedures    TSH     Standing Status:   Future     Standing Expiration Date:   6/28/2025    T4, Free     Standing Status:   Future     Standing Expiration Date:   6/28/2025    Lipid Panel     Standing Status:   Future     Standing Expiration Date:   6/28/2025    Hemoglobin A1C     Standing Status:   Future     Standing Expiration Date:   6/28/2025    Comprehensive Metabolic Panel     Standing Status:   Future     Standing Expiration Date:   6/28/2025    CBC Auto Differential     Standing Status:   Future     Standing Expiration Date:   6/28/2025    Microalbumin/Creatinine Ratio, Urine     Standing Status:   Future     Standing Expiration Date:   10/29/2025     Order Specific Question:   Specimen Source     Answer:   Urine    Vitamin D     Standing Status:   Future     Standing Expiration Date:   6/28/2025    Iron and TIBC     Standing Status:   Future     Standing Expiration Date:   6/28/2025    Ferritin     Standing Status:   Future     Standing Expiration Date:   4/29/2025

## 2024-05-06 ENCOUNTER — LAB VISIT (OUTPATIENT)
Dept: LAB | Facility: HOSPITAL | Age: 74
End: 2024-05-06
Attending: FAMILY MEDICINE
Payer: COMMERCIAL

## 2024-05-06 DIAGNOSIS — R00.2 PALPITATIONS: ICD-10-CM

## 2024-05-06 DIAGNOSIS — D64.9 ANEMIA, UNSPECIFIED TYPE: ICD-10-CM

## 2024-05-06 DIAGNOSIS — R73.09 ABNORMAL GLUCOSE: ICD-10-CM

## 2024-05-06 DIAGNOSIS — G47.33 OSA ON CPAP: ICD-10-CM

## 2024-05-06 DIAGNOSIS — M85.80 OSTEOPENIA, UNSPECIFIED LOCATION: ICD-10-CM

## 2024-05-06 DIAGNOSIS — R73.03 PREDIABETES: ICD-10-CM

## 2024-05-06 DIAGNOSIS — I10 PRIMARY HYPERTENSION: ICD-10-CM

## 2024-05-06 DIAGNOSIS — E78.2 MIXED HYPERLIPIDEMIA: ICD-10-CM

## 2024-05-06 LAB
ALBUMIN/CREAT UR: NORMAL UG/MG (ref 0–30)
CREAT UR-MCNC: 32 MG/DL (ref 15–325)
MICROALBUMIN UR DL<=1MG/L-MCNC: <5 UG/ML

## 2024-05-06 PROCEDURE — 82043 UR ALBUMIN QUANTITATIVE: CPT | Performed by: FAMILY MEDICINE

## 2024-05-09 ENCOUNTER — OFFICE VISIT (OUTPATIENT)
Dept: PRIMARY CARE CLINIC | Facility: CLINIC | Age: 74
End: 2024-05-09
Payer: COMMERCIAL

## 2024-05-09 VITALS
TEMPERATURE: 99 F | WEIGHT: 162.69 LBS | HEART RATE: 93 BPM | DIASTOLIC BLOOD PRESSURE: 72 MMHG | RESPIRATION RATE: 18 BRPM | SYSTOLIC BLOOD PRESSURE: 134 MMHG | HEIGHT: 65 IN | OXYGEN SATURATION: 97 % | BODY MASS INDEX: 27.1 KG/M2

## 2024-05-09 DIAGNOSIS — E78.2 MIXED HYPERLIPIDEMIA: ICD-10-CM

## 2024-05-09 DIAGNOSIS — I10 PRIMARY HYPERTENSION: ICD-10-CM

## 2024-05-09 DIAGNOSIS — R73.03 PREDIABETES: ICD-10-CM

## 2024-05-09 DIAGNOSIS — H93.12 TINNITUS OF LEFT EAR: ICD-10-CM

## 2024-05-09 DIAGNOSIS — Z23 NEED FOR TDAP VACCINATION: ICD-10-CM

## 2024-05-09 DIAGNOSIS — E87.6 HYPOKALEMIA: ICD-10-CM

## 2024-05-09 DIAGNOSIS — R73.09 ABNORMAL GLUCOSE: ICD-10-CM

## 2024-05-09 DIAGNOSIS — M17.11 OSTEOARTHRITIS OF RIGHT KNEE, UNSPECIFIED OSTEOARTHRITIS TYPE: ICD-10-CM

## 2024-05-09 DIAGNOSIS — E55.9 VITAMIN D DEFICIENCY: ICD-10-CM

## 2024-05-09 DIAGNOSIS — R07.89 ATYPICAL CHEST PAIN: Primary | ICD-10-CM

## 2024-05-09 DIAGNOSIS — H91.92 HEARING LOSS OF LEFT EAR, UNSPECIFIED HEARING LOSS TYPE: ICD-10-CM

## 2024-05-09 DIAGNOSIS — R26.9 ABNORMAL GAIT: ICD-10-CM

## 2024-05-09 PROCEDURE — 3075F SYST BP GE 130 - 139MM HG: CPT | Mod: CPTII,S$GLB,, | Performed by: FAMILY MEDICINE

## 2024-05-09 PROCEDURE — 90715 TDAP VACCINE 7 YRS/> IM: CPT | Mod: S$GLB,,, | Performed by: FAMILY MEDICINE

## 2024-05-09 PROCEDURE — 1159F MED LIST DOCD IN RCRD: CPT | Mod: CPTII,S$GLB,, | Performed by: FAMILY MEDICINE

## 2024-05-09 PROCEDURE — 3061F NEG MICROALBUMINURIA REV: CPT | Mod: CPTII,S$GLB,, | Performed by: FAMILY MEDICINE

## 2024-05-09 PROCEDURE — 90471 IMMUNIZATION ADMIN: CPT | Mod: S$GLB,,, | Performed by: FAMILY MEDICINE

## 2024-05-09 PROCEDURE — 3008F BODY MASS INDEX DOCD: CPT | Mod: CPTII,S$GLB,, | Performed by: FAMILY MEDICINE

## 2024-05-09 PROCEDURE — 3078F DIAST BP <80 MM HG: CPT | Mod: CPTII,S$GLB,, | Performed by: FAMILY MEDICINE

## 2024-05-09 PROCEDURE — 3066F NEPHROPATHY DOC TX: CPT | Mod: CPTII,S$GLB,, | Performed by: FAMILY MEDICINE

## 2024-05-09 PROCEDURE — 99999 PR PBB SHADOW E&M-EST. PATIENT-LVL V: CPT | Mod: PBBFAC,,, | Performed by: FAMILY MEDICINE

## 2024-05-09 PROCEDURE — 3044F HG A1C LEVEL LT 7.0%: CPT | Mod: CPTII,S$GLB,, | Performed by: FAMILY MEDICINE

## 2024-05-09 PROCEDURE — 1101F PT FALLS ASSESS-DOCD LE1/YR: CPT | Mod: CPTII,S$GLB,, | Performed by: FAMILY MEDICINE

## 2024-05-09 PROCEDURE — 3288F FALL RISK ASSESSMENT DOCD: CPT | Mod: CPTII,S$GLB,, | Performed by: FAMILY MEDICINE

## 2024-05-09 PROCEDURE — 99215 OFFICE O/P EST HI 40 MIN: CPT | Mod: 25,S$GLB,, | Performed by: FAMILY MEDICINE

## 2024-05-09 PROCEDURE — 1126F AMNT PAIN NOTED NONE PRSNT: CPT | Mod: CPTII,S$GLB,, | Performed by: FAMILY MEDICINE

## 2024-05-09 NOTE — PROGRESS NOTES
Subjective:      Patient ID: Xin Taylor is a 73 y.o. female.    Chief Complaint: Medication Refill (Genetic test and sleep meds )      Patient is a 73 y.o. female coming in today for medication refill.   She recently f/u with cardiology. She reports worsening tinnitus in the past few weeks. States she continues to have worsening ear ringing that sometimes has long duration. She completed vestibular MRI that showed no abnormal findings. Results state ear ringing is likely due to progressive hearing loss. Latest lab work shows pt is not anemic at this time. Electrolytes, kidney function, liver enzymes, and fasting glucose were all WNL. Only exception is mild hypokalemia in kidney function. A1c was 5.7 which is slightly prediabetic. Thyroid levels were WNL. Discussed dietary changes to help with mild hypokalemia. BP is stable in clinic today. Has been taking Meloxicam once a week, but she continues to have intermittent knee pain. Currently not f/u with orthopedics. She is due for RSV and tetanus vaccine. No other health concern at this time.       1. Atypical chest pain    2. Tinnitus of left ear    3. Hearing loss of left ear, unspecified hearing loss type    4. Primary hypertension    5. Mixed hyperlipidemia    6. Prediabetes    7. Hypokalemia    8. Need for Tdap vaccination    9. Abnormal gait    10. Osteoarthritis of right knee, unspecified osteoarthritis type    11. Vitamin D deficiency    12. Abnormal glucose       [facility]    5/8/2024 11:42 PM CDT - Filed by Patient   This questionnaire should take approximately 5 to 10 minutes to complete.  To begin, press Let's Begin and then press Continue. Let's Begin   On average, how many days per week do you engage in moderate to strenuous exercise (like a brisk walk)? 4 days   On average, how many minutes do you engage in exercise at this level? 30 min   Do you feel stress - tense, restless, nervous, or anxious, or unable to sleep at night because your mind is  troubled all the time - these days? Only a little   How hard is it for you to pay for the very basics like food, housing, medical care, and heating? Not hard at all   Within the past 12 months, you worried that your food would run out before you got the money to buy more. Never true   Within the past 12 months, the food you bought just didn't last and you didn't have money to get more. Never true   In the past 12 months, has lack of transportation kept you from medical appointments or from getting medications? No   In the past 12 months, has lack of transportation kept you from meetings, work, or from getting things needed for daily living? No   How often do you have a drink containing alcohol? Never   How many drinks containing alcohol do you have on a typical day when you are drinking? Patient does not drink   How often do you have six or more drinks on one occasion? Never   In the last 12 months, was there a time when you were not able to pay the mortgage or rent on time? No   In the last 12 months, how many places have you lived? (range: at least 0) 1   In the last 12 months, was there a time when you did not have a steady place to sleep or slept in a shelter (including now)? No         Pmh, Psh, Family Hx, Social Hx, HM updated in Epic Tabs today.   Review of Systems   Constitutional:  Negative for chills, fatigue and fever.   HENT:  Negative for ear pain and trouble swallowing.         + Ear ringing   Eyes:  Negative for pain and visual disturbance.   Respiratory:  Negative for cough and shortness of breath.    Cardiovascular:  Negative for chest pain and leg swelling.   Gastrointestinal:  Negative for abdominal pain, blood in stool, nausea and vomiting.   Endocrine: Negative for cold intolerance and heat intolerance.   Genitourinary:  Negative for dysuria and frequency.   Musculoskeletal:  Negative for joint swelling, myalgias and neck pain.   Skin:  Negative for color change and rash.   Neurological:   "Negative for dizziness and headaches.   Psychiatric/Behavioral:  Negative for behavioral problems and sleep disturbance.      Objective:     Vitals:    05/09/24 1352   BP: 134/72   BP Location: Left arm   Patient Position: Sitting   BP Method: Small (Manual)   Pulse: 93   Resp: 18   Temp: 98.5 °F (36.9 °C)   TempSrc: Tympanic   SpO2: 97%   Weight: 73.8 kg (162 lb 11.2 oz)   Height: 5' 5" (1.651 m)     Wt Readings from Last 10 Encounters:   05/09/24 73.8 kg (162 lb 11.2 oz)   03/14/24 76 kg (167 lb 8.8 oz)   01/30/24 73.3 kg (161 lb 9.6 oz)   01/03/24 73.3 kg (161 lb 9.6 oz)   12/13/23 72.7 kg (160 lb 6.2 oz)   11/16/23 74 kg (163 lb 2.3 oz)   09/14/23 73.2 kg (161 lb 6 oz)   08/21/23 73 kg (161 lb)   08/18/23 73 kg (161 lb)   08/18/23 73.1 kg (161 lb 2.5 oz)     Physical Exam  Vitals reviewed.   Constitutional:       Appearance: Normal appearance. She is well-developed and overweight.   HENT:      Head: Normocephalic and atraumatic.      Right Ear: Tympanic membrane and external ear normal.      Left Ear: Tympanic membrane and external ear normal.      Nose: Nose normal.      Mouth/Throat:      Mouth: Mucous membranes are moist.      Pharynx: Oropharynx is clear.   Eyes:      Conjunctiva/sclera: Conjunctivae normal.      Pupils: Pupils are equal, round, and reactive to light.   Neck:      Thyroid: No thyromegaly.   Cardiovascular:      Rate and Rhythm: Normal rate and regular rhythm.      Heart sounds: Normal heart sounds. No murmur heard.     No friction rub. No gallop.   Pulmonary:      Effort: Pulmonary effort is normal. No respiratory distress.      Breath sounds: Normal breath sounds. No wheezing or rales.   Abdominal:      General: Bowel sounds are normal. There is no distension.      Palpations: Abdomen is soft.      Tenderness: There is no abdominal tenderness. There is no rebound.   Musculoskeletal:         General: Normal range of motion.      Cervical back: Normal range of motion and neck supple. "   Lymphadenopathy:      Cervical: No cervical adenopathy.   Skin:     General: Skin is warm and dry.      Findings: No rash.   Neurological:      Mental Status: She is alert and oriented to person, place, and time.   Psychiatric:         Attention and Perception: Attention and perception normal.         Mood and Affect: Mood and affect normal.         Speech: Speech normal.         Behavior: Behavior normal.         Thought Content: Thought content normal.         Cognition and Memory: Cognition and memory normal.         Judgment: Judgment normal.         Assessment:     1. Atypical chest pain    2. Tinnitus of left ear    3. Hearing loss of left ear, unspecified hearing loss type    4. Primary hypertension    5. Mixed hyperlipidemia    6. Prediabetes    7. Hypokalemia    8. Need for Tdap vaccination    9. Abnormal gait    10. Osteoarthritis of right knee, unspecified osteoarthritis type    11. Vitamin D deficiency    12. Abnormal glucose        Plan:   Xin was seen today for medication refill.    Diagnoses and all orders for this visit:    Atypical chest pain  -     TSH; Future  -     T4, Free; Future  -     Lipid Panel; Future  -     Hemoglobin A1C; Future  -     Comprehensive Metabolic Panel; Future  -     CBC Auto Differential; Future  -     Iron and TIBC; Future  -     Ferritin; Future  -     Vitamin D; Future    Tinnitus of left ear  -     Ambulatory referral/consult to Audiology; Future  -     Ambulatory referral/consult to ENT; Future  -     TSH; Future  -     T4, Free; Future  -     Lipid Panel; Future  -     Hemoglobin A1C; Future  -     Comprehensive Metabolic Panel; Future  -     CBC Auto Differential; Future  -     Iron and TIBC; Future  -     Ferritin; Future  -     Vitamin D; Future    Hearing loss of left ear, unspecified hearing loss type  -     Ambulatory referral/consult to Audiology; Future  -     Ambulatory referral/consult to ENT; Future  -     TSH; Future  -     T4, Free; Future  -      Lipid Panel; Future  -     Hemoglobin A1C; Future  -     Comprehensive Metabolic Panel; Future  -     CBC Auto Differential; Future  -     Iron and TIBC; Future  -     Ferritin; Future  -     Vitamin D; Future    Primary hypertension  -     TSH; Future  -     T4, Free; Future  -     Lipid Panel; Future  -     Hemoglobin A1C; Future  -     Comprehensive Metabolic Panel; Future  -     CBC Auto Differential; Future  -     Iron and TIBC; Future  -     Ferritin; Future  -     Vitamin D; Future  -     Microalbumin/Creatinine Ratio, Urine; Future    Mixed hyperlipidemia  -     TSH; Future  -     T4, Free; Future  -     Lipid Panel; Future  -     Hemoglobin A1C; Future  -     Comprehensive Metabolic Panel; Future  -     CBC Auto Differential; Future  -     Iron and TIBC; Future  -     Ferritin; Future  -     Vitamin D; Future    Prediabetes  -     TSH; Future  -     T4, Free; Future  -     Lipid Panel; Future  -     Hemoglobin A1C; Future  -     Comprehensive Metabolic Panel; Future  -     CBC Auto Differential; Future  -     Iron and TIBC; Future  -     Ferritin; Future  -     Vitamin D; Future    Hypokalemia  -     TSH; Future  -     T4, Free; Future  -     Lipid Panel; Future  -     Hemoglobin A1C; Future  -     Comprehensive Metabolic Panel; Future  -     CBC Auto Differential; Future  -     Iron and TIBC; Future  -     Ferritin; Future  -     Vitamin D; Future    Need for Tdap vaccination  -     DIPH,PERTUSS(ACEL),TET VAC(PF)(ADULT)(ADACEL)(TDaP)  -     TSH; Future  -     T4, Free; Future  -     Lipid Panel; Future  -     Hemoglobin A1C; Future  -     Comprehensive Metabolic Panel; Future  -     CBC Auto Differential; Future  -     Iron and TIBC; Future  -     Ferritin; Future  -     Vitamin D; Future    Abnormal gait  -     Ambulatory referral/consult to Orthopedics; Future  -     TSH; Future  -     T4, Free; Future  -     Lipid Panel; Future  -     Hemoglobin A1C; Future  -     Comprehensive Metabolic Panel;  Future  -     CBC Auto Differential; Future  -     Iron and TIBC; Future  -     Ferritin; Future  -     Vitamin D; Future    Osteoarthritis of right knee, unspecified osteoarthritis type  -     Ambulatory referral/consult to Orthopedics; Future  -     TSH; Future  -     T4, Free; Future  -     Lipid Panel; Future  -     Hemoglobin A1C; Future  -     Comprehensive Metabolic Panel; Future  -     CBC Auto Differential; Future  -     Iron and TIBC; Future  -     Ferritin; Future  -     Vitamin D; Future    Vitamin D deficiency  -     Vitamin D; Future    Abnormal glucose  -     Hemoglobin A1C; Future  -     Comprehensive Metabolic Panel; Future  -     CBC Auto Differential; Future  -     Iron and TIBC; Future  -     Ferritin; Future      Left ear tinnitus is persisting and worsening at this time.   Referral given to Audiology and ENT for further tinnitus assessment.   Hypokalemia is new problem noted in latest lab work.   Advised on increasing potassium rich foods to diet for hypokalemia treatment.   Right knee osteoarthritis is persisting at this time.   Referral given to orthopedics for further right knee assessment.   Pt can increase Meloxicam to twice a week for knee pain treatment.   Other chronic conditions were reviewed and are stable at this time. Continue with medications as prescribed.   Advised to start taking Magnesium supplement for improved sleep.   TDAP vaccine administered at local pharmacy.   Advised to get RSV vaccine at local pharmacy.   Instructed to f/u in 1 year.     Patient Instructions   Magnesium Gluconate or Glycinate  around 27.5-30mg at night ( will help some with potassium also)     Follow up in about 1 year (around 5/9/2025) for physical with Dr TORRES.      LABS:   Lab Results   Component Value Date    HGBA1C 5.7 (H) 05/06/2024    HGBA1C 5.8 (H) 04/05/2022    HGBA1C 5.7 (H) 10/18/2021      Lab Results   Component Value Date    CHOL 204 (H) 05/06/2024    CHOL 184 02/23/2023    CHOL 200 (H)  04/05/2022     Lab Results   Component Value Date    LDLCALC 99.6 05/06/2024    LDLCALC 93.4 02/23/2023    LDLCALC 106.0 04/05/2022     Lab Results   Component Value Date    WBC 4.46 05/06/2024    HGB 12.1 05/06/2024    HCT 37.4 05/06/2024     05/06/2024    CHOL 204 (H) 05/06/2024    TRIG 47 05/06/2024    HDL 95 (H) 05/06/2024    ALT 13 05/06/2024    AST 21 05/06/2024     05/06/2024    K 3.4 (L) 05/06/2024    CL 98 05/06/2024    CREATININE 0.9 05/06/2024    BUN 18 05/06/2024    CO2 25 05/06/2024    TSH 1.524 05/06/2024    HGBA1C 5.7 (H) 05/06/2024       The 10-year ASCVD risk score (Dalton MARTIN, et al., 2019) is: 17.5%    Values used to calculate the score:      Age: 73 years      Sex: Female      Is Non- : Yes      Diabetic: No      Tobacco smoker: No      Systolic Blood Pressure: 134 mmHg      Is BP treated: Yes      HDL Cholesterol: 95 mg/dL      Total Cholesterol: 204 mg/dL  Mammo Digital Screening Bilat w/ Jl  Narrative: Result:  Mammo Digital Screening Bilat w/ Jl    History:  Patient is 73 y.o. and is seen for a screening mammogram.    Films Compared:  Compared to: 01/25/2023 Mammo Digital Screening Bilat w/ Jl, 11/16/2021   Mammo Digital Screening Bilat w/ Jl, 10/06/2020 Mammo Digital Screening   Bilat w/ Jl, 09/19/2019 Mammo Digital Screening Bilat w/ Jl, and   09/11/2018 Mammo Digital Screening Bilat with Tomosynthesis_CAD     Findings:  This procedure was performed using tomosynthesis.   Computer-aided detection was utilized in the interpretation of this   examination.    The breasts have scattered areas of fibroglandular density. There is no   evidence of suspicious masses, microcalcifications or architectural   distortion.  Impression:    No mammographic evidence of malignancy.    BI-RADS Category 1: Negative    Recommendation:  Routine screening mammogram in 1 year is recommended.    Your estimated lifetime risk of breast cancer (to age 85) based on    Tyrer-Cuzick risk assessment model is 5.45 %.  According to the American   Can[employer] Society, patients with a lifetime breast cancer risk of 20% or   higher might benefit from supplemental screening tests, such as screening   breast MRI.      Visit today included increased complexity associated with the care of the episodic problem : chest pain, tinnitus   and managing the longitudinal care of the patient due to the serious and/or complex managed problem(s) noted in the diagnosis section.     45 minutes of total time spent on the encounter, which includes face to face time and non-face to face time preparing to see the patient (eg, review of tests), Obtaining and/or reviewing separately obtained history, Documenting clinical information in the electronic or other health record, Independently interpreting results (not separately reported) and communicating results to the patient/family/caregiver, or Care coordination (not separately reported).      Scribe Attestation:   I, Santiago Mckeon, am scribing for, and in the presence of, Dr. Breann Carlson MD. I performed the above scribed service and the documentation accurately describes the services I performed. I attest to the accuracy of the note.    I, Dr. Breann Carlson MD, reviewed documentation as scribed above. I personally performed the services described in this documentation.  I agree that the record reflects my personal performance and is accurate and complete. Breann Carlson MD.    05/09/2024

## 2024-05-09 NOTE — PATIENT INSTRUCTIONS
Magnesium Gluconate or Glycinate  around 27.5-30mg at night ( will help some with potassium also)

## 2024-05-11 ENCOUNTER — HOSPITAL ENCOUNTER (EMERGENCY)
Facility: HOSPITAL | Age: 74
Discharge: HOME | End: 2024-05-11
Attending: EMERGENCY MEDICINE
Payer: COMMERCIAL

## 2024-05-11 ENCOUNTER — NURSE TRIAGE (OUTPATIENT)
Dept: ADMINISTRATIVE | Facility: CLINIC | Age: 74
End: 2024-05-11
Payer: COMMERCIAL

## 2024-05-11 VITALS
HEART RATE: 80 BPM | RESPIRATION RATE: 18 BRPM | BODY MASS INDEX: 26.13 KG/M2 | TEMPERATURE: 98 F | WEIGHT: 157 LBS | DIASTOLIC BLOOD PRESSURE: 70 MMHG | SYSTOLIC BLOOD PRESSURE: 138 MMHG | OXYGEN SATURATION: 98 %

## 2024-05-11 DIAGNOSIS — M79.605 LEFT LEG PAIN: ICD-10-CM

## 2024-05-11 DIAGNOSIS — M25.562 LEFT KNEE PAIN: ICD-10-CM

## 2024-05-11 PROCEDURE — 99284 EMERGENCY DEPT VISIT MOD MDM: CPT | Mod: 25

## 2024-05-11 RX ORDER — TIZANIDINE 2 MG/1
2 TABLET ORAL EVERY 8 HOURS PRN
Qty: 15 TABLET | Refills: 0 | Status: SHIPPED | OUTPATIENT
Start: 2024-05-11 | End: 2024-05-16

## 2024-05-11 RX ORDER — NAPROXEN 375 MG/1
375 TABLET ORAL 2 TIMES DAILY WITH MEALS
Qty: 10 TABLET | Refills: 0 | Status: SHIPPED | OUTPATIENT
Start: 2024-05-11 | End: 2024-05-16

## 2024-05-11 NOTE — FIRST PROVIDER EVALUATION
Medical screening examination initiated.  I have conducted a focused provider triage encounter, findings are as follows:    Brief history of present illness:  73-year-old female presents the emergency department for lower back and left leg pain.  Patient reports she injured herself when she is turning in bed.    Vitals:    05/11/24 1629 05/11/24 1631   BP:  (!) 140/66   BP Location:  Right arm   Patient Position:  Sitting   Pulse:  84   Resp:  18   Temp:  98 °F (36.7 °C)   TempSrc:  Oral   SpO2:  98%   Weight: 71.2 kg (157 lb)        Pertinent physical exam:  No acute distress    Brief workup plan:  Imaging    Preliminary workup initiated; this workup will be continued and followed by the physician or advanced practice provider that is assigned to the patient when roomed.

## 2024-05-11 NOTE — TELEPHONE ENCOUNTER
----- Message from Salma Romeo NP sent at 2/21/2022  1:12 PM CST -----  Looks like she has a UTI.  Nitrofurantoin 100 mg p.o. b.i.d. 5 days, 10., Pyridium 200 mg every 8 hours p.r.n. discomfort, 6., no refills   This morning, stretched in bed, & had sudden onset extreme pain to LLE, outer left side near knee, radiated into calf. Similar to lucina horse, eventually tried to get up & could not bear weight. Believes it may be hamstring, but unsure. Having trouble getting to restroom with support & using RLE.     Dispo-ER now for eval. Pt vu. Will reach out for transportation now & advised if no one able to bring her in, to call for ambulance.    Reason for Disposition   Unable to walk    Additional Information   Negative: Looks like a broken bone or dislocated joint (e.g., crooked or deformed)   Negative: Sounds like a life-threatening emergency to the triager   Negative: Chest pain   Negative: Difficulty breathing   Negative: Entire foot is cool or blue in comparison to other side    Protocols used: Leg Pain-A-AH

## 2024-05-11 NOTE — ED PROVIDER NOTES
Encounter Date: 5/11/2024       History     Chief Complaint   Patient presents with    Leg Pain     Pt c/o left leg pain since this morning. Pt states she was twisting in bed and stretched. Pt states she then felt a pain in left leg, cannot walk but normally can. Pt cannot bear much weight on leg.      Patient presents to ER for left leg pain, onset this morning.  Denies any associated symptoms.  States pain began when she stretched her left leg prior to getting out of bed.  She localizes pain to posterior upper leg just superior to knee.  Pain has been constant since onset.  Pain is reproducible with extension of left lower extremity.  She does report chronic swelling of bilateral lower extremities, states no change.  She denies chest pain, shortness of breath, numbness, tingling, open wound, erythema, joint immobility, joint instability.    The history is provided by the patient.     Review of patient's allergies indicates:   Allergen Reactions    No known drug allergies      Past Medical History:   Diagnosis Date    Anemia     Colon polyp     Edema     HLD (hyperlipidemia)     HTN (hypertension)     Palpitation     Tinnitus      Past Surgical History:   Procedure Laterality Date    COLONOSCOPY N/A 04/22/2022    Procedure: COLONOSCOPY;  Surgeon: Priyanka Winkler MD;  Location: [facility];  Service: Endoscopy;  Laterality: N/A;    HYSTERECTOMY       Family History   Problem Relation Name Age of Onset    Hypertension Mother Liana Taylor     Thyroid disease Mother Liana Taylor     Cataracts Mother Liana Taylor     Cancer Mother Liana Taylor         lung    Alcohol abuse Father Santiago Taylor     Cancer Other      Heart defect Brother      Breast cancer Paternal Aunt       Social History     Tobacco Use    Smoking status: Never     Passive exposure: Never    Smokeless tobacco: Never    Tobacco comments:     None   Substance Use Topics    Alcohol use: No    Drug use: No     Review of Systems   Constitutional:   Negative for chills, fatigue and fever.   Respiratory:  Negative for cough and shortness of breath.    Cardiovascular:  Negative for chest pain and palpitations.   Gastrointestinal:  Negative for abdominal pain, nausea and vomiting.   Musculoskeletal:  Negative for back pain, myalgias and neck pain.        +left leg pain   Skin:  Negative for color change, rash and wound.   Neurological:  Negative for weakness, numbness and headaches.   All other systems reviewed and are negative.      Physical Exam     Initial Vitals [05/11/24 1631]   BP Pulse Resp Temp SpO2   (!) 140/66 84 18 98 °F (36.7 °C) 98 %      MAP       --         Physical Exam    Nursing note and vitals reviewed.  Constitutional: She is not diaphoretic. She is cooperative.  Non-toxic appearance. She does not have a sickly appearance. She does not appear ill. No distress.   HENT:   Head: Normocephalic and atraumatic.   Eyes: Conjunctivae are normal.   Neck: Neck supple.   Normal range of motion.  Cardiovascular:  Normal rate, regular rhythm and intact distal pulses.           Pulmonary/Chest: No respiratory distress.   Musculoskeletal:         General: Normal range of motion.      Cervical back: Normal range of motion and neck supple.      Left upper leg: Tenderness present.      Left knee: Normal.     Neurological: She is alert and oriented to person, place, and time. She has normal strength. No sensory deficit. GCS score is 15. GCS eye subscore is 4. GCS verbal subscore is 5. GCS motor subscore is 6.   Skin: Skin is warm and dry. Capillary refill takes less than 2 seconds.         ED Course   Procedures  Labs Reviewed - No data to display       Imaging Results              X-Ray Knee Complete 4 or More Views Left (Final result)  Result time 05/11/24 17:40:21      Final result by Kevin Vargas MD (05/11/24 17:40:21)                   Impression:      As above      Electronically signed by: Kevin Vargas  Date:    05/11/2024  Time:    17:40                Narrative:    EXAMINATION:  XR KNEE COMP 4 OR MORE VIEWS LEFT    CLINICAL HISTORY:  Pain in left knee    TECHNIQUE:  AP, lateral, and Merchant views of the left knee were performed.    COMPARISON:  None    FINDINGS:  No acute fracture or dislocation.  Moderate degenerative joint disease of the left knee.  Suprapatellar joint effusion.                                       X-Ray Hip 2 or 3 views Left (with Pelvis when performed) (Final result)  Result time 05/11/24 17:31:55      Final result by Kevin Vargas MD (05/11/24 17:31:55)                   Impression:      As above      Electronically signed by: Kevin Vargas  Date:    05/11/2024  Time:    17:31               Narrative:    EXAMINATION:  XR HIP WITH PELVIS WHEN PERFORMED, 2 OR 3 VIEWS LEFT    CLINICAL HISTORY:  Pain in left leg    TECHNIQUE:  AP view of the pelvis and frog leg lateral view of the left hip were performed.    COMPARISON:  None    FINDINGS:  No fracture or dislocation.  Punctate densities in the femoral neck may relate from enchondroma.  Pubic osteitis.                                       X-Ray Lumbar Spine Ap And Lateral (Final result)  Result time 05/11/24 17:18:13      Final result by Kevin Vargas MD (05/11/24 17:18:13)                   Impression:      No acute abnormality.      Electronically signed by: Kevin Vargas  Date:    05/11/2024  Time:    17:18               Narrative:    EXAMINATION:  XR LUMBAR SPINE AP AND LATERAL    CLINICAL HISTORY:  XR LUMBAR SPINE AP AND LATERAL    COMPARISON:  None    FINDINGS:  Multiple radiographic views  were obtained.    No evidence of acute fracture or dislocation.  Mild moderate multilevel degenerative disc disease.  Atherosclerotic changes.  Grade 1 anterolisthesis of L4 respect to L5.  Moderate amount of stool in the colon.                                       Medications - No data to display  Medical Decision Making  Risk  Prescription drug management.                   Discussed all  results with patient and she verbalized understanding no further concerns.  Mild bilateral lower extremity swelling equal on both sides.  No erythema.  No pain to left lower extremity.  Distal sensation is intact.  Neurovascularly intact distally.  Pain is reproducible with extension of left lower extremity.  Discussed with patient findings consistent with muscle strain, will provide patient with NSAID and muscle relaxant Rx.  Discussed outpatient follow-up with her PCP.  Discussed signs and symptoms to return to ER.  Patient agrees with plan and voiced no further concerns.    I discussed with patient that evaluation in the ED does not suggest any emergent or life threatening medical conditions requiring immediate intervention beyond what was provided in the ED, and I believe patient is safe for discharge. Regardless, an unremarkable evaluation in the ED does not preclude the development or presence of a serious of life threatening condition. As such, patient was instructed to return immediately for any worsening or change in current symptoms.                Clinical Impression:  Final diagnoses:  [M79.605] Left leg pain  [M25.562] Left knee pain          ED Disposition Condition    Discharge Stable          ED Prescriptions       Medication Sig Dispense Start Date End Date Auth. Provider    naproxen (NAPROSYN) 375 MG tablet Take 1 tablet (375 mg total) by mouth 2 (two) times daily with meals. for 5 days 10 tablet 5/11/2024 5/16/2024 Corby Franklin NP    tiZANidine (ZANAFLEX) 2 MG tablet Take 1 tablet (2 mg total) by mouth every 8 (eight) hours as needed. 15 tablet 5/11/2024 5/16/2024 Corby Franklin NP          Follow-up Information       Follow up With Specialties Details Why Contact Info Additional Information    Breann Carlson MD Family Medicine In 2 days  [address]  [address]  [facility] [address]  [phone]       [facility] In 2 days  [address]  [facility] [id number] [phone] Please park on the [address] side and use the Clinic entrance. Check in on the 1st floor, to the right across from the café.    [facility] Emergency Dept. Emergency Medicine  As needed, If symptoms worsen [address]  [facility] [address] [phone]              Corby Franklin, CHRISS  05/11/24 1958

## 2024-05-27 ENCOUNTER — OFFICE VISIT (OUTPATIENT)
Dept: OTOLARYNGOLOGY | Facility: CLINIC | Age: 74
End: 2024-05-27
Payer: COMMERCIAL

## 2024-05-27 ENCOUNTER — CLINICAL SUPPORT (OUTPATIENT)
Dept: AUDIOLOGY | Facility: CLINIC | Age: 74
End: 2024-05-27
Payer: COMMERCIAL

## 2024-05-27 VITALS — WEIGHT: 158.5 LBS | BODY MASS INDEX: 26.38 KG/M2

## 2024-05-27 DIAGNOSIS — H93.12 TINNITUS OF LEFT EAR: ICD-10-CM

## 2024-05-27 DIAGNOSIS — G25.3 STAPEDIAL MYOCLONUS: Primary | ICD-10-CM

## 2024-05-27 DIAGNOSIS — H91.92 HEARING LOSS OF LEFT EAR, UNSPECIFIED HEARING LOSS TYPE: ICD-10-CM

## 2024-05-27 DIAGNOSIS — G25.3 STAPEDIAL MYOCLONUS: ICD-10-CM

## 2024-05-27 DIAGNOSIS — H90.3 SENSORINEURAL HEARING LOSS, BILATERAL: Primary | ICD-10-CM

## 2024-05-27 PROCEDURE — 3066F NEPHROPATHY DOC TX: CPT | Mod: CPTII,S$GLB,, | Performed by: STUDENT IN AN ORGANIZED HEALTH CARE EDUCATION/TRAINING PROGRAM

## 2024-05-27 PROCEDURE — 1101F PT FALLS ASSESS-DOCD LE1/YR: CPT | Mod: CPTII,S$GLB,, | Performed by: STUDENT IN AN ORGANIZED HEALTH CARE EDUCATION/TRAINING PROGRAM

## 2024-05-27 PROCEDURE — 3288F FALL RISK ASSESSMENT DOCD: CPT | Mod: CPTII,S$GLB,, | Performed by: STUDENT IN AN ORGANIZED HEALTH CARE EDUCATION/TRAINING PROGRAM

## 2024-05-27 PROCEDURE — 99214 OFFICE O/P EST MOD 30 MIN: CPT | Mod: S$GLB,,, | Performed by: STUDENT IN AN ORGANIZED HEALTH CARE EDUCATION/TRAINING PROGRAM

## 2024-05-27 PROCEDURE — 3061F NEG MICROALBUMINURIA REV: CPT | Mod: CPTII,S$GLB,, | Performed by: STUDENT IN AN ORGANIZED HEALTH CARE EDUCATION/TRAINING PROGRAM

## 2024-05-27 PROCEDURE — 1126F AMNT PAIN NOTED NONE PRSNT: CPT | Mod: CPTII,S$GLB,, | Performed by: STUDENT IN AN ORGANIZED HEALTH CARE EDUCATION/TRAINING PROGRAM

## 2024-05-27 PROCEDURE — 99999 PR PBB SHADOW E&M-EST. PATIENT-LVL II: CPT | Mod: PBBFAC,,, | Performed by: AUDIOLOGIST-HEARING AID FITTER

## 2024-05-27 PROCEDURE — 3044F HG A1C LEVEL LT 7.0%: CPT | Mod: CPTII,S$GLB,, | Performed by: STUDENT IN AN ORGANIZED HEALTH CARE EDUCATION/TRAINING PROGRAM

## 2024-05-27 PROCEDURE — 99999 PR PBB SHADOW E&M-EST. PATIENT-LVL III: CPT | Mod: PBBFAC,,, | Performed by: STUDENT IN AN ORGANIZED HEALTH CARE EDUCATION/TRAINING PROGRAM

## 2024-05-27 PROCEDURE — 92557 COMPREHENSIVE HEARING TEST: CPT | Mod: S$GLB,,, | Performed by: AUDIOLOGIST-HEARING AID FITTER

## 2024-05-27 PROCEDURE — 92567 TYMPANOMETRY: CPT | Mod: S$GLB,,, | Performed by: AUDIOLOGIST-HEARING AID FITTER

## 2024-05-27 PROCEDURE — 3008F BODY MASS INDEX DOCD: CPT | Mod: CPTII,S$GLB,, | Performed by: STUDENT IN AN ORGANIZED HEALTH CARE EDUCATION/TRAINING PROGRAM

## 2024-05-27 PROCEDURE — 1159F MED LIST DOCD IN RCRD: CPT | Mod: CPTII,S$GLB,, | Performed by: STUDENT IN AN ORGANIZED HEALTH CARE EDUCATION/TRAINING PROGRAM

## 2024-05-27 NOTE — PROGRESS NOTES
Chief complaint:   Chief Complaint   Patient presents with    Tinnitus          Referring Provider:  Breann Carlson Md  [address] [place] [address]    History of Present Illness:     Ms. Taylor is a 73 y.o. female with HTN, palpitations presenting for evaluation of tinnitus.     Patient presents with tinnitus. Onset of symptoms was gradual starting 1 year ago with stable course since that time. Patient describes the tinnitus as constant located in the left ear. The quality is described as medium range pitch. The pattern is nonpulsatile with an intensity that is medium. Patient describes her level of annoyance as minimally annoying. Associated symptoms include cracking sensation and pressure in the left ear (worse at night when lying down, only happened once or twice). Denies otalgia, otorrhea, vertigo.     Previous treatments include staying hydrated.     The patient also denies pain deep within the ear, tenderness around the ear canal or pre-auricular area, or headaches.         Return clinic visit, 5/27/24    Bilateral non pulsatile tinnitus has been stable over last 2 yeas or so. Hearing stable as well. No pain, vertigo, drainage.    Does have sperate episodes of left ear crackling sound (high pitched). That has happened a few times over the last 2 years. It recurred about a month ago, present for one week daily, but then resolved again about 3 weeks ago. Would last for 10-15 seconds. Associated with sensation of eye quivering.    History        Past Medical History:   Past Medical History:   Diagnosis Date    Anemia     Colon polyp     Edema     HLD (hyperlipidemia)     HTN (hypertension)     Palpitation     Tinnitus     .          Past Surgical History:  Past Surgical History:   Procedure Laterality Date    COLONOSCOPY N/A 04/22/2022    Procedure: COLONOSCOPY;  Surgeon: Priyanka Winkler MD;  Location: [facility];  Service: Endoscopy;  Laterality: N/A;    HYSTERECTOMY     .          Medications: Medication list was reviewed. She  has a current medication list which includes the following prescription(s): amlodipine, aspirin, atorvastatin, b complex vitamins, co-enzyme q-10, ferrous sulfate, fish oil-omega-3 fatty acids, multivitamin, turmeric root extract, valsartan-hydrochlorothiazide, and vitamin d.         Allergies:   Review of patient's allergies indicates:   Allergen Reactions    No known drug allergies             Family history: family history includes Alcohol abuse in her father; Breast cancer in her paternal aunt; Cancer in her mother and another family member; Cataracts in her mother; Heart defect in her brother; Hypertension in her mother; Thyroid disease in her mother.         Social History          Alcohol use:  reports no history of alcohol use.            Tobacco:  reports that she has never smoked. She has never been exposed to tobacco smoke. She has never used smokeless tobacco.         Please see the patient's intake form for full details of past medical history, past surgical history, family history, social history and review of systems. ?This information was reviewed by me and noted.      Physical Examination     General: Well developed, well nourished, well hydrated. Verbal with a strong voice and not dysphonic.     Head/Face: Normocephalic, atraumatic. No scars or lesions. Facial musculature equal.     Eyes: No scleral icterus or conjunctival hemorrhage. EOMI. PERRLA.     Ears:     Right ear: No gross deformity. EAC is clear of debris and erythema. The TM is intact with a pneumatized middle ear. No signs of retraction, fluid or infection.      Left ear: No gross deformity. EAC is clear of debris and erythema. The TM is intact with a pneumatized middle ear. No signs of retraction, fluid or infection.       Neurologic: Moving all extremities without gross abnormality.CN II-XII grossly intact. House-Brackmann 1/6. No signs of nystagmus.      Psych: Alert and oriented to  person, place, and time with an appropriate mood and affect.       Audiogram     Audiogram, 05/27/2024 was independently reviewed      Audio 5/27/24 reviewed        Mild progression of bilateral sloping Sensorineural Hearing Loss   Excellent word rec  Normal tymps      Imaging    I have independently reviewed the following imaging with the findings noted below:      CT sinus 2019  No middle ear effusion  Normal bony labyrinth   No IAC widening     Labs  A1c 5.8      Assessment     1. Stapedial myoclonus    2. Tinnitus of left ear    3. Hearing loss of left ear, unspecified hearing loss type          Suspect the clicking is secondary to myoclonus - conservative measures discussed including reducing caffeine and stress and improving sleep with episodes arise    For her hearing loss and tinnitus - she has had a slow progression over the last 4 years. Reasonable to continue q2y hearing tests, sooner if change noted. Conservative measures including masking discussed.        Brad Mccabe MD  [facility]   [facility]  [address]  [place] [address]  P: [phone]  F: [phone]

## 2024-05-27 NOTE — PROGRESS NOTES
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  Patient complains of tinnitus. She has history of bilateral sensorineural hearing loss with her last audiogram in 2022. She continues to have tinnitus that fluctuates in pitch and intensity. She endorses soft humming that is persistent. Over the past two years, she also has intermittent fluctuating loud "crackling" primarily in her left ear, duration for seconds. Had one episode of feeling lightheaded because of how loud tinnitus sounded and also caused left eye twitching. Last heard "crackling" two weeks ago. No vertigo.     Results reveal a normal-to-severe sensorineural hearing loss 250-8000 Hz for the right ear, and a normal-to-severe sensorineural hearing loss 250-8000 Hz for the left ear.   Speech Reception Thresholds were 10 dBHL for the right ear and 10 dBHL for the left ear.   Word recognition scores were good for the right ear and good for the left ear.   Tympanograms were Type A for the right ear and Type A for the left ear.    Patient was counseled on the above findings.    Recommendations:  ENT at completion of this visit: "Suspect the clicking is secondary to myoclonus - conservative measures discussed including reducing caffeine and stress and improving sleep with episodes arise."  Audiogram every two years to monitor hearing loss, or sooner if any perceived changes in hearing.  Tinnitus masking and management strategies were discussed, including use of "soothing sound" maskers.                 "

## 2024-06-11 ENCOUNTER — PATIENT MESSAGE (OUTPATIENT)
Dept: PRIMARY CARE CLINIC | Facility: CLINIC | Age: 74
End: 2024-06-11
Payer: COMMERCIAL

## 2024-06-11 DIAGNOSIS — I10 PRIMARY HYPERTENSION: ICD-10-CM

## 2024-06-12 RX ORDER — AMLODIPINE BESYLATE 10 MG/1
10 TABLET ORAL
Qty: 90 TABLET | Refills: 1 | Status: SHIPPED | OUTPATIENT
Start: 2024-06-12

## 2024-06-12 RX ORDER — MELOXICAM 15 MG/1
15 TABLET ORAL
Qty: 30 TABLET | Refills: 1 | Status: SHIPPED | OUTPATIENT
Start: 2024-06-12

## 2024-06-12 NOTE — TELEPHONE ENCOUNTER
Refill Routing Note   Medication(s) are not appropriate for processing by [facility] for the following reason(s):        Outside of protocol    [facility] action(s):  Route               Appointments  past 12m or future 3m with PCP    Date Provider   Last Visit   5/9/2024 Breann Carlson MD   Next Visit   Visit date not found Breann Carlson MD   ED visits in past 90 days: 1        Note composed:4:10 PM 06/12/2024

## 2024-06-12 NOTE — TELEPHONE ENCOUNTER
No care due was identified.  [facility] Embedded Care Due Messages. Reference number: [id number].   6/12/2024 3:57:16 PM CDT

## 2024-06-12 NOTE — TELEPHONE ENCOUNTER
See refill request. See my chart message.//ddw    -Pt can increase Meloxicam to twice a week for knee pain treatment. Per Dr Carlson on 5/9/2024.    -Per pt medication was erroneously discontinued by MEGA Franklin NP on 5/11/2024.

## 2024-06-18 ENCOUNTER — OFFICE VISIT (OUTPATIENT)
Dept: CARDIOLOGY | Facility: CLINIC | Age: 74
End: 2024-06-18
Payer: COMMERCIAL

## 2024-06-18 VITALS
OXYGEN SATURATION: 99 % | BODY MASS INDEX: 27.25 KG/M2 | DIASTOLIC BLOOD PRESSURE: 78 MMHG | HEIGHT: 65 IN | SYSTOLIC BLOOD PRESSURE: 136 MMHG | WEIGHT: 163.56 LBS | HEART RATE: 91 BPM

## 2024-06-18 DIAGNOSIS — G47.33 OSA ON CPAP: ICD-10-CM

## 2024-06-18 DIAGNOSIS — R20.0 NUMBNESS AND TINGLING OF FOOT: ICD-10-CM

## 2024-06-18 DIAGNOSIS — I10 PRIMARY HYPERTENSION: ICD-10-CM

## 2024-06-18 DIAGNOSIS — R73.03 PREDIABETES: ICD-10-CM

## 2024-06-18 DIAGNOSIS — R20.2 NUMBNESS AND TINGLING OF FOOT: ICD-10-CM

## 2024-06-18 DIAGNOSIS — R20.0 NUMBNESS OF FOOT: ICD-10-CM

## 2024-06-18 DIAGNOSIS — R00.2 PALPITATIONS: Primary | ICD-10-CM

## 2024-06-18 DIAGNOSIS — M85.80 OSTEOPENIA, UNSPECIFIED LOCATION: ICD-10-CM

## 2024-06-18 DIAGNOSIS — E78.2 MIXED HYPERLIPIDEMIA: ICD-10-CM

## 2024-06-18 PROCEDURE — 1159F MED LIST DOCD IN RCRD: CPT | Mod: CPTII,S$GLB,, | Performed by: STUDENT IN AN ORGANIZED HEALTH CARE EDUCATION/TRAINING PROGRAM

## 2024-06-18 PROCEDURE — 3044F HG A1C LEVEL LT 7.0%: CPT | Mod: CPTII,S$GLB,, | Performed by: STUDENT IN AN ORGANIZED HEALTH CARE EDUCATION/TRAINING PROGRAM

## 2024-06-18 PROCEDURE — 3008F BODY MASS INDEX DOCD: CPT | Mod: CPTII,S$GLB,, | Performed by: STUDENT IN AN ORGANIZED HEALTH CARE EDUCATION/TRAINING PROGRAM

## 2024-06-18 PROCEDURE — 1126F AMNT PAIN NOTED NONE PRSNT: CPT | Mod: CPTII,S$GLB,, | Performed by: STUDENT IN AN ORGANIZED HEALTH CARE EDUCATION/TRAINING PROGRAM

## 2024-06-18 PROCEDURE — 99214 OFFICE O/P EST MOD 30 MIN: CPT | Mod: S$GLB,,, | Performed by: STUDENT IN AN ORGANIZED HEALTH CARE EDUCATION/TRAINING PROGRAM

## 2024-06-18 PROCEDURE — 3066F NEPHROPATHY DOC TX: CPT | Mod: CPTII,S$GLB,, | Performed by: STUDENT IN AN ORGANIZED HEALTH CARE EDUCATION/TRAINING PROGRAM

## 2024-06-18 PROCEDURE — 1101F PT FALLS ASSESS-DOCD LE1/YR: CPT | Mod: CPTII,S$GLB,, | Performed by: STUDENT IN AN ORGANIZED HEALTH CARE EDUCATION/TRAINING PROGRAM

## 2024-06-18 PROCEDURE — 3075F SYST BP GE 130 - 139MM HG: CPT | Mod: CPTII,S$GLB,, | Performed by: STUDENT IN AN ORGANIZED HEALTH CARE EDUCATION/TRAINING PROGRAM

## 2024-06-18 PROCEDURE — 3061F NEG MICROALBUMINURIA REV: CPT | Mod: CPTII,S$GLB,, | Performed by: STUDENT IN AN ORGANIZED HEALTH CARE EDUCATION/TRAINING PROGRAM

## 2024-06-18 PROCEDURE — 3078F DIAST BP <80 MM HG: CPT | Mod: CPTII,S$GLB,, | Performed by: STUDENT IN AN ORGANIZED HEALTH CARE EDUCATION/TRAINING PROGRAM

## 2024-06-18 PROCEDURE — 3288F FALL RISK ASSESSMENT DOCD: CPT | Mod: CPTII,S$GLB,, | Performed by: STUDENT IN AN ORGANIZED HEALTH CARE EDUCATION/TRAINING PROGRAM

## 2024-06-18 PROCEDURE — 99999 PR PBB SHADOW E&M-EST. PATIENT-LVL III: CPT | Mod: PBBFAC,,, | Performed by: STUDENT IN AN ORGANIZED HEALTH CARE EDUCATION/TRAINING PROGRAM

## 2024-06-18 NOTE — PROGRESS NOTES
"              Section of Cardiology                  Cardiac Clinic Note    Chief Complaint/Reason for consultation: hypertension, palpitations       HPI:   Xin Taylor is a 73 y.o. female with h/o HTN, ASIF, HLD who comes in as a referral to cardiology clinic by PCP Dr. Carlson for evaluation.     7/11/22  About 2 weeks ago,had palpitations, started when she was going to the bathroom  symptoms   Would go away after resting, drinking water, raising her feet  Reports left ear tinnitus, has seen ENT, normal workup   Does not exercise regularly, no limitations with this   Worked in healthcare for many years, currently a     Denies tobacco or ETOH abuse  Family hx: mother- DM; brother- arteriosclerosis     Denies DM, CVA    Denies chest pain, SOB, dizziness, syncope         8/24/22  14 day monitor- no significant arrhythmias seen. Symptoms correlated ST. Average heart rate is normal at 68 bpm  Denies palpitations   Echo with normal EF  BP elevated today, but says normotensive BP at home have been more 120s-130s systolic, diastolic 60s  Has gained 2-3 lbs overall, but no major changes  Cycling 30 min 3 days a week     Denies chest pain, SOB, dizziness, syncope       2/20/23  Had post nasal drip recently with a cough   2 weeks ago, had pain right below the breast, worse with breathing in, described as a "twinge", became positional, but one issue with deep breathing  Symptoms pretty much resolved  Still exercising with bicycle   BP high today, normotensive on prior visits  Reports ankle swelling     Denies SOB, chest pain       4/24/23  Did not want to start bystolic, so continues to take amlodipine  Riding her stationary bike, 3 times a week  Intermittent dizzy, but not concerning   Weight increased 3 lbs from 2/23  Brings in home BP log, average systolic 132, diastolic avg 75  BP elevated today- elevated on repeat and more elevated with BP cuff  reports pulsating in her ear at times- has seen audiology "     Denies SOB, chest pain        6/28/23  Doing well  BP has been running high  Says valsartan 320 caused palpitation  Beet juice reduced blood pressure  Taking the 160 valsartan  Has been having numbness in feet      9/14/23  Arterial U/S 7/23 left leg with plaque build up, no sig stenosis  Started on ASA daily, but was taking every other work  BP stable   Stopped taking the beet juice due to low BP  Still having foot numbness   Fell about 1 month ago after tripping  No leg weakness      1/3/24  Bp stable today  Has been intermittently high today  Reports HR as high as 111  Was sick prior to [place]  Did not have COVID    Denies chest pain, dizziness, syncope         3/14/24  BP stable doing, mildly elevated  Does intermittent fasting  Still using beet juice with her meals (not frequent)  Has knee pain, mostly in AM, both knees   Weight went up 6 lbs since last visit  Not exercising much and reduced salads intake       Denies chest pain, dizziness, syncope     6/18/24  Lost 4 lbs since last visit, did lose 10 lbs in may, now trending up- got COVID at some point   Conscious of her diet  Still does intermittent fasting  BP stable   No major exercising- rides bike about 30 min 3 x week     Denies SOB, chest pain, dizziness, syncope           EKG 3/14/24 NSR, cannot r/o septal infarct  EKG 9/14/23 NSR, incomplete RBBB,   EKG 2/20/23 NSR, possible LAE, incomplete RBBB, miminal LVH  EKG 7/11/22 NSR, possible LAE, minimal LVH criteria     ECHO  7/22  The left ventricle is normal in size with normal systolic function.  Normal left ventricular diastolic function.  The estimated PA systolic pressure is 31 mmHg.  Normal right ventricular size with normal right ventricular systolic function.  Normal central venous pressure (3 mmHg).  Moderate pulmonic regurgitation.  Mild mitral regurgitation.  Mild tricuspid regurgitation.  The estimated ejection fraction is 60%.        STRESS TEST    [facility]      ROS: All 10 systems reviewed.  Please refer to the HPI for pertinent positives. All other systems negative.     Past Medical History  Past Medical History:   Diagnosis Date    Anemia     Colon polyp     Edema     HLD (hyperlipidemia)     HTN (hypertension)     Palpitation     Tinnitus        Surgical History  Past Surgical History:   Procedure Laterality Date    COLONOSCOPY N/A 04/22/2022    Procedure: COLONOSCOPY;  Surgeon: Priyanka Winkler MD;  Location: [facility];  Service: Endoscopy;  Laterality: N/A;    HYSTERECTOMY            Allergies:   Review of patient's allergies indicates:   Allergen Reactions    No known drug allergies        Social History:  Social History     Socioeconomic History    Marital status: Legally    Occupational History    Occupation: retired   Tobacco Use    Smoking status: Never     Passive exposure: Never    Smokeless tobacco: Never    Tobacco comments:     None   Substance and Sexual Activity    Alcohol use: No    Drug use: No    Sexual activity: Not Currently     Partners: Male     Birth control/protection: None     Comment:      Social Determinants of Health     Financial Resource Strain: Low Risk  (6/18/2024)    Overall Financial Resource Strain (CARDIA)     Difficulty of Paying Living Expenses: Not hard at all   Food Insecurity: No Food Insecurity (6/18/2024)    Hunger Vital Sign     Worried About Running Out of Food in the Last Year: Never true     Ran Out of Food in the Last Year: Never true   Transportation Needs: No Transportation Needs (5/8/2024)    PRAPARE - Transportation     Lack of Transportation (Medical): No     Lack of Transportation (Non-Medical): No   Physical Activity: Insufficiently Active (6/18/2024)    Exercise Vital Sign     Days of Exercise per Week: 3 days     Minutes of Exercise per Session: 30 min   Stress: No Stress Concern Present (6/18/2024)    [employer] - Occupational Stress Questionnaire     Feeling of Stress : Only a little   Housing  "Stability: Low Risk  (9/24/2023)    Housing Stability Vital Sign     Unable to Pay for Housing in the Last Year: No     Number of Places Lived in the Last Year: 1     Unstable Housing in the Last Year: No       Family History:  family history includes Alcohol abuse in her father; Breast cancer in her paternal aunt; Cancer in her mother and another family member; Cataracts in her mother; Heart defect in her brother; Hypertension in her mother; Thyroid disease in her mother.    Home Medications:  Current Outpatient Medications on File Prior to Visit   Medication Sig Dispense Refill    amLODIPine (NORVASC) 10 MG tablet TAKE 1 TABLET(10 MG) BY MOUTH EVERY DAY 90 tablet 1    aspirin 81 mg Tab Take 1 tablet by mouth once daily.      atorvastatin (LIPITOR) 10 MG tablet Take 1 tablet (10 mg total) by mouth once daily. 90 tablet 3    b complex vitamins tablet Take 1 tablet by mouth once daily.      co-enzyme Q-10 30 mg capsule Take 30 mg by mouth 3 (three) times daily.      ferrous sulfate 325 mg (65 mg iron) Tab tablet Take 1 tablet (325 mg total) by mouth once daily. Take with citrus 100 tablet 3    fish oil-omega-3 fatty acids 300-1,000 mg capsule Take 2 g by mouth once daily.      meloxicam (MOBIC) 15 MG tablet Take 1 tablet (15 mg total) by mouth every 7 days. Once a week or PRN 30 tablet 1    multivitamin (THERAGRAN) per tablet Take 1 tablet by mouth once daily.      turmeric root extract 500 mg Cap Take 500 mg by mouth 2 (two) times daily. 100 capsule 4    valsartan-hydrochlorothiazide (DIOVAN-HCT) 160-25 mg per tablet Take 1 tablet by mouth once daily. 90 tablet 3    vitamin D 1000 units Tab Take 185 mg by mouth once daily.       No current facility-administered medications on file prior to visit.       Physical exam:  /78 (BP Location: Right arm, Patient Position: Sitting, BP Method: Small (Manual))   Pulse 91   Ht 5' 5" (1.651 m)   Wt 74.2 kg (163 lb 9.3 oz)   LMP  (LMP Unknown)   SpO2 99%   BMI 27.22 " "kg/m²         General: Pt is a 73 y.o. year old female who is AAOx3, in NAD, is pleasant, well nourished, looks stated age  HEENT: PERRL, EOMI, Oral mucosa pink & moist  CVS  No abnormal cardiac pulsations noted on inspection. JVP not raised. The apical impulse is normal on palpation, and is located in the left 5th intercostal space in the mid - clavicular line. No palpable thrills or abnormal pulsations noted. RR, S1 - S2 heard, no murmurs, rubs or gallops appreciated.   PUL : CTA B/L. No wheezes/crackles heard   ABD : BS +, soft. No tenderness elicited   LE : No C/C/E. Distal Pulses palpable B/L         LABS:    Chemistry:   Lab Results   Component Value Date     05/06/2024    K 3.4 (L) 05/06/2024    CL 98 05/06/2024    CO2 25 05/06/2024    BUN 18 05/06/2024    CREATININE 0.9 05/06/2024    CALCIUM 10.0 05/06/2024     Cardiac Markers: No results found for: "CKTOTAL", "CKMB", "CKMBINDEX", "TROPONINI"  Cardiac Markers (Last 3): No results found for: "CKTOTAL", "CKMB", "CKMBINDEX", "TROPONINI"  CBC:   Lab Results   Component Value Date    WBC 4.46 05/06/2024    HGB 12.1 05/06/2024    HCT 37.4 05/06/2024    MCV 94 05/06/2024     05/06/2024     Lipids:   Lab Results   Component Value Date    CHOL 204 (H) 05/06/2024    TRIG 47 05/06/2024    HDL 95 (H) 05/06/2024     Coagulation: No results found for: "PT", "INR", "APTT"        Assessment      1. Palpitations    2. Primary hypertension    3. ASIF on CPAP    4. Prediabetes    5. Mixed hyperlipidemia    6. Osteopenia, unspecified location    7. Numbness and tingling of foot    8. Numbness of foot               Plan:    Palpitations  Intermittent   14 day cardiac monitor 7/22- no significant arrhythmias   Echocardiogram 7/22 with normal EF, mod OH, mild MR/TR  48 hr monitor 1/24 no significant arrhythmias     Foot numbness  SHEY 7/23 RLE normal, mildly abnormal LLE  Arterial U/S 7/23 left leg with plaque build up, no sig stenosis    ASIF  Compliant with " CPAP    HTN  Mildly elevated   Continue Diovan-hydrochlorothiazide in a.m. 160/25, [id number] mg at night  Low-salt, low-fat diet  Exercise as tolerated, at least 30 minutes daily    Pre diabetes  A1c 5.7  Continue therapy    HLD  LDL 93 as of 2/23  Continue statin        This note was prepared using voice recognition system and is likely to have sound alike errors that may have been overlooked even after proofreading.     I have reviewed all pertinent chart information.  Plans and recommendations have been formulated under my direct supervision. All questions answered and patient voiced understanding.  To see if  If symptoms persist go to the ED.    RTC in 6 months           Naz Martinez MD  Cardiology

## 2024-07-17 DIAGNOSIS — I10 PRIMARY HYPERTENSION: ICD-10-CM

## 2024-07-17 RX ORDER — AMLODIPINE BESYLATE 10 MG/1
10 TABLET ORAL DAILY
Qty: 90 TABLET | Refills: 1 | Status: SHIPPED | OUTPATIENT
Start: 2024-07-17

## 2024-08-06 DIAGNOSIS — I10 PRIMARY HYPERTENSION: ICD-10-CM

## 2024-08-06 RX ORDER — VALSARTAN AND HYDROCHLOROTHIAZIDE 160; 25 MG/1; MG/1
1 TABLET ORAL DAILY
Qty: 90 TABLET | Refills: 3 | Status: SHIPPED | OUTPATIENT
Start: 2024-08-06 | End: 2025-08-06

## 2024-08-06 RX ORDER — VALSARTAN AND HYDROCHLOROTHIAZIDE 160; 25 MG/1; MG/1
1 TABLET ORAL
Qty: 90 TABLET | Refills: 3 | OUTPATIENT
Start: 2024-08-06

## 2024-09-04 ENCOUNTER — OFFICE VISIT (OUTPATIENT)
Dept: OPHTHALMOLOGY | Facility: CLINIC | Age: 74
End: 2024-09-04
Payer: COMMERCIAL

## 2024-09-04 ENCOUNTER — PATIENT MESSAGE (OUTPATIENT)
Dept: OPHTHALMOLOGY | Facility: CLINIC | Age: 74
End: 2024-09-04

## 2024-09-04 DIAGNOSIS — R73.03 PREDIABETES: Primary | ICD-10-CM

## 2024-09-04 DIAGNOSIS — I10 PRIMARY HYPERTENSION: ICD-10-CM

## 2024-09-04 DIAGNOSIS — H52.7 REFRACTIVE ERRORS: ICD-10-CM

## 2024-09-04 DIAGNOSIS — E11.36 DIABETIC CATARACT: ICD-10-CM

## 2024-09-04 PROCEDURE — 2023F DILAT RTA XM W/O RTNOPTHY: CPT | Mod: CPTII,S$GLB,, | Performed by: OPTOMETRIST

## 2024-09-04 PROCEDURE — 92015 DETERMINE REFRACTIVE STATE: CPT | Mod: S$GLB,,, | Performed by: OPTOMETRIST

## 2024-09-04 PROCEDURE — 3066F NEPHROPATHY DOC TX: CPT | Mod: CPTII,S$GLB,, | Performed by: OPTOMETRIST

## 2024-09-04 PROCEDURE — 1159F MED LIST DOCD IN RCRD: CPT | Mod: CPTII,S$GLB,, | Performed by: OPTOMETRIST

## 2024-09-04 PROCEDURE — 92014 COMPRE OPH EXAM EST PT 1/>: CPT | Mod: S$GLB,,, | Performed by: OPTOMETRIST

## 2024-09-04 PROCEDURE — 99999 PR PBB SHADOW E&M-EST. PATIENT-LVL III: CPT | Mod: PBBFAC,,, | Performed by: OPTOMETRIST

## 2024-09-04 PROCEDURE — 3061F NEG MICROALBUMINURIA REV: CPT | Mod: CPTII,S$GLB,, | Performed by: OPTOMETRIST

## 2024-09-04 PROCEDURE — 3044F HG A1C LEVEL LT 7.0%: CPT | Mod: CPTII,S$GLB,, | Performed by: OPTOMETRIST

## 2024-09-04 NOTE — PROGRESS NOTES
SUBJECTIVE  Xin Taylor is 73 y.o. female  Corrected distance visual acuity was 20/25 in the right eye and 20/20 in the left eye. Corrected near visual acuity was J1 in the right eye and J1 in the left eye.   Chief Complaint   Patient presents with    Prediabetes     Lab Results       Component                Value               Date                       HGBA1C                   5.7 (H)             05/06/2024                    HPI     Prediabetes     Additional comments: Lab Results       Component                Value               Date                       HGBA1C                   5.7 (H)             05/06/2024                     Comments    Patient states slight   No using  Wear Bifocal glasses           Last edited by Sue Alanis on 9/4/2024  2:30 PM.         Assessment /Plan :  1. Prediabetes    No Background Diabetic Retinopathy  Strict BG control, f/u w/ PCP, and annual DFE  Stressed importance of DM control to preserve vision     2. Diabetic cataract  Cataracts are not visually significant and not affecting activities of daily living. Annual observation is recommended at this time.   Patient to call or return to clinic with any significant change in vision prior to next visit.      3. Primary hypertension  No HTN Retinopathy, monitor annually.       4. Refractive errors  Dispense Final Rx for glasses.  RTC 1 year  Discussed above and answered questions.

## 2024-10-21 ENCOUNTER — PATIENT MESSAGE (OUTPATIENT)
Dept: PRIMARY CARE CLINIC | Facility: CLINIC | Age: 74
End: 2024-10-21
Payer: COMMERCIAL

## 2024-10-21 RX ORDER — ATORVASTATIN CALCIUM 10 MG/1
10 TABLET, FILM COATED ORAL DAILY
Qty: 90 TABLET | Refills: 3 | OUTPATIENT
Start: 2024-10-21 | End: 2025-10-21

## 2024-10-21 RX ORDER — ATORVASTATIN CALCIUM 10 MG/1
10 TABLET, FILM COATED ORAL
Qty: 90 TABLET | Refills: 3 | Status: SHIPPED | OUTPATIENT
Start: 2024-10-21

## 2024-10-21 NOTE — TELEPHONE ENCOUNTER
No care due was identified.  Health [facility] Embedded Care Due Messages. Reference number: [id number].   10/21/2024 3:51:46 AM CDT

## 2024-10-21 NOTE — TELEPHONE ENCOUNTER
No care due was identified.  Health [facility] Embedded Care Due Messages. Reference number: [id number].   10/21/2024 9:35:23 AM CDT

## 2024-11-11 ENCOUNTER — PATIENT MESSAGE (OUTPATIENT)
Dept: SLEEP MEDICINE | Facility: CLINIC | Age: 74
End: 2024-11-11
Payer: COMMERCIAL

## 2024-11-12 ENCOUNTER — OFFICE VISIT (OUTPATIENT)
Dept: PULMONOLOGY | Facility: CLINIC | Age: 74
End: 2024-11-12
Payer: COMMERCIAL

## 2024-11-12 VITALS
BODY MASS INDEX: 27.22 KG/M2 | OXYGEN SATURATION: 97 % | RESPIRATION RATE: 16 BRPM | HEART RATE: 87 BPM | SYSTOLIC BLOOD PRESSURE: 120 MMHG | DIASTOLIC BLOOD PRESSURE: 82 MMHG | HEIGHT: 65 IN

## 2024-11-12 DIAGNOSIS — I10 PRIMARY HYPERTENSION: ICD-10-CM

## 2024-11-12 DIAGNOSIS — G47.33 OSA ON CPAP: Primary | ICD-10-CM

## 2024-11-12 DIAGNOSIS — R73.03 PREDIABETES: ICD-10-CM

## 2024-11-12 DIAGNOSIS — E78.2 MIXED HYPERLIPIDEMIA: ICD-10-CM

## 2024-11-12 PROBLEM — K13.79 MASS OF ORAL CAVITY: Status: RESOLVED | Noted: 2017-05-19 | Resolved: 2024-11-12

## 2024-11-12 PROCEDURE — 3074F SYST BP LT 130 MM HG: CPT | Mod: CPTII,S$GLB,,

## 2024-11-12 PROCEDURE — 3061F NEG MICROALBUMINURIA REV: CPT | Mod: CPTII,S$GLB,,

## 2024-11-12 PROCEDURE — 3288F FALL RISK ASSESSMENT DOCD: CPT | Mod: CPTII,S$GLB,,

## 2024-11-12 PROCEDURE — 1126F AMNT PAIN NOTED NONE PRSNT: CPT | Mod: CPTII,S$GLB,,

## 2024-11-12 PROCEDURE — 3066F NEPHROPATHY DOC TX: CPT | Mod: CPTII,S$GLB,,

## 2024-11-12 PROCEDURE — 1101F PT FALLS ASSESS-DOCD LE1/YR: CPT | Mod: CPTII,S$GLB,,

## 2024-11-12 PROCEDURE — 1160F RVW MEDS BY RX/DR IN RCRD: CPT | Mod: CPTII,S$GLB,,

## 2024-11-12 PROCEDURE — 3044F HG A1C LEVEL LT 7.0%: CPT | Mod: CPTII,S$GLB,,

## 2024-11-12 PROCEDURE — 99214 OFFICE O/P EST MOD 30 MIN: CPT | Mod: S$GLB,,,

## 2024-11-12 PROCEDURE — 1159F MED LIST DOCD IN RCRD: CPT | Mod: CPTII,S$GLB,,

## 2024-11-12 PROCEDURE — 3008F BODY MASS INDEX DOCD: CPT | Mod: CPTII,S$GLB,,

## 2024-11-12 PROCEDURE — 99999 PR PBB SHADOW E&M-EST. PATIENT-LVL V: CPT | Mod: PBBFAC,,,

## 2024-11-12 PROCEDURE — 3079F DIAST BP 80-89 MM HG: CPT | Mod: CPTII,S$GLB,,

## 2024-11-12 RX ORDER — CLINDAMYCIN HYDROCHLORIDE 150 MG/1
150 CAPSULE ORAL EVERY 6 HOURS
COMMUNITY
Start: 2024-11-08

## 2024-11-12 NOTE — PATIENT INSTRUCTIONS
Instructed to call Michelle Gay RRT with any issues with device.   Office: [phone]  Cell: [phone]

## 2024-11-12 NOTE — PROGRESS NOTES
Subjective:       Patient ID: Xin Taylor is a 74 y.o. female.  Patient Active Problem List   Diagnosis    HTN (hypertension)    HLD (hyperlipidemia)    Anemia    Personal history of colonic polyps    Osteopenia    ASIF on CPAP    Prediabetes    Abnormal glucose    Palpitations     Immunization History   Administered Date(s) Administered    COVID-19, MRNA, LN-S, PF (Pfizer) (Purple Cap) 03/05/2021, 03/26/2021, 11/02/2021    Hepatitis A, Adult 11/13/2012, 05/13/2013    Hepatitis A, Pediatric/Adolescent, 2 Dose 05/13/2013    Influenza 10/29/2020    Influenza (FLUAD) - Quadrivalent - Adjuvanted - PF *Preferred* (65+) 10/29/2021, 11/17/2022, 09/26/2023    Influenza - Quadrivalent - PF *Preferred* (6 months and older) 10/30/2013    Influenza - Trivalent - Afluria, Fluzone MDV 10/06/2008, 10/06/2009, 10/25/2010    Influenza - Trivalent - Fluad - Adjuvanted - PF (65 years and older 10/18/2024    Influenza - Trivalent - Fluzone High Dose - PF (65 years and older) 10/27/2015, 11/10/2016, 11/29/2017, 11/21/2018, 10/10/2019    Influenza Split 10/06/2008, 10/06/2009, 10/25/2010, 11/13/2012    Meningococcal Conjugate (MCV4P) 11/13/2012    Pneumococcal Conjugate - 13 Valent 01/27/2016    Pneumococcal Polysaccharide - 23 Valent 11/10/2016    Tdap 11/13/2012, 05/09/2024    Typhoid - ViCPs 11/09/2012    Yellow Fever 11/09/2012    Zoster 08/08/2012    Zoster Recombinant 04/29/2021, 06/29/2021     Social History     Tobacco Use   Smoking Status Never    Passive exposure: Never   Smokeless Tobacco Never   Tobacco Comments    None          11/16/2023    10:52 AM   EPWORTH SLEEPINESS SCALE   Sitting and reading 3   Watching TV 3   Sitting, inactive in a public place (e.g. a theatre or a meeting) 0   As a passenger in a car for an hour without a break 0   Lying down to rest in the afternoon when circumstances permit 3   Sitting and talking to someone 0   Sitting quietly after a lunch without alcohol 3   In a car, while stopped for a few  minutes in traffic 1   Total score 13           Chief Complaint: No chief complaint on file.      Xin Taylor comes to see me as a follow-up  Last visit was 09/224/2021  She has ASIF on APAP 6-14  CPAP affected by recall.   Has replacement  Residual AHI 3.0, some mask leak 12 min     [place] score 3  Her usage greater than 4 hours was 96.7 %.  She'll follow-up annually.  Information on equipment change given  Goals of CPAP discussed      11/16/2023  Followup  No snoring  Has dream station 1 and 2  Has not switched device  Says using device  Works at : fell at work  [place] 13      11/12/2024  Followup          Review of Systems   Constitutional: Negative.    HENT: Negative.     Eyes: Negative.    Respiratory: Negative.     Genitourinary: Negative.    Endocrine: endocrine negative    Musculoskeletal: Negative.    Skin: Negative.    Gastrointestinal: Negative.    Neurological: Negative.    Psychiatric/Behavioral: Negative.     All other systems reviewed and are negative.          Objective:       There were no vitals filed for this visit.        Physical Exam   Constitutional: She is oriented to person, place, and time. She appears well-developed and well-nourished.   HENT:   Head: Normocephalic.   Nose: Nose normal.   Cardiovascular: Normal rate and regular rhythm.   Pulmonary/Chest: Normal expansion, symmetric chest wall expansion, effort normal and breath sounds normal.   Musculoskeletal:         General: Normal range of motion.      Cervical back: Normal range of motion.        Legs:    Neurological: She is alert and oriented to person, place, and time.   Skin: Skin is warm and dry.   Psychiatric: She has a normal mood and affect.   Nursing note and vitals reviewed.    Personal Diagnostic Review  DOWNLOAD    Compliance Information 10/16/2022 - 11/14/2022  Compliance Summary  10/16/2022 - 11/14/2022 (30 days)  Days with Device Usage 30 days  Days without Device Usage 0 days  Percent Days with Device Usage  100.0%  Cumulative Usage 10 days 4 hrs. 41 mins.  Maximum Usage (1 Day) 10 hrs. 46 mins. 11 secs.  Average Usage (All Days) 8 hrs. 9 mins. 22 secs.  Average Usage (Days Used) 8 hrs. 9 mins. 22 secs.  Minimum Usage (1 Day) 2 hrs. 15 mins. 15 secs.  Percent of Days with Usage >= 4 Hours 96.7%  Percent of Days with Usage < 4 Hours 3.3%  Date Range  Total Blower Time 10 days 4 hrs. 41 mins. 17 secs.  Average AHI 2.6  Auto-CPAP Summary  Auto-CPAP Mean Pressure 9.0 cmH2O  Auto-CPAP Peak Average Pressure 13.1 cmH2O  Device Pressure <= 90% of Time 12.7 cmH2O  Average Time in Large Leak Per Day 15 mins. 24 secs.      CXR today   X-Ray Knee Complete 4 or More Views Left  Narrative: EXAMINATION:  XR KNEE COMP 4 OR MORE VIEWS LEFT    CLINICAL HISTORY:  Pain in left knee    TECHNIQUE:  AP, lateral, and Merchant views of the left knee were performed.    COMPARISON:  None    FINDINGS:  No acute fracture or dislocation.  Moderate degenerative joint disease of the left knee.  Suprapatellar joint effusion.  Impression: As above    Electronically signed by: Kevin Vargas  Date:    05/11/2024  Time:    17:40  X-Ray Hip 2 or 3 views Left (with Pelvis when performed)  Narrative: EXAMINATION:  XR HIP WITH PELVIS WHEN PERFORMED, 2 OR 3 VIEWS LEFT    CLINICAL HISTORY:  Pain in left leg    TECHNIQUE:  AP view of the pelvis and frog leg lateral view of the left hip were performed.    COMPARISON:  None    FINDINGS:  No fracture or dislocation.  Punctate densities in the femoral neck may relate from enchondroma.  Pubic osteitis.  Impression: As above    Electronically signed by: Kevin Vargas  Date:    05/11/2024  Time:    17:31  X-Ray Lumbar Spine Ap And Lateral  Narrative: EXAMINATION:  XR LUMBAR SPINE AP AND LATERAL    CLINICAL HISTORY:  XR LUMBAR SPINE AP AND LATERAL    COMPARISON:  None    FINDINGS:  Multiple radiographic views  were obtained.    No evidence of acute fracture or dislocation.  Mild moderate multilevel degenerative disc  disease.  Atherosclerotic changes.  Grade 1 anterolisthesis of L4 respect to L5.  Moderate amount of stool in the colon.  Impression: No acute abnormality.    Electronically signed by: Kevin Vargas  Date:    05/11/2024  Time:    17:18     Assessment:       Problem List Items Addressed This Visit       HTN (hypertension)    HLD (hyperlipidemia) - Primary    Prediabetes    ASIF on CPAP     Plan:       Patient using device and benefits    Avoid salt indiscretion    No follow-ups on file.    This note was prepared using voice recognition system and is likely to have sound alike errors that may have been overlooked even after proof reading.  Please call me with any questions    TIME SPENT WITH PATIENT:     Time spent: 20 minutes in face to face  discussion concerning diagnosis, prognosis, review of lab and test results, benefits of treatment as well as management of disease, counseling of patient and coordination of care between various health  care providers . Greater than half the time spent was used for coordination of care and counseling of patient.     Vito Muse    Pulmonary/Critical care/Sleepmedicine

## 2024-11-12 NOTE — ASSESSMENT & PLAN NOTE
Reports Use and benefits with device  Still using old device  Unable to obtain download   Instructed to stop using old device that has been medically recalled  Dr. Muse present and strongly emphasized safety risk with its continued use.  Patient stated that she will no longer use old device and begin use with Dream station 2.   Given instructions on new device use and SD card insertion by RTT Michelle  Will follow up in 12 weeks with new device download data  CPAP supplies ordered for new device Dream station 2     Full face mask  HME: [facility]  CPAP    Plan:   Follow up in 12 week with download from Dream station 2 information  CPAP supplies ordered    Discussed therapeutic goals for positive airway pressure therapy(CPAP or BiPAP):   Ideal is usage 100% of nights for 6 - 8 hours per night.   Minimum usage is 70% of night for at least 4 hours per night used.  Mask choices discussed.  Patient expressed understanding. All Questions answered.

## 2024-11-12 NOTE — PROGRESS NOTES
Subjective:      Patient ID: Xin Taylor is a 74 y.o. female.    Chief Complaint: No chief complaint on file.      11/12/2024  Presents to the  pulmonary clinic for ASIF follow up  New to me  Last seen by VLADIMIR Malloy PA-C in 12/2023   She reports using  her CPAP device every night with noticeable sleep benefits.   Does not have any sleep complaints  or device  issues at this time.   Bedtime: 11-12 pm  Wake time: 9 am  Has been using old machine even though she has the new Dream station 2 at home  No particular reason given for no switching devices  Unable to obtain download information for this visit   New device turned on but not used creating discrepancy issue between new & old device data  Will follow up in 12 weeks with new device download information  Uses stationary bike 3 days a week for 30 minutes to exercise.   Hx HTN follows Cardiology and taking Norvasc 10 mg and Diovan--25.  6/12/2017 HST reviewed: Mild ASIF  AHI 13.4   CPAP 6-14 cm H2O    Ep:4      Review of Systems   Constitutional:  Negative for fever, weight loss, fatigue and night sweats.   HENT:  Negative for postnasal drip, rhinorrhea, sinus pressure, sore throat, trouble swallowing and congestion.    Respiratory:  Negative for apnea, snoring, cough, hemoptysis, sputum production, chest tightness, shortness of breath, wheezing, orthopnea, asthma nighttime symptoms, dyspnea on extertion and somnolence.    Cardiovascular:  Positive for leg swelling (compression stockings). Negative for chest pain.   Musculoskeletal:  Positive for arthralgias (knees).   Neurological: Negative.    Psychiatric/Behavioral:  Negative for sleep disturbance.      Past Medical History:   Diagnosis Date    Anemia     Colon polyp     Edema     HLD (hyperlipidemia)     HTN (hypertension)     Palpitation     Tinnitus      Active Problem List with Overview Notes    Diagnosis Date Noted    Palpitations 06/01/2023    Prediabetes 04/29/2020    Abnormal glucose  04/29/2020    ASIF on CPAP 05/19/2017 6/12/2017 Home Sleep Study Mild ASIF  AHI 13.4   On Auto CPAP 6-14 cm optimal AHI 2.2  Full face mask  HME: [facility]       Osteopenia 07/26/2016     1200mg of calcium through diet, l2 weakest on dexa, l1-l3 at -1.5      Personal history of colonic polyps 08/11/2015    HTN (hypertension)     HLD (hyperlipidemia)     Anemia       Active Ambulatory Problems     Diagnosis Date Noted    HTN (hypertension)     HLD (hyperlipidemia)     Anemia     Personal history of colonic polyps 08/11/2015    Osteopenia 07/26/2016    ASIF on CPAP 05/19/2017    Prediabetes 04/29/2020    Abnormal glucose 04/29/2020    Palpitations 06/01/2023     Resolved Ambulatory Problems     Diagnosis Date Noted    Mass of oral cavity 05/19/2017     Past Medical History:   Diagnosis Date    Colon polyp     Edema     Palpitation     Tinnitus       Past Surgical History:   Procedure Laterality Date    COLONOSCOPY N/A 04/22/2022    Procedure: COLONOSCOPY;  Surgeon: Priyanka Winkler MD;  Location: [facility];  Service: Endoscopy;  Laterality: N/A;    HYSTERECTOMY          Tobacco Use: Low Risk  (9/4/2024)    Patient History     Smoking Tobacco Use: Never     Smokeless Tobacco Use: Never     Passive Exposure: Never      Current Outpatient Medications   Medication Sig    amLODIPine (NORVASC) 10 MG tablet Take 1 tablet (10 mg total) by mouth once daily.    aspirin 81 mg Tab Take 1 tablet by mouth once daily.    atorvastatin (LIPITOR) 10 MG tablet TAKE 1 TABLET(10 MG) BY MOUTH EVERY DAY    b complex vitamins tablet Take 1 tablet by mouth once daily.    co-enzyme Q-10 30 mg capsule Take 30 mg by mouth 3 (three) times daily.    ferrous sulfate 325 mg (65 mg iron) Tab tablet Take 1 tablet (325 mg total) by mouth once daily. Take with citrus    fish oil-omega-3 fatty acids 300-1,000 mg capsule Take 2 g by mouth once daily.    meloxicam (MOBIC) 15 MG tablet Take 1 tablet (15 mg total) by mouth  "every 7 days. Once a week or PRN    multivitamin (THERAGRAN) per tablet Take 1 tablet by mouth once daily.    turmeric root extract 500 mg Cap Take 500 mg by mouth 2 (two) times daily.    valsartan-hydrochlorothiazide (DIOVAN-HCT) 160-25 mg per tablet Take 1 tablet by mouth once daily.    vitamin D 1000 units Tab Take 185 mg by mouth once daily.   Last reviewed on 9/4/2024  3:08 PM by Eder Miller OD      Objective:     Vitals:    11/12/24 1202   BP: 120/82   Pulse: 87   Resp: 16     Body mass index is 27.22 kg/m².       Physical Exam   Constitutional: She is oriented to person, place, and time.   HENT:   Head: Normocephalic.   Nose: Nose normal.   Mouth/Throat: Oropharynx is clear and moist.   Cardiovascular: Normal rate, regular rhythm, normal heart sounds and normal pulses.   Non pitting edema to BL hands (stable per patinet). Compression stocking on at present.    Pulmonary/Chest: Normal expansion, symmetric chest wall expansion, effort normal and breath sounds normal. No respiratory distress. She has no decreased breath sounds. She has no wheezes. She has no rhonchi. She has no rales.   Musculoskeletal:      Cervical back: Normal range of motion and neck supple.   Lymphadenopathy: No supraclavicular adenopathy is present.     She has no cervical adenopathy.     She has no axillary adenopathy.   Neurological: She is alert and oriented to person, place, and time.   Skin: Skin is warm and dry. Nails show no clubbing.   Psychiatric: She has a normal mood and affect. Her behavior is normal. Judgment and thought content normal.   Vitals reviewed.        6/18/2024     3:15 PM 5/27/2024     2:23 PM 5/11/2024     6:28 PM 5/11/2024     4:31 PM 5/11/2024     4:29 PM 5/9/2024     1:52 PM 3/14/2024     1:37 PM   Pulmonary Function Tests   SpO2 99 %  98 % 98 %  97 % 99 %   Height 5' 5" (1.651 m)     5' 5" (1.651 m) 5' 5" (1.651 m)   Weight 74.2 kg (163 lb 9.3 oz) 71.9 kg (158 lb 8.2 oz)   71.2 kg (157 lb) 73.8 kg " (162 lb 11.2 oz) 76 kg (167 lb 8.8 oz)   BMI (Calculated) 27.2     27.1 27.9       X-Ray Knee Complete 4 or More Views Left  Narrative: EXAMINATION:  XR KNEE COMP 4 OR MORE VIEWS LEFT    CLINICAL HISTORY:  Pain in left knee    TECHNIQUE:  AP, lateral, and Merchant views of the left knee were performed.    COMPARISON:  None    FINDINGS:  No acute fracture or dislocation.  Moderate degenerative joint disease of the left knee.  Suprapatellar joint effusion.  Impression: As above    Electronically signed by: Kevin Vargas  Date:    05/11/2024  Time:    17:40  X-Ray Hip 2 or 3 views Left (with Pelvis when performed)  Narrative: EXAMINATION:  XR HIP WITH PELVIS WHEN PERFORMED, 2 OR 3 VIEWS LEFT    CLINICAL HISTORY:  Pain in left leg    TECHNIQUE:  AP view of the pelvis and frog leg lateral view of the left hip were performed.    COMPARISON:  None    FINDINGS:  No fracture or dislocation.  Punctate densities in the femoral neck may relate from enchondroma.  Pubic osteitis.  Impression: As above    Electronically signed by: Kevin Vargas  Date:    05/11/2024  Time:    17:31  X-Ray Lumbar Spine Ap And Lateral  Narrative: EXAMINATION:  XR LUMBAR SPINE AP AND LATERAL    CLINICAL HISTORY:  XR LUMBAR SPINE AP AND LATERAL    COMPARISON:  None    FINDINGS:  Multiple radiographic views  were obtained.    No evidence of acute fracture or dislocation.  Mild moderate multilevel degenerative disc disease.  Atherosclerotic changes.  Grade 1 anterolisthesis of L4 respect to L5.  Moderate amount of stool in the colon.  Impression: No acute abnormality.    Electronically signed by: Kevin Vargas  Date:    05/11/2024  Time:    17:18     Encounter Date: 6/18/2024 Cardiac Clinic Note  Naz Martinez MD   Physician  Specialty: Cardiology  Chief Complaint/Reason for consultation: hypertension, palpitations         HPI:   Xin Taylor is a 73 y.o. female with h/o HTN, ASIF, HLD who comes in as a referral to cardiology clinic by PCP   Love for evaluation.      7/11/22  About 2 weeks ago,had palpitations, started when she was going to the bathroom  symptoms   Would go away after resting, drinking water, raising her feet  Reports left ear tinnitus, has seen ENT, normal workup   Does not exercise regularly, no limitations with this   Worked in healthcare for many years, currently a      Denies tobacco or ETOH abuse  Family hx: mother- DM; brother- arteriosclerosis      Denies DM, CVA     Denies chest pain, SOB, dizziness, syncope      Plan:     Palpitations  Intermittent   14 day cardiac monitor 7/22- no significant arrhythmias   Echocardiogram 7/22 with normal EF, mod HI, mild MR/TR  48 hr monitor 1/24 no significant arrhythmias      Foot numbness  SHEY 7/23 RLE normal, mildly abnormal LLE  Arterial U/S 7/23 left leg with plaque build up, no sig stenosis     ASIF  Compliant with CPAP     HTN  Mildly elevated   Continue Diovan-hydrochlorothiazide in a.m. 160/25, [id number] mg at night  Low-salt, low-fat diet  Exercise as tolerated, at least 30 minutes daily     Pre diabetes  A1c 5.7  Continue therapy     HLD  LDL 93 as of 2/23  Continue statin  Personal Diagnostic Review    12/13/2023 Routine     Narrative & Impression  EXAMINATION:  XR CHEST PA AND LATERAL     CLINICAL HISTORY:  Cough, unspecified     TECHNIQUE:  PA and lateral views of the chest were performed.     COMPARISON:  11/17/2022     FINDINGS:  The lungs are clear and free of infiltrate.  No pleural effusion or pneumothorax. The heart is not enlarged. There is tortuosity of the descending thoracic aorta.     Impression:     1.  No acute cardiopulmonary process.           SLEEP STUDY SUMMARYJun 12 2017  Night #1  Device start time 23:51  Study start time 00:06  Study end time 08:06  Total study duration 07:55  Disconnects: # 0  Disconnect: (min) 0  Invalid respiratory signals: # 1  Invalid respiratory signal: (min) 2  Invalid O2 signal: (min) 2  Apnea + Hypopnea Index  (AHI/RDI**)  with >= 3% O2 desaturation 13.4  Total number of hypopneas  with >= 3% O2 desaturation 58  Apnea + Hypopnea Index (AHI/RDI**)  with >= 4% O2 desaturation 10.2  Total number of hypopneas  with >= 4% O2 desaturation 33  Apnea Index (AI) 6.1  Total number of apneas 48  Obstructive apneas 48  Unclassified apneas* 0  Lowest O2 saturation 80%  Time of occurrence 05:32  % time below 90% saturation 2%  Heart Rate (bpm)  Minimum 49  Maximum 103  Average 59  Snoring/Respiration Noise  Time over 50dBa (HH:MM) 07:52  % time over 50dBa 99%  Maximum () 80.0  Average () 66.4  * Unclassified apnea: apnea accompanied by a reduction in chest movement of greater than 90% compared to baseline movement.  ** AHI/RDI = sum of the apneas and hypopneas divided by the number hours in the study.       Assessment/Plan:     ASIF on CPAP  Assessment & Plan:  Reports Use and benefits with device  Still using old device  Unable to obtain download   Instructed to stop using old device that has been medically recalled  Dr. Muse present and strongly emphasized safety risk with its continued use.  Patient stated that she will no longer use old device and begin use with Dream station 2.   Given instructions on new device use and SD card insertion by LEONID Martinez  Will follow up in 12 weeks with new device download data  CPAP supplies ordered for new device Dream station 2     Full face mask  HME: [facility]  CPAP    Plan:   Follow up in 12 week with download from Dream station 2 information  CPAP supplies ordered    Discussed therapeutic goals for positive airway pressure therapy(CPAP or BiPAP):   Ideal is usage 100% of nights for 6 - 8 hours per night.   Minimum usage is 70% of night for at least 4 hours per night used.  Mask choices discussed.  Patient expressed understanding. All Questions answered.      Orders:  -     CPAP/BIPAP SUPPLIES    Mixed hyperlipidemia  Assessment & Plan:  Taking Lipitor 10 mg   Stable    Plan:  Continue  instructions and care with ordering provider.       Primary hypertension  Assessment & Plan:  Last note reviewed from Cardiology on 2024, HEATHER Martinez MD  Still taking Diovan-hydrochlorothiazide 160/25 & [id number] mg   Follows low-salt, low-fat diet  Exercises on stationary bike       Plan:   Continue management instructions and follow up with cardiology      Prediabetes            Outpatient Encounter Medications as of 2024   Medication Sig Dispense Refill    amLODIPine (NORVASC) 10 MG tablet Take 1 tablet (10 mg total) by mouth once daily. 90 tablet 1    aspirin 81 mg Tab Take 1 tablet by mouth once daily.      atorvastatin (LIPITOR) 10 MG tablet TAKE 1 TABLET(10 MG) BY MOUTH EVERY DAY 90 tablet 3    b complex vitamins tablet Take 1 tablet by mouth once daily.      co-enzyme Q-10 30 mg capsule Take 30 mg by mouth 3 (three) times daily.      ferrous sulfate 325 mg (65 mg iron) Tab tablet Take 1 tablet (325 mg total) by mouth once daily. Take with citrus 100 tablet 3    fish oil-omega-3 fatty acids 300-1,000 mg capsule Take 2 g by mouth once daily.      [] influenza, adjuvanted, (FLUAD TRIV ,65Y UP,,PF,) 45 mcg (15 mcg x 3)/0.5 mL IM vaccine (> or = 66 yo) Inject 0.5 mLs into the muscle once. for 1 dose 0.5 mL 0    meloxicam (MOBIC) 15 MG tablet Take 1 tablet (15 mg total) by mouth every 7 days. Once a week or PRN 30 tablet 1    multivitamin (THERAGRAN) per tablet Take 1 tablet by mouth once daily.      turmeric root extract 500 mg Cap Take 500 mg by mouth 2 (two) times daily. 100 capsule 4    valsartan-hydrochlorothiazide (DIOVAN-HCT) 160-25 mg per tablet Take 1 tablet by mouth once daily. 90 tablet 3    vitamin D 1000 units Tab Take 185 mg by mouth once daily.      [DISCONTINUED] atorvastatin (LIPITOR) 10 MG tablet Take 1 tablet (10 mg total) by mouth once daily. 90 tablet 3     No facility-administered encounter medications on file as of 2024.     No orders of the  defined types were placed in this encounter.    Follow up in about 12 weeks (around 2/4/2025), or follow up in 12 weeks for virtual visit and download ( with [employer]).     Note has been documented by Bety Masters on 11/12/2024 Pulmonary Disease

## 2024-11-12 NOTE — ASSESSMENT & PLAN NOTE
Last note reviewed from Cardiology on 6/18/2024, HEATHER Martinez MD  Still taking Diovan-hydrochlorothiazide 160/25 & [id number] mg   Follows low-salt, low-fat diet  Exercises on stationary bike       Plan:   Continue management instructions and follow up with cardiology

## 2025-01-09 ENCOUNTER — PATIENT MESSAGE (OUTPATIENT)
Dept: PRIMARY CARE CLINIC | Facility: CLINIC | Age: 75
End: 2025-01-09
Payer: COMMERCIAL

## 2025-01-17 ENCOUNTER — OFFICE VISIT (OUTPATIENT)
Dept: CARDIOLOGY | Facility: CLINIC | Age: 75
End: 2025-01-17
Payer: COMMERCIAL

## 2025-01-17 VITALS — SYSTOLIC BLOOD PRESSURE: 140 MMHG | DIASTOLIC BLOOD PRESSURE: 76 MMHG

## 2025-01-17 DIAGNOSIS — E78.2 MIXED HYPERLIPIDEMIA: ICD-10-CM

## 2025-01-17 DIAGNOSIS — R06.09 DOE (DYSPNEA ON EXERTION): Primary | ICD-10-CM

## 2025-01-17 DIAGNOSIS — G47.33 OSA ON CPAP: ICD-10-CM

## 2025-01-17 DIAGNOSIS — I10 PRIMARY HYPERTENSION: ICD-10-CM

## 2025-01-17 PROCEDURE — 1159F MED LIST DOCD IN RCRD: CPT | Mod: CPTII,S$GLB,,

## 2025-01-17 PROCEDURE — 99999 PR PBB SHADOW E&M-EST. PATIENT-LVL III: CPT | Mod: PBBFAC,,,

## 2025-01-17 PROCEDURE — 1126F AMNT PAIN NOTED NONE PRSNT: CPT | Mod: CPTII,S$GLB,,

## 2025-01-17 PROCEDURE — 1101F PT FALLS ASSESS-DOCD LE1/YR: CPT | Mod: CPTII,S$GLB,,

## 2025-01-17 PROCEDURE — 99214 OFFICE O/P EST MOD 30 MIN: CPT | Mod: S$GLB,,,

## 2025-01-17 PROCEDURE — 3288F FALL RISK ASSESSMENT DOCD: CPT | Mod: CPTII,S$GLB,,

## 2025-01-17 PROCEDURE — 3077F SYST BP >= 140 MM HG: CPT | Mod: CPTII,S$GLB,,

## 2025-01-17 PROCEDURE — 3078F DIAST BP <80 MM HG: CPT | Mod: CPTII,S$GLB,,

## 2025-01-17 PROCEDURE — 1160F RVW MEDS BY RX/DR IN RCRD: CPT | Mod: CPTII,S$GLB,,

## 2025-01-17 NOTE — PROGRESS NOTES
Subjective:   Patient ID:  Xin Taylor is a 74 y.o. female who presents for evaluation of No chief complaint on file.      HPI  Xin Taylor is a 72 y.o. female with h/o HTN, ASIF, HLD, anxiety who presents to clinic today for BP follow up. Recently started on bystolic and Diovan. Bystolic was increased 3 days ago to 20mg daily. denies any HA, dizziness, near syncope, CP. Stopped Amlodipine at last visit, still with some sharda ankle swelling. Pt also reports sharda knee pain worsening over years and foot callus pains    Home Bps 130s systolic in am after meds 130s, evenings 150s  Works at  facility, active    1/17/25  Here today for CV follow-up, c/o atypical chest discomfort when she turns certain ways in the bed but feels she cannot recreate the pain. Has leg swelling (CCB). Denies any SOB, dizziness or palpitations    Echocardiogram 7/22 with normal EF, mod PA, mild MR/TR  48 hr monitor 1/24 no significant arrhythmias   Past Medical History:   Diagnosis Date    Anemia     Colon polyp     Edema     HLD (hyperlipidemia)     HTN (hypertension)     Palpitation     Tinnitus        Past Surgical History:   Procedure Laterality Date    COLONOSCOPY N/A 04/22/2022    Procedure: COLONOSCOPY;  Surgeon: Priyanka Winkler MD;  Location: [facility];  Service: Endoscopy;  Laterality: N/A;    HYSTERECTOMY         Social History     Tobacco Use    Smoking status: Never     Passive exposure: Never    Smokeless tobacco: Never    Tobacco comments:     None   Substance Use Topics    Alcohol use: No    Drug use: No       Family History   Problem Relation Name Age of Onset    Hypertension Mother Liana Taylor     Thyroid disease Mother Liana Taylor     Cataracts Mother Liana Taylor     Cancer Mother Liana Taylor         lung    Alcohol abuse Father Santiago Taylor     Cancer Other      Heart defect Brother      Breast cancer Paternal Aunt         Current Outpatient Medications on File Prior to Visit   Medication Sig Dispense  Refill    amLODIPine (NORVASC) 10 MG tablet Take 1 tablet (10 mg total) by mouth once daily. 90 tablet 1    aspirin 81 mg Tab Take 1 tablet by mouth once daily.      atorvastatin (LIPITOR) 10 MG tablet TAKE 1 TABLET(10 MG) BY MOUTH EVERY DAY 90 tablet 3    b complex vitamins tablet Take 1 tablet by mouth once daily.      clindamycin (CLEOCIN) 150 MG capsule Take 150 mg by mouth every 6 (six) hours.      co-enzyme Q-10 30 mg capsule Take 30 mg by mouth 3 (three) times daily.      ferrous sulfate 325 mg (65 mg iron) Tab tablet Take 1 tablet (325 mg total) by mouth once daily. Take with citrus 100 tablet 3    fish oil-omega-3 fatty acids 300-1,000 mg capsule Take 2 g by mouth once daily.      meloxicam (MOBIC) 15 MG tablet Take 1 tablet (15 mg total) by mouth every 7 days. Once a week or PRN 30 tablet 1    multivitamin (THERAGRAN) per tablet Take 1 tablet by mouth once daily.      turmeric root extract 500 mg Cap Take 500 mg by mouth 2 (two) times daily. 100 capsule 4    valsartan-hydrochlorothiazide (DIOVAN-HCT) 160-25 mg per tablet Take 1 tablet by mouth once daily. 90 tablet 3    vitamin D 1000 units Tab Take 185 mg by mouth once daily.       No current facility-administered medications on file prior to visit.      Wt Readings from Last 3 Encounters:   06/18/24 74.2 kg (163 lb 9.3 oz)   05/27/24 71.9 kg (158 lb 8.2 oz)   05/11/24 71.2 kg (157 lb)     Temp Readings from Last 3 Encounters:   05/11/24 98 °F (36.7 °C) (Oral)   05/09/24 98.5 °F (36.9 °C) (Tympanic)   08/21/23 98.4 °F (36.9 °C) (Oral)     BP Readings from Last 3 Encounters:   11/12/24 120/82   06/18/24 136/78   05/11/24 138/70     Pulse Readings from Last 3 Encounters:   11/12/24 87   06/18/24 91   05/11/24 80        Review of Systems   Constitutional: Negative.   HENT: Negative.     Eyes: Negative.    Cardiovascular:  Positive for chest pain.   Respiratory: Negative.     Skin: Negative.    Musculoskeletal: Negative.    Gastrointestinal: Negative.     Genitourinary: Negative.    Neurological: Negative.    Psychiatric/Behavioral: Negative.         Objective:   Physical Exam  Vitals and nursing note reviewed.   Constitutional:       Appearance: Normal appearance.   HENT:      Head: Normocephalic.   Eyes:      Pupils: Pupils are equal, round, and reactive to light.   Cardiovascular:      Rate and Rhythm: Normal rate and regular rhythm.      Heart sounds: Normal heart sounds, S1 normal and S2 normal. No murmur heard.     No S3 or S4 sounds.   Pulmonary:      Effort: Pulmonary effort is normal.      Breath sounds: Normal breath sounds.   Abdominal:      General: Bowel sounds are normal.      Palpations: Abdomen is soft.   Musculoskeletal:         General: Normal range of motion.      Cervical back: Normal range of motion.      Right lower leg: Edema present.      Left lower leg: Edema present.   Skin:     General: Skin is warm and dry.      Capillary Refill: Capillary refill takes less than 2 seconds.   Neurological:      General: No focal deficit present.      Mental Status: She is alert and oriented to person, place, and time.   Psychiatric:         Mood and Affect: Mood normal.         Behavior: Behavior normal.         Thought Content: Thought content normal.         Lab Results   Component Value Date    CHOL 204 (H) 05/06/2024    CHOL 184 02/23/2023    CHOL 200 (H) 04/05/2022     Lab Results   Component Value Date    HDL 95 (H) 05/06/2024    HDL 80 (H) 02/23/2023    HDL 84 (H) 04/05/2022     Lab Results   Component Value Date    LDLCALC 99.6 05/06/2024    LDLCALC 93.4 02/23/2023    LDLCALC 106.0 04/05/2022     Lab Results   Component Value Date    TRIG 47 05/06/2024    TRIG 53 02/23/2023    TRIG 50 04/05/2022     Lab Results   Component Value Date    CHOLHDL 46.6 05/06/2024    CHOLHDL 43.5 02/23/2023    CHOLHDL 42.0 04/05/2022       Chemistry        Component Value Date/Time     05/06/2024 1128    K 3.4 (L) 05/06/2024 1128    CL 98 05/06/2024 1128    CO2  "25 05/06/2024 1128    BUN 18 05/06/2024 1128    CREATININE 0.9 05/06/2024 1128    GLU 91 05/06/2024 1128        Component Value Date/Time    CALCIUM 10.0 05/06/2024 1128    ALKPHOS 73 05/06/2024 1128    AST 21 05/06/2024 1128    ALT 13 05/06/2024 1128    BILITOT 0.5 05/06/2024 1128    ESTGFRAFRICA >60.0 04/05/2022 1147    EGFRNONAA >60.0 04/05/2022 1147          Lab Results   Component Value Date    TSH 1.524 05/06/2024     No results found for: "INR", "PROTIME"  @RESUFAST(WBC,HGB,HCT,MCV,PLT)  @LABRCNTIP(BNP,BNPTRIAGEBLO)@  CrCl cannot be calculated (Patient's most recent lab result is older than the maximum 7 days allowed.).     Results for orders placed during the hospital encounter of 07/15/22    Echo    Interpretation Summary  · The left ventricle is normal in size with normal systolic function.  · Normal left ventricular diastolic function.  · The estimated PA systolic pressure is 31 mmHg.  · Normal right ventricular size with normal right ventricular systolic function.  · Normal central venous pressure (3 mmHg).  · Moderate pulmonic regurgitation.  · Mild mitral regurgitation.  · Mild tricuspid regurgitation.  · The estimated ejection fraction is 60%.     No results found for this or any previous visit.     Assessment:      1. Primary hypertension    2. Mixed hyperlipidemia    3. ASIF on CPAP            Plan:   Primary hypertension    Mixed hyperlipidemia    ASIF on CPAP      Consider dec amlodipine and adding coreg, pt would like to think about it    Amlodipine, Diovan-HCT, Low-salt, profile BP-HTN  Statin-HLD  CPAP- ASIF  Discussed heart healthy diet, daily exercise- diabetes    Exercise stress  RTC2m for BP f/u    Courtney Guillot, FNP-C [facility], Cardiology        "

## 2025-02-03 ENCOUNTER — PATIENT MESSAGE (OUTPATIENT)
Dept: PRIMARY CARE CLINIC | Facility: CLINIC | Age: 75
End: 2025-02-03
Payer: COMMERCIAL

## 2025-02-03 DIAGNOSIS — Z12.31 ENCOUNTER FOR SCREENING MAMMOGRAM FOR BREAST CANCER: Primary | ICD-10-CM

## 2025-02-04 ENCOUNTER — OFFICE VISIT (OUTPATIENT)
Dept: PULMONOLOGY | Facility: CLINIC | Age: 75
End: 2025-02-04
Payer: COMMERCIAL

## 2025-02-04 DIAGNOSIS — G47.33 OSA ON CPAP: Primary | ICD-10-CM

## 2025-02-04 PROCEDURE — 98006 SYNCH AUDIO-VIDEO EST MOD 30: CPT | Mod: 95,,,

## 2025-02-04 PROCEDURE — 3288F FALL RISK ASSESSMENT DOCD: CPT | Mod: CPTII,95,,

## 2025-02-04 PROCEDURE — 1159F MED LIST DOCD IN RCRD: CPT | Mod: CPTII,95,,

## 2025-02-04 PROCEDURE — 1101F PT FALLS ASSESS-DOCD LE1/YR: CPT | Mod: CPTII,95,,

## 2025-02-04 PROCEDURE — 1160F RVW MEDS BY RX/DR IN RCRD: CPT | Mod: CPTII,95,,

## 2025-02-04 NOTE — PROGRESS NOTES
Subjective:      Patient ID: Xin Taylor is a 74 y.o. female.    Chief Complaint: Sleep Apnea      The patient location is: [place]  The chief complaint leading to consultation is: Sleep Apnea    Visit type: audiovisual    Face to Face time with patient: 19  40 minutes of total time spent on the encounter, which includes face to face time and non-face to face time preparing to see the patient (eg, review of tests), Obtaining and/or reviewing separately obtained history, Documenting clinical information in the electronic or other health record, Independently interpreting results (not separately reported) and communicating results to the patient/family/caregiver, or Care coordination (not separately reported).     Each patient to whom he or she provides medical services by telemedicine is:  (1) informed of the relationship between the physician and patient and the respective role of any other health care provider with respect to management of the patient; and (2) notified that he or she may decline to receive medical services by telemedicine and may withdraw from such care at any time.    Notes:     02/04/2025   Xin Taylor 74 y.o.   Follow up ASIF visit   Stopped using recalled CPAP device - sent it back  Hesitant at first but using new device nightly with ease   No issues   Improved sleep quality   Wakes to void - on diuretic   Able to go back to sleep with ease  No residual day or nighttime symptoms  Question about supply  Started taking OTC Mg 100 mg every other day  Bedtime 12:30-1 am   Wake time 10 am     Download today:  1/5/2025 - 2/3/2025 (30 days)  Percent Days with Device Usage 100.0%  Percent of Days with Usage >= 4 Hours 100.0%  AHI= 2.2            2/4/2025   EPWORTH SLEEPINESS SCALE   Sitting and reading 0   Watching TV 2   Sitting, inactive in a public place (e.g. a theatre or a meeting) 0   As a passenger in a car for an hour without a break 0   Lying down to rest in the afternoon when  circumstances permit 3   Sitting and talking to someone 0   Sitting quietly after a lunch without alcohol 2   In a car, while stopped for a few minutes in traffic 0   Total score 7     Review of Systems   Constitutional:  Negative for fatigue and night sweats.   HENT: Negative.     Respiratory:  Negative for snoring, wheezing, orthopnea and somnolence.    Neurological:  Negative for headaches.   Psychiatric/Behavioral:  Negative for sleep disturbance.        Interval HPI History:    11/12/2024  Presents to the  pulmonary clinic for ASIF follow up  New to me  Last seen by VLADIMIR Malloy PA-C in 12/2023   She reports using  her CPAP device every night with noticeable sleep benefits.   Does not have any sleep complaints  or device  issues at this time.   Bedtime: 11-12 pm  Wake time: 9 am  Has been using old machine even though she has the new Dream station 2 at home  No particular reason given for no switching devices  Unable to obtain download information for this visit   New device turned on but not used creating discrepancy issue between new & old device data  Will follow up in 12 weeks with new device download information  Uses stationary bike 3 days a week for 30 minutes to exercise.   Hx HTN follows Cardiology and taking Norvasc 10 mg and Diovan--25.  6/12/2017 HST reviewed: Mild ASIF  AHI 13.4   CPAP 6-14 cm H2O     Ep:4    Patient Active Problem List   Diagnosis    HTN (hypertension)    HLD (hyperlipidemia)    Anemia    Personal history of colonic polyps    Osteopenia    ASIF on CPAP    Prediabetes    Abnormal glucose    Palpitations      Past Medical History:   Diagnosis Date    Anemia     Colon polyp     Edema     HLD (hyperlipidemia)     HTN (hypertension)     Palpitation     Tinnitus      Past Surgical History:   Procedure Laterality Date    COLONOSCOPY N/A 04/22/2022    Procedure: COLONOSCOPY;  Surgeon: Priyanka Winkler MD;  Location: [facility];  Service: Endoscopy;  Laterality: N/A;    HYSTERECTOMY       "  Review of patient's allergies indicates:   Allergen Reactions    No known drug allergies       Tobacco Use: Low Risk  (2/4/2025)    Patient History     Smoking Tobacco Use: Never     Smokeless Tobacco Use: Never     Passive Exposure: Never      Current Outpatient Medications   Medication Sig    amLODIPine (NORVASC) 10 MG tablet Take 1 tablet (10 mg total) by mouth once daily.    aspirin 81 mg Tab Take 1 tablet by mouth once daily.    atorvastatin (LIPITOR) 10 MG tablet TAKE 1 TABLET(10 MG) BY MOUTH EVERY DAY    b complex vitamins tablet Take 1 tablet by mouth once daily.    co-enzyme Q-10 30 mg capsule Take 30 mg by mouth 3 (three) times daily.    ferrous sulfate 325 mg (65 mg iron) Tab tablet Take 1 tablet (325 mg total) by mouth once daily. Take with citrus    fish oil-omega-3 fatty acids 300-1,000 mg capsule Take 2 g by mouth once daily.    meloxicam (MOBIC) 15 MG tablet Take 1 tablet (15 mg total) by mouth every 7 days. Once a week or PRN    multivitamin (THERAGRAN) per tablet Take 1 tablet by mouth once daily.    turmeric root extract 500 mg Cap Take 500 mg by mouth 2 (two) times daily.    valsartan-hydrochlorothiazide (DIOVAN-HCT) 160-25 mg per tablet Take 1 tablet by mouth once daily.    vitamin D 1000 units Tab Take 185 mg by mouth once daily.   Last reviewed on 2/4/2025 11:55 AM by Bety Masters, FNP-C      Objective:   There were no vitals filed for this visit.   Last 3 sets of Vitals        6/18/2024     3:15 PM 11/12/2024    12:02 PM 1/17/2025     3:39 PM   Vitals - 1 value per visit   SYSTOLIC 136 120 140   DIASTOLIC 78 82 76   Pulse 91 87    Resp  16    SPO2 99 % 97 %    Weight (lb) 163.58     Weight (kg) 74.2     Height 5' 5" (1.651 m) 5' 5" (1.651 m)    BMI (Calculated) 27.2     Pain Score Zero Zero Zero      There is no height or weight on file to calculate BMI.   Wt Readings from Last 3 Encounters:   06/18/24 74.2 kg (163 lb 9.3 oz)   05/27/24 71.9 kg (158 lb 8.2 oz)   05/11/24 71.2 kg (157 " "lb)        Physical Exam   Constitutional: She is cooperative.   Neurological: She is alert.           11/12/2024    12:02 PM 6/18/2024     3:15 PM 5/27/2024     2:23 PM 5/11/2024     6:28 PM 5/11/2024     4:31 PM 5/11/2024     4:29 PM 5/9/2024     1:52 PM   Pulmonary Function Tests   SpO2 97 % 99 %  98 % 98 %  97 %   Height 5' 5" (1.651 m) 5' 5" (1.651 m)     5' 5" (1.651 m)   Weight  74.2 kg (163 lb 9.3 oz) 71.9 kg (158 lb 8.2 oz)   71.2 kg (157 lb) 73.8 kg (162 lb 11.2 oz)   BMI (Calculated)  27.2     27.1       Personal Diagnostic Review    Summary Report  Patient ID: [id number]  YOB: 1950  Setup Date: 6/14/2017  Date Range:  Device:  Mask:  1/5/2025 - 2/3/2025  DreamStation 2 Auto CPAP Advanced V1.0.6.4326 ([id number]) [id number]  Patient Reference:  [facility]: [facility]....  [address] [place] [address], [facility] [place] [address]  Phone Number:  Address:  Phone Number:  Linked Vivi: [employer]  Care Team  [facility]. [address] [place] [address] [id number] [phone]  Vito Muse [facility] [address] [place] [id number]  Compliance Information 1/5/2025 - 2/3/2025  Compliance Summary  1/5/2025 - 2/3/2025 (30 days)  Days with Device Usage 30 days  Days without Device Usage 0 days  Percent Days with Device Usage 100.0%  Cumulative Usage 12 days 2 hrs. 30 mins. 41 secs.  Maximum Usage (1 Day) 12 hrs. 9 mins. 47 secs.  Average Usage (All Days) 9 hrs. 41 mins. 1 secs.  Average Usage (Days Used) 9 hrs. 41 mins. 1 secs.  Minimum Usage (1 Day) 6 hrs. 40 mins. 56 secs.  Percent of Days with Usage >= 4 Hours 100.0%  Percent of Days with Usage < 4 Hours 0.0%  Date Range  Total Blower Time 12 days 2 hrs. 30 mins. 54 secs.  Average AHI 2.2  Auto-CPAP Summary  Auto-CPAP Mean Pressure 7.9 cmH2O  Auto-CPAP Peak Average Pressure 13.6 cmH2O  Device Pressure <= 90% of Time 10.9 cmH2O  Average Time in Large Leak Per Day 0 secs.  Device Settings " as of 2/3/2025  AutoCPAP - A-Flex  Device Settings  Device Mode  Parameter Value  Min Pressure 6 cmH2O  Max Pressure 14 cmH2O  A-Flex Setting 2  Auto Off Off  Auto On Off  Ramp+ Time 15 minutes  Ramp+ Start Pressure Off  Mask Resistance Off  Tubing Type 15  Opti-Start Off  EZ-Start Disabled  Patient Controls Access Off  Patient Data Access Off  Tube Temperature 3  Humidifier 3  Hours     Assessment/Plan:     1. ASIF on CPAP  Overview:  6/12/2017 Home Sleep Study Mild ASIF  AHI 13.4   On Auto CPAP 6-14 cm optimal AHI 2.2  Full face mask  HME: [facility]     Assessment & Plan:  - [place] Sleepiness Scale TOTAL = 7  (validated sleepiness questionnaire with a higher score indicating greater sleepiness; range 0-24)    - No longer using recalled device   - PAP Settings: 6-14 cm H2O   Full face Mask     HME: [facility]   - Using and benefits per patient  - Compliant data  - Effective control of ASIF with AHI= 2.2  - No barriers to use identified    - Encouraged consistent usage & hours of usage for effective symptom control and long-term health benefits.  - Therapeutic goals for positive airway pressure therapy(CPAP or BiPAP):  Ideal is usage 100% of nights for at least 6 hours per night  Minimum usage is 70% of night for at least 4 hours per night used    Plan:  - Supplies ordered  - Follow up 1 year for ASIF evaluation & device compliance download.    Orders:  -     CPAP/BIPAP SUPPLIES           Outpatient Encounter Medications as of 2/4/2025   Medication Sig Dispense Refill    amLODIPine (NORVASC) 10 MG tablet Take 1 tablet (10 mg total) by mouth once daily. 90 tablet 1    aspirin 81 mg Tab Take 1 tablet by mouth once daily.      atorvastatin (LIPITOR) 10 MG tablet TAKE 1 TABLET(10 MG) BY MOUTH EVERY DAY 90 tablet 3    b complex vitamins tablet Take 1 tablet by mouth once daily.      co-enzyme Q-10 30 mg capsule Take 30 mg by mouth 3 (three) times daily.      ferrous sulfate 325 mg (65 mg iron) Tab tablet Take 1 tablet (325 mg  total) by mouth once daily. Take with citrus 100 tablet 3    fish oil-omega-3 fatty acids 300-1,000 mg capsule Take 2 g by mouth once daily.      meloxicam (MOBIC) 15 MG tablet Take 1 tablet (15 mg total) by mouth every 7 days. Once a week or PRN 30 tablet 1    multivitamin (THERAGRAN) per tablet Take 1 tablet by mouth once daily.      turmeric root extract 500 mg Cap Take 500 mg by mouth 2 (two) times daily. 100 capsule 4    valsartan-hydrochlorothiazide (DIOVAN-HCT) 160-25 mg per tablet Take 1 tablet by mouth once daily. 90 tablet 3    vitamin D 1000 units Tab Take 185 mg by mouth once daily.      [DISCONTINUED] clindamycin (CLEOCIN) 150 MG capsule Take 150 mg by mouth every 6 (six) hours.       No facility-administered encounter medications on file as of 2/4/2025.     Orders Placed This Encounter   Procedures    CPAP/BIPAP SUPPLIES     Order Specific Question:   Length of need (1-99 months):     Answer:   99     Order Specific Question:   Choose ONE mask type and its corresponding cushions and/or pillows:     Answer:    Full Face Mask, 1 per 90 days:  Full Face Cushion, (3 per 90 days)     Order Specific Question:   Choose EITHER Heated or Non-Heated Tubjing     Answer:    Non-Heated Tubing, 1 per 90 days     Order Specific Question:   All other supplies as needed as listed below:     Answer:    Headgear, 1 per 180 days     Order Specific Question:   All other supplies as needed as listed below:     Answer:    Disposable Filter, 6 per 90 days     Order Specific Question:   All other supplies as needed as listed below:     Answer:    Exhalation Port, contact payer for quantity/frequency     Order Specific Question:   All other supplies as needed as listed below:     Answer:    Humidifier Chamber, 1 per 180 days     Order Specific Question:   All other supplies as needed as listed below:     Answer:    Non-Disposable Filter, 1 per 180 days     Order Specific Question:   DME  Agency:     Answer:   [facility]       Discussed diagnosis, its evaluation, treatment and usual course. All questions answered.     Patient verbalized understanding of plan and left in no acute distress.    No follow-ups on file.    Note has been documented by JAMI Elise 2/4/2025   Thank you for allowing me to participate in the care of this patient,    JAMI Elise   Pulmonary Disease

## 2025-02-04 NOTE — ASSESSMENT & PLAN NOTE
- [place] Sleepiness Scale TOTAL = 7  (validated sleepiness questionnaire with a higher score indicating greater sleepiness; range 0-24)    - No longer using recalled device   - PAP Settings: 6-14 cm H2O   Full face Mask     HME: [facility]   - Using and benefits per patient  - Compliant data  - Effective control of ASIF with AHI= 2.2  - No barriers to use identified    - Encouraged consistent usage & hours of usage for effective symptom control and long-term health benefits.  - Therapeutic goals for positive airway pressure therapy(CPAP or BiPAP):  Ideal is usage 100% of nights for at least 6 hours per night  Minimum usage is 70% of night for at least 4 hours per night used    Plan:  - Supplies ordered  - Follow up 1 year for ASIF evaluation & device compliance download.

## 2025-02-04 NOTE — PATIENT INSTRUCTIONS
"     [facility] for CPAP/Oxygen/Nebulizer supplies.  Customer Service: [phone]  Call: [phone]  Fax: [phone]  Billing Inquiries: [phone] or [phone]     Continuous Positive Airway Pressure Machine Cleaning     One of the most important factors in maintaining CPAP compliance is taking proper care of your CPAP equipment. In order to have successful CPAP therapy, you must be willing to make your treatment a priority in your life, and that means regularly cleaning and maintaining your CPAP equipment. Fortunately, taking proper care of your equipment is pretty easy, and not very time consuming. With a little adjustment to your regular morning routine, your device and accessories will be working at 100% efficiency to get you that much needed sleep you've been longing for.     One of the most frequent questions we get asked is "how often do I need to clean my CPAP equipment?" .     CPAP Humidifier Cleaning and Replacement:  Nearly all current CPAP machines now come with a heated humidification system that helps cut down on morning dry mouth as well as keeping your nasal turbinates from drying out and becoming irritated and inflamed. However, the humidification chamber needs to be cleaned out daily to prevent bacteria build-up as well as calcification. Recommended routine:     Remove chamber from humidifier carefully so water doesn't enter your CPAP machine.     Open chamber and wash with warm, soapy water.     Rinse well with water and allow to dry on a clean cloth or paper towel out in direct sunlight.     Fill with distilled or sterile water (obtained at any grocery store) Do not use tap water as it may contain minerals and chemicals that can damage components of the machine. It is also not recommended to use filtered water (i.e. through a Jasmin filter) for the same reasons.     Once a week the humidifier chamber should be soaked in a solution of 1 part white vinegar 3 parts water for " approximately 15-20 minutes before rinsing thoroughly with distilled water.     Some humidifier chambers are  safe, but make sure to check your CPAP machine's manual before cleaning in a .     Humidifier chambers should be replaced as needed.     CPAP Mask Cleaning and Replacement:  Most CPAP mask cushions are made of silicone, a gentle, non-irritating material. However, while silicone is a very comfortable material for masks, it doesn't have a very long lifespan, and without proper care can breakdown quicker than expected. The oil from your skin interacts with the silicone mask and causes breakdown and stiffness. Therefore, cleaning your CPAP mask is crucial in making it efficient as possible. Here's some tips on CPAP mask cleaning and replacement:     Wash mask daily with warm water and mild, non-fragrant soap or purchase CPAP mask specific wipes and detergents. You can use the same type of mild basic facial soap that you use on your face.     Rinse with water and allow to air dry on a clean cloth or paper towel out of direct sunlight.     Before using mask at night, wash your face thoroughly and don't use facial moisturizers. Facial oils and moisturizers can breakdown the silicone faster.     Once a week soak mask in solution of 1 part white vinegar 3 parts water before rinsing in distilled water.     Headgear and chinstraps should be washed as needed by hand using warm soapy water, rinsed well, and air dried. Do not place headgear or chinstraps in washing machine or dryer.  For replacement schedules of CPAP masks you should check both your 's recommendations and your insurance allowance. However, for most masks it is recommended that you  replace the cushions 1-2 times per month, and the mask every 3-6 months.     CPAP tubing should be cleaned weekly in a sink of warm, soapy water, rinsed well, and left to hang-dry out of direct sunlight.     CPAP Filters Cleaning and  Replacement  Your filters are located near the back of the CPAP machine where the device draws air from the room that it compresses to your pressure settings. Nearly all CPAP machines have a disposable white paper filter and some have an additional non-disposable grey filter as well. Here are some cleaning tips for your CPAP filters:     You should clean the grey non-disposable filter at least on a weekly basis. You may have to clean it more regularly if you have pets, smoke inside your house, or if your home is especially marni.     Rinse grey filters with water and allow to dry before placing back into your machine.     The grey re-usable filters should be replaced when it begins to look worn or after 6 months.     Replace disposable white paper filters monthly or more frequently if it appears dingy or dirty.     Your CPAP machine itself does not need to be cleaned but you may want to dust it down with a slightly damp cloth as desired.     General CPAP Maintenance & CPAP Cleaning Tips  Make your CPAP equipment cleaning part of your morning routine, allowing the equipment ample time to dry during the day.     Keep machine and accessories out of direct sunlight to avoid damaging them.     Never use bleach to clean accessories.     Other machine accessories such as power cords and data cards may need to be replaced due to equipment malfunctions.     Place machine on a level surface away from objects such as curtains that may interfere with the air intake.     Always use distilled or sterile water when cleaning components.     Keep track of when you should order replacement parts for your mask and accessories so that you always get the most out of your therapy.     With these simple tips on cleaning and maintaining your CPAP device and accessories, you will assuredly have a much better CPAP therapy experience.     There are a number of machines advertised that clean Continuous Positive Airway Pressure equipment. For  most patients this is not necessary. If you have a history of chronic sinus or lung infections this type of cleaning device may be helpful. Unfortunately, at this time most insurance companies and Medicare are not paying for these devices.

## 2025-02-06 ENCOUNTER — HOSPITAL ENCOUNTER (OUTPATIENT)
Dept: CARDIOLOGY | Facility: HOSPITAL | Age: 75
Discharge: HOME | End: 2025-02-06
Payer: COMMERCIAL

## 2025-02-06 VITALS
DIASTOLIC BLOOD PRESSURE: 78 MMHG | HEIGHT: 65 IN | SYSTOLIC BLOOD PRESSURE: 143 MMHG | BODY MASS INDEX: 27.16 KG/M2 | HEART RATE: 106 BPM | WEIGHT: 163 LBS

## 2025-02-06 DIAGNOSIS — R06.09 DOE (DYSPNEA ON EXERTION): ICD-10-CM

## 2025-02-06 LAB
CV STRESS BASE HR: 106 BPM
DIASTOLIC BLOOD PRESSURE: 78 MMHG
OHS CV CPX 1 MINUTE RECOVERY HEART RATE: 141 BPM
OHS CV CPX 85 PERCENT MAX PREDICTED HEART RATE MALE: 124
OHS CV CPX ESTIMATED METS: 7
OHS CV CPX MAX PREDICTED HEART RATE: 146
OHS CV CPX PATIENT IS FEMALE: 1
OHS CV CPX PATIENT IS MALE: 0
OHS CV CPX PEAK DIASTOLIC BLOOD PRESSURE: 49 MMHG
OHS CV CPX PEAK HEAR RATE: 153 BPM
OHS CV CPX PEAK RATE PRESSURE PRODUCT: NORMAL
OHS CV CPX PEAK SYSTOLIC BLOOD PRESSURE: 156 MMHG
OHS CV CPX PERCENT MAX PREDICTED HEART RATE ACHIEVED: 109
OHS CV CPX RATE PRESSURE PRODUCT PRESENTING: NORMAL
STRESS ECHO POST EXERCISE DUR MIN: 4 MINUTES
STRESS ECHO POST EXERCISE DUR SEC: 49 SECONDS
SYSTOLIC BLOOD PRESSURE: 143 MMHG

## 2025-02-06 PROCEDURE — 93017 CV STRESS TEST TRACING ONLY: CPT

## 2025-02-06 PROCEDURE — 93018 CV STRESS TEST I&R ONLY: CPT | Mod: ,,, | Performed by: INTERNAL MEDICINE

## 2025-02-06 PROCEDURE — 93016 CV STRESS TEST SUPVJ ONLY: CPT | Mod: ,,, | Performed by: INTERNAL MEDICINE

## 2025-02-07 ENCOUNTER — TELEPHONE (OUTPATIENT)
Dept: CARDIOLOGY | Facility: CLINIC | Age: 75
End: 2025-02-07
Payer: COMMERCIAL

## 2025-02-07 NOTE — TELEPHONE ENCOUNTER
Contacted PT and reviewed results-Stress test nml     PT stated verbal understanding    ----- Message from Judy Forbes NP sent at 2/6/2025  7:44 PM CST -----  Stress test nml

## 2025-02-27 ENCOUNTER — HOSPITAL ENCOUNTER (OUTPATIENT)
Dept: RADIOLOGY | Facility: HOSPITAL | Age: 75
Discharge: HOME | End: 2025-02-27
Attending: FAMILY MEDICINE
Payer: COMMERCIAL

## 2025-02-27 VITALS — HEIGHT: 65 IN | BODY MASS INDEX: 27.15 KG/M2 | WEIGHT: 162.94 LBS

## 2025-02-27 DIAGNOSIS — Z12.31 ENCOUNTER FOR SCREENING MAMMOGRAM FOR BREAST CANCER: ICD-10-CM

## 2025-02-27 PROCEDURE — 77067 SCR MAMMO BI INCL CAD: CPT | Mod: 26,,, | Performed by: RADIOLOGY

## 2025-02-27 PROCEDURE — 77063 BREAST TOMOSYNTHESIS BI: CPT | Mod: TC

## 2025-02-27 PROCEDURE — 77063 BREAST TOMOSYNTHESIS BI: CPT | Mod: 26,,, | Performed by: RADIOLOGY

## 2025-03-11 ENCOUNTER — RESULTS FOLLOW-UP (OUTPATIENT)
Dept: PRIMARY CARE CLINIC | Facility: CLINIC | Age: 75
End: 2025-03-11

## 2025-03-19 DIAGNOSIS — Z00.00 ROUTINE ADULT HEALTH MAINTENANCE: Primary | ICD-10-CM

## 2025-03-19 DIAGNOSIS — I10 PRIMARY HYPERTENSION: ICD-10-CM

## 2025-03-21 ENCOUNTER — RESULTS FOLLOW-UP (OUTPATIENT)
Dept: CARDIOLOGY | Facility: CLINIC | Age: 75
End: 2025-03-21

## 2025-03-21 ENCOUNTER — OFFICE VISIT (OUTPATIENT)
Dept: CARDIOLOGY | Facility: CLINIC | Age: 75
End: 2025-03-21
Payer: COMMERCIAL

## 2025-03-21 ENCOUNTER — HOSPITAL ENCOUNTER (OUTPATIENT)
Dept: CARDIOLOGY | Facility: HOSPITAL | Age: 75
Discharge: HOME | End: 2025-03-21
Payer: COMMERCIAL

## 2025-03-21 ENCOUNTER — TELEPHONE (OUTPATIENT)
Dept: PHARMACY | Facility: CLINIC | Age: 75
End: 2025-03-21
Payer: COMMERCIAL

## 2025-03-21 VITALS
HEIGHT: 65 IN | DIASTOLIC BLOOD PRESSURE: 77 MMHG | OXYGEN SATURATION: 100 % | HEART RATE: 79 BPM | SYSTOLIC BLOOD PRESSURE: 143 MMHG | WEIGHT: 171.06 LBS | RESPIRATION RATE: 16 BRPM | BODY MASS INDEX: 28.5 KG/M2

## 2025-03-21 DIAGNOSIS — G47.33 OSA ON CPAP: ICD-10-CM

## 2025-03-21 DIAGNOSIS — E78.2 MIXED HYPERLIPIDEMIA: ICD-10-CM

## 2025-03-21 DIAGNOSIS — I10 PRIMARY HYPERTENSION: Primary | ICD-10-CM

## 2025-03-21 DIAGNOSIS — I10 PRIMARY HYPERTENSION: ICD-10-CM

## 2025-03-21 DIAGNOSIS — Z00.00 ROUTINE ADULT HEALTH MAINTENANCE: ICD-10-CM

## 2025-03-21 LAB
OHS QRS DURATION: 92 MS
OHS QTC CALCULATION: 470 MS

## 2025-03-21 PROCEDURE — 93005 ELECTROCARDIOGRAM TRACING: CPT

## 2025-03-21 PROCEDURE — 99999 PR PBB SHADOW E&M-EST. PATIENT-LVL IV: CPT | Mod: PBBFAC,,,

## 2025-03-21 PROCEDURE — 93010 ELECTROCARDIOGRAM REPORT: CPT | Mod: ,,, | Performed by: INTERNAL MEDICINE

## 2025-03-21 RX ORDER — MAGNESIUM 200 MG
TABLET ORAL ONCE
COMMUNITY

## 2025-03-21 RX ORDER — VALSARTAN AND HYDROCHLOROTHIAZIDE 320; 25 MG/1; MG/1
1 TABLET, FILM COATED ORAL DAILY
Qty: 30 TABLET | Refills: 3 | Status: SHIPPED | OUTPATIENT
Start: 2025-03-21 | End: 2026-03-21

## 2025-03-21 RX ORDER — AMLODIPINE BESYLATE 10 MG/1
5 TABLET ORAL DAILY
Start: 2025-03-21

## 2025-03-21 NOTE — PROGRESS NOTES
Subjective:   Patient ID:  Xin Taylor is a 74 y.o. female who presents for evaluation of No chief complaint on file.      HPI  Xin Taylor is a 72 y.o. female with h/o HTN, ASIF, HLD, anxiety who presents to clinic today for BP follow up. Recently started on bystolic and Diovan. Bystolic was increased 3 days ago to 20mg daily. denies any HA, dizziness, near syncope, CP. Stopped Amlodipine at last visit, still with some sharda ankle swelling. Pt also reports sharda knee pain worsening over years and foot callus pains    Home Bps 130s systolic in am after meds 130s, evenings 150s  Works at  facility, active    1/17/25  Here today for CV follow-up, c/o atypical chest discomfort when she turns certain ways in the bed but feels she cannot recreate the pain. Has leg swelling (CCB). Denies any SOB, dizziness or palpitations    Echocardiogram 7/22 with normal EF, mod AR, mild MR/TR  48 hr monitor 1/24 no significant arrhythmias     3/21/25  Here today for CV follow up doing well denies any new CP or CHF sxs. Weight up 8lb today from last visit. Compliant with medications, does have ankle edema likely from CCB, also inquiring about sharda UE edema ?lymphedema  Past Medical History:   Diagnosis Date    Anemia     Colon polyp     Edema     HLD (hyperlipidemia)     HTN (hypertension)     Palpitation     Tinnitus        Past Surgical History:   Procedure Laterality Date    COLONOSCOPY N/A 04/22/2022    Procedure: COLONOSCOPY;  Surgeon: Priyanka Winkler MD;  Location: [facility];  Service: Endoscopy;  Laterality: N/A;    HYSTERECTOMY         Social History     Tobacco Use    Smoking status: Never     Passive exposure: Never    Smokeless tobacco: Never    Tobacco comments:     None   Substance Use Topics    Alcohol use: No    Drug use: No       Family History   Problem Relation Name Age of Onset    Hypertension Mother Liana Taylor     Thyroid disease Mother Liana Taylor     Cataracts Mother Liana Taylor     Cancer Mother  Liana Taylor         lung    Alcohol abuse Father Santiago Taylor     Cancer Other      Heart defect Brother      Breast cancer Paternal Aunt         Current Outpatient Medications on File Prior to Visit   Medication Sig Dispense Refill    amLODIPine (NORVASC) 10 MG tablet Take 1 tablet (10 mg total) by mouth once daily. 90 tablet 1    aspirin 81 mg Tab Take 1 tablet by mouth once daily.      atorvastatin (LIPITOR) 10 MG tablet TAKE 1 TABLET(10 MG) BY MOUTH EVERY DAY 90 tablet 3    b complex vitamins tablet Take 1 tablet by mouth once daily.      co-enzyme Q-10 30 mg capsule Take 30 mg by mouth 3 (three) times daily.      ferrous sulfate 325 mg (65 mg iron) Tab tablet Take 1 tablet (325 mg total) by mouth once daily. Take with citrus 100 tablet 3    fish oil-omega-3 fatty acids 300-1,000 mg capsule Take 2 g by mouth once daily.      magnesium 200 mg Tab Take by mouth once. Patient takes 1 pill every other day      meloxicam (MOBIC) 15 MG tablet Take 1 tablet (15 mg total) by mouth every 7 days. Once a week or PRN 30 tablet 1    multivitamin (THERAGRAN) per tablet Take 1 tablet by mouth once daily.      turmeric root extract 500 mg Cap Take 500 mg by mouth 2 (two) times daily. 100 capsule 4    valsartan-hydrochlorothiazide (DIOVAN-HCT) 160-25 mg per tablet Take 1 tablet by mouth once daily. 90 tablet 3    vitamin D 1000 units Tab Take 185 mg by mouth once daily.       No current facility-administered medications on file prior to visit.      Wt Readings from Last 3 Encounters:   03/21/25 77.6 kg (171 lb 1.2 oz)   02/27/25 73.9 kg (162 lb 14.7 oz)   02/06/25 73.9 kg (163 lb)     Temp Readings from Last 3 Encounters:   05/11/24 98 °F (36.7 °C) (Oral)   05/09/24 98.5 °F (36.9 °C) (Tympanic)   08/21/23 98.4 °F (36.9 °C) (Oral)     BP Readings from Last 3 Encounters:   03/21/25 (!) 143/77   02/06/25 (!) 143/78   01/17/25 (!) 140/76     Pulse Readings from Last 3 Encounters:   03/21/25 79   02/06/25 106   11/12/24 87         Review of Systems   Constitutional: Negative.   HENT: Negative.     Eyes: Negative.    Cardiovascular:  Positive for chest pain.   Respiratory: Negative.     Skin: Negative.    Musculoskeletal: Negative.    Gastrointestinal: Negative.    Genitourinary: Negative.    Neurological: Negative.    Psychiatric/Behavioral: Negative.         Objective:   Physical Exam  Vitals and nursing note reviewed.   Constitutional:       Appearance: Normal appearance.   HENT:      Head: Normocephalic.   Eyes:      Pupils: Pupils are equal, round, and reactive to light.   Cardiovascular:      Rate and Rhythm: Normal rate and regular rhythm.      Heart sounds: Normal heart sounds, S1 normal and S2 normal. No murmur heard.     No S3 or S4 sounds.   Pulmonary:      Effort: Pulmonary effort is normal.      Breath sounds: Normal breath sounds.   Abdominal:      General: Bowel sounds are normal.      Palpations: Abdomen is soft.   Musculoskeletal:         General: Normal range of motion.      Cervical back: Normal range of motion.      Right lower leg: Edema present.      Left lower leg: Edema present.   Skin:     General: Skin is warm and dry.      Capillary Refill: Capillary refill takes less than 2 seconds.   Neurological:      General: No focal deficit present.      Mental Status: She is alert and oriented to person, place, and time.   Psychiatric:         Mood and Affect: Mood normal.         Behavior: Behavior normal.         Thought Content: Thought content normal.         Lab Results   Component Value Date    CHOL 204 (H) 05/06/2024    CHOL 184 02/23/2023    CHOL 200 (H) 04/05/2022     Lab Results   Component Value Date    HDL 95 (H) 05/06/2024    HDL 80 (H) 02/23/2023    HDL 84 (H) 04/05/2022     Lab Results   Component Value Date    LDLCALC 99.6 05/06/2024    LDLCALC 93.4 02/23/2023    LDLCALC 106.0 04/05/2022     Lab Results   Component Value Date    TRIG 47 05/06/2024    TRIG 53 02/23/2023    TRIG 50 04/05/2022     Lab Results  "  Component Value Date    CHOLHDL 46.6 05/06/2024    CHOLHDL 43.5 02/23/2023    CHOLHDL 42.0 04/05/2022       Chemistry        Component Value Date/Time     05/06/2024 1128    K 3.4 (L) 05/06/2024 1128    CL 98 05/06/2024 1128    CO2 25 05/06/2024 1128    BUN 18 05/06/2024 1128    CREATININE 0.9 05/06/2024 1128    GLU 91 05/06/2024 1128        Component Value Date/Time    CALCIUM 10.0 05/06/2024 1128    ALKPHOS 73 05/06/2024 1128    AST 21 05/06/2024 1128    ALT 13 05/06/2024 1128    BILITOT 0.5 05/06/2024 1128    ESTGFRAFRICA >60.0 04/05/2022 1147    EGFRNONAA >60.0 04/05/2022 1147          Lab Results   Component Value Date    TSH 1.524 05/06/2024     No results found for: "INR", "PROTIME"  @RESUFAST(WBC,HGB,HCT,MCV,PLT)  @LABRCNTIP(BNP,BNPTRIAGEBLO)@  CrCl cannot be calculated (Patient's most recent lab result is older than the maximum 7 days allowed.).     Results for orders placed during the hospital encounter of 07/15/22    Echo    Interpretation Summary  · The left ventricle is normal in size with normal systolic function.  · Normal left ventricular diastolic function.  · The estimated PA systolic pressure is 31 mmHg.  · Normal right ventricular size with normal right ventricular systolic function.  · Normal central venous pressure (3 mmHg).  · Moderate pulmonic regurgitation.  · Mild mitral regurgitation.  · Mild tricuspid regurgitation.  · The estimated ejection fraction is 60%.     No results found for this or any previous visit.     Assessment:      1. Primary hypertension    2. Mixed hyperlipidemia    3. ASIF on CPAP              Plan:   Primary hypertension    Mixed hyperlipidemia    ASIF on CPAP        Dec amlodipine to5mg daily  Increasing valsartan (Diovan) to 320mg  Monitor ankle edema    Amlodipine, Diovan-HCT, Low-salt, profile BP-HTN  Statin-HLD  CPAP- ASIF  Follow up with PCP, lymphedema  Discussed heart healthy diet, daily exercise- diabetes    Labs today  RTC 3m, ok to keep appt with  " Armida Forbes, [facility], Cardiology

## 2025-03-21 NOTE — TELEPHONE ENCOUNTER
[facility] Chart Review & Patient Outreach Details For Medication Adherence Project    Reason for Outreach Encounter: 3rd Party payor non-compliance report ([employer], BCBS, C, etc)  2.  Patient Outreach Method: Reviewed Patient Chart  3.   Medication in question: atorvastatin   LAST FILLED: 1/10/25 for 90 day supply  Hyperlipidemia Medications              atorvastatin (LIPITOR) 10 MG tablet TAKE 1 TABLET(10 MG) BY MOUTH EVERY DAY    fish oil-omega-3 fatty acids 300-1,000 mg capsule Take 2 g by mouth once daily.               4.  Reviewed and or Updates Made To: Patient Chart  5. Outreach Outcomes and/or actions taken: Patient filled medication and is on track to be adherent

## 2025-03-24 ENCOUNTER — TELEPHONE (OUTPATIENT)
Dept: CARDIOLOGY | Facility: CLINIC | Age: 75
End: 2025-03-24
Payer: COMMERCIAL

## 2025-03-24 NOTE — TELEPHONE ENCOUNTER
----- Message from Judy Forbes NP sent at 3/21/2025 11:10 PM CDT -----  Labs all look good  ----- Message -----  From: [facility]  Sent: 3/21/2025   9:29 PM CDT  To: Judy Forbes NP

## 2025-04-17 ENCOUNTER — TELEPHONE (OUTPATIENT)
Dept: PHARMACY | Facility: CLINIC | Age: 75
End: 2025-04-17
Payer: COMMERCIAL

## 2025-04-18 NOTE — TELEPHONE ENCOUNTER
[facility]Population [facility] Chart Review & Patient Outreach Details For Medication Adherence Project    Reason for Outreach Encounter: 3rd Party payor non-compliance report ([employer], BCBS, [employer], etc)  2.  Patient Outreach Method: Reviewed patient chart  and [employer] message  3.   Medication in question:    Hyperlipidemia Medications              atorvastatin (LIPITOR) 10 MG tablet TAKE 1 TABLET(10 MG) BY MOUTH EVERY DAY    fish oil-omega-3 fatty acids 300-1,000 mg capsule Take 2 g by mouth once daily.                  LF 90 ds 1/20/25    4.  Reviewed and or Updates Made To: Patient Chart  5. Outreach Outcomes and/or actions taken: Patient filled medication and is on track to be adherent and Patient reminded to  prescription  Additional Notes:

## 2025-04-20 ENCOUNTER — PATIENT MESSAGE (OUTPATIENT)
Dept: PRIMARY CARE CLINIC | Facility: CLINIC | Age: 75
End: 2025-04-20
Payer: COMMERCIAL

## 2025-05-05 ENCOUNTER — PATIENT MESSAGE (OUTPATIENT)
Dept: CARDIOLOGY | Facility: CLINIC | Age: 75
End: 2025-05-05
Payer: COMMERCIAL

## 2025-05-05 DIAGNOSIS — I10 PRIMARY HYPERTENSION: ICD-10-CM

## 2025-05-06 ENCOUNTER — LAB VISIT (OUTPATIENT)
Dept: LAB | Facility: HOSPITAL | Age: 75
End: 2025-05-06
Attending: FAMILY MEDICINE
Payer: COMMERCIAL

## 2025-05-06 DIAGNOSIS — I10 PRIMARY HYPERTENSION: Primary | ICD-10-CM

## 2025-05-06 DIAGNOSIS — R07.89 ATYPICAL CHEST PAIN: ICD-10-CM

## 2025-05-06 DIAGNOSIS — H91.92 HEARING LOSS OF LEFT EAR, UNSPECIFIED HEARING LOSS TYPE: ICD-10-CM

## 2025-05-06 DIAGNOSIS — M17.11 OSTEOARTHRITIS OF RIGHT KNEE, UNSPECIFIED OSTEOARTHRITIS TYPE: ICD-10-CM

## 2025-05-06 DIAGNOSIS — E78.2 MIXED HYPERLIPIDEMIA: ICD-10-CM

## 2025-05-06 DIAGNOSIS — R26.9 ABNORMAL GAIT: ICD-10-CM

## 2025-05-06 DIAGNOSIS — I10 PRIMARY HYPERTENSION: ICD-10-CM

## 2025-05-06 DIAGNOSIS — E87.6 HYPOKALEMIA: ICD-10-CM

## 2025-05-06 DIAGNOSIS — R73.09 ABNORMAL GLUCOSE: ICD-10-CM

## 2025-05-06 DIAGNOSIS — R73.03 PREDIABETES: ICD-10-CM

## 2025-05-06 DIAGNOSIS — E55.9 VITAMIN D DEFICIENCY: ICD-10-CM

## 2025-05-06 DIAGNOSIS — H93.12 TINNITUS OF LEFT EAR: ICD-10-CM

## 2025-05-06 DIAGNOSIS — Z23 NEED FOR TDAP VACCINATION: ICD-10-CM

## 2025-05-06 LAB
25(OH)D3+25(OH)D2 SERPL-MCNC: 56 NG/ML (ref 30–96)
ABSOLUTE EOSINOPHIL (OHS): 0.09 K/UL
ABSOLUTE MONOCYTE (OHS): 0.36 K/UL (ref 0.3–1)
ABSOLUTE NEUTROPHIL COUNT (OHS): 1.83 K/UL (ref 1.8–7.7)
ALBUMIN SERPL BCP-MCNC: 3.7 G/DL (ref 3.5–5.2)
ALP SERPL-CCNC: 79 UNIT/L (ref 40–150)
ALT SERPL W/O P-5'-P-CCNC: 15 UNIT/L (ref 10–44)
ANION GAP (OHS): 10 MMOL/L (ref 8–16)
AST SERPL-CCNC: 23 UNIT/L (ref 11–45)
BASOPHILS # BLD AUTO: 0.02 K/UL
BASOPHILS NFR BLD AUTO: 0.5 %
BILIRUB SERPL-MCNC: 0.6 MG/DL (ref 0.1–1)
BUN SERPL-MCNC: 17 MG/DL (ref 8–23)
CALCIUM SERPL-MCNC: 9.1 MG/DL (ref 8.7–10.5)
CHLORIDE SERPL-SCNC: 99 MMOL/L (ref 95–110)
CHOLEST SERPL-MCNC: 188 MG/DL (ref 120–199)
CHOLEST/HDLC SERPL: 2.1 {RATIO} (ref 2–5)
CO2 SERPL-SCNC: 26 MMOL/L (ref 23–29)
CREAT SERPL-MCNC: 0.9 MG/DL (ref 0.5–1.4)
EAG (OHS): 117 MG/DL (ref 68–131)
ERYTHROCYTE [DISTWIDTH] IN BLOOD BY AUTOMATED COUNT: 14.6 % (ref 11.5–14.5)
FERRITIN SERPL-MCNC: 104 NG/ML (ref 20–300)
GFR SERPLBLD CREATININE-BSD FMLA CKD-EPI: >60 ML/MIN/1.73/M2
GLUCOSE SERPL-MCNC: 93 MG/DL (ref 70–110)
HBA1C MFR BLD: 5.7 % (ref 4–5.6)
HCT VFR BLD AUTO: 36.7 % (ref 37–48.5)
HDLC SERPL-MCNC: 91 MG/DL (ref 40–75)
HDLC SERPL: 48.4 % (ref 20–50)
HGB BLD-MCNC: 12 GM/DL (ref 12–16)
IMM GRANULOCYTES # BLD AUTO: 0.01 K/UL (ref 0–0.04)
IMM GRANULOCYTES NFR BLD AUTO: 0.2 % (ref 0–0.5)
IRON SATN MFR SERPL: 18 % (ref 20–50)
IRON SERPL-MCNC: 77 UG/DL (ref 30–160)
LDLC SERPL CALC-MCNC: 86.8 MG/DL (ref 63–159)
LYMPHOCYTES # BLD AUTO: 2 K/UL (ref 1–4.8)
MCH RBC QN AUTO: 30.6 PG (ref 27–31)
MCHC RBC AUTO-ENTMCNC: 32.7 G/DL (ref 32–36)
MCV RBC AUTO: 94 FL (ref 82–98)
NONHDLC SERPL-MCNC: 97 MG/DL
NUCLEATED RBC (/100WBC) (OHS): 0 /100 WBC
PLATELET # BLD AUTO: 229 K/UL (ref 150–450)
PMV BLD AUTO: 9.6 FL (ref 9.2–12.9)
POTASSIUM SERPL-SCNC: 3.3 MMOL/L (ref 3.5–5.1)
PROT SERPL-MCNC: 7.5 GM/DL (ref 6–8.4)
RBC # BLD AUTO: 3.92 M/UL (ref 4–5.4)
RELATIVE EOSINOPHIL (OHS): 2.1 %
RELATIVE LYMPHOCYTE (OHS): 46.4 % (ref 18–48)
RELATIVE MONOCYTE (OHS): 8.4 % (ref 4–15)
RELATIVE NEUTROPHIL (OHS): 42.4 % (ref 38–73)
SODIUM SERPL-SCNC: 135 MMOL/L (ref 136–145)
T4 FREE SERPL-MCNC: 1.06 NG/DL (ref 0.71–1.51)
TIBC SERPL-MCNC: 432 UG/DL (ref 250–450)
TRANSFERRIN SERPL-MCNC: 292 MG/DL (ref 200–375)
TRIGL SERPL-MCNC: 51 MG/DL (ref 30–150)
TSH SERPL-ACNC: 1.62 UIU/ML (ref 0.4–4)
WBC # BLD AUTO: 4.31 K/UL (ref 3.9–12.7)

## 2025-05-06 PROCEDURE — 80053 COMPREHEN METABOLIC PANEL: CPT

## 2025-05-06 PROCEDURE — 82728 ASSAY OF FERRITIN: CPT

## 2025-05-06 PROCEDURE — 82306 VITAMIN D 25 HYDROXY: CPT

## 2025-05-06 PROCEDURE — 84443 ASSAY THYROID STIM HORMONE: CPT

## 2025-05-06 PROCEDURE — 85025 COMPLETE CBC W/AUTO DIFF WBC: CPT

## 2025-05-06 PROCEDURE — 82465 ASSAY BLD/SERUM CHOLESTEROL: CPT

## 2025-05-06 PROCEDURE — 84439 ASSAY OF FREE THYROXINE: CPT

## 2025-05-06 PROCEDURE — 84466 ASSAY OF TRANSFERRIN: CPT

## 2025-05-06 PROCEDURE — 82043 UR ALBUMIN QUANTITATIVE: CPT | Performed by: FAMILY MEDICINE

## 2025-05-06 PROCEDURE — 36415 COLL VENOUS BLD VENIPUNCTURE: CPT

## 2025-05-06 PROCEDURE — 83036 HEMOGLOBIN GLYCOSYLATED A1C: CPT

## 2025-05-06 RX ORDER — AMLODIPINE BESYLATE 10 MG/1
5 TABLET ORAL DAILY
Qty: 60 TABLET | Refills: 2
Start: 2025-05-06

## 2025-05-06 RX ORDER — AMLODIPINE BESYLATE 5 MG/1
5 TABLET ORAL DAILY
Qty: 90 TABLET | Refills: 3 | Status: SHIPPED | OUTPATIENT
Start: 2025-05-06 | End: 2026-05-06

## 2025-05-15 ENCOUNTER — OFFICE VISIT (OUTPATIENT)
Dept: PRIMARY CARE CLINIC | Facility: CLINIC | Age: 75
End: 2025-05-15
Payer: COMMERCIAL

## 2025-05-15 VITALS
WEIGHT: 173.13 LBS | HEART RATE: 89 BPM | DIASTOLIC BLOOD PRESSURE: 70 MMHG | HEIGHT: 65 IN | BODY MASS INDEX: 28.84 KG/M2 | SYSTOLIC BLOOD PRESSURE: 148 MMHG | RESPIRATION RATE: 19 BRPM | TEMPERATURE: 98 F | OXYGEN SATURATION: 99 %

## 2025-05-15 DIAGNOSIS — R73.03 PREDIABETES: ICD-10-CM

## 2025-05-15 DIAGNOSIS — I10 PRIMARY HYPERTENSION: Primary | ICD-10-CM

## 2025-05-15 DIAGNOSIS — M17.11 OSTEOARTHRITIS OF RIGHT KNEE, UNSPECIFIED OSTEOARTHRITIS TYPE: ICD-10-CM

## 2025-05-15 DIAGNOSIS — E78.2 MIXED HYPERLIPIDEMIA: ICD-10-CM

## 2025-05-15 DIAGNOSIS — G47.33 OSA ON CPAP: ICD-10-CM

## 2025-05-15 DIAGNOSIS — D64.9 ANEMIA, UNSPECIFIED TYPE: ICD-10-CM

## 2025-05-15 DIAGNOSIS — M85.80 OSTEOPENIA, UNSPECIFIED LOCATION: ICD-10-CM

## 2025-05-15 DIAGNOSIS — E87.6 HYPOKALEMIA: ICD-10-CM

## 2025-05-15 PROCEDURE — 1101F PT FALLS ASSESS-DOCD LE1/YR: CPT | Mod: CPTII,S$GLB,, | Performed by: FAMILY MEDICINE

## 2025-05-15 PROCEDURE — 3008F BODY MASS INDEX DOCD: CPT | Mod: CPTII,S$GLB,, | Performed by: FAMILY MEDICINE

## 2025-05-15 PROCEDURE — 3061F NEG MICROALBUMINURIA REV: CPT | Mod: CPTII,S$GLB,, | Performed by: FAMILY MEDICINE

## 2025-05-15 PROCEDURE — G2211 COMPLEX E/M VISIT ADD ON: HCPCS | Mod: S$GLB,,, | Performed by: FAMILY MEDICINE

## 2025-05-15 PROCEDURE — 1125F AMNT PAIN NOTED PAIN PRSNT: CPT | Mod: CPTII,S$GLB,, | Performed by: FAMILY MEDICINE

## 2025-05-15 PROCEDURE — 3066F NEPHROPATHY DOC TX: CPT | Mod: CPTII,S$GLB,, | Performed by: FAMILY MEDICINE

## 2025-05-15 PROCEDURE — 1160F RVW MEDS BY RX/DR IN RCRD: CPT | Mod: CPTII,S$GLB,, | Performed by: FAMILY MEDICINE

## 2025-05-15 PROCEDURE — 99999 PR PBB SHADOW E&M-EST. PATIENT-LVL V: CPT | Mod: PBBFAC,,, | Performed by: FAMILY MEDICINE

## 2025-05-15 PROCEDURE — 1159F MED LIST DOCD IN RCRD: CPT | Mod: CPTII,S$GLB,, | Performed by: FAMILY MEDICINE

## 2025-05-15 PROCEDURE — 3078F DIAST BP <80 MM HG: CPT | Mod: CPTII,S$GLB,, | Performed by: FAMILY MEDICINE

## 2025-05-15 PROCEDURE — 3288F FALL RISK ASSESSMENT DOCD: CPT | Mod: CPTII,S$GLB,, | Performed by: FAMILY MEDICINE

## 2025-05-15 PROCEDURE — 99215 OFFICE O/P EST HI 40 MIN: CPT | Mod: S$GLB,,, | Performed by: FAMILY MEDICINE

## 2025-05-15 PROCEDURE — 3077F SYST BP >= 140 MM HG: CPT | Mod: CPTII,S$GLB,, | Performed by: FAMILY MEDICINE

## 2025-05-15 PROCEDURE — 3044F HG A1C LEVEL LT 7.0%: CPT | Mod: CPTII,S$GLB,, | Performed by: FAMILY MEDICINE

## 2025-05-15 NOTE — PROGRESS NOTES
Chief Complaint  Chief Complaint   Patient presents with    Annual Exam       HPI  Xin Taylor is a 74 y.o. female with multiple medical diagnoses as listed in the medical history and problem list that presents for  in person visit.     History of Present Illness    CHIEF COMPLAINT:  - Patient presents for a yearly follow-up visit and to discuss recent lab results.    HPI:  Patient, a 74-year-old female, presents for her yearly follow-up. She reports decreased frequency in home blood pressure monitoring, transitioning from multiple daily checks to none. She expresses concern about potentially elevated blood pressure based on subjective feelings, while acknowledging the unreliability of this method.    She reports leg cramps, particularly in one leg, describing the need to stretch the muscle behind her knee while in bed before rising in the morning. The cramping is localized to the muscle behind the knee, not the knee itself.    She also reports palpitations and edema, noting increased puffiness during summer months. She inquires about potential correlations with weather, temperature, and humidity.    She reports recent changes in bowel habits, which she attributes to inadequate water intake. She uses prune juice only for constipation management.    Regarding activity level, she notes a decrease compared to her previous routine when she was in school and interacting with children regularly.    She denies any new or worsening symptoms beyond those discussed.    MEDICATIONS:  - Valsartan hydrochlorothiazide, dosage increased recently, for hypertension  - Amlodipine 5 mg, dosage decreased recently, for hypertension  - Lipitor, for cholesterol management  - Magnesium, 1 pill every other day  - B-complex, soft gel form  - Iron supplement  - Valsartan dosage increased recently to offset effects of Amlodipine  - Amlodipine dosage decreased from previous amount to 5 mg    PMH:  - Hypertension  - Hyperlipidemia  -  "Pre-diabetes  - Osteoarthritis  - Obstructive sleep apnea: on CPAP  - History of anemia  - Osteopenia         No questionnaires on file.       Pmh, Psh, Family Hx, Social Hx, HM updated in Epic Tabs today.    Review of Systems   Constitutional:  Positive for unexpected weight change. Negative for activity change, appetite change and fatigue.   Respiratory:  Negative for cough and shortness of breath.    Cardiovascular:  Negative for chest pain and palpitations.   Gastrointestinal:  Negative for abdominal distention and abdominal pain.   Psychiatric/Behavioral:  Negative for dysphoric mood and sleep disturbance. The patient is not nervous/anxious.         Objective:     Vitals:    05/15/25 1352   BP: (!) 148/70   BP Location: Right arm   Patient Position: Sitting   Pulse: 89   Resp: 19   Temp: 98.2 °F (36.8 °C)   TempSrc: Tympanic   SpO2: 99%   Weight: 78.5 kg (173 lb 2.4 oz)   Height: 5' 5" (1.651 m)     Wt Readings from Last 10 Encounters:   05/15/25 78.5 kg (173 lb 2.4 oz)   03/21/25 77.6 kg (171 lb 1.2 oz)   02/27/25 73.9 kg (162 lb 14.7 oz)   02/06/25 73.9 kg (163 lb)   06/18/24 74.2 kg (163 lb 9.3 oz)   05/27/24 71.9 kg (158 lb 8.2 oz)   05/11/24 71.2 kg (157 lb)   05/09/24 73.8 kg (162 lb 11.2 oz)   03/14/24 76 kg (167 lb 8.8 oz)   01/30/24 73.3 kg (161 lb 9.6 oz)     Physical Exam    TEST RESULTS:  - Hemoglobin: 12 (current), normal, better than previous results  - Hematocrit: normal  - Red blood cell count: 3.99 (most recent), 3.7, 3.5 (previous results), consistently just below reference range  - Iron: normal  - Ferritin: 104, normal (above desired level of 40)  - TIBC: normal  - Transferrin: normal  - Iron saturation: low, but considered normal for this patient  - Potassium: 3.5 (current), low  - GFR: above 60, normal  - Fasting glucose: 93, normal (less than 100)  - Liver tests: AST 23, ALT 15, normal  - Cholesterol: HDL 91 (above 50, which is good)  - Vitamin D: 56, normal (above 30 for bone health)  - " A1C: 5.7, stable, borderline pre-diabetic  - Thyroid levels: normal       Physical Exam  Vitals and nursing note reviewed.   Constitutional:       General: She is awake.      Appearance: Normal appearance. She is well-developed and well-groomed. She is obese.   HENT:      Head: Normocephalic and atraumatic.      Right Ear: External ear normal.      Left Ear: External ear normal.      Nose: Nose normal.   Eyes:      Conjunctiva/sclera: Conjunctivae normal.   Neck:      Thyroid: No thyromegaly or thyroid tenderness.   Cardiovascular:      Rate and Rhythm: Normal rate and regular rhythm.      Heart sounds: Normal heart sounds.   Pulmonary:      Effort: Pulmonary effort is normal. No accessory muscle usage.      Breath sounds: Normal breath sounds.   Musculoskeletal:      Cervical back: Normal range of motion and neck supple.   Neurological:      General: No focal deficit present.      Mental Status: She is alert. Mental status is at baseline.   Psychiatric:         Attention and Perception: Attention normal.         Mood and Affect: Mood normal.         Speech: Speech normal.         Behavior: Behavior normal. Behavior is cooperative.         Thought Content: Thought content normal.         Judgment: Judgment normal.         Assessment:   LABS:   Lab Results   Component Value Date    HGBA1C 5.7 (H) 05/06/2025    HGBA1C 5.7 (H) 05/06/2024    HGBA1C 5.8 (H) 04/05/2022      Lab Results   Component Value Date    CHOL 188 05/06/2025    CHOL 204 (H) 05/06/2024    CHOL 184 02/23/2023     Lab Results   Component Value Date    LDLCALC 86.8 05/06/2025    LDLCALC 99.6 05/06/2024    LDLCALC 93.4 02/23/2023     Lab Results   Component Value Date    WBC 4.31 05/06/2025    HGB 12.0 05/06/2025    HCT 36.7 (L) 05/06/2025     05/06/2025    CHOL 188 05/06/2025    TRIG 51 05/06/2025    HDL 91 (H) 05/06/2025    ALT 15 05/06/2025    AST 23 05/06/2025     (L) 05/06/2025    K 3.3 (L) 05/06/2025    CL 99 05/06/2025    CREATININE  0.9 05/06/2025    BUN 17 05/06/2025    CO2 26 05/06/2025    TSH 1.625 05/06/2025    HGBA1C 5.7 (H) 05/06/2025       Plan:   1. Primary hypertension    2. Mixed hyperlipidemia    3. Prediabetes    4. Osteoarthritis of right knee, unspecified osteoarthritis type    5. ASIF on CPAP  Overview:  6/12/2017 Home Sleep Study Mild ASIF  AHI 13.4   On Auto CPAP 6-14 cm optimal AHI 2.2  Full face mask  HME: [facility]       6. Anemia, unspecified type    7. Osteopenia, unspecified location  Overview:  1200mg of calcium through diet, l2 weakest on dexa, l1-l3 at -1.5      8. Hypokalemia  -     Basic Metabolic Panel; Future; Expected date: 01/15/2026  -     Magnesium; Future; Expected date: 01/15/2026      Assessment & Plan    I10 Primary hypertension  E78.2 Mixed hyperlipidemia  R73.03 Prediabetes  M17.11 Osteoarthritis of right knee, unspecified osteoarthritis type  G47.33 ASIF on CPAP  D64.9 Anemia, unspecified type  M85.80 Osteopenia, unspecified location  E87.6 Hypokalemia    IMPRESSION:  Reviewed lab results: hemoglobin normal at 12, indicating resolved anemia; iron levels, including ferritin at 104, within acceptable range.  Noted slightly low potassium (3.5) likely due to HCTZ in valsartan/HCTZ combination; not critically low to warrant immediate intervention.  Cholesterol levels significantly improved; HDL at 91 considered excellent.  A1C at 5.7, indicating stable prediabetes; current management approach sufficient.  Opted for dietary approach initially to address low potassium with follow-up labs in 3 months.    PLAN SUMMARY:  - Order potassium and magnesium level check in 3 months  - Continue Lipitor at current dose  - Continue magnesium supplement every other day  - Continue valsartan/HCTZ and amlodipine 5 mg at current doses  - Consider adding OTC potassium supplement (Nature Made 99 mg) daily if dietary increase insufficient  - Follow up in 3 months for lab work  - Contact office after lab results to determine if  in-person follow-up needed    PRIMARY HYPERTENSION:  - Continued valsartan/HCTZ and amlodipine 5 mg at current doses.  - Explained the relationship between HCTZ and potassium loss.  - Follow up in 3 months for lab work; contact the office after lab results are available to determine if an in-person follow-up visit is necessary based on the results.    MIXED HYPERLIPIDEMIA:  - Continued Lipitor at current dose due to its role in maintaining improved cholesterol levels and cardiovascular protection.    HYPOKALEMIA:  - Discussed the connection between low potassium levels and muscle cramps, as well as the relationship between magnesium and potassium retention.  - Reviewed potassium-rich foods (avocados, bananas, dried apricots, lentils, potatoes) and daily recommended intake with need for additional intake when on a diuretic.  - Recommend substituting avocado spread for mayonnaise on sandwiches to increase potassium intake while maintaining calorie control.  - Consider adding OTC potassium supplement (e.g., Nature Made 99 mg) daily if unable to increase dietary potassium sufficiently.  - Ordered potassium and magnesium level check in 3 months.  - Continued magnesium supplement every other day.    LIFESTYLE CHANGES:  - Patient to maintain current level of physical activity and be mindful of weight.           Mammo Digital Screening Bilat w/ Jl  Narrative: Facility:  [facility] [address] [place] [id number] [phone]    Name: Xin Taylor    MRN: [id number]    Result:  Mammo Digital Screening Bilat w/ Jl    History:  Patient is 74 y.o. and is seen for a screening mammogram.    Films Compared:  Compared to: 01/30/2024 Mammo Digital Screening Bilat w/ Jl, 01/25/2023   Mammo Digital Screening Bilat w/ Jl, and 11/16/2021 Mammo Digital   Screening Bilat w/ Jl     Findings:  This procedure was performed using tomosynthesis.   Computer-aided detection was utilized in the  interpretation of this   examination.    There are scattered areas of fibroglandular density. There is no evidence   of suspicious masses, microcalcifications or architectural distortion.  Impression:    No mammographic evidence of malignancy.    BI-RADS Category 1: Negative    Recommendation:  Routine screening mammogram in 1 year is recommended.    Your estimated lifetime risk of breast cancer (to age 85) based on   Tyrer-Cuzick risk assessment model is 5.35%.  According to the American   Cancer Society, patients with a lifetime breast cancer risk of 20% or   higher might benefit from supplemental screening tests, such as screening   breast MRI.    Electronically signed by,  Tarun Dumont MD    The 10-year ASCVD risk score (Dalton MARTIN, et al., 2019) is: 20%    Values used to calculate the score:      Age: 74 years      Sex: Female      Is Non- : Yes      Diabetic: No      Tobacco smoker: No      Systolic Blood Pressure: 148 mmHg      Is BP treated: Yes      HDL Cholesterol: 91 mg/dL      Total Cholesterol: 188 mg/dL    Follow-up: Follow up in about 1 year (around 5/15/2026) for physical with Dr TORRES.    I spent a total of   45    minutes face to face and non-face to face on the date of this visit.This includes time preparing to see the patient (eg, review of tests, notes), obtaining and/or reviewing additional history from an independent historian and/or outside medical records, documenting clinical information in the electronic health record, independently interpreting results and/or communicating results to the patient/family/caregiver, or care coordinator.  Visit today included increased complexity associated with the care of the episodic problem addressed and managing the longitudinal care of the patient due to the serious and/or complex managed problem(s).    This note was generated with the assistance of ambient listening technology. Verbal consent was obtained by the patient and  accompanying visitor(s) for the recording of patient appointment to facilitate this note. I attest to having reviewed and edited the generated note for accuracy, though some syntax or spelling errors may persist. Please contact the author of this note for any clarification.       There are no Patient Instructions on file for this visit.

## 2025-05-23 ENCOUNTER — TELEPHONE (OUTPATIENT)
Dept: PHARMACY | Facility: CLINIC | Age: 75
End: 2025-05-23
Payer: COMMERCIAL

## 2025-05-23 NOTE — TELEPHONE ENCOUNTER
[facility] Chart Review & Patient Outreach Details For Medication Adherence Project    Reason for Outreach Encounter: 3rd Party payor non-compliance report ([employer], [employer]BS, [employer], etc)  2.  Patient Outreach Method: Reviewed patient chart   3.   Medication in question:    Hyperlipidemia Medications              atorvastatin (LIPITOR) 10 MG tablet TAKE 1 TABLET(10 MG) BY MOUTH EVERY DAY    fish oil-omega-3 fatty acids 300-1,000 mg capsule Take 2 g by mouth once daily.               LF 90 ds 4/20/25    4.  Reviewed and or Updates Made To: Patient Chart  5. Outreach Outcomes and/or actions taken: Patient filled medication and is on track to be adherent  Additional Notes:

## 2025-07-18 DIAGNOSIS — I10 PRIMARY HYPERTENSION: ICD-10-CM

## 2025-07-21 RX ORDER — VALSARTAN AND HYDROCHLOROTHIAZIDE 320; 25 MG/1; MG/1
1 TABLET, FILM COATED ORAL
Qty: 90 TABLET | Refills: 3 | Status: SHIPPED | OUTPATIENT
Start: 2025-07-21

## 2025-07-22 ENCOUNTER — OFFICE VISIT (OUTPATIENT)
Dept: CARDIOLOGY | Facility: CLINIC | Age: 75
End: 2025-07-22
Payer: COMMERCIAL

## 2025-07-22 VITALS
BODY MASS INDEX: 28.71 KG/M2 | SYSTOLIC BLOOD PRESSURE: 144 MMHG | WEIGHT: 172.31 LBS | HEART RATE: 98 BPM | HEIGHT: 65 IN | RESPIRATION RATE: 16 BRPM | OXYGEN SATURATION: 99 % | DIASTOLIC BLOOD PRESSURE: 86 MMHG

## 2025-07-22 DIAGNOSIS — G47.33 OSA ON CPAP: Primary | ICD-10-CM

## 2025-07-22 DIAGNOSIS — R00.2 PALPITATIONS: ICD-10-CM

## 2025-07-22 DIAGNOSIS — E78.2 MIXED HYPERLIPIDEMIA: ICD-10-CM

## 2025-07-22 DIAGNOSIS — I10 PRIMARY HYPERTENSION: ICD-10-CM

## 2025-07-22 DIAGNOSIS — R73.03 PREDIABETES: ICD-10-CM

## 2025-07-22 PROCEDURE — 3061F NEG MICROALBUMINURIA REV: CPT | Mod: CPTII,S$GLB,, | Performed by: INTERNAL MEDICINE

## 2025-07-22 PROCEDURE — 3066F NEPHROPATHY DOC TX: CPT | Mod: CPTII,S$GLB,, | Performed by: INTERNAL MEDICINE

## 2025-07-22 PROCEDURE — 3079F DIAST BP 80-89 MM HG: CPT | Mod: CPTII,S$GLB,, | Performed by: INTERNAL MEDICINE

## 2025-07-22 PROCEDURE — 1159F MED LIST DOCD IN RCRD: CPT | Mod: CPTII,S$GLB,, | Performed by: INTERNAL MEDICINE

## 2025-07-22 PROCEDURE — 3008F BODY MASS INDEX DOCD: CPT | Mod: CPTII,S$GLB,, | Performed by: INTERNAL MEDICINE

## 2025-07-22 PROCEDURE — 3288F FALL RISK ASSESSMENT DOCD: CPT | Mod: CPTII,S$GLB,, | Performed by: INTERNAL MEDICINE

## 2025-07-22 PROCEDURE — 1101F PT FALLS ASSESS-DOCD LE1/YR: CPT | Mod: CPTII,S$GLB,, | Performed by: INTERNAL MEDICINE

## 2025-07-22 PROCEDURE — 3077F SYST BP >= 140 MM HG: CPT | Mod: CPTII,S$GLB,, | Performed by: INTERNAL MEDICINE

## 2025-07-22 PROCEDURE — 3044F HG A1C LEVEL LT 7.0%: CPT | Mod: CPTII,S$GLB,, | Performed by: INTERNAL MEDICINE

## 2025-07-22 PROCEDURE — 99204 OFFICE O/P NEW MOD 45 MIN: CPT | Mod: S$GLB,,, | Performed by: INTERNAL MEDICINE

## 2025-07-22 PROCEDURE — 99999 PR PBB SHADOW E&M-EST. PATIENT-LVL IV: CPT | Mod: PBBFAC,,, | Performed by: INTERNAL MEDICINE

## 2025-07-22 NOTE — PROGRESS NOTES
Subjective:   Patient ID:  07/22/2025      Xin Taylor is a 74 y.o. female who presents for evaluation of No chief complaint on file.      History of Present Illness    CHIEF COMPLAINT:  Patient presents today for six-month follow-up assessment    CARDIAC SYMPTOMS:  She reports experiencing daily palpitations described as fluttering and inconsistent rhythm, most notably during early morning bathroom visits. She manages symptoms by drinking water and practicing deep breathing, which helps the palpitations jhoan. She speculates dehydration may be a contributing factor.    CARDIAC HISTORY:  Previous 2-day monitor revealed 185 skipped beats from bottom ventricles and 130 skipped beats from top atria. A 14-day monitor from 2022 showed minimal PVCs with a burden of 0.04%, and documented 19 episodes of rapid heartbeat with the longest episode lasting 20 consecutive beats.    SLEEP:  She continues using CPAP for sleep management following a sleep study in July 2022.    MEDICATIONS:  She currently takes amlodipine. Previously took Nebivolol (Bystolic), initially prescribed February 2023, increased to 20 mg in May 2023, then discontinued same month.    LIFESTYLE:  She reports minimal caffeine intake, consuming occasional tea and decaf coffee. She avoids cold drinks and does not consume caffeinated beverages daily.         HPI    Past Medical History:   Diagnosis Date    Anemia     Colon polyp     Edema     HLD (hyperlipidemia)     HTN (hypertension)     Palpitation     Tinnitus        Past Surgical History:   Procedure Laterality Date    COLONOSCOPY N/A 04/22/2022    Procedure: COLONOSCOPY;  Surgeon: Priyanka Winkler MD;  Location: [facility];  Service: Endoscopy;  Laterality: N/A;    HYSTERECTOMY         Social History[1]    Family History   Problem Relation Name Age of Onset    Hypertension Mother Liana Taylor     Thyroid disease Mother Liana Taylor     Cataracts Mother Liana Taylor     Cancer Mother Liana Taylor          lung    Alcohol abuse Father Santiago Taylor     Cancer Other      Heart defect Brother      Breast cancer Paternal Aunt           Review of Systems   Cardiovascular:  Negative for chest pain, dyspnea on exertion, palpitations and syncope.   Neurological:  Negative for focal weakness.       Current Outpatient Medications on File Prior to Visit   Medication Sig    amLODIPine (NORVASC) 5 MG tablet Take 1 tablet (5 mg total) by mouth once daily.    aspirin 81 mg Tab Take 1 tablet by mouth once daily.    atorvastatin (LIPITOR) 10 MG tablet TAKE 1 TABLET(10 MG) BY MOUTH EVERY DAY    b complex vitamins tablet Take 1 tablet by mouth once daily.    co-enzyme Q-10 30 mg capsule Take 30 mg by mouth 3 (three) times daily.    ferrous sulfate 325 mg (65 mg iron) Tab tablet Take 1 tablet (325 mg total) by mouth once daily. Take with citrus    fish oil-omega-3 fatty acids 300-1,000 mg capsule Take 2 g by mouth once daily.    magnesium 200 mg Tab Take by mouth once. Patient takes 1 pill every other day (Patient taking differently: Take by mouth Daily. Patient takes 1 pill every po daily)    meloxicam (MOBIC) 15 MG tablet Take 1 tablet (15 mg total) by mouth every 7 days. Once a week or PRN    multivitamin (THERAGRAN) per tablet Take 1 tablet by mouth once daily.    turmeric root extract 500 mg Cap Take 500 mg by mouth 2 (two) times daily.    valsartan-hydrochlorothiazide (DIOVAN-HCT) 320-25 mg per tablet TAKE 1 TABLET BY MOUTH DAILY    vitamin D 1000 units Tab Take 185 mg by mouth once daily.     No current facility-administered medications on file prior to visit.       Objective:   Objective:  Wt Readings from Last 3 Encounters:   07/22/25 78.1 kg (172 lb 4.6 oz)   05/15/25 78.5 kg (173 lb 2.4 oz)   03/21/25 77.6 kg (171 lb 1.2 oz)     Temp Readings from Last 3 Encounters:   05/15/25 98.2 °F (36.8 °C) (Tympanic)   05/11/24 98 °F (36.7 °C) (Oral)   05/09/24 98.5 °F (36.9 °C) (Tympanic)     BP Readings from Last 3 Encounters:    07/22/25 (!) 144/86   05/15/25 (!) 148/70   03/21/25 (!) 143/77     Pulse Readings from Last 3 Encounters:   07/22/25 98   05/15/25 89   03/21/25 79       Physical Exam  Vitals reviewed.   Constitutional:       Appearance: She is well-developed.   Neck:      Vascular: No carotid bruit.   Cardiovascular:      Rate and Rhythm: Normal rate and regular rhythm.      Pulses: Intact distal pulses.      Heart sounds: Normal heart sounds. No murmur heard.  Pulmonary:      Breath sounds: Normal breath sounds.   Neurological:      Mental Status: She is oriented to person, place, and time.           Lab Results   Component Value Date    CHOL 188 05/06/2025    CHOL 204 (H) 05/06/2024    CHOL 184 02/23/2023     Lab Results   Component Value Date    LDLCALC 86.8 05/06/2025    LDLCALC 99.6 05/06/2024    LDLCALC 93.4 02/23/2023         Chemistry        Component Value Date/Time     (L) 05/06/2025 1214     03/21/2025 1352    K 3.3 (L) 05/06/2025 1214    K 3.6 03/21/2025 1352    CL 99 05/06/2025 1214     03/21/2025 1352    CO2 26 05/06/2025 1214    CO2 28 03/21/2025 1352    BUN 17 05/06/2025 1214    CREATININE 0.9 05/06/2025 1214    GLU 93 05/06/2025 1214    GLU 81 03/21/2025 1352        Component Value Date/Time    CALCIUM 9.1 05/06/2025 1214    CALCIUM 9.7 03/21/2025 1352    ALKPHOS 79 05/06/2025 1214    ALKPHOS 85 03/21/2025 1352    AST 23 05/06/2025 1214    AST 29 03/21/2025 1352    ALT 15 05/06/2025 1214    ALT 20 03/21/2025 1352    BILITOT 0.6 05/06/2025 1214    BILITOT 0.5 03/21/2025 1352    ESTGFRAFRICA >60.0 04/05/2022 1147    EGFRNONAA >60.0 04/05/2022 1147          Lab Results   Component Value Date    TSH 1.625 05/06/2025       Lab Results   Component Value Date    WBC 4.31 05/06/2025    HGB 12.0 05/06/2025    HCT 36.7 (L) 05/06/2025    MCV 94 05/06/2025     05/06/2025       @LABRCNTIP(BNP,BNPTRIAGEBLO)@       Imaging:  ======    No results found for this or any previous visit.    Results for  orders placed in visit on 02/24/11    US Carotid Bilateral    Narrative  DATE OF EXAM: Feb 24 2011    BOC   0519  -  US EXTRACRANIAL COMPLETE BILAT:  [id number]    CLINICAL HISTORY:   401.1  BENIGN HYPERTENSION    PROCEDURE COMMENT:    ICD 9 CODE(S):   ()    CPT 4 CODE(S)/MODIFIER(S):   ()    RESULTS: REAL-TIME ULTRASONOGRAPHY DEMONSTRATED THE DISTAL CERVICAL  COMMON CAROTID, PROXIMAL INTERNAL AND EXTERNAL CAROTID AND VERTEBRAL  ARTERIES TO BE PATENT BILATERALLY WITH ANTEGRADE FLOW NOTED.  MILD PLAQUE  WAS PRESENT BILATERALLY, HOWEVER THE ACQUIRED PEAK INTERNAL CAROTID  ARTERY SYSTOLIC VELOCITY MEASUREMENT AND ICA/CCA RATIO ARE WITHIN NORMAL  LIMITS BILATERALLY.    IMPRESSION: MILD PLAQUE BILATERALLY WITHOUT ELEVATION OF THE ACQUIRED  DOPPLER MEASUREMENTS TO INDICATE HEMODYNAMICALLY SIGNIFICANT STENOSIS ON  EITHER SIDE.      : brandon  Transcribe Date/Time: Feb 24 2011  2:27P  Dictated by : JOSE DE JESUS HURTADO DR  Report reviewed by:   Read On: Feb 24 2011  2:16P  JOSE DE JESUS HURTADO M.D.   [id number]  Images were reviewed, findings were verified and document was  electronically  SIGNED BY: JOSE DE JESUS HURTADO DR On: Feb 24 2011  4:03P    Results for orders placed during the hospital encounter of 12/13/23    X-Ray Chest PA And Lateral    Narrative  EXAMINATION:  XR CHEST PA AND LATERAL    CLINICAL HISTORY:  Cough, unspecified    TECHNIQUE:  PA and lateral views of the chest were performed.    COMPARISON:  11/17/2022    FINDINGS:  The lungs are clear and free of infiltrate.  No pleural effusion or pneumothorax. The heart is not enlarged. There is tortuosity of the descending thoracic aorta.    Impression  1.  No acute cardiopulmonary process.      Electronically signed by: Leonardo Harp DO  Date:    12/13/2023  Time:    15:56      No results found for this or any previous visit.      No valid procedures specified.        Results for orders placed during the hospital encounter of 02/06/25    Exercise Stress - EKG    Interpretation  Summary    The ECG portion of the study is negative for ischemia.    The patient reported no chest pain during the stress test.    The exercise capacity was normal.    The patient exercised for 4 minutes 49 seconds on a [place] protocol, achieving a peak heart rate of 153 bpm, which is 109% of the age predicted maximum heart rate. Their exercise capacity was normal.      Results for orders placed during the hospital encounter of 07/15/22    Echo    Interpretation Summary  · The left ventricle is normal in size with normal systolic function.  · Normal left ventricular diastolic function.  · The estimated PA systolic pressure is 31 mmHg.  · Normal right ventricular size with normal right ventricular systolic function.  · Normal central venous pressure (3 mmHg).  · Moderate pulmonic regurgitation.  · Mild mitral regurgitation.  · Mild tricuspid regurgitation.  · The estimated ejection fraction is 60%.      No results found for this or any previous visit.      Lab Results   Component Value Date    HGBA1C 5.7 (H) 05/06/2025         The 10-year ASCVD risk score (Dalton MARTIN, et al., 2019) is: 19.2%    Values used to calculate the score:      Age: 74 years      Sex: Female      Is Non- : Yes      Diabetic: No      Tobacco smoker: No      Systolic Blood Pressure: 144 mmHg      Is BP treated: Yes      HDL Cholesterol: 91 mg/dL      Total Cholesterol: 188 mg/dL    Diagnostic Results:  ECG: Reviewed    Assessment and Plan:   ASIF on CPAP    Primary hypertension    Palpitations  -     Cardiac Monitor - 3-15 Day Adult; Future    Prediabetes    Mixed hyperlipidemia        Assessment & Plan    G47.33 ASIF on CPAP  I10 Primary hypertension  R00.2 Palpitations  R73.03 Prediabetes  E78.2 Mixed hyperlipidemia    Reviewed previous heart monitor results from 2022, noting benign findings including short episodes of SVTs/atrial tachycardia, not AFib.  Considered beta blocker therapy (e.g. metoprolol) for symptom  management, but deferred due to current tolerance of symptoms.  Plan to continue current management if monitor results remain benign; will consider low-dose beta blocker if clinically indicated based on results.    R00.2 PALPITATIONS:  - Explained that AFib is a concern due to increased stroke risk, while short episodes of tachycardia are generally benign.  - Ordered new 1-week heart monitor to reassess for any changes in cardiac rhythm, particularly to rule out AFib.  - Follow up after completion of 1-week heart monitor to review results and discuss any necessary changes to management plan.               Reviewed all tests and above medical conditions with patient in detail and formulated treatment plan.      Follow up in  6 months         [1]   Social History  Tobacco Use    Smoking status: Never     Passive exposure: Never    Smokeless tobacco: Never    Tobacco comments:     None   Substance Use Topics    Alcohol use: No    Drug use: No

## 2025-07-29 ENCOUNTER — PATIENT MESSAGE (OUTPATIENT)
Dept: ADMINISTRATIVE | Facility: HOSPITAL | Age: 75
End: 2025-07-29
Payer: COMMERCIAL

## 2025-07-29 ENCOUNTER — HOSPITAL ENCOUNTER (OUTPATIENT)
Dept: CARDIOLOGY | Facility: HOSPITAL | Age: 75
Discharge: HOME | End: 2025-07-29
Attending: INTERNAL MEDICINE
Payer: COMMERCIAL

## 2025-07-29 DIAGNOSIS — R00.2 PALPITATIONS: ICD-10-CM

## 2025-07-30 ENCOUNTER — PATIENT OUTREACH (OUTPATIENT)
Dept: ADMINISTRATIVE | Facility: HOSPITAL | Age: 75
End: 2025-07-30
Payer: COMMERCIAL

## 2025-08-11 RX ORDER — MELOXICAM 15 MG/1
TABLET ORAL
Qty: 30 TABLET | Refills: 1 | Status: SHIPPED | OUTPATIENT
Start: 2025-08-11 | End: 2025-08-12 | Stop reason: SDUPTHER

## 2025-08-12 RX ORDER — MELOXICAM 15 MG/1
TABLET ORAL
Qty: 30 TABLET | Refills: 1 | Status: SHIPPED | OUTPATIENT
Start: 2025-08-12

## 2025-08-14 ENCOUNTER — NURSE TRIAGE (OUTPATIENT)
Dept: ADMINISTRATIVE | Facility: CLINIC | Age: 75
End: 2025-08-14
Payer: COMMERCIAL

## 2025-08-14 ENCOUNTER — PATIENT MESSAGE (OUTPATIENT)
Dept: PRIMARY CARE CLINIC | Facility: CLINIC | Age: 75
End: 2025-08-14
Payer: COMMERCIAL

## 2025-08-14 ENCOUNTER — OCHSNER VIRTUAL EMERGENCY DEPARTMENT (OUTPATIENT)
Facility: CLINIC | Age: 75
End: 2025-08-14
Payer: COMMERCIAL

## 2025-08-14 ENCOUNTER — PATIENT OUTREACH (OUTPATIENT)
Facility: OTHER | Age: 75
End: 2025-08-14
Payer: COMMERCIAL

## 2025-08-14 DIAGNOSIS — R00.2 PALPITATIONS: Primary | ICD-10-CM

## 2025-08-15 ENCOUNTER — OFFICE VISIT (OUTPATIENT)
Dept: CARDIOLOGY | Facility: CLINIC | Age: 75
End: 2025-08-15
Payer: COMMERCIAL

## 2025-08-15 ENCOUNTER — HOSPITAL ENCOUNTER (OUTPATIENT)
Dept: CARDIOLOGY | Facility: HOSPITAL | Age: 75
Discharge: HOME | End: 2025-08-15
Attending: INTERNAL MEDICINE
Payer: COMMERCIAL

## 2025-08-15 VITALS
OXYGEN SATURATION: 99 % | SYSTOLIC BLOOD PRESSURE: 162 MMHG | DIASTOLIC BLOOD PRESSURE: 92 MMHG | WEIGHT: 170.31 LBS | BODY MASS INDEX: 28.34 KG/M2 | HEART RATE: 86 BPM

## 2025-08-15 VITALS — DIASTOLIC BLOOD PRESSURE: 86 MMHG | SYSTOLIC BLOOD PRESSURE: 168 MMHG

## 2025-08-15 DIAGNOSIS — E78.2 MIXED HYPERLIPIDEMIA: ICD-10-CM

## 2025-08-15 DIAGNOSIS — I10 PRIMARY HYPERTENSION: ICD-10-CM

## 2025-08-15 DIAGNOSIS — I10 PRIMARY HYPERTENSION: Primary | ICD-10-CM

## 2025-08-15 DIAGNOSIS — E87.6 HYPOKALEMIA: ICD-10-CM

## 2025-08-15 DIAGNOSIS — R00.2 PALPITATIONS: ICD-10-CM

## 2025-08-15 DIAGNOSIS — R00.2 PALPITATIONS: Primary | ICD-10-CM

## 2025-08-15 DIAGNOSIS — R94.31 ABNORMAL ECG: ICD-10-CM

## 2025-08-15 DIAGNOSIS — I10 ESSENTIAL HYPERTENSION: ICD-10-CM

## 2025-08-15 LAB
OHS QRS DURATION: 88 MS
OHS QTC CALCULATION: 438 MS

## 2025-08-15 PROCEDURE — 93010 ELECTROCARDIOGRAM REPORT: CPT | Mod: ,,, | Performed by: INTERNAL MEDICINE

## 2025-08-15 PROCEDURE — 93005 ELECTROCARDIOGRAM TRACING: CPT

## 2025-08-15 PROCEDURE — 99999 PR PBB SHADOW E&M-EST. PATIENT-LVL III: CPT | Mod: PBBFAC,,, | Performed by: INTERNAL MEDICINE

## 2025-08-15 RX ORDER — SPIRONOLACTONE 25 MG/1
25 TABLET ORAL DAILY
Qty: 30 TABLET | Refills: 11 | Status: SHIPPED | OUTPATIENT
Start: 2025-08-15 | End: 2026-08-15

## 2025-08-15 RX ORDER — VALSARTAN 320 MG/1
320 TABLET ORAL DAILY
Qty: 90 TABLET | Refills: 3 | Status: SHIPPED | OUTPATIENT
Start: 2025-08-15 | End: 2026-08-15

## 2025-08-18 RX ORDER — MELOXICAM 15 MG/1
TABLET ORAL
Qty: 30 TABLET | Refills: 1 | Status: SHIPPED | OUTPATIENT
Start: 2025-08-18

## 2025-08-19 ENCOUNTER — TELEPHONE (OUTPATIENT)
Dept: CARDIOLOGY | Facility: CLINIC | Age: 75
End: 2025-08-19
Payer: COMMERCIAL

## 2025-08-20 ENCOUNTER — TELEPHONE (OUTPATIENT)
Dept: CARDIOLOGY | Facility: CLINIC | Age: 75
End: 2025-08-20
Payer: COMMERCIAL

## 2025-08-25 ENCOUNTER — PATIENT MESSAGE (OUTPATIENT)
Dept: PRIMARY CARE CLINIC | Facility: CLINIC | Age: 75
End: 2025-08-25
Payer: COMMERCIAL

## 2025-08-29 ENCOUNTER — LAB VISIT (OUTPATIENT)
Dept: LAB | Facility: HOSPITAL | Age: 75
End: 2025-08-29
Attending: INTERNAL MEDICINE
Payer: COMMERCIAL

## 2025-08-29 DIAGNOSIS — R00.2 PALPITATIONS: ICD-10-CM

## 2025-08-29 DIAGNOSIS — I10 ESSENTIAL HYPERTENSION: ICD-10-CM

## 2025-08-29 DIAGNOSIS — R94.31 ABNORMAL ECG: ICD-10-CM

## 2025-08-29 LAB
ALBUMIN SERPL BCP-MCNC: 3.9 G/DL (ref 3.5–5.2)
ALP SERPL-CCNC: 81 UNIT/L (ref 40–150)
ALT SERPL W/O P-5'-P-CCNC: 19 UNIT/L (ref 0–55)
ANION GAP (OHS): 11 MMOL/L (ref 8–16)
AST SERPL-CCNC: 27 UNIT/L (ref 0–50)
BILIRUB SERPL-MCNC: 0.5 MG/DL (ref 0.1–1)
BUN SERPL-MCNC: 19 MG/DL (ref 8–23)
CALCIUM SERPL-MCNC: 9.6 MG/DL (ref 8.7–10.5)
CHLORIDE SERPL-SCNC: 103 MMOL/L (ref 95–110)
CO2 SERPL-SCNC: 25 MMOL/L (ref 23–29)
CREAT SERPL-MCNC: 1 MG/DL (ref 0.5–1.4)
GFR SERPLBLD CREATININE-BSD FMLA CKD-EPI: 59 ML/MIN/1.73/M2
GLUCOSE SERPL-MCNC: 100 MG/DL (ref 70–110)
POTASSIUM SERPL-SCNC: 4.1 MMOL/L (ref 3.5–5.1)
PROT SERPL-MCNC: 7.7 GM/DL (ref 6–8.4)
SODIUM SERPL-SCNC: 139 MMOL/L (ref 136–145)

## 2025-08-29 PROCEDURE — 36415 COLL VENOUS BLD VENIPUNCTURE: CPT

## 2025-08-29 PROCEDURE — 80053 COMPREHEN METABOLIC PANEL: CPT

## 2025-09-02 ENCOUNTER — TELEPHONE (OUTPATIENT)
Dept: CARDIOLOGY | Facility: CLINIC | Age: 75
End: 2025-09-02
Payer: COMMERCIAL